# Patient Record
Sex: FEMALE | Race: BLACK OR AFRICAN AMERICAN | Employment: OTHER | URBAN - METROPOLITAN AREA
[De-identification: names, ages, dates, MRNs, and addresses within clinical notes are randomized per-mention and may not be internally consistent; named-entity substitution may affect disease eponyms.]

---

## 2017-04-20 ENCOUNTER — HOSPITAL ENCOUNTER (OUTPATIENT)
Dept: GENERAL RADIOLOGY | Age: 82
Discharge: HOME | End: 2017-04-20
Attending: INTERNAL MEDICINE | Admitting: INTERNAL MEDICINE

## 2017-04-20 LAB
ALT SERPL-CCNC: 24 U/L (ref 10–40)
AST SERPL-CCNC: 24 IU/L (ref 15–37)
BASOPHILS ABSOLUTE: 0 K/CU MM
BASOPHILS RELATIVE PERCENT: 0.3 % (ref 0–1)
BUN BLDV-MCNC: 17 MG/DL (ref 6–23)
CREAT SERPL-MCNC: 1 MG/DL (ref 0.6–1.1)
DIFFERENTIAL TYPE: ABNORMAL
EOSINOPHILS ABSOLUTE: 0.3 K/CU MM
EOSINOPHILS RELATIVE PERCENT: 3 % (ref 0–3)
ERYTHROCYTE SEDIMENTATION RATE: 66 MM/HR (ref 0–30)
GFR AFRICAN AMERICAN: >60 ML/MIN/1.73M2
GFR NON-AFRICAN AMERICAN: 53 ML/MIN/1.73M2
HCT VFR BLD CALC: 35.7 % (ref 37–47)
HEMOGLOBIN: 11.5 GM/DL (ref 12.5–16)
IMMATURE NEUTROPHIL %: 0.3 % (ref 0–0.43)
LYMPHOCYTES ABSOLUTE: 1.9 K/CU MM
LYMPHOCYTES RELATIVE PERCENT: 20.3 % (ref 24–44)
MCH RBC QN AUTO: 34.3 PG (ref 27–31)
MCHC RBC AUTO-ENTMCNC: 32.2 % (ref 32–36)
MCV RBC AUTO: 106.6 FL (ref 78–100)
MONOCYTES ABSOLUTE: 0.7 K/CU MM
MONOCYTES RELATIVE PERCENT: 7.4 % (ref 0–4)
NUCLEATED RBC %: 0 %
PDW BLD-RTO: 13.4 % (ref 11.7–14.9)
PLATELET # BLD: 268 K/CU MM (ref 140–440)
PMV BLD AUTO: 10 FL (ref 7.5–11.1)
RBC # BLD: 3.35 M/CU MM (ref 4.2–5.4)
SEGMENTED NEUTROPHILS ABSOLUTE COUNT: 6.4 K/CU MM
SEGMENTED NEUTROPHILS RELATIVE PERCENT: 68.7 % (ref 36–66)
TOTAL IMMATURE NEUTOROPHIL: 0.03 K/CU MM
TOTAL NUCLEATED RBC: 0 K/CU MM
WBC # BLD: 9.3 K/CU MM (ref 4–10.5)

## 2017-08-18 ENCOUNTER — HOSPITAL ENCOUNTER (OUTPATIENT)
Dept: GENERAL RADIOLOGY | Age: 82
Discharge: HOME | End: 2017-08-18
Attending: INTERNAL MEDICINE | Admitting: INTERNAL MEDICINE

## 2017-08-18 LAB
ALT SERPL-CCNC: 19 U/L (ref 10–40)
AST SERPL-CCNC: 24 IU/L (ref 15–37)
BASOPHILS ABSOLUTE: 0 K/CU MM
BASOPHILS RELATIVE PERCENT: 0.5 % (ref 0–1)
BUN BLDV-MCNC: 15 MG/DL (ref 6–23)
CREAT SERPL-MCNC: 1.1 MG/DL (ref 0.6–1.1)
DIFFERENTIAL TYPE: ABNORMAL
EOSINOPHILS ABSOLUTE: 0.3 K/CU MM
EOSINOPHILS RELATIVE PERCENT: 3.6 % (ref 0–3)
ERYTHROCYTE SEDIMENTATION RATE: 63 MM/HR (ref 0–30)
GFR AFRICAN AMERICAN: 58 ML/MIN/1.73M2
GFR NON-AFRICAN AMERICAN: 48 ML/MIN/1.73M2
HCT VFR BLD CALC: 35.3 % (ref 37–47)
HEMOGLOBIN: 11.2 GM/DL (ref 12.5–16)
IMMATURE NEUTROPHIL %: 0.3 % (ref 0–0.43)
LYMPHOCYTES ABSOLUTE: 2.6 K/CU MM
LYMPHOCYTES RELATIVE PERCENT: 29.7 % (ref 24–44)
MCH RBC QN AUTO: 33.9 PG (ref 27–31)
MCHC RBC AUTO-ENTMCNC: 31.7 % (ref 32–36)
MCV RBC AUTO: 107 FL (ref 78–100)
MONOCYTES ABSOLUTE: 0.9 K/CU MM
MONOCYTES RELATIVE PERCENT: 9.9 % (ref 0–4)
NUCLEATED RBC %: 0 %
PDW BLD-RTO: 14.3 % (ref 11.7–14.9)
PLATELET # BLD: 335 K/CU MM (ref 140–440)
PMV BLD AUTO: 9.5 FL (ref 7.5–11.1)
RBC # BLD: 3.3 M/CU MM (ref 4.2–5.4)
SEGMENTED NEUTROPHILS ABSOLUTE COUNT: 4.9 K/CU MM
SEGMENTED NEUTROPHILS RELATIVE PERCENT: 56 % (ref 36–66)
TOTAL IMMATURE NEUTOROPHIL: 0.03 K/CU MM
TOTAL NUCLEATED RBC: 0 K/CU MM
WBC # BLD: 8.8 K/CU MM (ref 4–10.5)

## 2018-02-19 ENCOUNTER — HOSPITAL ENCOUNTER (OUTPATIENT)
Dept: GENERAL RADIOLOGY | Age: 83
Discharge: HOME | End: 2018-02-19
Attending: INTERNAL MEDICINE | Admitting: INTERNAL MEDICINE

## 2018-02-19 LAB
ALT SERPL-CCNC: 15 U/L (ref 10–40)
AST SERPL-CCNC: 15 IU/L (ref 15–37)
BUN BLDV-MCNC: 21 MG/DL (ref 6–23)
CREAT SERPL-MCNC: 1 MG/DL (ref 0.6–1.1)
ERYTHROCYTE SEDIMENTATION RATE: 39 MM/HR (ref 0–30)
GFR AFRICAN AMERICAN: >60 ML/MIN/1.73M2
GFR NON-AFRICAN AMERICAN: 53 ML/MIN/1.73M2

## 2018-02-27 ENCOUNTER — HOSPITAL ENCOUNTER (OUTPATIENT)
Dept: GENERAL RADIOLOGY | Age: 83
Discharge: HOME | End: 2018-02-27
Attending: INTERNAL MEDICINE | Admitting: INTERNAL MEDICINE

## 2018-02-27 DIAGNOSIS — M25.552 BILATERAL HIP PAIN: ICD-10-CM

## 2018-02-27 DIAGNOSIS — M25.551 BILATERAL HIP PAIN: ICD-10-CM

## 2018-07-02 ENCOUNTER — HOSPITAL ENCOUNTER (OUTPATIENT)
Dept: GENERAL RADIOLOGY | Age: 83
Discharge: HOME | End: 2018-07-02
Attending: INTERNAL MEDICINE | Admitting: INTERNAL MEDICINE

## 2018-07-02 LAB
ALT SERPL-CCNC: 22 U/L (ref 10–40)
AST SERPL-CCNC: 21 IU/L (ref 15–37)
BASOPHILS ABSOLUTE: 0 K/CU MM
BASOPHILS RELATIVE PERCENT: 0.4 % (ref 0–1)
BUN BLDV-MCNC: 20 MG/DL (ref 6–23)
CREAT SERPL-MCNC: 1.1 MG/DL (ref 0.6–1.1)
DIFFERENTIAL TYPE: ABNORMAL
EOSINOPHILS ABSOLUTE: 0.3 K/CU MM
EOSINOPHILS RELATIVE PERCENT: 2.8 % (ref 0–3)
ERYTHROCYTE SEDIMENTATION RATE: 17 MM/HR (ref 0–30)
GFR AFRICAN AMERICAN: 57 ML/MIN/1.73M2
GFR NON-AFRICAN AMERICAN: 47 ML/MIN/1.73M2
HCT VFR BLD CALC: 37.4 % (ref 37–47)
HEMOGLOBIN: 11.6 GM/DL (ref 12.5–16)
IMMATURE NEUTROPHIL %: 0.3 % (ref 0–0.43)
LYMPHOCYTES ABSOLUTE: 3.3 K/CU MM
LYMPHOCYTES RELATIVE PERCENT: 35.8 % (ref 24–44)
MCH RBC QN AUTO: 34.3 PG (ref 27–31)
MCHC RBC AUTO-ENTMCNC: 31 % (ref 32–36)
MCV RBC AUTO: 110.7 FL (ref 78–100)
MONOCYTES ABSOLUTE: 0.9 K/CU MM
MONOCYTES RELATIVE PERCENT: 9.6 % (ref 0–4)
NUCLEATED RBC %: 0.7 %
PDW BLD-RTO: 13.2 % (ref 11.7–14.9)
PLATELET # BLD: 257 K/CU MM (ref 140–440)
PMV BLD AUTO: 10.1 FL (ref 7.5–11.1)
RBC # BLD: 3.38 M/CU MM (ref 4.2–5.4)
SEGMENTED NEUTROPHILS ABSOLUTE COUNT: 4.7 K/CU MM
SEGMENTED NEUTROPHILS RELATIVE PERCENT: 51.1 % (ref 36–66)
TOTAL IMMATURE NEUTOROPHIL: 0.03 K/CU MM
TOTAL NUCLEATED RBC: 0.1 K/CU MM
WBC # BLD: 9.1 K/CU MM (ref 4–10.5)

## 2018-11-28 ENCOUNTER — HOSPITAL ENCOUNTER (EMERGENCY)
Age: 83
Discharge: HOME | End: 2018-11-28
Attending: EMERGENCY MEDICINE
Payer: MEDICARE

## 2018-11-28 VITALS
BODY MASS INDEX: 28.93 KG/M2 | TEMPERATURE: 97.4 F | WEIGHT: 180 LBS | SYSTOLIC BLOOD PRESSURE: 137 MMHG | HEIGHT: 66 IN | DIASTOLIC BLOOD PRESSURE: 72 MMHG | OXYGEN SATURATION: 97 % | HEART RATE: 59 BPM | RESPIRATION RATE: 16 BRPM

## 2018-11-28 DIAGNOSIS — R51.9 RIGHT-SIDED HEADACHE: Primary | ICD-10-CM

## 2018-11-28 LAB
ALBUMIN SERPL-MCNC: 3.6 GM/DL (ref 3.4–5)
ALP BLD-CCNC: 66 IU/L (ref 40–129)
ALT SERPL-CCNC: 20 U/L (ref 10–40)
ANION GAP SERPL CALCULATED.3IONS-SCNC: 12 MMOL/L (ref 4–16)
AST SERPL-CCNC: 15 IU/L (ref 15–37)
BACTERIA: NEGATIVE /HPF
BASOPHILS ABSOLUTE: 0 K/CU MM
BASOPHILS RELATIVE PERCENT: 0.3 % (ref 0–1)
BILIRUB SERPL-MCNC: 0.1 MG/DL (ref 0–1)
BILIRUBIN URINE: NEGATIVE MG/DL
BLOOD, URINE: ABNORMAL
BUN BLDV-MCNC: 25 MG/DL (ref 6–23)
CALCIUM SERPL-MCNC: 9.8 MG/DL (ref 8.3–10.6)
CHLORIDE BLD-SCNC: 103 MMOL/L (ref 99–110)
CLARITY: ABNORMAL
CO2: 26 MMOL/L (ref 21–32)
COLOR: YELLOW
CREAT SERPL-MCNC: 1 MG/DL (ref 0.6–1.1)
DIFFERENTIAL TYPE: ABNORMAL
EOSINOPHILS ABSOLUTE: 0.2 K/CU MM
EOSINOPHILS RELATIVE PERCENT: 1.7 % (ref 0–3)
ERYTHROCYTE SEDIMENTATION RATE: 48 MM/HR (ref 0–30)
GFR AFRICAN AMERICAN: >60 ML/MIN/1.73M2
GFR NON-AFRICAN AMERICAN: 53 ML/MIN/1.73M2
GLUCOSE BLD-MCNC: 152 MG/DL (ref 70–99)
GLUCOSE, URINE: NEGATIVE MG/DL
HCT VFR BLD CALC: 36.1 % (ref 37–47)
HEMOGLOBIN: 11.1 GM/DL (ref 12.5–16)
IMMATURE NEUTROPHIL %: 0.3 % (ref 0–0.43)
KETONES, URINE: NEGATIVE MG/DL
LEUKOCYTE ESTERASE, URINE: ABNORMAL
LYMPHOCYTES ABSOLUTE: 3.5 K/CU MM
LYMPHOCYTES RELATIVE PERCENT: 36.5 % (ref 24–44)
MCH RBC QN AUTO: 33.3 PG (ref 27–31)
MCHC RBC AUTO-ENTMCNC: 30.7 % (ref 32–36)
MCV RBC AUTO: 108.4 FL (ref 78–100)
MONOCYTES ABSOLUTE: 0.8 K/CU MM
MONOCYTES RELATIVE PERCENT: 8.4 % (ref 0–4)
NITRITE URINE, QUANTITATIVE: NEGATIVE
NUCLEATED RBC %: 0 %
PDW BLD-RTO: 13.2 % (ref 11.7–14.9)
PH, URINE: 5 (ref 5–8)
PLATELET # BLD: 275 K/CU MM (ref 140–440)
PMV BLD AUTO: 9.1 FL (ref 7.5–11.1)
POTASSIUM SERPL-SCNC: 3.8 MMOL/L (ref 3.5–5.1)
PROTEIN UA: NEGATIVE MG/DL
RBC # BLD: 3.33 M/CU MM (ref 4.2–5.4)
RBC URINE: 6 /HPF (ref 0–6)
SEGMENTED NEUTROPHILS ABSOLUTE COUNT: 5 K/CU MM
SEGMENTED NEUTROPHILS RELATIVE PERCENT: 52.8 % (ref 36–66)
SODIUM BLD-SCNC: 141 MMOL/L (ref 135–145)
SPECIFIC GRAVITY UA: 1.02 (ref 1–1.03)
SQUAMOUS EPITHELIAL: 5 /HPF
TOTAL IMMATURE NEUTOROPHIL: 0.03 K/CU MM
TOTAL NUCLEATED RBC: 0 K/CU MM
TOTAL PROTEIN: 7 GM/DL (ref 6.4–8.2)
TRICHOMONAS: ABNORMAL /HPF
UROBILINOGEN, URINE: NORMAL MG/DL (ref 0.2–1)
WBC # BLD: 9.5 K/CU MM (ref 4–10.5)
WBC UA: 35 /HPF (ref 0–5)

## 2018-11-28 PROCEDURE — 99284 EMERGENCY DEPT VISIT MOD MDM: CPT

## 2018-11-28 PROCEDURE — 80053 COMPREHEN METABOLIC PANEL: CPT

## 2018-11-28 PROCEDURE — 6370000000 HC RX 637 (ALT 250 FOR IP): Performed by: EMERGENCY MEDICINE

## 2018-11-28 PROCEDURE — 81001 URINALYSIS AUTO W/SCOPE: CPT

## 2018-11-28 PROCEDURE — 85025 COMPLETE CBC W/AUTO DIFF WBC: CPT

## 2018-11-28 PROCEDURE — 85652 RBC SED RATE AUTOMATED: CPT

## 2018-11-28 PROCEDURE — 86141 C-REACTIVE PROTEIN HS: CPT

## 2018-11-28 PROCEDURE — 87086 URINE CULTURE/COLONY COUNT: CPT

## 2018-11-28 PROCEDURE — 36415 COLL VENOUS BLD VENIPUNCTURE: CPT

## 2018-11-28 RX ORDER — METHYLPREDNISOLONE 4 MG/1
TABLET ORAL
Qty: 1 KIT | Refills: 0 | Status: SHIPPED | OUTPATIENT
Start: 2018-11-28 | End: 2019-10-08

## 2018-11-28 RX ORDER — PREDNISONE 20 MG/1
60 TABLET ORAL ONCE
Status: COMPLETED | OUTPATIENT
Start: 2018-11-28 | End: 2018-11-28

## 2018-11-28 RX ORDER — HYDROCODONE BITARTRATE AND ACETAMINOPHEN 5; 325 MG/1; MG/1
1 TABLET ORAL ONCE
Status: COMPLETED | OUTPATIENT
Start: 2018-11-28 | End: 2018-11-28

## 2018-11-28 RX ADMIN — HYDROCODONE BITARTRATE AND ACETAMINOPHEN 1 TABLET: 5; 325 TABLET ORAL at 20:52

## 2018-11-28 RX ADMIN — PREDNISONE 60 MG: 20 TABLET ORAL at 22:06

## 2018-11-28 ASSESSMENT — ENCOUNTER SYMPTOMS
NAUSEA: 1
SORE THROAT: 0
SHORTNESS OF BREATH: 0
ABDOMINAL PAIN: 0
VOMITING: 0
CONSTIPATION: 0
COUGH: 0
EYE REDNESS: 0
DIARRHEA: 0
RHINORRHEA: 0
PHOTOPHOBIA: 0
EYE DISCHARGE: 0
BACK PAIN: 0

## 2018-11-28 ASSESSMENT — PAIN SCALES - GENERAL
PAINLEVEL_OUTOF10: 10
PAINLEVEL_OUTOF10: 10

## 2018-11-28 ASSESSMENT — PAIN DESCRIPTION - ORIENTATION: ORIENTATION: ANTERIOR;RIGHT

## 2018-11-28 ASSESSMENT — PAIN DESCRIPTION - LOCATION: LOCATION: HEAD

## 2018-11-28 ASSESSMENT — PAIN DESCRIPTION - FREQUENCY: FREQUENCY: CONTINUOUS

## 2018-11-28 ASSESSMENT — PAIN DESCRIPTION - ONSET: ONSET: GRADUAL

## 2018-11-28 ASSESSMENT — PAIN DESCRIPTION - DESCRIPTORS: DESCRIPTORS: CONSTANT

## 2018-11-28 ASSESSMENT — PAIN DESCRIPTION - PAIN TYPE: TYPE: ACUTE PAIN;CHRONIC PAIN

## 2018-11-29 LAB — HIGH SENSITIVE C-REACTIVE PROTEIN: 6.1 MG/L

## 2018-11-30 LAB
CULTURE: NORMAL
Lab: NORMAL
REPORT STATUS: NORMAL
SPECIMEN: NORMAL
TOTAL COLONY COUNT: NORMAL

## 2019-02-05 ENCOUNTER — HOSPITAL ENCOUNTER (OUTPATIENT)
Age: 84
Discharge: HOME | End: 2019-02-05
Payer: MEDICARE

## 2019-02-05 LAB
ALT SERPL-CCNC: 16 U/L (ref 10–40)
ANISOCYTOSIS: ABNORMAL
AST SERPL-CCNC: 22 IU/L (ref 15–37)
BASOPHILS ABSOLUTE: 0 K/CU MM
BASOPHILS RELATIVE PERCENT: 0.4 % (ref 0–1)
BUN BLDV-MCNC: 27 MG/DL (ref 6–23)
CREAT SERPL-MCNC: 1 MG/DL (ref 0.6–1.1)
DIFFERENTIAL TYPE: ABNORMAL
DIFFERENTIAL TYPE: ABNORMAL
EOSINOPHILS ABSOLUTE: 0.1 K/CU MM
EOSINOPHILS RELATIVE PERCENT: 1.5 % (ref 0–3)
GFR AFRICAN AMERICAN: >60 ML/MIN/1.73M2
GFR NON-AFRICAN AMERICAN: 53 ML/MIN/1.73M2
HCT VFR BLD CALC: 37.5 % (ref 37–47)
HEMOGLOBIN: 11.3 GM/DL (ref 12.5–16)
IMMATURE NEUTROPHIL %: 0.4 % (ref 0–0.43)
LYMPHOCYTES ABSOLUTE: 3.7 K/CU MM
LYMPHOCYTES RELATIVE PERCENT: 44.5 % (ref 24–44)
MACROCYTES: ABNORMAL
MCH RBC QN AUTO: 34 PG (ref 27–31)
MCHC RBC AUTO-ENTMCNC: 30.1 % (ref 32–36)
MCV RBC AUTO: 113 FL (ref 78–100)
MONOCYTES ABSOLUTE: 0.9 K/CU MM
MONOCYTES RELATIVE PERCENT: 10.3 % (ref 0–4)
NUCLEATED RBC %: 0 %
PDW BLD-RTO: 13.1 % (ref 11.7–14.9)
PLATELET # BLD: 281 K/CU MM (ref 140–440)
PMV BLD AUTO: 9.8 FL (ref 7.5–11.1)
RBC # BLD: 3.32 M/CU MM (ref 4.2–5.4)
SEGMENTED NEUTROPHILS ABSOLUTE COUNT: 3.6 K/CU MM
SEGMENTED NEUTROPHILS RELATIVE PERCENT: 42.9 % (ref 36–66)
TOTAL IMMATURE NEUTOROPHIL: 0.03 K/CU MM
TOTAL NUCLEATED RBC: 0 K/CU MM
WBC # BLD: 8.3 K/CU MM (ref 4–10.5)

## 2019-02-05 PROCEDURE — 82565 ASSAY OF CREATININE: CPT

## 2019-02-05 PROCEDURE — 84450 TRANSFERASE (AST) (SGOT): CPT

## 2019-02-05 PROCEDURE — 36415 COLL VENOUS BLD VENIPUNCTURE: CPT

## 2019-02-05 PROCEDURE — 85025 COMPLETE CBC W/AUTO DIFF WBC: CPT

## 2019-02-05 PROCEDURE — 84520 ASSAY OF UREA NITROGEN: CPT

## 2019-02-05 PROCEDURE — 84460 ALANINE AMINO (ALT) (SGPT): CPT

## 2019-10-08 ENCOUNTER — APPOINTMENT (OUTPATIENT)
Dept: CT IMAGING | Age: 84
End: 2019-10-08
Payer: COMMERCIAL

## 2019-10-08 ENCOUNTER — HOSPITAL ENCOUNTER (EMERGENCY)
Age: 84
Discharge: HOME | End: 2019-10-08
Attending: EMERGENCY MEDICINE
Payer: COMMERCIAL

## 2019-10-08 VITALS
HEART RATE: 62 BPM | RESPIRATION RATE: 19 BRPM | DIASTOLIC BLOOD PRESSURE: 78 MMHG | OXYGEN SATURATION: 99 % | HEIGHT: 66 IN | WEIGHT: 180 LBS | BODY MASS INDEX: 28.93 KG/M2 | SYSTOLIC BLOOD PRESSURE: 163 MMHG | TEMPERATURE: 98.4 F

## 2019-10-08 DIAGNOSIS — E04.1 THYROID NODULE: ICD-10-CM

## 2019-10-08 DIAGNOSIS — S09.90XA CLOSED HEAD INJURY, INITIAL ENCOUNTER: ICD-10-CM

## 2019-10-08 DIAGNOSIS — S00.03XA HEMATOMA OF SCALP, INITIAL ENCOUNTER: ICD-10-CM

## 2019-10-08 DIAGNOSIS — S01.01XA LACERATION OF SCALP, INITIAL ENCOUNTER: Primary | ICD-10-CM

## 2019-10-08 PROCEDURE — 70486 CT MAXILLOFACIAL W/O DYE: CPT

## 2019-10-08 PROCEDURE — 70450 CT HEAD/BRAIN W/O DYE: CPT

## 2019-10-08 PROCEDURE — 90715 TDAP VACCINE 7 YRS/> IM: CPT | Performed by: EMERGENCY MEDICINE

## 2019-10-08 PROCEDURE — 6360000002 HC RX W HCPCS: Performed by: EMERGENCY MEDICINE

## 2019-10-08 PROCEDURE — 72125 CT NECK SPINE W/O DYE: CPT

## 2019-10-08 PROCEDURE — 99284 EMERGENCY DEPT VISIT MOD MDM: CPT

## 2019-10-08 PROCEDURE — 4500000027

## 2019-10-08 PROCEDURE — 6370000000 HC RX 637 (ALT 250 FOR IP): Performed by: EMERGENCY MEDICINE

## 2019-10-08 PROCEDURE — 90471 IMMUNIZATION ADMIN: CPT | Performed by: EMERGENCY MEDICINE

## 2019-10-08 RX ORDER — ACETAMINOPHEN 325 MG/1
650 TABLET ORAL ONCE
Status: COMPLETED | OUTPATIENT
Start: 2019-10-08 | End: 2019-10-08

## 2019-10-08 RX ORDER — TRAMADOL HYDROCHLORIDE 50 MG/1
50 TABLET ORAL ONCE
Status: DISCONTINUED | OUTPATIENT
Start: 2019-10-08 | End: 2019-10-08

## 2019-10-08 RX ADMIN — TETANUS TOXOID, REDUCED DIPHTHERIA TOXOID AND ACELLULAR PERTUSSIS VACCINE, ADSORBED 0.5 ML: 5; 2.5; 8; 8; 2.5 SUSPENSION INTRAMUSCULAR at 17:38

## 2019-10-08 RX ADMIN — ACETAMINOPHEN 650 MG: 325 TABLET ORAL at 18:56

## 2019-10-08 ASSESSMENT — PAIN SCALES - GENERAL
PAINLEVEL_OUTOF10: 5
PAINLEVEL_OUTOF10: 6

## 2019-10-08 ASSESSMENT — PAIN DESCRIPTION - PAIN TYPE: TYPE: ACUTE PAIN

## 2019-10-22 ASSESSMENT — ENCOUNTER SYMPTOMS
VOMITING: 0
CONSTIPATION: 0
SORE THROAT: 0
ABDOMINAL PAIN: 0
SHORTNESS OF BREATH: 0
SINUS PRESSURE: 0
COUGH: 0
NAUSEA: 0
WHEEZING: 0
DIARRHEA: 0
RHINORRHEA: 0

## 2019-10-30 ENCOUNTER — HOSPITAL ENCOUNTER (OUTPATIENT)
Age: 84
Discharge: HOME | End: 2019-10-30
Payer: COMMERCIAL

## 2019-10-30 ENCOUNTER — HOSPITAL ENCOUNTER (OUTPATIENT)
Dept: ULTRASOUND IMAGING | Age: 84
Discharge: HOME | End: 2019-10-30
Payer: COMMERCIAL

## 2019-10-30 DIAGNOSIS — E04.1 THYROID NODULE: ICD-10-CM

## 2019-10-30 LAB
ALT SERPL-CCNC: 15 U/L (ref 10–40)
AST SERPL-CCNC: 16 IU/L (ref 15–37)
BASOPHILS ABSOLUTE: 0 K/CU MM
BASOPHILS RELATIVE PERCENT: 0.4 % (ref 0–1)
C-REACTIVE PROTEIN, HIGH SENSITIVITY: 3.1 MG/L
DIFFERENTIAL TYPE: ABNORMAL
EOSINOPHILS ABSOLUTE: 0.1 K/CU MM
EOSINOPHILS RELATIVE PERCENT: 1.4 % (ref 0–3)
ERYTHROCYTE SEDIMENTATION RATE: 32 MM/HR (ref 0–30)
GAMMA GLUTAMYL TRANSFERASE: 71 IU/L (ref 5–36)
HCT VFR BLD CALC: 33.3 % (ref 37–47)
HEMOGLOBIN: 10.3 GM/DL (ref 12.5–16)
IMMATURE NEUTROPHIL %: 0.1 % (ref 0–0.43)
LYMPHOCYTES ABSOLUTE: 3.2 K/CU MM
LYMPHOCYTES RELATIVE PERCENT: 40.2 % (ref 24–44)
MCH RBC QN AUTO: 33.9 PG (ref 27–31)
MCHC RBC AUTO-ENTMCNC: 30.9 % (ref 32–36)
MCV RBC AUTO: 109.5 FL (ref 78–100)
MONOCYTES ABSOLUTE: 0.4 K/CU MM
MONOCYTES RELATIVE PERCENT: 4.4 % (ref 0–4)
NUCLEATED RBC %: 0 %
PDW BLD-RTO: 13.1 % (ref 11.7–14.9)
PLATELET # BLD: 314 K/CU MM (ref 140–440)
PMV BLD AUTO: 9.2 FL (ref 7.5–11.1)
RBC # BLD: 3.04 M/CU MM (ref 4.2–5.4)
SEGMENTED NEUTROPHILS ABSOLUTE COUNT: 4.3 K/CU MM
SEGMENTED NEUTROPHILS RELATIVE PERCENT: 53.5 % (ref 36–66)
TOTAL IMMATURE NEUTOROPHIL: 0.01 K/CU MM
TOTAL NUCLEATED RBC: 0 K/CU MM
WBC # BLD: 8 K/CU MM (ref 4–10.5)

## 2019-10-30 PROCEDURE — 76536 US EXAM OF HEAD AND NECK: CPT

## 2019-10-30 PROCEDURE — 86140 C-REACTIVE PROTEIN: CPT

## 2019-10-30 PROCEDURE — 84460 ALANINE AMINO (ALT) (SGPT): CPT

## 2019-10-30 PROCEDURE — 85025 COMPLETE CBC W/AUTO DIFF WBC: CPT

## 2019-10-30 PROCEDURE — 82977 ASSAY OF GGT: CPT

## 2019-10-30 PROCEDURE — 85652 RBC SED RATE AUTOMATED: CPT

## 2019-10-30 PROCEDURE — 84450 TRANSFERASE (AST) (SGOT): CPT

## 2019-10-30 PROCEDURE — 36415 COLL VENOUS BLD VENIPUNCTURE: CPT

## 2020-02-06 ENCOUNTER — HOSPITAL ENCOUNTER (OUTPATIENT)
Age: 85
Discharge: HOME | End: 2020-02-06
Payer: COMMERCIAL

## 2020-02-06 LAB
T4 FREE: 0.77 NG/DL (ref 0.9–1.8)
TSH HIGH SENSITIVITY: 0.99 UIU/ML (ref 0.27–4.2)

## 2020-02-06 PROCEDURE — 84439 ASSAY OF FREE THYROXINE: CPT

## 2020-02-06 PROCEDURE — 84443 ASSAY THYROID STIM HORMONE: CPT

## 2020-02-06 PROCEDURE — 36415 COLL VENOUS BLD VENIPUNCTURE: CPT

## 2020-06-02 ENCOUNTER — HOSPITAL ENCOUNTER (OUTPATIENT)
Age: 85
Discharge: HOME | End: 2020-06-02
Payer: COMMERCIAL

## 2020-06-02 LAB
ALT SERPL-CCNC: 14 U/L (ref 10–40)
AST SERPL-CCNC: 15 IU/L (ref 15–37)
BASOPHILS ABSOLUTE: 0.1 K/CU MM
BASOPHILS RELATIVE PERCENT: 0.7 % (ref 0–1)
BUN BLDV-MCNC: 21 MG/DL (ref 6–23)
C-REACTIVE PROTEIN, HIGH SENSITIVITY: 4.5 MG/L
CREAT SERPL-MCNC: 1 MG/DL (ref 0.6–1.1)
DIFFERENTIAL TYPE: ABNORMAL
DIFFERENTIAL TYPE: ABNORMAL
EOSINOPHILS ABSOLUTE: 0.2 K/CU MM
EOSINOPHILS RELATIVE PERCENT: 3.1 % (ref 0–3)
ERYTHROCYTE SEDIMENTATION RATE: 41 MM/HR (ref 0–30)
GAMMA GLUTAMYL TRANSFERASE: 78 IU/L (ref 5–36)
GFR AFRICAN AMERICAN: >60 ML/MIN/1.73M2
GFR NON-AFRICAN AMERICAN: 53 ML/MIN/1.73M2
HCT VFR BLD CALC: 34.7 % (ref 37–47)
HEMOGLOBIN: 10.9 GM/DL (ref 12.5–16)
IMMATURE NEUTROPHIL %: 0.1 % (ref 0–0.43)
LYMPHOCYTES ABSOLUTE: 3 K/CU MM
LYMPHOCYTES RELATIVE PERCENT: 39.5 % (ref 24–44)
MACROCYTES: ABNORMAL
MCH RBC QN AUTO: 35 PG (ref 27–31)
MCHC RBC AUTO-ENTMCNC: 31.4 % (ref 32–36)
MCV RBC AUTO: 111.6 FL (ref 78–100)
MONOCYTES ABSOLUTE: 0.8 K/CU MM
MONOCYTES RELATIVE PERCENT: 10 % (ref 0–4)
NUCLEATED RBC %: 0 %
PDW BLD-RTO: 13.3 % (ref 11.7–14.9)
PLATELET # BLD: 302 K/CU MM (ref 140–440)
PMV BLD AUTO: 9.3 FL (ref 7.5–11.1)
RBC # BLD: 3.11 M/CU MM (ref 4.2–5.4)
RBC # BLD: ABNORMAL 10*6/UL
SEGMENTED NEUTROPHILS ABSOLUTE COUNT: 3.4 K/CU MM
SEGMENTED NEUTROPHILS RELATIVE PERCENT: 46.6 % (ref 36–66)
TOTAL IMMATURE NEUTOROPHIL: 0.01 K/CU MM
TOTAL NUCLEATED RBC: 0 K/CU MM
WBC # BLD: 7.5 K/CU MM (ref 4–10.5)

## 2020-06-02 PROCEDURE — 86140 C-REACTIVE PROTEIN: CPT

## 2020-06-02 PROCEDURE — 85025 COMPLETE CBC W/AUTO DIFF WBC: CPT

## 2020-06-02 PROCEDURE — 36415 COLL VENOUS BLD VENIPUNCTURE: CPT

## 2020-06-02 PROCEDURE — 84460 ALANINE AMINO (ALT) (SGPT): CPT

## 2020-06-02 PROCEDURE — 82565 ASSAY OF CREATININE: CPT

## 2020-06-02 PROCEDURE — 82977 ASSAY OF GGT: CPT

## 2020-06-02 PROCEDURE — 84520 ASSAY OF UREA NITROGEN: CPT

## 2020-06-02 PROCEDURE — 85652 RBC SED RATE AUTOMATED: CPT

## 2020-06-02 PROCEDURE — 84450 TRANSFERASE (AST) (SGOT): CPT

## 2020-11-04 ENCOUNTER — HOSPITAL ENCOUNTER (OUTPATIENT)
Age: 85
Discharge: HOME | End: 2020-11-04
Payer: COMMERCIAL

## 2020-11-04 LAB
ALT SERPL-CCNC: 10 U/L (ref 10–40)
AST SERPL-CCNC: 13 IU/L (ref 15–37)
BASOPHILS ABSOLUTE: 0 K/CU MM
BASOPHILS RELATIVE PERCENT: 0.5 % (ref 0–1)
BUN BLDV-MCNC: 20 MG/DL (ref 6–23)
CREAT SERPL-MCNC: 1 MG/DL (ref 0.6–1.1)
DIFFERENTIAL TYPE: ABNORMAL
EOSINOPHILS ABSOLUTE: 0.3 K/CU MM
EOSINOPHILS RELATIVE PERCENT: 4 % (ref 0–3)
ERYTHROCYTE SEDIMENTATION RATE: 30 MM/HR (ref 0–30)
GFR AFRICAN AMERICAN: >60 ML/MIN/1.73M2
GFR NON-AFRICAN AMERICAN: 53 ML/MIN/1.73M2
HCT VFR BLD CALC: 34.6 % (ref 37–47)
HEMOGLOBIN: 10.6 GM/DL (ref 12.5–16)
IMMATURE NEUTROPHIL %: 0.3 % (ref 0–0.43)
LYMPHOCYTES ABSOLUTE: 2.3 K/CU MM
LYMPHOCYTES RELATIVE PERCENT: 31 % (ref 24–44)
MCH RBC QN AUTO: 34.4 PG (ref 27–31)
MCHC RBC AUTO-ENTMCNC: 30.6 % (ref 32–36)
MCV RBC AUTO: 112.3 FL (ref 78–100)
MONOCYTES ABSOLUTE: 0.9 K/CU MM
MONOCYTES RELATIVE PERCENT: 11.4 % (ref 0–4)
NUCLEATED RBC %: 0 %
PDW BLD-RTO: 13.5 % (ref 11.7–14.9)
PLATELET # BLD: 289 K/CU MM (ref 140–440)
PMV BLD AUTO: 9.4 FL (ref 7.5–11.1)
RBC # BLD: 3.08 M/CU MM (ref 4.2–5.4)
SEGMENTED NEUTROPHILS ABSOLUTE COUNT: 4 K/CU MM
SEGMENTED NEUTROPHILS RELATIVE PERCENT: 52.8 % (ref 36–66)
TOTAL IMMATURE NEUTOROPHIL: 0.02 K/CU MM
TOTAL NUCLEATED RBC: 0 K/CU MM
WBC # BLD: 7.6 K/CU MM (ref 4–10.5)

## 2020-11-04 PROCEDURE — 36415 COLL VENOUS BLD VENIPUNCTURE: CPT

## 2020-11-04 PROCEDURE — 84460 ALANINE AMINO (ALT) (SGPT): CPT

## 2020-11-04 PROCEDURE — 84520 ASSAY OF UREA NITROGEN: CPT

## 2020-11-04 PROCEDURE — 85652 RBC SED RATE AUTOMATED: CPT

## 2020-11-04 PROCEDURE — 85025 COMPLETE CBC W/AUTO DIFF WBC: CPT

## 2020-11-04 PROCEDURE — 84450 TRANSFERASE (AST) (SGOT): CPT

## 2020-11-04 PROCEDURE — 82565 ASSAY OF CREATININE: CPT

## 2021-03-23 ENCOUNTER — HOSPITAL ENCOUNTER (OUTPATIENT)
Age: 86
Discharge: HOME | End: 2021-03-23
Payer: COMMERCIAL

## 2021-03-23 LAB
ALT SERPL-CCNC: 12 U/L (ref 10–40)
AST SERPL-CCNC: 16 IU/L (ref 15–37)
BASOPHILS ABSOLUTE: 0 K/CU MM
BASOPHILS RELATIVE PERCENT: 0.4 % (ref 0–1)
BUN BLDV-MCNC: 29 MG/DL (ref 6–23)
CREAT SERPL-MCNC: 1.1 MG/DL (ref 0.6–1.1)
DIFFERENTIAL TYPE: ABNORMAL
EOSINOPHILS ABSOLUTE: 0.2 K/CU MM
EOSINOPHILS RELATIVE PERCENT: 2.3 % (ref 0–3)
ERYTHROCYTE SEDIMENTATION RATE: 53 MM/HR (ref 0–30)
GFR AFRICAN AMERICAN: 57 ML/MIN/1.73M2
GFR NON-AFRICAN AMERICAN: 47 ML/MIN/1.73M2
HCT VFR BLD CALC: 32 % (ref 37–47)
HEMOGLOBIN: 10.1 GM/DL (ref 12.5–16)
IMMATURE NEUTROPHIL %: 0.4 % (ref 0–0.43)
LYMPHOCYTES ABSOLUTE: 3.1 K/CU MM
LYMPHOCYTES RELATIVE PERCENT: 42.3 % (ref 24–44)
MCH RBC QN AUTO: 36.5 PG (ref 27–31)
MCHC RBC AUTO-ENTMCNC: 31.6 % (ref 32–36)
MCV RBC AUTO: 115.5 FL (ref 78–100)
MONOCYTES ABSOLUTE: 0.5 K/CU MM
MONOCYTES RELATIVE PERCENT: 7.1 % (ref 0–4)
NUCLEATED RBC %: 0 %
PDW BLD-RTO: 15.8 % (ref 11.7–14.9)
PLATELET # BLD: 363 K/CU MM (ref 140–440)
PMV BLD AUTO: 9.3 FL (ref 7.5–11.1)
RBC # BLD: 2.77 M/CU MM (ref 4.2–5.4)
SEGMENTED NEUTROPHILS ABSOLUTE COUNT: 3.5 K/CU MM
SEGMENTED NEUTROPHILS RELATIVE PERCENT: 47.5 % (ref 36–66)
TOTAL IMMATURE NEUTOROPHIL: 0.03 K/CU MM
TOTAL NUCLEATED RBC: 0 K/CU MM
WBC # BLD: 7.4 K/CU MM (ref 4–10.5)

## 2021-03-23 PROCEDURE — 36415 COLL VENOUS BLD VENIPUNCTURE: CPT

## 2021-03-23 PROCEDURE — 84520 ASSAY OF UREA NITROGEN: CPT

## 2021-03-23 PROCEDURE — 84450 TRANSFERASE (AST) (SGOT): CPT

## 2021-03-23 PROCEDURE — 85652 RBC SED RATE AUTOMATED: CPT

## 2021-03-23 PROCEDURE — 82565 ASSAY OF CREATININE: CPT

## 2021-03-23 PROCEDURE — 85025 COMPLETE CBC W/AUTO DIFF WBC: CPT

## 2021-03-23 PROCEDURE — 84460 ALANINE AMINO (ALT) (SGPT): CPT

## 2021-08-09 ENCOUNTER — HOSPITAL ENCOUNTER (OUTPATIENT)
Age: 86
Discharge: HOME | End: 2021-08-09
Payer: COMMERCIAL

## 2021-08-09 LAB
ALT SERPL-CCNC: 11 U/L (ref 10–40)
AST SERPL-CCNC: 11 IU/L (ref 15–37)
BASOPHILS ABSOLUTE: 0 K/CU MM
BASOPHILS RELATIVE PERCENT: 0.4 % (ref 0–1)
BUN BLDV-MCNC: 29 MG/DL (ref 6–23)
CREAT SERPL-MCNC: 1 MG/DL (ref 0.6–1.1)
DIFFERENTIAL TYPE: ABNORMAL
EOSINOPHILS ABSOLUTE: 0.1 K/CU MM
EOSINOPHILS RELATIVE PERCENT: 1.3 % (ref 0–3)
GFR AFRICAN AMERICAN: >60 ML/MIN/1.73M2
GFR NON-AFRICAN AMERICAN: 53 ML/MIN/1.73M2
HCT VFR BLD CALC: 33.9 % (ref 37–47)
HEMOGLOBIN: 10.2 GM/DL (ref 12.5–16)
IMMATURE NEUTROPHIL %: 0.3 % (ref 0–0.43)
LYMPHOCYTES ABSOLUTE: 3 K/CU MM
LYMPHOCYTES RELATIVE PERCENT: 40.3 % (ref 24–44)
MCH RBC QN AUTO: 36.2 PG (ref 27–31)
MCHC RBC AUTO-ENTMCNC: 30.1 % (ref 32–36)
MCV RBC AUTO: 120.2 FL (ref 78–100)
MONOCYTES ABSOLUTE: 0.7 K/CU MM
MONOCYTES RELATIVE PERCENT: 9.8 % (ref 0–4)
NUCLEATED RBC %: 0 %
PDW BLD-RTO: 13.5 % (ref 11.7–14.9)
PLATELET # BLD: 329 K/CU MM (ref 140–440)
PMV BLD AUTO: 9.1 FL (ref 7.5–11.1)
RBC # BLD: 2.82 M/CU MM (ref 4.2–5.4)
SEGMENTED NEUTROPHILS ABSOLUTE COUNT: 3.6 K/CU MM
SEGMENTED NEUTROPHILS RELATIVE PERCENT: 47.9 % (ref 36–66)
TOTAL IMMATURE NEUTOROPHIL: 0.02 K/CU MM
TOTAL NUCLEATED RBC: 0 K/CU MM
WBC # BLD: 7.4 K/CU MM (ref 4–10.5)

## 2021-08-09 PROCEDURE — 82565 ASSAY OF CREATININE: CPT

## 2021-08-09 PROCEDURE — 84460 ALANINE AMINO (ALT) (SGPT): CPT

## 2021-08-09 PROCEDURE — 84520 ASSAY OF UREA NITROGEN: CPT

## 2021-08-09 PROCEDURE — 85025 COMPLETE CBC W/AUTO DIFF WBC: CPT

## 2021-08-09 PROCEDURE — 84450 TRANSFERASE (AST) (SGOT): CPT

## 2021-08-09 PROCEDURE — 85652 RBC SED RATE AUTOMATED: CPT

## 2021-08-09 PROCEDURE — 36415 COLL VENOUS BLD VENIPUNCTURE: CPT

## 2021-08-10 LAB — ERYTHROCYTE SEDIMENTATION RATE: 20 MM/HR (ref 0–30)

## 2022-01-31 ENCOUNTER — APPOINTMENT (OUTPATIENT)
Dept: CT IMAGING | Age: 87
End: 2022-01-31
Payer: COMMERCIAL

## 2022-01-31 ENCOUNTER — APPOINTMENT (OUTPATIENT)
Dept: GENERAL RADIOLOGY | Age: 87
End: 2022-01-31
Payer: COMMERCIAL

## 2022-01-31 ENCOUNTER — HOSPITAL ENCOUNTER (OUTPATIENT)
Age: 87
Setting detail: OBSERVATION
Discharge: NURSING, REHAB OR LONG-TERM CARE | End: 2022-02-12
Attending: EMERGENCY MEDICINE | Admitting: FAMILY MEDICINE
Payer: COMMERCIAL

## 2022-01-31 DIAGNOSIS — R41.0 CONFUSION: ICD-10-CM

## 2022-01-31 DIAGNOSIS — N17.9 AKI (ACUTE KIDNEY INJURY) (HCC): ICD-10-CM

## 2022-01-31 DIAGNOSIS — U07.1 COVID-19: Primary | ICD-10-CM

## 2022-01-31 DIAGNOSIS — R77.8 ELEVATED TROPONIN: ICD-10-CM

## 2022-01-31 DIAGNOSIS — M79.10 MYALGIA: ICD-10-CM

## 2022-01-31 DIAGNOSIS — R53.1 GENERAL WEAKNESS: ICD-10-CM

## 2022-01-31 PROBLEM — R79.89 TROPONIN I ABOVE REFERENCE RANGE: Status: ACTIVE | Noted: 2022-01-31

## 2022-01-31 LAB
ALBUMIN SERPL-MCNC: 2.8 GM/DL (ref 3.4–5)
ALP BLD-CCNC: 64 IU/L (ref 40–129)
ALT SERPL-CCNC: 23 U/L (ref 10–40)
ANION GAP SERPL CALCULATED.3IONS-SCNC: 12 MMOL/L (ref 4–16)
AST SERPL-CCNC: 50 IU/L (ref 15–37)
BASE EXCESS MIXED: 0.3 (ref 0–2.3)
BASOPHILS ABSOLUTE: 0 K/CU MM
BASOPHILS RELATIVE PERCENT: 0.1 % (ref 0–1)
BILIRUB SERPL-MCNC: 0.2 MG/DL (ref 0–1)
BUN BLDV-MCNC: 46 MG/DL (ref 6–23)
CALCIUM SERPL-MCNC: 9.2 MG/DL (ref 8.3–10.6)
CHLORIDE BLD-SCNC: 101 MMOL/L (ref 99–110)
CO2: 20 MMOL/L (ref 21–32)
CREAT SERPL-MCNC: 1.7 MG/DL (ref 0.6–1.1)
D DIMER: 416 NG/ML(DDU)
DIFFERENTIAL TYPE: ABNORMAL
EKG ATRIAL RATE: 56 BPM
EKG DIAGNOSIS: NORMAL
EKG P AXIS: 52 DEGREES
EKG P-R INTERVAL: 164 MS
EKG Q-T INTERVAL: 470 MS
EKG QRS DURATION: 114 MS
EKG QTC CALCULATION (BAZETT): 453 MS
EKG R AXIS: -41 DEGREES
EKG T AXIS: -69 DEGREES
EKG VENTRICULAR RATE: 56 BPM
EOSINOPHILS ABSOLUTE: 0 K/CU MM
EOSINOPHILS RELATIVE PERCENT: 0.5 % (ref 0–3)
FERRITIN: 1468 NG/ML (ref 15–150)
GFR AFRICAN AMERICAN: 34 ML/MIN/1.73M2
GFR NON-AFRICAN AMERICAN: 28 ML/MIN/1.73M2
GLUCOSE BLD-MCNC: 128 MG/DL (ref 70–99)
GLUCOSE BLD-MCNC: 81 MG/DL (ref 70–99)
HCO3 VENOUS: 23.3 MMOL/L (ref 19–25)
HCT VFR BLD CALC: 33.6 % (ref 37–47)
HEMOGLOBIN: 10.8 GM/DL (ref 12.5–16)
HIGH SENSITIVE C-REACTIVE PROTEIN: 119.9 MG/L
IMMATURE NEUTROPHIL %: 0.4 % (ref 0–0.43)
LACTATE: 1.4 MMOL/L (ref 0.4–2)
LYMPHOCYTES ABSOLUTE: 1 K/CU MM
LYMPHOCYTES RELATIVE PERCENT: 13.3 % (ref 24–44)
MCH RBC QN AUTO: 34.8 PG (ref 27–31)
MCHC RBC AUTO-ENTMCNC: 32.1 % (ref 32–36)
MCV RBC AUTO: 108.4 FL (ref 78–100)
MONOCYTES ABSOLUTE: 0.3 K/CU MM
MONOCYTES RELATIVE PERCENT: 4.4 % (ref 0–4)
NUCLEATED RBC %: 0 %
O2 SAT, VEN: 94 % (ref 50–70)
PCO2, VEN: 32 MMHG (ref 38–52)
PDW BLD-RTO: 13.7 % (ref 11.7–14.9)
PH VENOUS: 7.47 (ref 7.32–7.42)
PLATELET # BLD: 238 K/CU MM (ref 140–440)
PMV BLD AUTO: 9.7 FL (ref 7.5–11.1)
PO2, VEN: 95 MMHG (ref 28–48)
POTASSIUM SERPL-SCNC: 4.6 MMOL/L (ref 3.5–5.1)
PRO-BNP: 733.4 PG/ML
PROCALCITONIN: 0.13
PROCALCITONIN: 0.13
RBC # BLD: 3.1 M/CU MM (ref 4.2–5.4)
SARS-COV-2, NAAT: DETECTED
SEGMENTED NEUTROPHILS ABSOLUTE COUNT: 5.9 K/CU MM
SEGMENTED NEUTROPHILS RELATIVE PERCENT: 81.3 % (ref 36–66)
SODIUM BLD-SCNC: 133 MMOL/L (ref 135–145)
SOURCE: ABNORMAL
TOTAL IMMATURE NEUTOROPHIL: 0.03 K/CU MM
TOTAL NUCLEATED RBC: 0 K/CU MM
TOTAL PROTEIN: 6.4 GM/DL (ref 6.4–8.2)
TROPONIN T: 0.03 NG/ML
TROPONIN T: 0.05 NG/ML
TROPONIN T: 0.05 NG/ML
WBC # BLD: 7.3 K/CU MM (ref 4–10.5)

## 2022-01-31 PROCEDURE — 84484 ASSAY OF TROPONIN QUANT: CPT

## 2022-01-31 PROCEDURE — 2580000003 HC RX 258: Performed by: EMERGENCY MEDICINE

## 2022-01-31 PROCEDURE — 36415 COLL VENOUS BLD VENIPUNCTURE: CPT

## 2022-01-31 PROCEDURE — 85025 COMPLETE CBC W/AUTO DIFF WBC: CPT

## 2022-01-31 PROCEDURE — 82728 ASSAY OF FERRITIN: CPT

## 2022-01-31 PROCEDURE — 84145 PROCALCITONIN (PCT): CPT

## 2022-01-31 PROCEDURE — 80053 COMPREHEN METABOLIC PANEL: CPT

## 2022-01-31 PROCEDURE — 6370000000 HC RX 637 (ALT 250 FOR IP): Performed by: EMERGENCY MEDICINE

## 2022-01-31 PROCEDURE — 86141 C-REACTIVE PROTEIN HS: CPT

## 2022-01-31 PROCEDURE — 82805 BLOOD GASES W/O2 SATURATION: CPT

## 2022-01-31 PROCEDURE — 93010 ELECTROCARDIOGRAM REPORT: CPT | Performed by: INTERNAL MEDICINE

## 2022-01-31 PROCEDURE — 83880 ASSAY OF NATRIURETIC PEPTIDE: CPT

## 2022-01-31 PROCEDURE — G0378 HOSPITAL OBSERVATION PER HR: HCPCS

## 2022-01-31 PROCEDURE — 96360 HYDRATION IV INFUSION INIT: CPT

## 2022-01-31 PROCEDURE — 94761 N-INVAS EAR/PLS OXIMETRY MLT: CPT

## 2022-01-31 PROCEDURE — 2580000003 HC RX 258: Performed by: PHYSICIAN ASSISTANT

## 2022-01-31 PROCEDURE — 99285 EMERGENCY DEPT VISIT HI MDM: CPT

## 2022-01-31 PROCEDURE — 6370000000 HC RX 637 (ALT 250 FOR IP): Performed by: PHYSICIAN ASSISTANT

## 2022-01-31 PROCEDURE — 87635 SARS-COV-2 COVID-19 AMP PRB: CPT

## 2022-01-31 PROCEDURE — 85379 FIBRIN DEGRADATION QUANT: CPT

## 2022-01-31 PROCEDURE — 6360000002 HC RX W HCPCS: Performed by: PHYSICIAN ASSISTANT

## 2022-01-31 PROCEDURE — 96361 HYDRATE IV INFUSION ADD-ON: CPT

## 2022-01-31 PROCEDURE — 93005 ELECTROCARDIOGRAM TRACING: CPT | Performed by: PHYSICIAN ASSISTANT

## 2022-01-31 PROCEDURE — 83605 ASSAY OF LACTIC ACID: CPT

## 2022-01-31 PROCEDURE — 70450 CT HEAD/BRAIN W/O DYE: CPT

## 2022-01-31 PROCEDURE — 82962 GLUCOSE BLOOD TEST: CPT

## 2022-01-31 PROCEDURE — 96372 THER/PROPH/DIAG INJ SC/IM: CPT

## 2022-01-31 PROCEDURE — 99205 OFFICE O/P NEW HI 60 MIN: CPT | Performed by: INTERNAL MEDICINE

## 2022-01-31 PROCEDURE — 71045 X-RAY EXAM CHEST 1 VIEW: CPT

## 2022-01-31 RX ORDER — ONDANSETRON 4 MG/1
4 TABLET, ORALLY DISINTEGRATING ORAL EVERY 8 HOURS PRN
Status: DISCONTINUED | OUTPATIENT
Start: 2022-01-31 | End: 2022-02-12 | Stop reason: HOSPADM

## 2022-01-31 RX ORDER — SODIUM CHLORIDE 0.9 % (FLUSH) 0.9 %
5-40 SYRINGE (ML) INJECTION EVERY 12 HOURS SCHEDULED
Status: DISCONTINUED | OUTPATIENT
Start: 2022-01-31 | End: 2022-02-12 | Stop reason: HOSPADM

## 2022-01-31 RX ORDER — 0.9 % SODIUM CHLORIDE 0.9 %
500 INTRAVENOUS SOLUTION INTRAVENOUS ONCE
Status: COMPLETED | OUTPATIENT
Start: 2022-01-31 | End: 2022-01-31

## 2022-01-31 RX ORDER — ONDANSETRON 2 MG/ML
4 INJECTION INTRAMUSCULAR; INTRAVENOUS EVERY 6 HOURS PRN
Status: DISCONTINUED | OUTPATIENT
Start: 2022-01-31 | End: 2022-02-12 | Stop reason: HOSPADM

## 2022-01-31 RX ORDER — PREDNISONE 10 MG/1
5 TABLET ORAL 2 TIMES DAILY
Status: DISCONTINUED | OUTPATIENT
Start: 2022-01-31 | End: 2022-02-12 | Stop reason: HOSPADM

## 2022-01-31 RX ORDER — ACETAMINOPHEN 325 MG/1
650 TABLET ORAL EVERY 6 HOURS PRN
Status: DISCONTINUED | OUTPATIENT
Start: 2022-01-31 | End: 2022-02-01

## 2022-01-31 RX ORDER — AMLODIPINE BESYLATE 5 MG/1
10 TABLET ORAL DAILY
Status: DISCONTINUED | OUTPATIENT
Start: 2022-02-01 | End: 2022-02-12 | Stop reason: HOSPADM

## 2022-01-31 RX ORDER — ATORVASTATIN CALCIUM 20 MG/1
20 TABLET, FILM COATED ORAL DAILY
Status: DISCONTINUED | OUTPATIENT
Start: 2022-02-01 | End: 2022-02-12 | Stop reason: HOSPADM

## 2022-01-31 RX ORDER — PREDNISONE 1 MG/1
5 TABLET ORAL 2 TIMES DAILY
COMMUNITY

## 2022-01-31 RX ORDER — ACETAMINOPHEN 650 MG/1
650 SUPPOSITORY RECTAL EVERY 6 HOURS PRN
Status: DISCONTINUED | OUTPATIENT
Start: 2022-01-31 | End: 2022-02-01

## 2022-01-31 RX ORDER — CLOPIDOGREL BISULFATE 75 MG/1
75 TABLET ORAL DAILY
Status: DISCONTINUED | OUTPATIENT
Start: 2022-02-01 | End: 2022-02-12 | Stop reason: HOSPADM

## 2022-01-31 RX ORDER — CLONIDINE HYDROCHLORIDE 0.1 MG/1
0.1 TABLET ORAL DAILY
Status: DISCONTINUED | OUTPATIENT
Start: 2022-02-01 | End: 2022-02-09

## 2022-01-31 RX ORDER — PANTOPRAZOLE SODIUM 40 MG/1
40 TABLET, DELAYED RELEASE ORAL
Status: DISCONTINUED | OUTPATIENT
Start: 2022-02-01 | End: 2022-02-12 | Stop reason: HOSPADM

## 2022-01-31 RX ORDER — METOPROLOL TARTRATE 50 MG/1
25 TABLET, FILM COATED ORAL DAILY
Status: DISCONTINUED | OUTPATIENT
Start: 2022-02-01 | End: 2022-02-03

## 2022-01-31 RX ORDER — CARBAMAZEPINE 200 MG/1
200 TABLET ORAL 2 TIMES DAILY
Status: DISCONTINUED | OUTPATIENT
Start: 2022-01-31 | End: 2022-02-12 | Stop reason: HOSPADM

## 2022-01-31 RX ORDER — ASPIRIN 81 MG/1
81 TABLET, CHEWABLE ORAL DAILY
Status: DISCONTINUED | OUTPATIENT
Start: 2022-02-01 | End: 2022-02-12 | Stop reason: HOSPADM

## 2022-01-31 RX ORDER — OMEPRAZOLE 20 MG/1
20 CAPSULE, DELAYED RELEASE ORAL DAILY
COMMUNITY

## 2022-01-31 RX ORDER — DICYCLOMINE HYDROCHLORIDE 10 MG/1
10 CAPSULE ORAL 2 TIMES DAILY
Status: DISCONTINUED | OUTPATIENT
Start: 2022-01-31 | End: 2022-02-12 | Stop reason: HOSPADM

## 2022-01-31 RX ORDER — FOLIC ACID 1 MG/1
1 TABLET ORAL DAILY
COMMUNITY

## 2022-01-31 RX ORDER — ASPIRIN 325 MG
325 TABLET ORAL ONCE
Status: COMPLETED | OUTPATIENT
Start: 2022-01-31 | End: 2022-01-31

## 2022-01-31 RX ORDER — SODIUM CHLORIDE, SODIUM LACTATE, POTASSIUM CHLORIDE, CALCIUM CHLORIDE 600; 310; 30; 20 MG/100ML; MG/100ML; MG/100ML; MG/100ML
INJECTION, SOLUTION INTRAVENOUS CONTINUOUS
Status: DISPENSED | OUTPATIENT
Start: 2022-01-31 | End: 2022-02-01

## 2022-01-31 RX ORDER — SODIUM CHLORIDE 9 MG/ML
25 INJECTION, SOLUTION INTRAVENOUS PRN
Status: DISCONTINUED | OUTPATIENT
Start: 2022-01-31 | End: 2022-02-12 | Stop reason: HOSPADM

## 2022-01-31 RX ORDER — POLYETHYLENE GLYCOL 3350 17 G/17G
17 POWDER, FOR SOLUTION ORAL DAILY PRN
Status: DISCONTINUED | OUTPATIENT
Start: 2022-01-31 | End: 2022-02-12 | Stop reason: HOSPADM

## 2022-01-31 RX ORDER — DICYCLOMINE HYDROCHLORIDE 10 MG/1
10 CAPSULE ORAL 2 TIMES DAILY PRN
Status: ON HOLD | COMMUNITY
End: 2022-07-26 | Stop reason: HOSPADM

## 2022-01-31 RX ORDER — SODIUM CHLORIDE 0.9 % (FLUSH) 0.9 %
5-40 SYRINGE (ML) INJECTION PRN
Status: DISCONTINUED | OUTPATIENT
Start: 2022-01-31 | End: 2022-02-12 | Stop reason: HOSPADM

## 2022-01-31 RX ORDER — FOLIC ACID 1 MG/1
1 TABLET ORAL DAILY
Status: DISCONTINUED | OUTPATIENT
Start: 2022-01-31 | End: 2022-02-12 | Stop reason: HOSPADM

## 2022-01-31 RX ORDER — ATORVASTATIN CALCIUM 20 MG/1
20 TABLET, FILM COATED ORAL DAILY
COMMUNITY

## 2022-01-31 RX ADMIN — SODIUM CHLORIDE 500 ML: 9 INJECTION, SOLUTION INTRAVENOUS at 14:47

## 2022-01-31 RX ADMIN — CARBAMAZEPINE 200 MG: 200 TABLET ORAL at 20:31

## 2022-01-31 RX ADMIN — SODIUM CHLORIDE, PRESERVATIVE FREE 5 ML: 5 INJECTION INTRAVENOUS at 20:35

## 2022-01-31 RX ADMIN — PREDNISONE 5 MG: 10 TABLET ORAL at 20:32

## 2022-01-31 RX ADMIN — ENOXAPARIN SODIUM 30 MG: 100 INJECTION SUBCUTANEOUS at 18:13

## 2022-01-31 RX ADMIN — DICYCLOMINE HYDROCHLORIDE 10 MG: 10 CAPSULE ORAL at 20:32

## 2022-01-31 RX ADMIN — SODIUM CHLORIDE, POTASSIUM CHLORIDE, SODIUM LACTATE AND CALCIUM CHLORIDE: 600; 310; 30; 20 INJECTION, SOLUTION INTRAVENOUS at 18:16

## 2022-01-31 RX ADMIN — FOLIC ACID 1 MG: 1 TABLET ORAL at 18:13

## 2022-01-31 RX ADMIN — ASPIRIN 325 MG ORAL TABLET 325 MG: 325 PILL ORAL at 15:46

## 2022-01-31 ASSESSMENT — ENCOUNTER SYMPTOMS
COUGH: 1
SORE THROAT: 1
SHORTNESS OF BREATH: 0
RHINORRHEA: 0
ABDOMINAL PAIN: 0
DIARRHEA: 0
NAUSEA: 0
EYE REDNESS: 0
BACK PAIN: 0
VOMITING: 0

## 2022-01-31 ASSESSMENT — PAIN SCALES - GENERAL
PAINLEVEL_OUTOF10: 0
PAINLEVEL_OUTOF10: 0

## 2022-01-31 NOTE — ED PROVIDER NOTES
Triage Chief Complaint:   Generalized Body Aches (x4 days )    MEE Angeles is a 80 y.o. female that presents with feeling unwell and complains of generalized body aches for the last 4 days. She has had a cough that is intermittently productive of sputum. She denies any shortness of breath or chest pain. She is not on supplemental oxygen at home. She denies any nausea, vomiting or diarrhea. No known sick contacts. She does complain of having a cold\" repetitively. She states she does have a sore throat. Patient has been vaccinated for COVID but did not receive the booster. Patient lives with her daughter who is at bedside. Daughter explains that patient is more confused than normal and has generalized weakness and decreased oral intake. Patient's daughter explains she has unable to get up and move around the house due to the generalized weakness and daughter is unable to assist her. ROS:   Review of Systems   Constitutional: Positive for appetite change (decreased) and fatigue. Negative for chills and fever. HENT: Positive for sore throat. Negative for congestion and rhinorrhea. Eyes: Negative for redness and visual disturbance. Respiratory: Positive for cough. Negative for shortness of breath. Cardiovascular: Negative for chest pain and leg swelling. Gastrointestinal: Negative for abdominal pain, diarrhea, nausea and vomiting. Genitourinary: Negative for dysuria and frequency. Musculoskeletal: Positive for myalgias (generalized). Negative for arthralgias and back pain. Skin: Negative for rash and wound. Neurological: Positive for weakness (generalized). Negative for syncope and headaches. Psychiatric/Behavioral: Positive for confusion. Negative for hallucinations and suicidal ideas.        Past Medical History:   Diagnosis Date    Arthritis     Cataract of left eye     Cataract of right eye     Chronic kidney disease     H/O cardiovascular stress test 11/1/11 There is no scintigraphic evidence of inducible myocardial ischemia. LV function is normal. EF 59%  No ECG changes Unremarkable pharmacological stress test. Normal myocardial  perfusion study.  H/O Doppler ultrasound 11/8/11    No sonographic evidence of hemodynamically significant stenosis of the carotid arteries bilaterally.  H/O Doppler ultrasound 8/21/08    Study suggestive of lack of evidence of any hemodynamically significant peripheral vascular disease at rest. Waveform analysis is triphasic throughout the lower extremities.  H/O echocardiogram 4/26/12    Left ventricular function is normal. EF >55% The transmitral spectral doppler flow pattern is suggestive of impaired LV relaxation.  H/O tilt table evaluation 10/10/08    The patient became orthostatic on tilt table study after getting Nitro. There were no neurocardiogenic episode seen.  H/O transesophageal echocardiography (MARIO) for monitoring 11/08/10    Normal MARIO. No PFO or clots noted    Hyperlipidemia     Hypertension     Movement disorder     Neuromuscular disorder ([address]     Osteoporosis     Pneumonia     Psychiatric problem     daughter states pt has no psych problems    TIA (transient ischemic attack)     Trigeminal neuralgia of right side of face      Past Surgical History:   Procedure Laterality Date    APPENDECTOMY      CATARACT REMOVAL      CHOLECYSTECTOMY      EYE SURGERY      HYSTERECTOMY       Family History   Problem Relation Age of Onset    Cancer Maternal Aunt     Cancer Maternal Grandmother     Cancer Maternal Grandfather     High Blood Pressure Maternal Grandfather      Social History     Socioeconomic History    Marital status:       Spouse name: Not on file    Number of children: 11    Years of education: Not on file    Highest education level: Not on file   Occupational History    Not on file   Tobacco Use    Smoking status: Never Smoker    Smokeless tobacco: Never Used   Substance and Sexual Activity    Alcohol use: No     Comment: 1 - 2 per year    Drug use: No     Comment: cig    Sexual activity: Never   Other Topics Concern    Not on file   Social History Narrative    Not on file     Social Determinants of Health     Financial Resource Strain:     Difficulty of Paying Living Expenses: Not on file   Food Insecurity:     Worried About Running Out of Food in the Last Year: Not on file    Alka of Food in the Last Year: Not on file   Transportation Needs:     Lack of Transportation (Medical): Not on file    Lack of Transportation (Non-Medical):  Not on file   Physical Activity:     Days of Exercise per Week: Not on file    Minutes of Exercise per Session: Not on file   Stress:     Feeling of Stress : Not on file   Social Connections:     Frequency of Communication with Friends and Family: Not on file    Frequency of Social Gatherings with Friends and Family: Not on file    Attends Yazdanism Services: Not on file    Active Member of [address] [place] [address] or Organizations: Not on file    Attends Club or Organization Meetings: Not on file    Marital Status: Not on file   Intimate Partner Violence:     Fear of Current or Ex-Partner: Not on file    Emotionally Abused: Not on file    Physically Abused: Not on file    Sexually Abused: Not on file   Housing Stability:     Unable to Pay for Housing in the Last Year: Not on file    Number of [place] in the Last Year: Not on file    Unstable Housing in the Last Year: Not on file     Current Facility-Administered Medications   Medication Dose Route Frequency Provider Last Rate Last Admin    0.9 % sodium chloride bolus  500 mL IntraVENous Once Roque Eric  mL/hr at 01/31/22 1447 500 mL at 01/31/22 1447     Current Outpatient Medications   Medication Sig Dispense Refill    promethazine-dextromethorphan (PROMETHAZINE-DM) 6.25-15 MG/5ML syrup Take 5 mLs by mouth 4 times daily as needed for Cough      docusate sodium (COLACE) 100 MG capsule Take 1 capsule by mouth daily 30 capsule 0    lisinopril-hydrochlorothiazide (PRINZIDE;ZESTORETIC) 20-25 MG per tablet Take 1 tablet by mouth daily.  carBAMazepine (TEGRETOL) 200 MG tablet Take 200 mg by mouth 2 times daily.  amLODIPine (NORVASC) 10 MG tablet Take 10 mg by mouth daily.  polyethylene glycol (GLYCOLAX) powder Take 17 g by mouth daily.  methotrexate (RHEUMATREX) 5 MG chemo tablet Take 25 mg by mouth once a week. Pt takes this on Sunday.  clopidogrel (PLAVIX) 75 MG tablet Take 75 mg by mouth daily.  clonidine (CATAPRES) 0.1 MG tablet Take 0.1 mg by mouth 2 times daily.  aspirin 81 MG chewable tablet Take 81 mg by mouth daily. Facility-Administered Medications Ordered in Other Encounters   Medication Dose Route Frequency Provider Last Rate Last Admin    denosumab (PROLIA) SC injection 60 mg  60 mg SubCUTAneous Once Gosia Padilla MD         Allergies   Allergen Reactions    Pcn [Penicillins] Swelling       Nursing Notes Reviewed     Physical Exam:   ED Triage Vitals [01/31/22 1252]   Enc Vitals Group      /82      Pulse 57      Resp 15      Temp 97.9 °F (36.6 °C)      Temp Source Oral      SpO2 97 %      Weight 160 lb (72.6 kg)      Height 5' 6\" (1.676 m)      Head Circumference       Peak Flow       Pain Score       Pain Loc       Pain Edu? Excl. in [address]? /69   Pulse 62   Temp 97.9 °F (36.6 °C) (Oral)   Resp 21   Ht 5' 6\" (1.676 m)   Wt 160 lb (72.6 kg)   SpO2 96%   BMI 25.82 kg/m²   My pulse ox interpretation is - normal  Physical Exam  Vitals and nursing note reviewed. Constitutional:       Appearance: She is not toxic-appearing or diaphoretic. Comments: Appears to feel unwell     HENT:      Head: Normocephalic and atraumatic. Eyes:      General:         Right eye: No discharge. Left eye: No discharge.       Conjunctiva/sclera: Conjunctivae normal.   Cardiovascular:      Rate and Rhythm: Normal rate and regular rhythm. Pulses: Normal pulses. Radial pulses are 2+ on the right side and 2+ on the left side. Pulmonary:      Effort: Pulmonary effort is normal. No respiratory distress. Breath sounds: No wheezing or rales. Abdominal:      General: There is no distension. Tenderness: There is no abdominal tenderness. There is no guarding or rebound. Musculoskeletal:         General: No swelling or tenderness. Normal range of motion. Right lower leg: No edema. Left lower leg: No edema. Skin:     General: Skin is warm and dry. Neurological:      General: No focal deficit present. Mental Status: She is alert. Cranial Nerves: No cranial nerve deficit. Comments: Moving all extremities. Mild and equal weakness throughout all extremities. Normal sensation to light touch throughout.    Psychiatric:         Mood and Affect: Mood normal.         Behavior: Behavior normal.      Comments: Repetitively tells me that she has a bad \"cold\"         I have reviewed and interpreted all of the currently available lab results from this visit (if applicable):  Results for orders placed or performed during the hospital encounter of 01/31/22   COVID-19, Rapid    Specimen: Nasopharyngeal   Result Value Ref Range    Source UNKNOWN     SARS-CoV-2, NAAT DETECTED (A) NOT DETECTED   CBC Auto Differential   Result Value Ref Range    WBC 7.3 4.0 - 10.5 K/CU MM    RBC 3.10 (L) 4.2 - 5.4 M/CU MM    Hemoglobin 10.8 (L) 12.5 - 16.0 GM/DL    Hematocrit 33.6 (L) 37 - 47 %    .4 (H) 78 - 100 FL    MCH 34.8 (H) 27 - 31 PG    MCHC 32.1 32.0 - 36.0 %    RDW 13.7 11.7 - 14.9 %    Platelets 319 623 - 944 K/CU MM    MPV 9.7 7.5 - 11.1 FL    Differential Type AUTOMATED DIFFERENTIAL     Segs Relative 81.3 (H) 36 - 66 %    Lymphocytes % 13.3 (L) 24 - 44 %    Monocytes % 4.4 (H) 0 - 4 %    Eosinophils % 0.5 0 - 3 %    Basophils % 0.1 0 - 1 %    Segs Absolute 5.9 K/CU MM    Lymphocytes Absolute 1.0 K/CU MM    Monocytes Absolute 0.3 K/CU MM    Eosinophils Absolute 0.0 K/CU MM    Basophils Absolute 0.0 K/CU MM    Nucleated RBC % 0.0 %    Total Nucleated RBC 0.0 K/CU MM    Total Immature Neutrophil 0.03 K/CU MM    Immature Neutrophil % 0.4 0 - 0.43 %   Comprehensive Metabolic Panel   Result Value Ref Range    Sodium 133 (L) 135 - 145 MMOL/L    Potassium 4.6 3.5 - 5.1 MMOL/L    Chloride 101 99 - 110 mMol/L    CO2 20 (L) 21 - 32 MMOL/L    BUN 46 (H) 6 - 23 MG/DL    CREATININE 1.7 (H) 0.6 - 1.1 MG/DL    Glucose 128 (H) 70 - 99 MG/DL    Calcium 9.2 8.3 - 10.6 MG/DL    Albumin 2.8 (L) 3.4 - 5.0 GM/DL    Total Protein 6.4 6.4 - 8.2 GM/DL    Total Bilirubin 0.2 0.0 - 1.0 MG/DL    ALT 23 10 - 40 U/L    AST 50 (H) 15 - 37 IU/L    Alkaline Phosphatase 64 40 - 129 IU/L    GFR Non- 28 (L) >60 mL/min/1.73m2    GFR  34 (L) >60 mL/min/1.73m2    Anion Gap 12 4 - 16   Troponin   Result Value Ref Range    Troponin T 0.054 (H) <0.01 NG/ML   Brain Natriuretic Peptide   Result Value Ref Range    Pro-.4 (H) <300 PG/ML   Lactic Acid, Plasma   Result Value Ref Range    Lactate 1.4 0.4 - 2.0 mMOL/L   Blood Gas, Venous   Result Value Ref Range    pH, [place] 7.47 (H) 7.32 - 7.42    pCO2, [place] 32 (L) 38 - 52 mmHG    pO2, [place] 95 (H) 28 - 48 mmHG    Base Exc, Mixed . 3 0 - 2.3    HCO3, Venous 23.3 19 - 25 MMOL/L    O2 Sat, [place] 94.0 (H) 50 - 70 %   D-Dimer, Quantitative   Result Value Ref Range    D-Dimer, Quant 416 (H) <230 NG/mL(DDU)   POCT Glucose   Result Value Ref Range    POC Glucose 81 70 - 99 MG/DL   EKG 12 Lead   Result Value Ref Range    Ventricular Rate 56 BPM    Atrial Rate 56 BPM    P-R Interval 164 ms    QRS Duration 114 ms    Q-T Interval 470 ms    QTc Calculation (Bazett) 453 ms    P Axis 52 degrees    R Axis -41 degrees    T Axis -69 degrees    Diagnosis       Sinus bradycardia  Left axis deviation  Incomplete left bundle branch block  Voltage criteria for left ventricular hypertrophy  ST & Marked T wave abnormality, consider anterolateral ischemia  Abnormal ECG  When compared with ECG of 23-OCT-2016 23:09,  Incomplete left bundle branch block has replaced Incomplete right bundle branch block        Radiographs (if obtained):  [] The following radiograph was interpreted by myself in the absence of a radiologist:  [x]Radiologist's Report Reviewed:  CT HEAD WO CONTRAST   Final Result   No acute intracranial abnormality. Chronic changes as described above. XR CHEST PORTABLE   Final Result   1. Patchy density in the mid to lower lung zones bilaterally which could   represent an atypical pneumonia, including viral pneumonia. Follow-up to   resolution is recommended. 2. Cardiomegaly without overt failure. EKG (if obtained): (All EKG's are interpreted by myself in the absence of a cardiologist)  Sinus bradycardia with a rate of 56. MS interval 164, , QTc 453. No ST elevations or depressions. Significant T wave inversions in the inferior and lateral leads. Q waves in the inferior leads. Incomplete left bundle branch block. Impression: Abnormal EKG. When compared to previous EKG from 10/23/2016, the significant T wave in inversions in the bradycardia are new. MDM:  Differential diagnoses considered include but are not limited to COVID-19, pneumonia, viral URI, deconditioning, urinary tract infection, electrolyte abnormality. Basic labs are obtained which show a mild MOSES better otherwise unremarkable. EKG does have abnormal T waves. Patient troponin is detectable only slightly so. Patient will likely need cardiology evaluation for this. She denies any chest pain. Chest x-ray shows patchy density in the mid to lower lung zones bilaterally which could represent atypical pneumonia including viral pneumonia. Patient's Covid test is positive. I suspect [id number] as a cause of patient symptoms. CT scan of her head shows no acute abnormalities.   Patient is maintaining normal oxygen saturations on room air but does have an MOSES and generalized weakness for which her daughter is unable to help her get up and around the house given the significant weakness. Given these things will admit her to the hospital for further evaluation and treatment. She was given a small bolus of IV fluids and full dose of aspirin. I spoke with LUCRETIA Siegel for hospitalist service, who graciously agreed to admit the patient. Plan of care explained to patient and her daughter. All questions and concerns were addressed to the patient and daughter's satisfaction. Patient and daughter understood and agreed with plan. I did don appropriate PPE (including face mask, protective eye ware/safety glasses and gloves), as recommended by the health facility/national standard best practice, during my bedside interactions with the patient. Clinical Impression:  1. COVID-19    2. General weakness    3. MOSES (acute kidney injury) ([facility] [address]    4. Elevated troponin    5. Myalgia    6. Confusion          Woo Costa MD       Please note that portions of this note may have been complete with a voice recognition program.  Effortswere made to edit the dictations, but occasional words are mis-transcribed.           Woo Costa MD  01/31/22 6929

## 2022-01-31 NOTE — H&P
V2.0  History and Physical      Name:  Noelle Cortes /Age/Sex: 1934  (80 y.o. female)   MRN & CSN:  [id number] & [id number] Encounter Date/Time: 2022 3:55 PM EST   Location:  ED04/ED-04 PCP: Taqueria Ren MD       Hospital Day: 1    Assessment and Plan:   Noelle Cortes is a 80 y.o. female with a past medical history of hyperlipidemia, hypertension, neuromuscular disorder, TIA who presents with decreased p.o. intake, increased confusion, body aches.     COVID-19 Pneumonia   -symptom onset 22 with weakness, decreased PO intake, COVID + 22  - received 2 doses of the vaccine   - admit CXR with patchy density in the mid to lower lungs bilaterally  -Pro-Nelson 0.1  -No hypoxia  -Continue symptomatic management  - pulmonary hygiene, wean O2 as able   - monitor respiratory status    MOSES  -Creatinine 1.7, baseline ~1.0  -Likely prerenal in setting of decreased p.o. intake due to COVID-19  -Hold home lisinopril, HCTZ  -Continue gentle IV fluids, LR at 75 cc/ hour x15 hours, then reassess    Acute encephalopathy  -CT head with no acute process  -Does have intermittent confusion at baseline, daughter reports more confused today  -No focal deficits  -Suspect metabolic/infectious secondary to COVID-19 and dehydration  -Continue IV fluids, monitor mental status    Generalized weakness  -In setting of above  -Lives with family, family concerned about caring for patient  -PT/OT, case management consulted    Elevated troponin   Abnormal EKG  -Troponin T 0.054   -EKG with no acute ischemic change, inferior, anterolateral T wave inversions, new from prior  -Denies chest pain  -Given aspirin in ED  -Suspect demand ischemia in setting of MOSES and infection  -Trend troponin, repeat EKG, consult cardiology    Hypertension  -hold home lisinopril-hydrochlorothiazide in setting of MOSES  -Continue home clonidine, amlodipine with hold parameters    TIA  -Continue home Plavix, aspirin     Trigeminal neuralgia  -Continue home Tegretol    Rheumatoid arthritis  -On methotrexate weekly  -Continue home prednisone    This patient was seen and examined in conjunction with Dr. Jana Caceres. He was agreeable with the plan and management as dictated above. Hospital Problems           Last Modified POA    COVID-19 1/31/2022 Yes        Disposition:   Current Living situation: Home with daughter  Expected Disposition: To be determined, may need SNF  Estimated D/C: Be determined    Diet ADULT DIET; Regular   DVT Prophylaxis [x] Lovenox, []  Heparin, [] SCDs, [] Ambulation,  [] Eliquis, [] Xarelto   Code Status Full Code -has no documented DNR, daughter states patient probably would not want resuscitation, but has never had this conversation with the patient, would revisit this should patient decline   Surrogate Decision Maker/ POA  daughter, Lettylucy Carrilloenson     History from:     Patient, daughter    History of Present Illness:     Chief Complaint: Kerin Kehr is a 80 y.o. female who presents with mental status, decreased p.o. intake, generalized weakness. History is mainly obtained from daughter as patient is confused and drowsy. Daughter states that patient over the last couple days has had significantly decreased p.o. intake, generalized weakness. Patient states she has had a productive cough and sore throat. She began feeling sick a few days ago. No known sick contacts that she is aware of. Patient denies any chest pain, shortness of breath, nausea, vomiting, abdominal pain. She has received 2 Covid vaccinations. Patient currently lives with daughter and daughter states she has been doing okay caring for her in the home however she is worried that if she is too weak she may need some rehab and is open to SNF if needed. Per my evaluation, the patient is alert to self, place, month, does not know the year and has poor situational awareness. She is drowsy, but is awake and answering questions appropriately. Review of Systems:   Ten point ROS reviewed negative, unless as noted above    Objective:   No intake or output data in the 24 hours ending 01/31/22 1657   Vitals:   Vitals:    01/31/22 1252 01/31/22 1333 01/31/22 1530 01/31/22 1600   BP: 125/82 124/69 124/66 107/63   Pulse: 57 62 59 60   Resp: 15 21 13 13   Temp: 97.9 °F (36.6 °C)      TempSrc: Oral      SpO2: 97% 96%  97%   Weight: 160 lb (72.6 kg)      Height: 5' 6\" (1.676 m)          Medications Prior to Admission     Prior to Admission medications    Medication Sig Start Date End Date Taking? Authorizing Provider   metoprolol tartrate (LOPRESSOR) 25 MG tablet Take 25 mg by mouth daily   Yes Historical Provider, MD   omeprazole (PRILOSEC) 20 MG delayed release capsule Take 20 mg by mouth daily   Yes Historical Provider, MD   atorvastatin (LIPITOR) 20 MG tablet Take 20 mg by mouth daily   Yes Historical Provider, MD   folic acid (FOLVITE) 1 MG tablet Take 1 mg by mouth daily   Yes Historical Provider, MD   dicyclomine (BENTYL) 10 MG capsule Take 10 mg by mouth 2 times daily as needed   Yes Historical Provider, MD   predniSONE (DELTASONE) 5 MG tablet Take 5 mg by mouth 2 times daily   Yes Historical Provider, MD   docusate sodium (COLACE) 100 MG capsule Take 1 capsule by mouth daily 8/14/16   Desiree Chirinos MD   lisinopril-hydrochlorothiazide (PRINZIDE;ZESTORETIC) 20-25 MG per tablet Take 1 tablet by mouth daily. Historical Provider, MD   carBAMazepine (TEGRETOL) 200 MG tablet Take 200 mg by mouth 2 times daily. Historical Provider, MD   amLODIPine (NORVASC) 10 MG tablet Take 10 mg by mouth daily. Historical Provider, MD   polyethylene glycol (GLYCOLAX) powder Take 17 g by mouth daily. Historical Provider, MD   methotrexate (RHEUMATREX) 5 MG chemo tablet Take 25 mg by mouth once a week. Pt takes this on Sunday. Historical Provider, MD   clopidogrel (PLAVIX) 75 MG tablet Take 75 mg by mouth daily.       Historical Provider, MD   clonidine (CATAPRES) 0.1 MG tablet Take 0.1 mg by mouth daily     Historical Provider, MD   aspirin 81 MG chewable tablet Take 81 mg by mouth daily. Historical Provider, MD       Physical Exam:   GEN Awake female, laying in bed in no apparent distress. Appears given age. Appears generally weak. EYES Pupils are equally round. No scleral erythema, discharge, or conjunctivitis. HENT Mucous membranes are moist.  NECK Supple, no apparent thyromegaly or masses. RESP Clear to auscultation, no wheezes, rales or rhonchi. Respirations even and unlabored on RA. CARDIO/VASC   S1/S2 auscultated. Regular rate without appreciable murmurs, rubs, or gallops. Peripheral pulses equal bilaterally and palpable. No peripheral edema. GI Abdomen is soft without significant tenderness, masses, or guarding.  No costovertebral angle tenderness. Lazo catheter is not present. MSK No gross joint deformities. SKIN Normal coloration, warm, dry. NEURO Cranial nerves appear grossly intact, normal speech, no lateralizing weakness. Follows commands. PSYCH Awake, drowsy, oriented to self, place, month, not to year and poor situational awareness. Affect appropriate. Past Medical History:   PMHx   Past Medical History:   Diagnosis Date    Arthritis     Cataract of left eye     Cataract of right eye     Chronic kidney disease     H/O cardiovascular stress test 11/1/11    There is no scintigraphic evidence of inducible myocardial ischemia. LV function is normal. EF 59%  No ECG changes Unremarkable pharmacological stress test. Normal myocardial  perfusion study.  H/O Doppler ultrasound 11/8/11    No sonographic evidence of hemodynamically significant stenosis of the carotid arteries bilaterally.  H/O Doppler ultrasound 8/21/08    Study suggestive of lack of evidence of any hemodynamically significant peripheral vascular disease at rest. Waveform analysis is triphasic throughout the lower extremities.     H/O echocardiogram 4/26/12    Left ventricular function is normal. EF >55% The transmitral spectral doppler flow pattern is suggestive of impaired LV relaxation.  H/O tilt table evaluation 10/10/08    The patient became orthostatic on tilt table study after getting Nitro. There were no neurocardiogenic episode seen.  H/O transesophageal echocardiography (MARIO) for monitoring 11/08/10    Normal MARIO. No PFO or clots noted    Hyperlipidemia     Hypertension     Movement disorder     Neuromuscular disorder ([address]     Osteoporosis     Pneumonia     Psychiatric problem     daughter states pt has no psych problems    TIA (transient ischemic attack)     Trigeminal neuralgia of right side of face      PSHX:  has a past surgical history that includes Appendectomy; Hysterectomy; Cholecystectomy; eye surgery; and Cataract removal.  Allergies: Allergies   Allergen Reactions    Pcn [Penicillins] Swelling     Fam HX:  family history includes Cancer in her maternal aunt, maternal grandfather, and maternal grandmother; High Blood Pressure in her maternal grandfather. Soc HX:   Social History     Socioeconomic History    Marital status:      Spouse name: None    Number of children: 5    Years of education: None    Highest education level: None   Occupational History    None   Tobacco Use    Smoking status: Never Smoker    Smokeless tobacco: Never Used   Substance and Sexual Activity    Alcohol use: No     Comment: 1 - 2 per year    Drug use: No     Comment: cig    Sexual activity: Never   Other Topics Concern    None   Social History Narrative    None     Social Determinants of Health     Financial Resource Strain:     Difficulty of Paying Living Expenses: Not on file   Food Insecurity:     Worried About Running Out of Food in the Last Year: Not on file    [place] of Food in the Last Year: Not on file   Transportation Needs:     Lack of Transportation (Medical):  Not on file    Lack of Transportation (Non-Medical):  Not on file   Physical Activity:     Days of Exercise per Week: Not on file    Minutes of Exercise per Session: Not on file   Stress:     Feeling of Stress : Not on file   Social Connections:     Frequency of Communication with Friends and Family: Not on file    Frequency of Social Gatherings with Friends and Family: Not on file    Attends Spiritism Services: Not on file    Active Member of Clubs or Organizations: Not on file    Attends Club or Organization Meetings: Not on file    Marital Status: Not on file   Intimate Partner Violence:     Fear of Current or Ex-Partner: Not on file    Emotionally Abused: Not on file    Physically Abused: Not on file    Sexually Abused: Not on file   Housing Stability:     Unable to Pay for Housing in the Last Year: Not on file    Number of Places Lived in the Last Year: Not on file    Unstable Housing in the Last Year: Not on file       Medications:   Medications:    sodium chloride flush  5-40 mL IntraVENous 2 times per day    enoxaparin  30 mg SubCUTAneous Daily    [START ON 2/1/2022] amLODIPine  10 mg Oral Daily    [START ON 2/1/2022] aspirin  81 mg Oral Daily    [START ON 2/1/2022] atorvastatin  20 mg Oral Daily    carBAMazepine  200 mg Oral BID    [START ON 2/1/2022] cloNIDine  0.1 mg Oral Daily    [START ON 2/1/2022] clopidogrel  75 mg Oral Daily    dicyclomine  10 mg Oral BID    folic acid  1 mg Oral Daily    [START ON 2/1/2022] metoprolol tartrate  25 mg Oral Daily    [START ON 2/1/2022] pantoprazole  40 mg Oral QAM AC    predniSONE  5 mg Oral BID      Infusions:    sodium chloride      lactated ringers       PRN Meds: sodium chloride flush, 5-40 mL, PRN  sodium chloride, 25 mL, PRN  ondansetron, 4 mg, Q8H PRN   Or  ondansetron, 4 mg, Q6H PRN  polyethylene glycol, 17 g, Daily PRN  acetaminophen, 650 mg, Q6H PRN   Or  acetaminophen, 650 mg, Q6H PRN        Labs    CBC:   Recent Labs     01/31/22  1248   WBC 7.3   HGB 10.8*   PLT 238     BMP:    Recent Labs     01/31/22  1248   *   K 4.6      CO2 20*   BUN 46*   CREATININE 1.7*   GLUCOSE 128*     Hepatic:   Recent Labs     01/31/22  1248   AST 50*   ALT 23   BILITOT 0.2   ALKPHOS 64     Lipids:   Lab Results   Component Value Date    CHOL 256 11/10/2015    HDL 73 11/10/2015    TRIG 226 11/10/2015     Hemoglobin A1C:   Lab Results   Component Value Date    LABA1C 6.1 05/11/2011     TSH: No results found for: TSH  Troponin:   Lab Results   Component Value Date    TROPONINT 0.054 01/31/2022    TROPONINT <0.010 10/23/2016    TROPONINT <0.010 03/13/2016     Lactic Acid: No results for input(s): LACTA in the last 72 hours. BNP:   Recent Labs     01/31/22  1248   PROBNP 733.4*     UA:  Lab Results   Component Value Date    NITRU NEGATIVE 11/28/2018    COLORU YELLOW 11/28/2018    WBCUA 35 11/28/2018    RBCUA 6 11/28/2018    MUCUS RARE 10/23/2016    TRICHOMONAS NONE SEEN 11/28/2018    YEAST RARE 09/23/2013    BACTERIA NEGATIVE 11/28/2018    CLARITYU HAZY 11/28/2018    SPECGRAV 1.017 11/28/2018    LEUKOCYTESUR LARGE 11/28/2018    UROBILINOGEN NORMAL 11/28/2018    BILIRUBINUR NEGATIVE 11/28/2018    BLOODU SMALL 11/28/2018    KETUA NEGATIVE 11/28/2018     Urine Cultures: No results found for: Abad Kaufman  Blood Cultures: No results found for: BC  No results found for: BLOODCULT2  Organism:   Lab Results   Component Value Date    ORG ENC 07/25/2017       Imaging/Diagnostics Last 24 Hours   CT HEAD WO CONTRAST    Result Date: 1/31/2022  EXAMINATION: CT OF THE HEAD WITHOUT CONTRAST  1/31/2022 2:33 pm TECHNIQUE: CT of the head was performed without the administration of intravenous contrast. Dose modulation, iterative reconstruction, and/or weight based adjustment of the mA/kV was utilized to reduce the radiation dose to as low as reasonably achievable. COMPARISON: 10/08/2019 HISTORY: Altered mental status. Confusion.  FINDINGS: BRAIN/VENTRICLES: There is no acute intracranial hemorrhage, mass effect or midline shift. No abnormal extra-axial fluid collection. The gray-white differentiation is maintained without evidence of an acute infarct. There is no evidence of hydrocephalus. Moderate parenchymal volume loss, within normal limits for age. Moderate periventricular and subcortical white matter hypoattenuation and left basal ganglia lacunar infarcts, likely sequelae of chronic small-vessel ischemia or hypertension. Vascular calcifications of the bilateral supraclinoid carotid arteries. ORBITS: The visualized portion of the orbits demonstrate no acute abnormality. SINUSES: The visualized paranasal sinuses and mastoid air cells demonstrate no acute abnormality. SOFT TISSUES/SKULL:  No acute abnormality of the visualized skull or soft tissues. No acute intracranial abnormality. Chronic changes as described above. XR CHEST PORTABLE    Result Date: 1/31/2022  EXAMINATION: ONE XRAY VIEW OF THE CHEST 1/31/2022 1:23 pm COMPARISON: 10/23/2016. HISTORY: ORDERING SYSTEM PROVIDED HISTORY: cough fatigue TECHNOLOGIST PROVIDED HISTORY: Reason for exam:->cough fatigue Reason for Exam: cough   fatigue FINDINGS: There are low lung volumes. The heart size is enlarged but unchanged. The pulmonary vasculature is within normal limits. There is some patchy density involving the mid to lower lung zones bilaterally. No pneumothoraces are seen. 1. Patchy density in the mid to lower lung zones bilaterally which could represent an atypical pneumonia, including viral pneumonia. Follow-up to resolution is recommended. 2. Cardiomegaly without overt failure. I discussed this patient with the ED provider and Dr. Gina Sherwood. I did a review of patient's medical records, lab results and imaging conducted today. I personally reviewed patient's vital signs including pulse ox and EKG.      Electronically signed by Uzma López PA-C on 1/31/2022 at 4:57 PM

## 2022-01-31 NOTE — ED PROVIDER NOTES
As physician assistant-in-triage, I performed a medical screening history and physical exam on this patient. HISTORY OF PRESENT ILLNESS  Delia Cormier is a 80 y.o. female with daughter for increased fatigue and cough for the past 3 days. Patient has had increasing confusion during this time. Mild increased work of breathing. No obvious chest pain or abdominal pain. No vomiting, fever or diarrhea. PHYSICAL EXAM  /82   Pulse 57   Temp 97.9 °F (36.6 °C) (Oral)   Resp 15   Ht 5' 6\" (1.676 m)   Wt 160 lb (72.6 kg)   SpO2 97%   BMI 25.82 kg/m²     On exam, the patient is elderly female, no acute distress. Breathing is unlabored. Skin is dry. See future provider note for medical decision making and disposition. Comment: Please note this report has been produced using speech recognition software and may contain errors related to that system including errors in grammar, punctuation, and spelling, as well as words and phrases that may be inappropriate. If there are any questions or concerns please feel free to contact the dictating provider for clarification.         Erika Ramos PA-C  01/31/22 0137

## 2022-02-01 LAB
ALBUMIN SERPL-MCNC: 2.7 GM/DL (ref 3.4–5)
ALP BLD-CCNC: 63 IU/L (ref 40–129)
ALT SERPL-CCNC: 22 U/L (ref 10–40)
ANION GAP SERPL CALCULATED.3IONS-SCNC: 14 MMOL/L (ref 4–16)
AST SERPL-CCNC: 48 IU/L (ref 15–37)
ATYPICAL LYMPHOCYTE ABSOLUTE COUNT: ABNORMAL
BANDED NEUTROPHILS ABSOLUTE COUNT: 0.3 K/CU MM
BANDED NEUTROPHILS RELATIVE PERCENT: 5 % (ref 5–11)
BILIRUB SERPL-MCNC: 0.2 MG/DL (ref 0–1)
BUN BLDV-MCNC: 40 MG/DL (ref 6–23)
CALCIUM SERPL-MCNC: 9 MG/DL (ref 8.3–10.6)
CHLORIDE BLD-SCNC: 103 MMOL/L (ref 99–110)
CO2: 24 MMOL/L (ref 21–32)
CREAT SERPL-MCNC: 1.4 MG/DL (ref 0.6–1.1)
D DIMER: 465 NG/ML(DDU)
DIFFERENTIAL TYPE: ABNORMAL
GFR AFRICAN AMERICAN: 43 ML/MIN/1.73M2
GFR NON-AFRICAN AMERICAN: 36 ML/MIN/1.73M2
GLUCOSE BLD-MCNC: 117 MG/DL (ref 70–99)
GLUCOSE BLD-MCNC: 64 MG/DL (ref 70–99)
GLUCOSE BLD-MCNC: 68 MG/DL (ref 70–99)
GLUCOSE BLD-MCNC: 70 MG/DL (ref 70–99)
GLUCOSE BLD-MCNC: 73 MG/DL (ref 70–99)
GLUCOSE BLD-MCNC: 86 MG/DL (ref 70–99)
HCT VFR BLD CALC: 33.3 % (ref 37–47)
HEMOGLOBIN: 10.4 GM/DL (ref 12.5–16)
HIGH SENSITIVE C-REACTIVE PROTEIN: 152.3 MG/L
LACTATE DEHYDROGENASE: 393 IU/L (ref 120–246)
LV EF: 48 %
LVEF MODALITY: NORMAL
LYMPHOCYTES ABSOLUTE: 1.3 K/CU MM
LYMPHOCYTES RELATIVE PERCENT: 21 % (ref 24–44)
MCH RBC QN AUTO: 34.6 PG (ref 27–31)
MCHC RBC AUTO-ENTMCNC: 31.2 % (ref 32–36)
MCV RBC AUTO: 110.6 FL (ref 78–100)
MONOCYTES ABSOLUTE: 0.5 K/CU MM
MONOCYTES RELATIVE PERCENT: 8 % (ref 0–4)
PDW BLD-RTO: 13.7 % (ref 11.7–14.9)
PLATELET # BLD: 232 K/CU MM (ref 140–440)
PMV BLD AUTO: 9.5 FL (ref 7.5–11.1)
POTASSIUM SERPL-SCNC: 4.6 MMOL/L (ref 3.5–5.1)
PROCALCITONIN: 0.12
RBC # BLD: 3.01 M/CU MM (ref 4.2–5.4)
SEGMENTED NEUTROPHILS ABSOLUTE COUNT: 3.9 K/CU MM
SEGMENTED NEUTROPHILS RELATIVE PERCENT: 66 % (ref 36–66)
SODIUM BLD-SCNC: 141 MMOL/L (ref 135–145)
TOTAL PROTEIN: 5.4 GM/DL (ref 6.4–8.2)
TROPONIN T: 0.04 NG/ML
WBC # BLD: 6 K/CU MM (ref 4–10.5)

## 2022-02-01 PROCEDURE — 36415 COLL VENOUS BLD VENIPUNCTURE: CPT

## 2022-02-01 PROCEDURE — 99215 OFFICE O/P EST HI 40 MIN: CPT | Performed by: INTERNAL MEDICINE

## 2022-02-01 PROCEDURE — 80053 COMPREHEN METABOLIC PANEL: CPT

## 2022-02-01 PROCEDURE — 85007 BL SMEAR W/DIFF WBC COUNT: CPT

## 2022-02-01 PROCEDURE — 85379 FIBRIN DEGRADATION QUANT: CPT

## 2022-02-01 PROCEDURE — 6370000000 HC RX 637 (ALT 250 FOR IP): Performed by: PHYSICIAN ASSISTANT

## 2022-02-01 PROCEDURE — 2580000003 HC RX 258: Performed by: PHYSICIAN ASSISTANT

## 2022-02-01 PROCEDURE — 93306 TTE W/DOPPLER COMPLETE: CPT

## 2022-02-01 PROCEDURE — 84484 ASSAY OF TROPONIN QUANT: CPT

## 2022-02-01 PROCEDURE — 84145 PROCALCITONIN (PCT): CPT

## 2022-02-01 PROCEDURE — 96372 THER/PROPH/DIAG INJ SC/IM: CPT

## 2022-02-01 PROCEDURE — 97530 THERAPEUTIC ACTIVITIES: CPT

## 2022-02-01 PROCEDURE — 97162 PT EVAL MOD COMPLEX 30 MIN: CPT

## 2022-02-01 PROCEDURE — 83615 LACTATE (LD) (LDH) ENZYME: CPT

## 2022-02-01 PROCEDURE — G0378 HOSPITAL OBSERVATION PER HR: HCPCS

## 2022-02-01 PROCEDURE — 86141 C-REACTIVE PROTEIN HS: CPT

## 2022-02-01 PROCEDURE — 6360000002 HC RX W HCPCS: Performed by: PHYSICIAN ASSISTANT

## 2022-02-01 PROCEDURE — APPSS45 APP SPLIT SHARED TIME 31-45 MINUTES: Performed by: NURSE PRACTITIONER

## 2022-02-01 PROCEDURE — 96361 HYDRATE IV INFUSION ADD-ON: CPT

## 2022-02-01 PROCEDURE — 94761 N-INVAS EAR/PLS OXIMETRY MLT: CPT

## 2022-02-01 PROCEDURE — 85027 COMPLETE CBC AUTOMATED: CPT

## 2022-02-01 PROCEDURE — 82962 GLUCOSE BLOOD TEST: CPT

## 2022-02-01 RX ORDER — GUAIFENESIN/DEXTROMETHORPHAN 100-10MG/5
5 SYRUP ORAL EVERY 4 HOURS PRN
Status: DISCONTINUED | OUTPATIENT
Start: 2022-02-01 | End: 2022-02-12 | Stop reason: HOSPADM

## 2022-02-01 RX ORDER — ACETAMINOPHEN 325 MG/1
650 TABLET ORAL EVERY 6 HOURS PRN
Status: DISCONTINUED | OUTPATIENT
Start: 2022-02-01 | End: 2022-02-12 | Stop reason: HOSPADM

## 2022-02-01 RX ADMIN — CLOPIDOGREL 75 MG: 75 TABLET, FILM COATED ORAL at 11:10

## 2022-02-01 RX ADMIN — PREDNISONE 5 MG: 10 TABLET ORAL at 20:56

## 2022-02-01 RX ADMIN — CARBAMAZEPINE 200 MG: 200 TABLET ORAL at 11:10

## 2022-02-01 RX ADMIN — ENOXAPARIN SODIUM 30 MG: 100 INJECTION SUBCUTANEOUS at 11:11

## 2022-02-01 RX ADMIN — PREDNISONE 5 MG: 10 TABLET ORAL at 11:11

## 2022-02-01 RX ADMIN — ASPIRIN 81 MG 81 MG: 81 TABLET ORAL at 11:11

## 2022-02-01 RX ADMIN — DICYCLOMINE HYDROCHLORIDE 10 MG: 10 CAPSULE ORAL at 11:10

## 2022-02-01 RX ADMIN — ATORVASTATIN CALCIUM 20 MG: 20 TABLET, FILM COATED ORAL at 11:10

## 2022-02-01 RX ADMIN — SODIUM CHLORIDE, PRESERVATIVE FREE 10 ML: 5 INJECTION INTRAVENOUS at 11:11

## 2022-02-01 RX ADMIN — DICYCLOMINE HYDROCHLORIDE 10 MG: 10 CAPSULE ORAL at 20:56

## 2022-02-01 RX ADMIN — CLONIDINE HYDROCHLORIDE 0.1 MG: 0.1 TABLET ORAL at 11:10

## 2022-02-01 RX ADMIN — SODIUM CHLORIDE, PRESERVATIVE FREE 10 ML: 5 INJECTION INTRAVENOUS at 20:56

## 2022-02-01 RX ADMIN — METOPROLOL TARTRATE 25 MG: 50 TABLET, FILM COATED ORAL at 12:08

## 2022-02-01 RX ADMIN — AMLODIPINE BESYLATE 10 MG: 5 TABLET ORAL at 11:10

## 2022-02-01 RX ADMIN — PANTOPRAZOLE SODIUM 40 MG: 40 TABLET, DELAYED RELEASE ORAL at 08:06

## 2022-02-01 RX ADMIN — FOLIC ACID 1 MG: 1 TABLET ORAL at 11:10

## 2022-02-01 RX ADMIN — CARBAMAZEPINE 200 MG: 200 TABLET ORAL at 20:56

## 2022-02-01 ASSESSMENT — PAIN SCALES - GENERAL
PAINLEVEL_OUTOF10: 0

## 2022-02-01 NOTE — PROGRESS NOTES
Physical Therapy  [facility] ACUTE CARE PHYSICAL THERAPY EVALUATION  Beryl Alejandre, 1934, [id number]/[id number]-A, 2/1/2022    History  [place]:  The primary encounter diagnosis was COVID-19. Diagnoses of General weakness, MOSES (acute kidney injury) ([address] Elevated troponin, Myalgia, and Confusion were also pertinent to this visit. Patient  has a past medical history of Arthritis, Cataract of left eye, Cataract of right eye, Chronic kidney disease, H/O cardiovascular stress test, H/O Doppler ultrasound, H/O Doppler ultrasound, H/O echocardiogram, H/O tilt table evaluation, H/O transesophageal echocardiography (MARIO) for monitoring, Hyperlipidemia, Hypertension, Movement disorder, Neuromuscular disorder ([address] Osteoporosis, Pneumonia, Psychiatric problem, TIA (transient ischemic attack), and Trigeminal neuralgia of right side of face. Patient  has a past surgical history that includes Appendectomy; Hysterectomy;  Cholecystectomy; eye surgery; and Cataract removal.    Subjective:  Patient states:  \"I'm tired\"   Pain:  Denies   Restrictions: droplet plus     Home Setup/Prior level of function  Social/Functional History  Lives With: Daughter (Daughter works)  Type of Home: House  Home Layout: Able to Live on Main level with bedroom/bathroom  Home Access: Level entry  Bathroom Shower/Tub: Walk-in shower  Bathroom Toilet: Standard  Bathroom Equipment: Shower chair,Grab bars in [address] [place], [place] [address]d: [address] [place] [address] (Uses  for ambulation)  ADL Assistance: Independent (Daughter supervises showers)  Homemaking Assistance: Needs assistance  Homemaking Responsibilities: No (Daughter cooks & cleans)  Ambulation Assistance: Independent  Transfer Assistance: Independent (Sleeps in hospital bed)  Active : No  Patient's  Info: Daughter    Examination of body systems (includes body structures/functions, activity/participation limitations):  · Observation:  Supine in bed upon arrival. Cooperative with therapy though limited with dec activity tolerance and feeling tired  · Vision:  Dec acuity. Can see colors and shapes   · Hearing:  Slightly [place]   · Cardiopulmonary:  Stable vitals on room air   · Orientation: oriented to person and place     Musculoskeletal  · ROM R/L:  WFL BLEs. · Strength R/L:  BLEs grossly 3-/5, Significant weakness observed in function and endurance. · Neuro: poor trunk control in sitting    Mobility/treatment:   · Rolling L/R:  maxA bilat directions needing initiation and facilitation at shoulders and hips   · Supine to sit:  maxA for bilat LE advancement and uprighting trunk. HOB elevated ~60 deg   · Sit to supine: maxA for bilat LE advancement and guidance of trunk. · Transfers: NT   · Sitting balance:  X ~5 minutes EOB variable from Tamika-maxA. Heavy posterior and right lateral lean. Multiple cues for postural correction with little ability to sustain. · Standing balance:  NT   · Gait: NT  · Educated pt on POC, role of PT, discharge. Cues provided for sequencing to inc safety and indep with mobility     [facility] 6 Clicks Inpatient Mobility:  AM-PAC Inpatient Mobility Raw Score : 8    Safety: patient left in bed with alarm, call light within reach    Assessment:  Pt is an 80year old female admitted with cough, fatigue, and acute encephalopathy. Positive for covid-19. Recommend subacute rehab once medically stable. At baseline she reports being [place] with gross mobility from [facility] level and most ADLs with the exception of bathing. She performed below her baseline and is home alone at times. She would benefit from continued therapy to address her current deficits, dec potential fall risk,and restore function. Complexity: Moderate  Prognosis: Good, no significant barriers to participation at this time.    Plan Times per week: 3/week  Discharge Recommendations: Subacute/Skilled Nursing Facility  Equipment: continue to assess     Goals:  Short term goals  Time Frame for Short term goals: 1 week  Short term goal 1: Pt will perform rolling modA  Short term goal 2: Pt will sit statically x 5 minutes Tamika with BUE support  Short term goal 3: Pt will perform sit><supine modA  Short term goal 4: Pt will transfer to recliner/bed maxA       Treatment plan:  Bed mobility, transfers, balance, gait, TA, TX, WC     Recommendations for NURSING mobility: 2 person squat pivot when pt more aroused. [address] can be used if pt continues to be this tired.      Time:   Time in: 1414  Time out: 1435  Timed treatment minutes: 9  Total time: 21    Electronically signed by:    Fidel Adair [id number]  2/1/2022, 4:03 PM

## 2022-02-01 NOTE — CONSULTS
CARDIOLOGY CONSULT NOTE   Reason for consultation:  Troponin      Referring physician:  No admitting provider for patient encounter. Primary care physician: Joshua Storey MD      Dear  Dr. Miguel Taylor admitting provider for patient encounter. Thanks for the consult. Chief Complaints :  Chief Complaint   Patient presents with    Generalized Body Aches     x4 days         History of present illness:Beryl is a 80 y. o.year old who presents from home after EMT with services called due to increased fatigue tiredness lethargic over the last several days including cough and respiratory distress. Patient was exposed to Covid? She is a poor historian somewhat confused according to patient's daughter her p.o. intake has been reduced she is tired not getting out of bed much. Cardiology consult due to abnormal troponin level she is Covid positive somewhat hypoxic requiring oxygen  No previous cardiac history she also seems to have acute renal failure  EKG shows left axis deviation with diffuse T wave inversion in the inferior leads  Is also bradycardic EKG changes are new compared to previous EKG in 2016      Past medical history:    has a past medical history of Arthritis, Cataract of left eye, Cataract of right eye, Chronic kidney disease, H/O cardiovascular stress test, H/O Doppler ultrasound, H/O Doppler ultrasound, H/O echocardiogram, H/O tilt table evaluation, H/O transesophageal echocardiography (MARIO) for monitoring, Hyperlipidemia, Hypertension, Movement disorder, Neuromuscular disorder ([address] Osteoporosis, Pneumonia, Psychiatric problem, TIA (transient ischemic attack), and Trigeminal neuralgia of right side of face. Past surgical history:   has a past surgical history that includes Appendectomy; Hysterectomy; Cholecystectomy; eye surgery; and Cataract removal.  Social History:   reports that she has never smoked.  She has never used smokeless tobacco. She reports that she does not drink alcohol and does not use drugs.   Family history:   no family history of CAD, STROKE of DM at early age    Allergies   Allergen Reactions    Pcn [Penicillins] Swelling       sodium chloride flush 0.9 % injection 5-40 mL, 2 times per day  sodium chloride flush 0.9 % injection 5-40 mL, PRN  0.9 % sodium chloride infusion, PRN  enoxaparin (LOVENOX) injection 30 mg, Daily  ondansetron (ZOFRAN-ODT) disintegrating tablet 4 mg, Q8H PRN   Or  ondansetron (ZOFRAN) injection 4 mg, Q6H PRN  polyethylene glycol (GLYCOLAX) packet 17 g, Daily PRN  acetaminophen (TYLENOL) tablet 650 mg, Q6H PRN   Or  acetaminophen (TYLENOL) suppository 650 mg, Q6H PRN  lactated ringers infusion, Continuous  [START ON 2/1/2022] amLODIPine (NORVASC) tablet 10 mg, Daily  [START ON 2/1/2022] aspirin chewable tablet 81 mg, Daily  [START ON 2/1/2022] atorvastatin (LIPITOR) tablet 20 mg, Daily  carBAMazepine (TEGRETOL) tablet 200 mg, BID  [START ON 2/1/2022] cloNIDine (CATAPRES) tablet 0.1 mg, Daily  [START ON 2/1/2022] clopidogrel (PLAVIX) tablet 75 mg, Daily  dicyclomine (BENTYL) capsule 10 mg, BID  folic acid (FOLVITE) tablet 1 mg, Daily  [START ON 2/1/2022] metoprolol tartrate (LOPRESSOR) tablet 25 mg, Daily  [START ON 2/1/2022] pantoprazole (PROTONIX) tablet 40 mg, QAM AC  predniSONE (DELTASONE) tablet 5 mg, BID    denosumab (PROLIA) SC injection 60 mg, Once      Current Facility-Administered Medications   Medication Dose Route Frequency Provider Last Rate Last Admin    sodium chloride flush 0.9 % injection 5-40 mL  5-40 mL IntraVENous 2 times per day Loli Christina, PA-C        sodium chloride flush 0.9 % injection 5-40 mL  5-40 mL IntraVENous PRN Loli Christina, PA-C        0.9 % sodium chloride infusion  25 mL IntraVENous PRN Loli Christina, PA-C        enoxaparin (LOVENOX) injection 30 mg  30 mg SubCUTAneous Daily Loli Christina PA-C   30 mg at 01/31/22 1813    ondansetron (ZOFRAN-ODT) disintegrating tablet 4 mg  4 mg Oral Q8H PRN Loli Christina PA-C        Or   [facility] ondansetron (ZOFRAN) injection 4 mg  4 mg IntraVENous Q6H PRN Itz Dole, PA-C        polyethylene glycol (GLYCOLAX) packet 17 g  17 g Oral Daily PRN Itz Dole, PA-C        acetaminophen (TYLENOL) tablet 650 mg  650 mg Oral Q6H PRN Itz Dole, PA-C        Or    acetaminophen (TYLENOL) suppository 650 mg  650 mg Rectal Q6H PRN Itz Dole, PA-C        lactated ringers infusion   IntraVENous Continuous Itz Dole, PA-C 75 mL/hr at 01/31/22 1816 New Bag at 01/31/22 1816    [START ON 2/1/2022] amLODIPine (NORVASC) tablet 10 mg  10 mg Oral Daily Itz Dole, PA-C        [START ON 2/1/2022] aspirin chewable tablet 81 mg  81 mg Oral Daily Itz Dole, PA-C        [START ON 2/1/2022] atorvastatin (LIPITOR) tablet 20 mg  20 mg Oral Daily Itz Dole, PA-C        carBAMazepine (TEGRETOL) tablet 200 mg  200 mg Oral BID Itz Dole, PA-C        [START ON 2/1/2022] cloNIDine (CATAPRES) tablet 0.1 mg  0.1 mg Oral Daily Itz Dole, PA-C        [START ON 2/1/2022] clopidogrel (PLAVIX) tablet 75 mg  75 mg Oral Daily Itz Dole, PA-C        dicyclomine (BENTYL) capsule 10 mg  10 mg Oral BID Itz Dole, PA-C        folic acid (FOLVITE) tablet 1 mg  1 mg Oral Daily Itz Dole, PA-C   1 mg at 01/31/22 1813    [START ON 2/1/2022] metoprolol tartrate (LOPRESSOR) tablet 25 mg  25 mg Oral Daily Itz Dole, PA-C        [START ON 2/1/2022] pantoprazole (PROTONIX) tablet 40 mg  40 mg Oral QAM AC Itz Dole, PA-C        predniSONE (DELTASONE) tablet 5 mg  5 mg Oral BID Itz Dole, PA-C         Current Outpatient Medications   Medication Sig Dispense Refill    metoprolol tartrate (LOPRESSOR) 25 MG tablet Take 25 mg by mouth daily      omeprazole (PRILOSEC) 20 MG delayed release capsule Take 20 mg by mouth daily      atorvastatin (LIPITOR) 20 MG tablet Take 20 mg by mouth daily      folic acid (FOLVITE) 1 MG tablet Take 1 mg by mouth daily      dicyclomine (BENTYL) 10 MG capsule Take 10 mg by mouth 2 times daily as needed      predniSONE (DELTASONE) 5 MG tablet Take 5 mg by mouth 2 times daily      docusate sodium (COLACE) 100 MG capsule Take 1 capsule by mouth daily 30 capsule 0    lisinopril-hydrochlorothiazide (PRINZIDE;ZESTORETIC) 20-25 MG per tablet Take 1 tablet by mouth daily.  carBAMazepine (TEGRETOL) 200 MG tablet Take 200 mg by mouth 2 times daily.  amLODIPine (NORVASC) 10 MG tablet Take 10 mg by mouth daily.  polyethylene glycol (GLYCOLAX) powder Take 17 g by mouth daily.  methotrexate (RHEUMATREX) 5 MG chemo tablet Take 25 mg by mouth once a week. Pt takes this on Sunday.  clopidogrel (PLAVIX) 75 MG tablet Take 75 mg by mouth daily.  clonidine (CATAPRES) 0.1 MG tablet Take 0.1 mg by mouth daily       aspirin 81 MG chewable tablet Take 81 mg by mouth daily. Facility-Administered Medications Ordered in Other Encounters   Medication Dose Route Frequency Provider Last Rate Last Admin    denosumab (PROLIA) SC injection 60 mg  60 mg SubCUTAneous Once Marcos Nelson MD         Review of Systems:   · Constitutional: No Fever or Weight Loss   · Eyes: No Decreased Vision  · ENT: No Headaches, Hearing Loss or Vertigo  · Cardiovascular: As per HPI  · Respiratory: As per HPI  · Gastrointestinal: No abdominal pain, appetite loss, blood in stools, constipation, diarrhea or heartburn  · Genitourinary: No dysuria, trouble voiding, or hematuria  · Musculoskeletal: Reports generalized aches and pains no gait disturbance, weakness or joint complaints  · Integumentary: No rash or pruritis  · Neurological: No TIA or stroke symptoms  · Psychiatric: No anxiety or depression  · Endocrine: No malaise, fatigue or temperature intolerance  · Hematologic/Lymphatic: No bleeding problems, blood clots or swollen lymph nodes  · Allergic/Immunologic: No nasal congestion or hives  All systems negative except as marked.         Physical Examination:    Vitals:    01/31/22 1701 01/31/22 [id number] 01/31/22 1802 01/31/22 1831   BP: (!) 144/76 129/65 107/70 (!) 147/78   Pulse: 62 58 71 70   Resp: 14 15 13 18   Temp:       TempSrc:       SpO2:       Weight:       Height:           General Appearance:  No distress, conversant    Constitutional:  Well developed, Well nourished, No acute distress, Non-toxic appearance. HENT:  Normocephalic, Atraumatic, Bilateral external ears normal, Oropharynx moist, No oral exudates, Nose normal. Neck- Normal range of motion, No tenderness, Supple, No stridor,no apical-carotid delay  Lymphatics : no palpable lymph nodes  Eyes:  PERRL, EOMI, Conjunctiva normal, No discharge. Respiratory:  Normal breath sounds, No respiratory distress, No wheezing, No chest tenderness. ,no use of accessory muscles, crackles Absent   Cardiovascular: (PMI) apex non displaced,no lifts no thrills, ankle swelling Absent  , 1+, s1 and s2 audible,Murmur. Present, JVD not noted    Abdomen /GI:  Bowel sounds normal, Soft, No tenderness, No masses, No gross visceromegaly   :  No costovertebral angle tenderness   Musculoskeletal:  No edema, no tenderness, no deformities.  Back- no tenderness  Integument:  Well hydrated, no rash   Lymphatic:  No lymphadenopathy noted   Neurologic:  Alert & oriented x 3, CN 2-12 normal, normal motor function, normal sensory function, no focal deficits noted           Medical decision making and Data review:    Lab Review   Recent Labs     01/31/22  1248   WBC 7.3   HGB 10.8*   HCT 33.6*         Recent Labs     01/31/22  1248   *   K 4.6      CO2 20*   BUN 46*   CREATININE 1.7*     Recent Labs     01/31/22  1248   AST 50*   ALT 23   BILITOT 0.2   ALKPHOS 64     Recent Labs     01/31/22  1248 01/31/22  1656   TROPONINT 0.054* 0.049*       Recent Labs     01/31/22  1248   PROBNP 733.4*     Lab Results   Component Value Date    INR 1.04 04/01/2015    PROTIME 11.8 04/01/2015       EKG: (reviewed by myself)    ECHO:(reviewed by myself)    Chest Xray:(reviewed by myself)  CT HEAD WO CONTRAST    Result Date: 1/31/2022  EXAMINATION: CT OF THE HEAD WITHOUT CONTRAST  1/31/2022 2:33 pm TECHNIQUE: CT of the head was performed without the administration of intravenous contrast. Dose modulation, iterative reconstruction, and/or weight based adjustment of the mA/kV was utilized to reduce the radiation dose to as low as reasonably achievable. COMPARISON: 10/08/2019 HISTORY: Altered mental status. Confusion. FINDINGS: BRAIN/VENTRICLES: There is no acute intracranial hemorrhage, mass effect or midline shift. No abnormal extra-axial fluid collection. The gray-white differentiation is maintained without evidence of an acute infarct. There is no evidence of hydrocephalus. Moderate parenchymal volume loss, within normal limits for age. Moderate periventricular and subcortical white matter hypoattenuation and left basal ganglia lacunar infarcts, likely sequelae of chronic small-vessel ischemia or hypertension. Vascular calcifications of the bilateral supraclinoid carotid arteries. ORBITS: The visualized portion of the orbits demonstrate no acute abnormality. SINUSES: The visualized paranasal sinuses and mastoid air cells demonstrate no acute abnormality. SOFT TISSUES/SKULL:  No acute abnormality of the visualized skull or soft tissues. No acute intracranial abnormality. Chronic changes as described above. XR CHEST PORTABLE    Result Date: 1/31/2022  EXAMINATION: ONE XRAY VIEW OF THE CHEST 1/31/2022 1:23 pm COMPARISON: 10/23/2016. HISTORY: ORDERING SYSTEM PROVIDED HISTORY: cough fatigue TECHNOLOGIST PROVIDED HISTORY: Reason for exam:->cough fatigue Reason for Exam: cough   fatigue FINDINGS: There are low lung volumes. The heart size is enlarged but unchanged. The pulmonary vasculature is within normal limits. There is some patchy density involving the mid to lower lung zones bilaterally. No pneumothoraces are seen.      1. Patchy density in the mid to lower lung zones bilaterally which could represent an atypical pneumonia, including viral pneumonia. Follow-up to resolution is recommended. 2. Cardiomegaly without overt failure. All labs, medications and tests reviewed by myself including data  from outside source , patient and available family . Continue all other medications of all above medical condition listed as is. Impression:  Active Problems:    HTN (hypertension)    Rheumatoid arthritis ([address]    CVA, old, hemiparesis ([address]    COVID-19    Troponin I above reference range  Resolved Problems:    * No resolved hospital problems. *      Assessment: 80 y. o.year old with PMH of  has a past medical history of Arthritis, Cataract of left eye, Cataract of right eye, Chronic kidney disease, H/O cardiovascular stress test, H/O Doppler ultrasound, H/O Doppler ultrasound, H/O echocardiogram, H/O tilt table evaluation, H/O transesophageal echocardiography (MARIO) for monitoring, Hyperlipidemia, Hypertension, Movement disorder, Neuromuscular disorder ([address] Osteoporosis, Pneumonia, Psychiatric problem, TIA (transient ischemic attack), and Trigeminal neuralgia of right side of face. Plan and Recommendations:    Elevated Troponin : Check serial troponin if they are not rising we will continue medical management for now. in setting of renal failure , covid pneumonia and hypoxia, does have new EKG changes compared to previous EKG in 2016 aspirin 81 mg , can consider stress test for risk stratification due to multiple risk factors once she is clinically improved and depending on her troponin level  Get echo  Acute renal failure probably secondary to poor p.o. intake as per primary team  HTN: stable, continue present medications   Covid pneumonia treatment as per primary team  DVT prophylaxis if no contraindication  6. Dyslipidemia: continue statins           Thank you  much for consult and giving us the opportunity in contributing in the care of this patient.  Please feel free to call me for any questions.        Abel Torres MD, 1/31/2022 8:04 PM

## 2022-02-01 NOTE — PROGRESS NOTES
Hospitalist Progress Note      Name:  Kike Boss /Age/Sex: 1934  (80 y.o. female)   MRN & CSN:  [id number] & [id number] Admission Date/Time: 2022  1:03 PM   [facility]  [facility] PCP: Clay Mcguire MD         Hospital Day: 2    Assessment and Plan:   Kike Boss is a 80 y.o. female with a past medical history of hyperlipidemia, hypertension, neuromuscular disorder, TIA who presented to the emergency room with decreased p.o. intake, increased confusion, body aches. Family members had concerned of care for patient.   May require SNF/rehab placement.     COVID-19 Pneumonia   -symptom onset 22 with weakness, decreased PO intake, COVID + 22  - received 2 doses of the vaccine   - admit CXR with patchy density in the mid to lower lungs bilaterally  -Pro-Nelson 0.1  -No hypoxia  -Continue symptomatic management  - pulmonary hygiene, wean O2 as able   - monitor respiratory status  -Screening labs ordered  -Pending labs consider pharmacy consult for therapies if indicated  -Consider SNF placement and/or rehab.     MOSES  -Creatinine 1.7, baseline ~1.0 -downtrending to 1.4/ ()  -Likely prerenal in setting of decreased p.o. intake due to COVID-19  -Hold home lisinopril, HCTZ  -Continue gentle IV fluids, LR at 75 cc/ hour x15 hours, then reassess     Acute encephalopathy  -CT head with no acute process  -Does have intermittent confusion at baseline, daughter reports more confused today  -No focal deficits  -Suspect metabolic/infectious secondary to COVID-19 and dehydration  -Continue IV fluids, monitor mental status     Generalized weakness likely secondary to above.  -Lives with family, family concerned about caring for patient  -PT/OT, case management consulted     Elevated troponin   Abnormal EKG  -Troponin T 0.054   -EKG with no acute ischemic change, inferior, anterolateral T wave inversions, new from prior  -Denies chest pain  -Given aspirin in ED  -Suspect demand ischemia in setting of MOSES and infection  -Trend troponin, repeat EKG, consult cardiology     Hypertension  -hold home lisinopril-hydrochlorothiazide in setting of MOSES  -Continue home clonidine, amlodipine with hold parameters     TIA  -Continue home Plavix, aspirin      Trigeminal neuralgia  -Continue home Tegretol     Rheumatoid arthritis  -On methotrexate weekly  -Continue home prednisone    Diet: ADULT DIET; Regular  DVT prophylaxis: Lovenox  GI prophylaxis: Protonix  CODE STATUS:Full Code    Treatment and discharge plan discussed with patient/family. Patient/family voiced understanding. This patient was seen examined and treatment plan discussed with/supervising physician. History of Present Illness:     Chief Complaint: Shortness of breath, cough, chills    Subjective  Patient examined at bedside this morning. Patient endorses dry cough and generalized weakness. Patient denies any myalgias. Denies any worsening shortness of breath. Patient denies any new concerns this morning. ROS reviewed negative, unless as noted above    Objective:   No intake or output data in the 24 hours ending 02/01/22 0746   Vitals:   Vitals:    02/01/22 0315   BP: 126/85   Pulse: 62   Resp: 16   Temp: 97.4 °F (36.3 °C)   SpO2: 97%     Physical Exam:   Physical Exam  Constitutional:       Appearance: Normal appearance. HENT:      Mouth/Throat:      Mouth: Mucous membranes are moist.   Eyes:      Extraocular Movements: Extraocular movements intact. Conjunctiva/sclera: Conjunctivae normal.   Cardiovascular:      Rate and Rhythm: Normal rate and regular rhythm. Pulses: Normal pulses. Heart sounds: Normal heart sounds. Pulmonary:      Effort: Pulmonary effort is normal.      Breath sounds: Normal breath sounds. Comments: Dry cough  Abdominal:      General: Abdomen is flat. Palpations: Abdomen is soft. Musculoskeletal:         General: Normal range of motion. Skin:     General: Skin is warm and dry. Capillary Refill: Capillary refill takes less than 2 seconds. Neurological:      General: No focal deficit present. Mental Status: She is alert. Psychiatric:         Mood and Affect: Mood normal.         Medications:   Medications:    sodium chloride flush  5-40 mL IntraVENous 2 times per day    enoxaparin  30 mg SubCUTAneous Daily    amLODIPine  10 mg Oral Daily    aspirin  81 mg Oral Daily    atorvastatin  20 mg Oral Daily    carBAMazepine  200 mg Oral BID    cloNIDine  0.1 mg Oral Daily    clopidogrel  75 mg Oral Daily    dicyclomine  10 mg Oral BID    folic acid  1 mg Oral Daily    metoprolol tartrate  25 mg Oral Daily    pantoprazole  40 mg Oral QAM AC    predniSONE  5 mg Oral BID      Infusions:    sodium chloride       PRN Meds: sodium chloride flush, 5-40 mL, PRN  sodium chloride, 25 mL, PRN  ondansetron, 4 mg, Q8H PRN   Or  ondansetron, 4 mg, Q6H PRN  polyethylene glycol, 17 g, Daily PRN  acetaminophen, 650 mg, Q6H PRN   Or  acetaminophen, 650 mg, Q6H PRN        Recent Labs     01/31/22  1248 02/01/22  0651   WBC 7.3 6.0   HGB 10.8* 10.4*   HCT 33.6* 33.3*    232      Recent Labs     01/31/22  1248   *   K 4.6      CO2 20*   BUN 46*   CREATININE 1.7*     Recent Labs     01/31/22  1248   AST 50*   ALT 23   BILITOT 0.2   ALKPHOS 64     No results for input(s): INR in the last 72 hours.   Recent Labs     01/31/22  1248 01/31/22  1656 01/31/22  2217   TROPONINT 0.054* 0.049* 0.030*        Imaging reviewed      Electronically signed by NE Flynn CNP on 2/1/2022 at 7:46 AM

## 2022-02-01 NOTE — PROGRESS NOTES
[facility] [place]  [address], [address] [facility],Third Floor  Phone: [phone]    Fax [phone]                  Danette Michelle MD, Juni Boyer MD, [address], MD, MD Anisa Mcintyre MD Danae Jesus, MD Teresa Proctor, MD Aleksandr Hudson, NE Doyle, NE Huffman, NE Echols, NE Lynch, TANIA     Cardiology Progress Note     Today's Plan: sign off    Admit Date:  1/31/2022    Consult reason/ Seen today for: elevated troponin    Subjective and  Overnight Events:  Patient states that she is doing okay. She does not feel better and she has been coughing. Assessment and plan:  1. Elevated Troponin : Troponin's are flat. We will continue medical management for now. in setting of renal failure, covid pneumonia and hypoxia, does have new EKG changes compared to previous EKG in 2016 aspirin 81 mg, can consider stress test for risk stratification due to multiple risk factors once she is clinically improved as an out patient possible in 4-6 weeks. 2. Acute renal failure probably secondary to poor p.o. intake as per primary team  3. HTN: stable, continue present medications   4. Covid pneumonia treatment as per primary team  5. DVT prophylaxis if no contraindication  6. Dyslipidemia: continue statins   7. Will sign off. Please re consult if additional cardiology recommendations are needed. Telemetry Reviewed:   Sinus tach    ECHO :   Echocardiogram 2/1/2022   Summary   Left ventricular systolic function is low normal.   Ejection fraction is visually estimated at 45-50%. Sigmoid septum noted. Sclerotic, but non-stenotic aortic valve. Trace aortic regurgitation noted with color doppler. Mitral annular calcification is present. Mild-moderate mitral regurgitation is present. No evidence of any pericardial effusion.       History of Presenting Illness:    Chief complain on admission : 87 y.o.year old who is admitted for  Chief Complaint   Patient presents with    Generalized Body Aches     x4 days         Past medical history:    has a past medical history of Arthritis, Cataract of left eye, Cataract of right eye, Chronic kidney disease, H/O cardiovascular stress test, H/O Doppler ultrasound, H/O Doppler ultrasound, H/O echocardiogram, H/O tilt table evaluation, H/O transesophageal echocardiography (MARIO) for monitoring, Hyperlipidemia, Hypertension, Movement disorder, Neuromuscular disorder ([address] Osteoporosis, Pneumonia, Psychiatric problem, TIA (transient ischemic attack), and Trigeminal neuralgia of right side of face. Past surgical history:   has a past surgical history that includes Appendectomy; Hysterectomy; Cholecystectomy; eye surgery; and Cataract removal.  Social History:   reports that she has never smoked. She has never used smokeless tobacco. She reports that she does not drink alcohol and does not use drugs. Family history:  family history includes Cancer in her maternal aunt, maternal grandfather, and maternal grandmother; High Blood Pressure in her maternal grandfather. Allergies   Allergen Reactions    Pcn [Penicillins] Swelling       Review of Systems   All 14 systems were reviewed and are negative  Except for the positive findings  which as documented     /85   Pulse 62   Temp 97.4 °F (36.3 °C) (Oral)   Resp 16   Ht 5' 6\" (1.676 m)   Wt 122 lb (55.3 kg)   SpO2 97%   BMI 19.69 kg/m²   No intake or output data in the 24 hours ending 02/01/22 0819    Physical Exam  Vitals reviewed. Constitutional:       General: She is not in acute distress. Appearance: Normal appearance. She is not ill-appearing. HENT:      Head: Atraumatic. Neck:      Vascular: No carotid bruit. Cardiovascular:      Rate and Rhythm: Normal rate and regular rhythm. Pulses: Normal pulses. Heart sounds: Normal heart sounds. No murmur heard.       Pulmonary:      Effort: Pulmonary effort is normal. No respiratory distress. Breath sounds: Normal breath sounds. Musculoskeletal:         General: No swelling or deformity. Cervical back: Neck supple. No muscular tenderness. Neurological:      Mental Status: She is alert. Medications:    sodium chloride flush  5-40 mL IntraVENous 2 times per day    enoxaparin  30 mg SubCUTAneous Daily    amLODIPine  10 mg Oral Daily    aspirin  81 mg Oral Daily    atorvastatin  20 mg Oral Daily    carBAMazepine  200 mg Oral BID    cloNIDine  0.1 mg Oral Daily    clopidogrel  75 mg Oral Daily    dicyclomine  10 mg Oral BID    folic acid  1 mg Oral Daily    metoprolol tartrate  25 mg Oral Daily    pantoprazole  40 mg Oral QAM AC    predniSONE  5 mg Oral BID      sodium chloride       sodium chloride flush, sodium chloride, ondansetron **OR** ondansetron, polyethylene glycol, acetaminophen **OR** acetaminophen    Lab Data:  CBC:   Recent Labs     01/31/22  1248 02/01/22  0651   WBC 7.3 6.0   HGB 10.8* 10.4*   HCT 33.6* 33.3*   .4* 110.6*    232     BMP:   Recent Labs     01/31/22  1248 02/01/22  0651   * 141   K 4.6 4.6    103   CO2 20* 24   BUN 46* 40*   CREATININE 1.7* 1.4*     PT/INR: No results for input(s): PROTIME, INR in the last 72 hours. BNP:    Recent Labs     01/31/22  1248   PROBNP 733.4*     TROPONIN:   Recent Labs     01/31/22  1656 01/31/22  2217 02/01/22  0651   TROPONINT 0.049* 0.030* 0.042*               All labs, medications and tests reviewed by myself , continue all other medications of all above medical condition listed as is except for changes mentioned above. Thank you very much for consult , please call with questions. Electronically signed by NE Castañeda CNP on 2/1/2022 at 8:19 AM    CARDIOLOGY ATTENDING ADDENDUM      HPI:  I have reviewed the  HPI  And agree with LUCRETIA note  Gilberto Yeager is a 80 y. o.year old who and presents with had concerns including Generalized Body Aches (x4 days ). Chief Complaint   Patient presents with    Generalized Body Aches     x4 days      Please review addendum/changes made to note by LUCRETIA  Interval history: Breathing is better being treated for Covid pneumonia    Physical Exam:  General:  Awake, alert, NAD  Head:normal  Eye:normal  Neck:  No JVD   Chest:  Clear to auscultation, respiration easy  Cardiovascular:  S1 and S2 audible, No added heart sounds, No signs of ankle edema, or volume overload, No evidence of JVD, No crackles  Abdomen:   nontender  Extremities:  *NO  edema  Pulses; palpable  Neuro: grossly normal      Assessment and plan ;  1. Elevated Troponin : Nonspecific troponin elevation with no significant rise more or less flat be related to Covid and renal failure will continue medical management for now. in setting of renal failure , covid pneumonia and hypoxia, does have new EKG changes compared to previous EKG in 2016 aspirin 81 mg , can consider stress test for risk stratification as outpatient basis   2. Echo shows EF of 45 to 50% we will add small dose metoprolol  3. Acute renal failure probably secondary to poor p.o. intake as per primary team  4. HTN: stable, continue present medications   5. Covid pneumonia treatment as per primary team  6. DVT prophylaxis if no contraindication  6. Dyslipidemia: continue statins   We will sign off call with questions            I have seen ,spoken to  and examined this patient personally, independently of the advanced practitioner I have spent substantiate  portion of this encounter independently myself in examining patient and developing the medical management plan . I have reviewed the hospital care given to date and reviewed all pertinent labs and imaging. The plan was developed mutually at the time of the visit with the patient, LUCRETIA and myself. I have spoken with patient, nursing staff and provided written and verbal instructions  to LUCRETIA . The note has been reviewed and I agree with the  Examination assessment, diagnosis, and treatment plan with changes made by me as follows . My documented MDM is a substantive portion of the supervisory note.       Destinee Mora MD [address] [facility] 02/01/22

## 2022-02-01 NOTE — PLAN OF CARE
Nutrition Problem #1: Moderate malnutrition,In context of acute illness or injury  Intervention: Food and/or Nutrient Delivery: Continue Current Diet,Start Oral Nutrition Supplement  Nutritional Goals: Pt will consume greater than half of meals

## 2022-02-01 NOTE — PROGRESS NOTES
Comprehensive Nutrition Assessment    Type and Reason for Visit:  Initial (Low BMI)    Nutrition Recommendations/Plan:   · Start fortified pudding supplements, Standard High Calorie Supplements TID   · Recommend assist feed if able, or promote adequate intake of supplements   · Please document PO intakes and measured weights for accurate nutrition status   · Will monitor for glucose, lytes, GI status, cognition, and poc     Nutrition Assessment:  Admitted with COVID 19 infection. Pt currently on regular diet, on non-select menu, likely not eating well as evidence by low BS and poor intake prior to admission. No PO intake date recorded yet. Pt needs set up assist at meals. Had BM this AM. Based on stated wt, pt possibly had 24% wt loss PTA, unable to verify per chart review. Trial oral nutrition supplements. Meets criteria for acute malnutrition. Follow as high nutrition risk. Malnutrition Assessment:  Malnutrition Status: Moderate malnutrition    Context:  Acute Illness     Findings of the 6 clinical characteristics of malnutrition:  Energy Intake:  Mild decrease in energy intake (Comment)  Weight Loss:  7 - Greater than 7.5% over 3 months (Up to 24% of unintentional weight loss)     Body Fat Loss:  Unable to assess     Muscle Mass Loss:  7 - Moderate muscle mass loss Clavicles (pectoralis & deltoids),Thigh (quadraceps),Calf (gastrocnemius),Scapula (trapezius) (weakness to all extremities)  Fluid Accumulation:  No significant fluid accumulation Extremities   Strength:  Not Performed    Estimated Daily Nutrient Needs:  Energy (kcal):  0862-4199 (30-35 kcals/kg); Weight Used for Energy Requirements:  Current     Protein (g):  71-89 (1.2-1.5 g/kg);  Weight Used for Protein Requirements:  Ideal        Fluid (ml/day):  6102-2576; Method Used for Fluid Requirements:  1 ml/kcal      Nutrition Related Findings:  Noted pt on nasal cannula, +some confusion during stay, rx: prednisone, folvite, +hypoglycemia dos, lactate ringers @ 75      Wounds:  None       Current Nutrition Therapies:    ADULT DIET; Regular    Anthropometric Measures:  · Height: 5' 6\" (167.6 cm)  · Current Body Weight: 121 lb 14.6 oz (55.3 kg)   · Admission Body Weight: 160 lb (72.6 kg) (stated/estimated)    · Usual Body Weight:  (n/a)     · Ideal Body Weight: 130 lbs; % Ideal Body Weight 93.8 %   · BMI: 19.7  · BMI Categories: Underweight (BMI less than 22) age over 72       Nutrition Diagnosis:   · Moderate malnutrition,In context of acute illness or injury related to inadequate protein-energy intake,cognitive or neurological impairment,acute injury/trauma,impaired respiratory function as evidenced by BMI,poor intake prior to admission,weight loss,moderate muscle loss    Nutrition Interventions:   Food and/or Nutrient Delivery:  Continue Current Diet,Start Oral Nutrition Supplement  Nutrition Education/Counseling:  No recommendation at this time   Coordination of Nutrition Care:  Continue to monitor while inpatient    Goals:  Pt will consume greater than half of meals       Nutrition Monitoring and Evaluation:   Behavioral-Environmental Outcomes:  None Identified   Food/Nutrient Intake Outcomes:  Food and Nutrient Intake,Supplement Intake  Physical Signs/Symptoms Outcomes:  Biochemical Data,GI Status,Meal Time Behavior,Weight,Nutrition Focused Physical Findings     Discharge Planning:     Too soon to determine     Electronically signed by Denae Parada RD, LD on 2/1/22 at 9:56 AM EST    Contact: [phone]

## 2022-02-02 LAB
ALBUMIN SERPL-MCNC: 2.8 GM/DL (ref 3.4–5)
ALP BLD-CCNC: 63 IU/L (ref 40–128)
ALT SERPL-CCNC: 32 U/L (ref 10–40)
ANION GAP SERPL CALCULATED.3IONS-SCNC: 12 MMOL/L (ref 4–16)
AST SERPL-CCNC: 72 IU/L (ref 15–37)
BASOPHILS ABSOLUTE: 0 K/CU MM
BASOPHILS RELATIVE PERCENT: 0.2 % (ref 0–1)
BILIRUB SERPL-MCNC: 0.2 MG/DL (ref 0–1)
BUN BLDV-MCNC: 40 MG/DL (ref 6–23)
CALCIUM SERPL-MCNC: 9 MG/DL (ref 8.3–10.6)
CHLORIDE BLD-SCNC: 102 MMOL/L (ref 99–110)
CO2: 22 MMOL/L (ref 21–32)
CREAT SERPL-MCNC: 1.3 MG/DL (ref 0.6–1.1)
D DIMER: 389 NG/ML(DDU)
DIFFERENTIAL TYPE: ABNORMAL
EOSINOPHILS ABSOLUTE: 0 K/CU MM
EOSINOPHILS RELATIVE PERCENT: 0.3 % (ref 0–3)
GFR AFRICAN AMERICAN: 47 ML/MIN/1.73M2
GFR NON-AFRICAN AMERICAN: 39 ML/MIN/1.73M2
GLUCOSE BLD-MCNC: 159 MG/DL (ref 70–99)
HCT VFR BLD CALC: 32.9 % (ref 37–47)
HEMOGLOBIN: 10.5 GM/DL (ref 12.5–16)
HIGH SENSITIVE C-REACTIVE PROTEIN: 128.3 MG/L
IMMATURE NEUTROPHIL %: 0.7 % (ref 0–0.43)
LYMPHOCYTES ABSOLUTE: 0.8 K/CU MM
LYMPHOCYTES RELATIVE PERCENT: 13.7 % (ref 24–44)
MCH RBC QN AUTO: 34.4 PG (ref 27–31)
MCHC RBC AUTO-ENTMCNC: 31.9 % (ref 32–36)
MCV RBC AUTO: 107.9 FL (ref 78–100)
MONOCYTES ABSOLUTE: 0.6 K/CU MM
MONOCYTES RELATIVE PERCENT: 9.3 % (ref 0–4)
NUCLEATED RBC %: 0 %
PDW BLD-RTO: 13.5 % (ref 11.7–14.9)
PLATELET # BLD: 263 K/CU MM (ref 140–440)
PMV BLD AUTO: 9.6 FL (ref 7.5–11.1)
POTASSIUM SERPL-SCNC: 4.6 MMOL/L (ref 3.5–5.1)
RBC # BLD: 3.05 M/CU MM (ref 4.2–5.4)
SEGMENTED NEUTROPHILS ABSOLUTE COUNT: 4.6 K/CU MM
SEGMENTED NEUTROPHILS RELATIVE PERCENT: 75.8 % (ref 36–66)
SODIUM BLD-SCNC: 136 MMOL/L (ref 135–145)
TOTAL IMMATURE NEUTOROPHIL: 0.04 K/CU MM
TOTAL NUCLEATED RBC: 0 K/CU MM
TOTAL PROTEIN: 5.7 GM/DL (ref 6.4–8.2)
TROPONIN T: 0.05 NG/ML
WBC # BLD: 6 K/CU MM (ref 4–10.5)

## 2022-02-02 PROCEDURE — 97530 THERAPEUTIC ACTIVITIES: CPT

## 2022-02-02 PROCEDURE — 97166 OT EVAL MOD COMPLEX 45 MIN: CPT

## 2022-02-02 PROCEDURE — G0378 HOSPITAL OBSERVATION PER HR: HCPCS

## 2022-02-02 PROCEDURE — 36415 COLL VENOUS BLD VENIPUNCTURE: CPT

## 2022-02-02 PROCEDURE — 6360000002 HC RX W HCPCS: Performed by: PHYSICIAN ASSISTANT

## 2022-02-02 PROCEDURE — 80053 COMPREHEN METABOLIC PANEL: CPT

## 2022-02-02 PROCEDURE — 84484 ASSAY OF TROPONIN QUANT: CPT

## 2022-02-02 PROCEDURE — 85025 COMPLETE CBC W/AUTO DIFF WBC: CPT

## 2022-02-02 PROCEDURE — 2580000003 HC RX 258: Performed by: PHYSICIAN ASSISTANT

## 2022-02-02 PROCEDURE — 85379 FIBRIN DEGRADATION QUANT: CPT

## 2022-02-02 PROCEDURE — 6370000000 HC RX 637 (ALT 250 FOR IP): Performed by: PHYSICIAN ASSISTANT

## 2022-02-02 PROCEDURE — 96372 THER/PROPH/DIAG INJ SC/IM: CPT

## 2022-02-02 PROCEDURE — 86141 C-REACTIVE PROTEIN HS: CPT

## 2022-02-02 PROCEDURE — 97112 NEUROMUSCULAR REEDUCATION: CPT

## 2022-02-02 RX ADMIN — SODIUM CHLORIDE, PRESERVATIVE FREE 10 ML: 5 INJECTION INTRAVENOUS at 09:47

## 2022-02-02 RX ADMIN — PREDNISONE 5 MG: 10 TABLET ORAL at 20:57

## 2022-02-02 RX ADMIN — DICYCLOMINE HYDROCHLORIDE 10 MG: 10 CAPSULE ORAL at 20:57

## 2022-02-02 RX ADMIN — DICYCLOMINE HYDROCHLORIDE 10 MG: 10 CAPSULE ORAL at 09:46

## 2022-02-02 RX ADMIN — ENOXAPARIN SODIUM 30 MG: 100 INJECTION SUBCUTANEOUS at 09:47

## 2022-02-02 RX ADMIN — METOPROLOL TARTRATE 25 MG: 50 TABLET, FILM COATED ORAL at 11:34

## 2022-02-02 RX ADMIN — AMLODIPINE BESYLATE 10 MG: 5 TABLET ORAL at 09:46

## 2022-02-02 RX ADMIN — CLOPIDOGREL 75 MG: 75 TABLET, FILM COATED ORAL at 09:47

## 2022-02-02 RX ADMIN — CLONIDINE HYDROCHLORIDE 0.1 MG: 0.1 TABLET ORAL at 09:47

## 2022-02-02 RX ADMIN — ATORVASTATIN CALCIUM 20 MG: 20 TABLET, FILM COATED ORAL at 09:47

## 2022-02-02 RX ADMIN — ASPIRIN 81 MG 81 MG: 81 TABLET ORAL at 09:47

## 2022-02-02 RX ADMIN — PREDNISONE 5 MG: 10 TABLET ORAL at 09:47

## 2022-02-02 RX ADMIN — CARBAMAZEPINE 200 MG: 200 TABLET ORAL at 09:47

## 2022-02-02 RX ADMIN — FOLIC ACID 1 MG: 1 TABLET ORAL at 09:47

## 2022-02-02 RX ADMIN — PANTOPRAZOLE SODIUM 40 MG: 40 TABLET, DELAYED RELEASE ORAL at 06:11

## 2022-02-02 RX ADMIN — SODIUM CHLORIDE, PRESERVATIVE FREE 10 ML: 5 INJECTION INTRAVENOUS at 20:57

## 2022-02-02 RX ADMIN — CARBAMAZEPINE 200 MG: 200 TABLET ORAL at 20:57

## 2022-02-02 NOTE — PROGRESS NOTES
Physical Therapy    Physical Therapy Treatment Note  Name: Carmen Nichole MRN: [id number] :   1934   Date:  2022   Admission Date: 2022 Room:  [address] [place], [place] [address]A   Restrictions/Precautions:  General precautions, fall risk, droplet plus  Communication with other providers:  RN  Subjective:  Patient states:  Agreeable to therapy  Pain:   Location, Type, Intensity (0/10 to 10/10):  10/10 pain in jaw from reported fall. Objective:    Observation:  Pt supine in bed upon arrival. Room air. Pt demonstrated improved activity tolerance on this date. Vitals stable throughout. Treatment, including education/measures:  Supine-to-sit: Tamika for LE management and scooting EOB. Increased time to complete. Verbal cues for sequencing. Sit<>stand: modA x2 and RW for balance and safety. Increased effort to complete. Verbal cues for hip extension and showed fair return demo. Pt presented w knee valgus on BLEs. Stand-pivot: maxA for balance and safety. Verbal cues for sequencing and hand placement. Standing balance: modA x2 and RW for balance and safety ~1 minute. Sitting balance: CGA - SBA for static and light dynamic balance EOB for ~5 minutes. Pt demonstrated less posterior and lateral lean on this date. Joey Leung assisted in balance. Gait: Pt attempted side steps modA x2 and RW, only able to perform 1 side step to the left before requesting to sit down. Assessment / Impression:    Patient's tolerance of treatment:  Fair   Adverse Reaction: n/a  Significant change in status and impact:  n/a  Barriers to improvement:  Balance, strength, coordination, cognition, vision. Plan for Next Session:    Continue working towards goals.   Time in:  1111  Time out:  1135  Timed treatment minutes:  24  Total treatment time:  24    Previously filed items:  Social/Functional History  Lives With: Daughter (Daughter works)  Type of Home: House  Home Layout: Able to Live on Main level with bedroom/bathroom  Home Access: Level entry  Bathroom Shower/Tub: Walk-in shower  Bathroom Toilet: Standard  Bathroom Equipment: Shower chair,Grab bars in [address] [place]: [address] [facility] (Uses wc for ambulation)  ADL Assistance: Independent (Daughter supervises showers)  Homemaking Assistance: Needs assistance  Homemaking Responsibilities: No (Daughter cooks & cleans)  Ambulation Assistance: Independent  Transfer Assistance: Independent (Sleeps in hospital bed)  Active : No  Patient's  Info: Daughter  Short term goals  Time Frame for Short term goals: 1 week  Short term goal 1: Pt will perform rolling modA  Short term goal 2: Pt will sit statically x 5 minutes Tamika with BUE support  Short term goal 3: Pt will perform sit><supine modA  Short term goal 4: Pt will transfer to recliner/bed maxA       Electronically signed by:    SHIRIN Shaw  2/2/2022, 11:39 AM

## 2022-02-02 NOTE — PROGRESS NOTES
Occupational Therapy    [facility] ACUTE CARE OCCUPATIONAL THERAPY EVALUATION    Beryl Alejandre, 1934, 4010/4010-A, 2/2/2022    Discharge Recommendation: Skilled Nursing Facility      History:  [place]:  The primary encounter diagnosis was COVID-19. Diagnoses of General weakness, MOSES (acute kidney injury) ([address] Elevated troponin, Myalgia, and Confusion were also pertinent to this visit. Subjective:  Patient states: \"I can try. \"   Pain: Pt reported \"10/10 mouth pain\", unable to describe pain in further detail  Communication with other providers: PT Tiffanie Bates, RN Marsha  Restrictions: General Precautions, Fall Risk, Droplet Plus Isolation, Bed/chair alarm    Home Setup/Prior level of function:  Social/Functional History  Lives With: Daughter (who works)  Type of Home: House  Home Layout: Able to Live on Main level with bedroom/bathroom  Home Access: Level entry  Bathroom Shower/Tub: Walk-in shower  Bathroom Toilet: Standard  Bathroom Equipment: Shower chair,Grab bars in [address] [place], [place] [address]vard: [address] [place] [address] (Uses wheelchair for mobility)  ADL Assistance: Independent (Daughter supervises showers)  Homemaking Assistance: Needs assistance  Homemaking Responsibilities: No (Daughter cooks & cleans)  Ambulation Assistance: Needs assistance (does not ambulate at baseline, completes stand pivot transfers to wheelchair)  Transfer Assistance: Independent   Active : No  Patient's  Info: Daughter    Examination:  · Observation: Supine in bed upon arrival. Agreeable/cooperative with evaluation. Very soften spoken with frequent unintelligible speech.   · Vision: Impaired (able to see objects/figures in environment)  · [facility]  · Vitals: Stable vitals throughout session    Body Systems and functions:  · ROM: Rt UE active shoulder flexion grossly 0-100', Rt UE WFL distally, Lt UE active shoulder flexion grossly 0-80', Lt UE grossly WFL distally  · Strength: Rt UE 4/5 MMT all major muscle groups, Lt UE 3+ to 4-/5 MMT all major muscle groups (pt repots baseline Lt sided weakness from prior CVA)  · Sensation: WFL (denies numbness/tingling)  · Tone: Normal  · Coordination: Decreased speed and dexterity in BL UEs (Lt impairments >Rt)    Activities of Daily Living (ADLs):  · Feeding: Supervision/setup (took sips of Ensure seated at chair level; setup for opening bottle and applying straw)  · Grooming: SBA (seated facial hygiene; unable to complete in standing at this time due to LE weakness)  · UB bathing: SBA   · LB bathing: Max A (reaching distal LEs, bottom, posterior thighs)  · UB dressing: CGA (light dynamic sitting balance with donning robe seated EOB)  · LB dressing: Dependent (donning BL socks)  · Toileting: Dependent    Cognitive and Psychosocial Functioning:  · Overall cognitive status: Impaired (pt with decreased overall alertness, soft-spoken and often unintelligible speech, memory impairments, delayed task initiation, limited overall insight/safety awareness)  · Affect: Normal     Balance:   · Sitting: SBA static sitting, CGA with light dynamic sitting tasks  · Standing: Mod A with RW; retropulsive, max cues for anterior weight-shift and upright posture    Functional Mobility:  · Bed Mobility: Min A supine to sitting EOB (HOB elevated to 40', max tactile cues for task initiation, min trunk boost for uprighting)  · Transfers: Mod A sit to stand from bed, Mod A stand pivot transfer bed to chair (max cues for technique/safe hand placement with each transfer)  · Ambulation: Unsafe/unable-does not ambulate at baseline per pt      AM-PAC 6 click short form for inpatient daily activity:   How much help from another person does the patient currently need. .. Unable  Dep A Lot  Max A A Lot   Mod A A Little  Min A A Little   CGA  SBA None   Mod I  Indep  Sup   1. Putting on and taking off regular lower body clothing? [x] 1    [] 2   [] 2   [] 3   [] 3   [] 4      2.  Bathing (including washing, rinsing, drying)? [] 1   [] 2   [x] 2 [] 3 [] 3 [] 4   3. Toileting, which includes using toilet, bedpan, or urinal? [x] 1    [] 2   [] 2   [] 3   [] 3   [] 4     4. Putting on and taking off regular upper body clothing? [] 1   [] 2   [] 2   [] 3   [x] 3    [] 4      5. Taking care of personal grooming such as brushing teeth? [] 1   [] 2    [] 2 [] 3    [x] 3   [] 4      6. Eating meals? [] 1   [] 2   [] 2   [] 3   [x] 3   [] 4      Raw Score:  13     [24=0% impaired(CH), 23=1-19%(CI), 20-22=20-39%(CJ), 15-19=40-59%(CK), 10-14=60-79%(CL), 7-9=80-99%(CM), 6=100%(CN)]     Treatment:  Therapeutic Activity Training:   Therapeutic activity training was instructed today. Cues were given for safety, sequence, UE/LE placement, awareness, and balance. Activities performed today included bed mobility training, UB/LB dressing tasks, sitting balance/tolerance, transfer training, standing tolerance with RW, self-feeding, education on role of OT, POC, importance of EOB/OOB activity, d/c planning      Safety Measures: Gait belt used, Left in Chair, Alarm in place    Assessment:  Pt is an 80year old female with a past medical history of Arthritis, Cataract of left eye, Cataract of right eye, Chronic kidney disease, H/O cardiovascular stress test, H/O Doppler ultrasound, H/O Doppler ultrasound, H/O echocardiogram, H/O tilt table evaluation, H/O transesophageal echocardiography (MARIO) for monitoring, Hyperlipidemia, Hypertension, Movement disorder, Neuromuscular disorder ([address] Osteoporosis, Pneumonia, Psychiatric problem, TIA (transient ischemic attack), and Trigeminal neuralgia of right side of face. Pt admitted with decreased PO intake and altered mental status. Pt diagnosed with acute encephalopathy due to COVID-19. Pt lives at home with her daughter and reported PLOF is as above. Pt currently presents with the above impairments, and will need continued OT services in SNF at discharge.     Complexity:

## 2022-02-02 NOTE — PROGRESS NOTES
Hospitalist Progress Note      Name:  Sage Earl /Age/Sex: 1934  (80 y.o. female)   MRN & CSN:  [id number] & [id number] Admission Date/Time: 2022  1:03 PM   Location:  [facility] PCP: Sushila Palm MD         Hospital Day: 3    Assessment and Plan:       Salena Griffin a 80 y. o. female with a past medical history of hyperlipidemia, hypertension, neuromuscular disorder, TIA who presented to the emergency room with decreased p.o. intake, increased confusion, body aches.      COVID-19 Pneumonia: No hypoxia or acute respiratory failure. Patient is on room air 99%. -symptom onset 22 with weakness, decreased PO intake, COVID + 22  - received 2 doses of the vaccine   - admit CXR with patchy density in the mid to lower lungs bilaterally  -Pro-Nelson 0.1  -No hypoxia  -Continue symptomatic management  - pulmonary hygiene, wean O2 as able   - monitor respiratory status  -Screening labs ordered  -Pending labs consider pharmacy consult for therapies if indicated  -SNF placement pending.   Per social work waiting on daughter to give choice of facility.     MOSES  -Creatinine 1.7, baseline ~1.0 -downtrending to 1.4/ ()-a.m. labs pending ()  -Likely prerenal in setting of decreased p.o. intake due to COVID-19  -Hold home lisinopril, HCTZ  -Continue gentle IV fluids, LR at 75 cc/ hour x15 hours, then reassess     Acute encephalopathy  -CT head with no acute process  -Does have intermittent confusion at baseline, daughter reports more confused today  -No focal deficits  -Suspect metabolic/infectious secondary to COVID-19 and dehydration  -Continue IV fluids, monitor mental status     Generalized weakness likely secondary to above.  -Lives with family, family concerned about caring for patient  -PT/OT, case management consulted     Elevated troponin   Abnormal EKG  -Troponin T 0.054 > 0.030 > 0.042  -EKG with no acute ischemic change, inferior, anterolateral T wave inversions, new from prior  -Denies chest pain  -Given aspirin in ED  -Suspect demand ischemia in setting of MOSES and infection  -Trend troponin, repeat EKG  -Cardiology consulted-unexplained possibly secondary to MOSES and poor p.o. intake. Recommended outpatient stress test for risk stratification due to multiple risk factors in 4 to 6 weeks once patient is clinically improved. Signed off on 2/1 they were available for further questions if needed.     Hypertension  -hold home lisinopril-hydrochlorothiazide in setting of MOSES  -Continue home clonidine, amlodipine with hold parameters     TIA  -Continue home Plavix, aspirin      Trigeminal neuralgia  -Continue home Tegretol      Rheumatoid arthritis  -On methotrexate weekly  -Continue home prednisone    Diet: ADULT DIET; Regular  ADULT ORAL NUTRITION SUPPLEMENT; Breakfast, Lunch; Fortified Pudding Oral Supplement  ADULT ORAL NUTRITION SUPPLEMENT; Lunch, Dinner, Breakfast; Standard High Calorie/High Protein Oral Supplement  DVT prophylaxis: Lovenox  GI prophylaxis: Protonix  CODE STATUS:Full Code      This patient was seen examined and treatment plan discussed with/supervising physician. History of Present Illness:     Chief Complaint: Confusion, body aches, positive for COVID-19. Subjective  Patient examined at bedside this morning. She is awake and alert her name. She is pleasantly confused. Unable to state where she is or the year. She does endorse cough. Denies any chest pain, nausea vomiting. There is no family members at bedside. No acute changes we will continue with plan as above. Spoke with social work they are waiting on facility choice from daughter.     ROS reviewed negative, unless as noted above    Objective:   No intake or output data in the 24 hours ending 02/02/22 0720   Vitals:   Vitals:    02/02/22 0946   BP: 129/89   Pulse: 101   Resp: 16   Temp: 97.4 °F (36.3 °C)   SpO2: 99%     Physical Exam:   Physical Exam  HENT:      Mouth/Throat:      Mouth: Mucous INR in the last 72 hours.   Recent Labs     01/31/22  1656 01/31/22  2217 02/01/22  0651   TROPONINT 0.049* 0.030* 0.042*        Imaging reviewed      Electronically signed by NE Preston CNP on 2/2/2022 at 7:20 AM

## 2022-02-03 LAB
ALBUMIN SERPL-MCNC: 3.1 GM/DL (ref 3.4–5)
ALP BLD-CCNC: 69 IU/L (ref 40–128)
ALT SERPL-CCNC: 37 U/L (ref 10–40)
ANION GAP SERPL CALCULATED.3IONS-SCNC: 16 MMOL/L (ref 4–16)
AST SERPL-CCNC: 76 IU/L (ref 15–37)
BACTERIA: ABNORMAL /HPF
BASOPHILS ABSOLUTE: 0 K/CU MM
BASOPHILS RELATIVE PERCENT: 0.3 % (ref 0–1)
BILIRUB SERPL-MCNC: 0.3 MG/DL (ref 0–1)
BILIRUBIN URINE: NEGATIVE MG/DL
BLOOD, URINE: ABNORMAL
BUN BLDV-MCNC: 40 MG/DL (ref 6–23)
CALCIUM SERPL-MCNC: 9.6 MG/DL (ref 8.3–10.6)
CHLORIDE BLD-SCNC: 101 MMOL/L (ref 99–110)
CLARITY: ABNORMAL
CO2: 24 MMOL/L (ref 21–32)
COLOR: YELLOW
CREAT SERPL-MCNC: 1.4 MG/DL (ref 0.6–1.1)
D DIMER: 490 NG/ML(DDU)
DIFFERENTIAL TYPE: ABNORMAL
EOSINOPHILS ABSOLUTE: 0.1 K/CU MM
EOSINOPHILS RELATIVE PERCENT: 1 % (ref 0–3)
GFR AFRICAN AMERICAN: 43 ML/MIN/1.73M2
GFR NON-AFRICAN AMERICAN: 36 ML/MIN/1.73M2
GLUCOSE BLD-MCNC: 92 MG/DL (ref 70–99)
GLUCOSE, URINE: NEGATIVE MG/DL
HCT VFR BLD CALC: 36.7 % (ref 37–47)
HEMOGLOBIN: 11.4 GM/DL (ref 12.5–16)
HIGH SENSITIVE C-REACTIVE PROTEIN: 110.5 MG/L
IMMATURE NEUTROPHIL %: 0.8 % (ref 0–0.43)
KETONES, URINE: NEGATIVE MG/DL
LEUKOCYTE ESTERASE, URINE: ABNORMAL
LYMPHOCYTES ABSOLUTE: 1.6 K/CU MM
LYMPHOCYTES RELATIVE PERCENT: 25.7 % (ref 24–44)
MCH RBC QN AUTO: 34.2 PG (ref 27–31)
MCHC RBC AUTO-ENTMCNC: 31.1 % (ref 32–36)
MCV RBC AUTO: 110.2 FL (ref 78–100)
MONOCYTES ABSOLUTE: 0.7 K/CU MM
MONOCYTES RELATIVE PERCENT: 11.4 % (ref 0–4)
NITRITE URINE, QUANTITATIVE: NEGATIVE
NUCLEATED RBC %: 0.3 %
PDW BLD-RTO: 13.5 % (ref 11.7–14.9)
PH, URINE: 6.5 (ref 5–8)
PLATELET # BLD: 300 K/CU MM (ref 140–440)
PMV BLD AUTO: 9.7 FL (ref 7.5–11.1)
POTASSIUM SERPL-SCNC: 4.3 MMOL/L (ref 3.5–5.1)
PROCALCITONIN: 0.13
PROTEIN UA: >300 MG/DL
RBC # BLD: 3.33 M/CU MM (ref 4.2–5.4)
RBC URINE: 197 /HPF (ref 0–6)
SEGMENTED NEUTROPHILS ABSOLUTE COUNT: 3.8 K/CU MM
SEGMENTED NEUTROPHILS RELATIVE PERCENT: 60.8 % (ref 36–66)
SODIUM BLD-SCNC: 141 MMOL/L (ref 135–145)
SPECIFIC GRAVITY UA: 1.02 (ref 1–1.03)
TOTAL IMMATURE NEUTOROPHIL: 0.05 K/CU MM
TOTAL NUCLEATED RBC: 0 K/CU MM
TOTAL PROTEIN: 6.6 GM/DL (ref 6.4–8.2)
UROBILINOGEN, URINE: NORMAL MG/DL (ref 0.2–1)
WBC # BLD: 6.3 K/CU MM (ref 4–10.5)
WBC CLUMP: ABNORMAL /HPF
WBC UA: 996 /HPF (ref 0–5)

## 2022-02-03 PROCEDURE — 85379 FIBRIN DEGRADATION QUANT: CPT

## 2022-02-03 PROCEDURE — 36415 COLL VENOUS BLD VENIPUNCTURE: CPT

## 2022-02-03 PROCEDURE — 6360000002 HC RX W HCPCS: Performed by: PHYSICIAN ASSISTANT

## 2022-02-03 PROCEDURE — G0378 HOSPITAL OBSERVATION PER HR: HCPCS

## 2022-02-03 PROCEDURE — 85025 COMPLETE CBC W/AUTO DIFF WBC: CPT

## 2022-02-03 PROCEDURE — 84145 PROCALCITONIN (PCT): CPT

## 2022-02-03 PROCEDURE — 6370000000 HC RX 637 (ALT 250 FOR IP): Performed by: PHYSICIAN ASSISTANT

## 2022-02-03 PROCEDURE — 80053 COMPREHEN METABOLIC PANEL: CPT

## 2022-02-03 PROCEDURE — 81001 URINALYSIS AUTO W/SCOPE: CPT

## 2022-02-03 PROCEDURE — 86141 C-REACTIVE PROTEIN HS: CPT

## 2022-02-03 PROCEDURE — 2580000003 HC RX 258: Performed by: PHYSICIAN ASSISTANT

## 2022-02-03 PROCEDURE — 96372 THER/PROPH/DIAG INJ SC/IM: CPT

## 2022-02-03 RX ADMIN — ASPIRIN 81 MG 81 MG: 81 TABLET ORAL at 09:34

## 2022-02-03 RX ADMIN — FOLIC ACID 1 MG: 1 TABLET ORAL at 09:34

## 2022-02-03 RX ADMIN — DICYCLOMINE HYDROCHLORIDE 10 MG: 10 CAPSULE ORAL at 19:51

## 2022-02-03 RX ADMIN — ENOXAPARIN SODIUM 30 MG: 100 INJECTION SUBCUTANEOUS at 09:34

## 2022-02-03 RX ADMIN — METOPROLOL TARTRATE 25 MG: 25 TABLET, FILM COATED ORAL at 11:03

## 2022-02-03 RX ADMIN — ATORVASTATIN CALCIUM 20 MG: 20 TABLET, FILM COATED ORAL at 09:34

## 2022-02-03 RX ADMIN — CLOPIDOGREL 75 MG: 75 TABLET, FILM COATED ORAL at 09:34

## 2022-02-03 RX ADMIN — SODIUM CHLORIDE, PRESERVATIVE FREE 10 ML: 5 INJECTION INTRAVENOUS at 09:33

## 2022-02-03 RX ADMIN — PREDNISONE 5 MG: 10 TABLET ORAL at 19:51

## 2022-02-03 RX ADMIN — PREDNISONE 5 MG: 10 TABLET ORAL at 09:34

## 2022-02-03 RX ADMIN — CARBAMAZEPINE 200 MG: 200 TABLET ORAL at 19:51

## 2022-02-03 RX ADMIN — DICYCLOMINE HYDROCHLORIDE 10 MG: 10 CAPSULE ORAL at 09:33

## 2022-02-03 RX ADMIN — CARBAMAZEPINE 200 MG: 200 TABLET ORAL at 09:34

## 2022-02-03 RX ADMIN — PANTOPRAZOLE SODIUM 40 MG: 40 TABLET, DELAYED RELEASE ORAL at 09:34

## 2022-02-03 RX ADMIN — SODIUM CHLORIDE, PRESERVATIVE FREE 10 ML: 5 INJECTION INTRAVENOUS at 19:52

## 2022-02-03 RX ADMIN — CLONIDINE HYDROCHLORIDE 0.1 MG: 0.1 TABLET ORAL at 09:34

## 2022-02-03 RX ADMIN — AMLODIPINE BESYLATE 10 MG: 5 TABLET ORAL at 09:33

## 2022-02-03 ASSESSMENT — PAIN SCALES - GENERAL
PAINLEVEL_OUTOF10: 0
PAINLEVEL_OUTOF10: 0

## 2022-02-03 NOTE — PROGRESS NOTES
Hospitalist Progress Note      Name:  Freddy BOBO/Age/Sex: 1934  (80 y.o. female)   MRN & CSN:  [id number] & [id number] Admission Date/Time: 2022  1:03 PM   Location:  [facility] PCP: Vandana Hendrix MD         Hospital Day: 4    Assessment and Plan:       Darvin Favors a 80 y. o. female with a past medical history of hyperlipidemia, hypertension, neuromuscular disorder, TIA who presented to the emergency room with decreased p.o. intake, increased confusion, body aches.      Discharge delayed due to waiting on placement    2/3-continue with current plan waiting placement    COVID-19 Pneumonia: No hypoxia or acute respiratory failure. Patient is on room air 99%. -symptom onset 22 with weakness, decreased PO intake, COVID + 22  - received 2 doses of the vaccine   - admit CXR with patchy density in the mid to lower lungs bilaterally  -Pro-Nelson 0.1  -Has not required oxygen supplementation. No hypoxia.  -Continue symptomatic management  - pulmonary hygiene, wean O2 as able   - monitor respiratory status  -Screening labs ordered  -Pending labs consider pharmacy consult for therapies if indicated  -SNF placement pending.   [facility] -      MOSES  -Creatinine 1.7, baseline ~1.0 -downtrending to 1.4/ (2)-a.m. labs pending ()  -Likely prerenal in setting of decreased p.o. intake due to COVID-19  -Hold home lisinopril, HCTZ  -Continue gentle IV fluids, LR at 75 cc/ hour x15 hours, then reassess     Acute encephalopathy  -CT head with no acute process  -Does have intermittent confusion at baseline, daughter reports more confused today  -No focal deficits  -Suspect metabolic/infectious secondary to COVID-19 and dehydration  -Continue IV fluids, monitor mental status     Generalized weakness likely secondary to above.  -Lives with family, family concerned about caring for patient  -PT/OT, case management consulted     Elevated troponin   Abnormal EKG  -Troponin T 0.054 > 0.030 > 0.042  -EKG with no acute ischemic change, inferior, anterolateral T wave inversions, new from prior  -Denies chest pain  -Given aspirin in ED  -Suspect demand ischemia in setting of MOSES and infection  -Trend troponin, repeat EKG  -Cardiology consulted-unexplained possibly secondary to MOSES and poor p.o. intake. Recommended outpatient stress test for risk stratification due to multiple risk factors in 4 to 6 weeks once patient is clinically improved. Signed off on 2/1 they were available for further questions if needed.     Hypertension  -hold home lisinopril-hydrochlorothiazide in setting of MOSES  -Continue home clonidine, amlodipine with hold parameters     TIA  -Continue home Plavix, aspirin      Trigeminal neuralgia  -Continue home Tegretol      Rheumatoid arthritis  -On methotrexate weekly  -Continue home prednisone     Diet: ADULT DIET; Regular  ADULT ORAL NUTRITION SUPPLEMENT; Breakfast, Lunch; Fortified Pudding Oral Supplement  ADULT ORAL NUTRITION SUPPLEMENT; Lunch, Dinner, Breakfast; Standard High Calorie/High Protein Oral Supplement  DVT prophylaxis: Lovenox  GI prophylaxis: Protonix  CODE STATUS:Full Code      Treatment and discharge plan discussed with patient/family. Patient/family voiced understanding. This patient was seen examined and treatment plan discussed with/supervising physician. History of Present Illness:     Chief Complaint: COVID-19 weakness, decreased p.o. intake    Subjective  Patient examined at bedside this morning sitting up watching television. Her breakfast tray is pleasant. She is awake and alert to herself and is pleasantly confused. On exam she denies any pain. She is friendly and interactive with me. ROS reviewed negative, unless as noted above    Objective:        Intake/Output Summary (Last 24 hours) at 2/3/2022 0732  Last data filed at 2/2/2022 0956  Gross per 24 hour   Intake 120 ml   Output --   Net 120 ml      Vitals:   Vitals:    02/03/22 0230 BP: 133/81   Pulse: 72   Resp: 18   Temp: 98.7 °F (37.1 °C)   SpO2: 98%     Physical Exam:   Physical Exam  Constitutional:       Appearance: Normal appearance. HENT:      Mouth/Throat:      Mouth: Mucous membranes are moist.   Eyes:      Extraocular Movements: Extraocular movements intact. Conjunctiva/sclera: Conjunctivae normal.   Cardiovascular:      Rate and Rhythm: Normal rate and regular rhythm. Pulses: Normal pulses. Heart sounds: Normal heart sounds. Pulmonary:      Effort: Pulmonary effort is normal.      Breath sounds: Normal breath sounds. Abdominal:      General: Abdomen is flat. Palpations: Abdomen is soft. Musculoskeletal:         General: Normal range of motion. Neurological:      Mental Status: She is alert. Comments: Alert and oriented to self. Pleasantly confused.          Medications:   Medications:    sodium chloride flush  5-40 mL IntraVENous 2 times per day    enoxaparin  30 mg SubCUTAneous Daily    amLODIPine  10 mg Oral Daily    aspirin  81 mg Oral Daily    atorvastatin  20 mg Oral Daily    carBAMazepine  200 mg Oral BID    cloNIDine  0.1 mg Oral Daily    clopidogrel  75 mg Oral Daily    dicyclomine  10 mg Oral BID    folic acid  1 mg Oral Daily    metoprolol tartrate  25 mg Oral Daily    pantoprazole  40 mg Oral QAM AC    predniSONE  5 mg Oral BID      Infusions:    sodium chloride       PRN Meds: acetaminophen, 650 mg, Q6H PRN   Or  acetaminophen, 650 mg, Q6H PRN  guaiFENesin-dextromethorphan, 5 mL, Q4H PRN  sodium chloride flush, 5-40 mL, PRN  sodium chloride, 25 mL, PRN  ondansetron, 4 mg, Q8H PRN   Or  ondansetron, 4 mg, Q6H PRN  polyethylene glycol, 17 g, Daily PRN        Recent Labs     01/31/22  1248 02/01/22  0651 02/02/22  1312   WBC 7.3 6.0 6.0   HGB 10.8* 10.4* 10.5*   HCT 33.6* 33.3* 32.9*    232 263      Recent Labs     01/31/22  1248 02/01/22  0651 02/02/22  1312   * 141 136   K 4.6 4.6 4.6    103 102   CO2 20* 24 22   BUN 46* 40* 40*   CREATININE 1.7* 1.4* 1.3*     Recent Labs     01/31/22  1248 02/01/22  0651 02/02/22  1312   AST 50* 48* 72*   ALT 23 22 32   BILITOT 0.2 0.2 0.2   ALKPHOS 64 63 63     No results for input(s): INR in the last 72 hours.   Recent Labs     01/31/22  2217 02/01/22  0651 02/02/22  1312   TROPONINT 0.030* 0.042* 0.052*        Imaging reviewed      Electronically signed by NE Austin CNP on 2/3/2022 at 7:32 AM

## 2022-02-03 NOTE — CARE COORDINATION
Call to [facility]. They are reviewing the Pt and will get back with the LSW. Per their protocol Pt would not be out of the 10 day isolation until 2/11. LSW was also informed that Pt insurance has not continued the waiver and precert will have to be obtained.

## 2022-02-04 LAB
ALBUMIN SERPL-MCNC: 2.9 GM/DL (ref 3.4–5)
ALBUMIN SERPL-MCNC: 3 GM/DL (ref 3.4–5)
ALP BLD-CCNC: 69 IU/L (ref 40–128)
ALT SERPL-CCNC: 38 U/L (ref 10–40)
ANION GAP SERPL CALCULATED.3IONS-SCNC: 14 MMOL/L (ref 4–16)
ANION GAP SERPL CALCULATED.3IONS-SCNC: 16 MMOL/L (ref 4–16)
AST SERPL-CCNC: 77 IU/L (ref 15–37)
BASOPHILS ABSOLUTE: 0 K/CU MM
BASOPHILS RELATIVE PERCENT: 0.2 % (ref 0–1)
BILIRUB SERPL-MCNC: 0.2 MG/DL (ref 0–1)
BUN BLDV-MCNC: 40 MG/DL (ref 6–23)
BUN BLDV-MCNC: 42 MG/DL (ref 6–23)
CALCIUM SERPL-MCNC: 9 MG/DL (ref 8.3–10.6)
CALCIUM SERPL-MCNC: 9.4 MG/DL (ref 8.3–10.6)
CHLORIDE BLD-SCNC: 103 MMOL/L (ref 99–110)
CHLORIDE BLD-SCNC: 108 MMOL/L (ref 99–110)
CO2: 22 MMOL/L (ref 21–32)
CO2: 23 MMOL/L (ref 21–32)
CREAT SERPL-MCNC: 1.4 MG/DL (ref 0.6–1.1)
CREAT SERPL-MCNC: 1.5 MG/DL (ref 0.6–1.1)
DIFFERENTIAL TYPE: ABNORMAL
EOSINOPHILS ABSOLUTE: 0 K/CU MM
EOSINOPHILS RELATIVE PERCENT: 0.6 % (ref 0–3)
GFR AFRICAN AMERICAN: 40 ML/MIN/1.73M2
GFR AFRICAN AMERICAN: 43 ML/MIN/1.73M2
GFR NON-AFRICAN AMERICAN: 33 ML/MIN/1.73M2
GFR NON-AFRICAN AMERICAN: 36 ML/MIN/1.73M2
GLUCOSE BLD-MCNC: 104 MG/DL (ref 70–99)
GLUCOSE BLD-MCNC: 107 MG/DL (ref 70–99)
GLUCOSE BLD-MCNC: 96 MG/DL (ref 70–99)
HCT VFR BLD CALC: 34.5 % (ref 37–47)
HEMOGLOBIN: 10.9 GM/DL (ref 12.5–16)
IMMATURE NEUTROPHIL %: 0.5 % (ref 0–0.43)
LYMPHOCYTES ABSOLUTE: 1.3 K/CU MM
LYMPHOCYTES RELATIVE PERCENT: 20.3 % (ref 24–44)
MCH RBC QN AUTO: 34 PG (ref 27–31)
MCHC RBC AUTO-ENTMCNC: 31.6 % (ref 32–36)
MCV RBC AUTO: 107.5 FL (ref 78–100)
MONOCYTES ABSOLUTE: 0.8 K/CU MM
MONOCYTES RELATIVE PERCENT: 13 % (ref 0–4)
NUCLEATED RBC %: 0 %
PDW BLD-RTO: 13.5 % (ref 11.7–14.9)
PHOSPHORUS: 1.2 MG/DL (ref 2.5–4.9)
PLATELET # BLD: 311 K/CU MM (ref 140–440)
PMV BLD AUTO: 9.6 FL (ref 7.5–11.1)
POTASSIUM SERPL-SCNC: 4 MMOL/L (ref 3.5–5.1)
POTASSIUM SERPL-SCNC: 4.6 MMOL/L (ref 3.5–5.1)
RBC # BLD: 3.21 M/CU MM (ref 4.2–5.4)
SEGMENTED NEUTROPHILS ABSOLUTE COUNT: 4 K/CU MM
SEGMENTED NEUTROPHILS RELATIVE PERCENT: 65.4 % (ref 36–66)
SODIUM BLD-SCNC: 142 MMOL/L (ref 135–145)
SODIUM BLD-SCNC: 144 MMOL/L (ref 135–145)
TOTAL IMMATURE NEUTOROPHIL: 0.03 K/CU MM
TOTAL NUCLEATED RBC: 0 K/CU MM
TOTAL PROTEIN: 6.2 GM/DL (ref 6.4–8.2)
WBC # BLD: 6.2 K/CU MM (ref 4–10.5)

## 2022-02-04 PROCEDURE — 2580000003 HC RX 258: Performed by: NURSE PRACTITIONER

## 2022-02-04 PROCEDURE — 36415 COLL VENOUS BLD VENIPUNCTURE: CPT

## 2022-02-04 PROCEDURE — G0378 HOSPITAL OBSERVATION PER HR: HCPCS

## 2022-02-04 PROCEDURE — 82962 GLUCOSE BLOOD TEST: CPT

## 2022-02-04 PROCEDURE — 6370000000 HC RX 637 (ALT 250 FOR IP): Performed by: NURSE PRACTITIONER

## 2022-02-04 PROCEDURE — 80069 RENAL FUNCTION PANEL: CPT

## 2022-02-04 PROCEDURE — 6360000002 HC RX W HCPCS: Performed by: PHYSICIAN ASSISTANT

## 2022-02-04 PROCEDURE — 80053 COMPREHEN METABOLIC PANEL: CPT

## 2022-02-04 PROCEDURE — 6370000000 HC RX 637 (ALT 250 FOR IP): Performed by: PHYSICIAN ASSISTANT

## 2022-02-04 PROCEDURE — 96361 HYDRATE IV INFUSION ADD-ON: CPT

## 2022-02-04 PROCEDURE — 85025 COMPLETE CBC W/AUTO DIFF WBC: CPT

## 2022-02-04 PROCEDURE — 94761 N-INVAS EAR/PLS OXIMETRY MLT: CPT

## 2022-02-04 PROCEDURE — 2580000003 HC RX 258: Performed by: PHYSICIAN ASSISTANT

## 2022-02-04 PROCEDURE — 96372 THER/PROPH/DIAG INJ SC/IM: CPT

## 2022-02-04 RX ORDER — SODIUM CHLORIDE 9 MG/ML
INJECTION, SOLUTION INTRAVENOUS CONTINUOUS
Status: DISPENSED | OUTPATIENT
Start: 2022-02-04 | End: 2022-02-04

## 2022-02-04 RX ADMIN — ACETAMINOPHEN 650 MG: 325 TABLET ORAL at 04:40

## 2022-02-04 RX ADMIN — ASPIRIN 81 MG 81 MG: 81 TABLET ORAL at 10:37

## 2022-02-04 RX ADMIN — FOLIC ACID 1 MG: 1 TABLET ORAL at 10:38

## 2022-02-04 RX ADMIN — CLOPIDOGREL 75 MG: 75 TABLET, FILM COATED ORAL at 10:38

## 2022-02-04 RX ADMIN — SODIUM CHLORIDE, PRESERVATIVE FREE 10 ML: 5 INJECTION INTRAVENOUS at 21:15

## 2022-02-04 RX ADMIN — PREDNISONE 5 MG: 10 TABLET ORAL at 21:14

## 2022-02-04 RX ADMIN — GUAIFENESIN AND DEXTROMETHORPHAN 5 ML: 100; 10 SYRUP ORAL at 04:40

## 2022-02-04 RX ADMIN — DICYCLOMINE HYDROCHLORIDE 10 MG: 10 CAPSULE ORAL at 10:39

## 2022-02-04 RX ADMIN — CARBAMAZEPINE 200 MG: 200 TABLET ORAL at 10:38

## 2022-02-04 RX ADMIN — SODIUM CHLORIDE: 9 INJECTION, SOLUTION INTRAVENOUS at 11:35

## 2022-02-04 RX ADMIN — ACETAMINOPHEN 650 MG: 325 TABLET ORAL at 21:18

## 2022-02-04 RX ADMIN — PANTOPRAZOLE SODIUM 40 MG: 40 TABLET, DELAYED RELEASE ORAL at 06:06

## 2022-02-04 RX ADMIN — ATORVASTATIN CALCIUM 20 MG: 20 TABLET, FILM COATED ORAL at 10:36

## 2022-02-04 RX ADMIN — CARBAMAZEPINE 200 MG: 200 TABLET ORAL at 21:14

## 2022-02-04 RX ADMIN — GUAIFENESIN AND DEXTROMETHORPHAN 5 ML: 100; 10 SYRUP ORAL at 23:16

## 2022-02-04 RX ADMIN — ENOXAPARIN SODIUM 30 MG: 100 INJECTION SUBCUTANEOUS at 10:38

## 2022-02-04 RX ADMIN — DICYCLOMINE HYDROCHLORIDE 10 MG: 10 CAPSULE ORAL at 21:15

## 2022-02-04 RX ADMIN — PREDNISONE 5 MG: 10 TABLET ORAL at 10:37

## 2022-02-04 NOTE — PROGRESS NOTES
Hospitalist Progress Note      Name:  Trina Chakraborty /Age/Sex: 1934  (80 y.o. female)   MRN & CSN:  [id number] & [id number] Admission Date/Time: 2022  1:03 PM   [facility] PCP: Joaquin Castañeda MD         Hospital Day: 5    Assessment and Plan:       Luis Gallegos a 80 y. o. female with a past medical history of hyperlipidemia, hypertension, neuromuscular disorder, TIA who presented to the emergency room with decreased p.o. intake, increased confusion, body aches.      Discharge delayed due to waiting on placement     -continue with current plan awaiting placement     COVID-19 Pneumonia: No hypoxia or acute respiratory failure.  Patient is on room air 99%. -symptom onset 22 with weakness, decreased PO intake, COVID + 22. Patient remained asymptomatic.  - received 2 doses of the vaccine   - admit CXR with patchy density in the mid to lower lungs bilaterally  -Pro-Nelson 0.1  -Has not required oxygen supplementation.   No hypoxia.  -Continue symptomatic management  - pulmonary hygiene, wean O2 as able   - monitor respiratory status  -Screening labs ordered  -Pending labs consider pharmacy consult for therapies if indicated  -SNF placement pending.  [facility] or [facility] -      MOSES  -Creatinine 1.7, baseline ~1.0 -downtrending to 1.4/ ()-a.m. labs pending ()  -Likely prerenal in setting of decreased p.o. intake due to COVID-19  -Hold home lisinopril, HCTZ  -Continue gentle IV fluids, LR at 75 cc/ hour x15 hours, then reassess     Acute encephalopathy  -CT head with no acute process  -Does have intermittent confusion at baseline, daughter reports more confused today  -No focal deficits  -Suspect metabolic/infectious secondary to COVID-19 and dehydration  -Continue IV fluids, monitor mental status     Generalized weakness likely secondary to above.  -Lives with family, family concerned about caring for patient  -PT/OT, case management consulted     Elevated troponin   Abnormal EKG  -Troponin T 0.054 > 0.030 > 0.042  -EKG with no acute ischemic change, inferior, anterolateral T wave inversions, new from prior  -Denies chest pain  -Given aspirin in ED  -Suspect demand ischemia in setting of MOSES and infection  -Trend troponin, repeat EKG  -Cardiology consulted-unexplained possibly secondary to MOSES and poor p.o. intake.  Recommended outpatient stress test for risk stratification due to multiple risk factors in 4 to 6 weeks once patient is clinically improved.  Signed off on 2/1 they were available for further questions if needed.     Hypertension  -hold home lisinopril-hydrochlorothiazide in setting of MOSES  -Continue home clonidine, amlodipine with hold parameters     TIA  -Continue home Plavix, aspirin      Trigeminal neuralgia  -Continue home Tegretol      Rheumatoid arthritis  -On methotrexate weekly  -Continue home prednisone     Diet: ADULT DIET; Regular  ADULT ORAL NUTRITION SUPPLEMENT; Breakfast, Lunch; Fortified Pudding Oral Supplement  ADULT ORAL NUTRITION SUPPLEMENT; Lunch, Dinner, Breakfast; Standard High Calorie/High Protein Oral Supplement  DVT prophylaxis: Lovenox  GI prophylaxis: Protonix  CODE STATUS:Full Code    This patient was seen examined and treatment plan discussed with/supervising physician. History of Present Illness:     Chief Complaint: Generalized weakness, decreased p.o. intake. Subjective  Patient examined at bedside this morning. She is known to me and she appears pleasantly confused again. Responsive to her name and states she is in the hospital.  No acute distress noted. Nursing reports no acute events. When asked patient denies any pain. She is smiling and laughing. ROS reviewed negative, unless as noted above    Objective:        Intake/Output Summary (Last 24 hours) at 2/4/2022 0748  Last data filed at 2/3/2022 1800  Gross per 24 hour   Intake 300 ml   Output 200 ml   Net 100 ml      Vitals:   Vitals:    02/04/22 0610 Recent Labs     02/02/22  1312 02/03/22  1040 02/04/22  0226    141 142   K 4.6 4.3 4.6    101 103   CO2 22 24 23   BUN 40* 40* 42*   CREATININE 1.3* 1.4* 1.5*     Recent Labs     02/02/22  1312 02/03/22  1040 02/04/22  0226   AST 72* 76* 77*   ALT 32 37 38   BILITOT 0.2 0.3 0.2   ALKPHOS 63 69 69     No results for input(s): INR in the last 72 hours. Recent Labs     02/02/22  1312   TROPONINT 0.052*        Imaging reviewed during her hospitalization.        Electronically signed by NE Flynn CNP on 2/4/2022 at 7:48 AM

## 2022-02-04 NOTE — PLAN OF CARE
Nutrition Problem #1: Moderate malnutrition,In context of acute illness or injury  Intervention: Food and/or Nutrient Delivery: Continue Current Diet,Continue Oral Nutrition Supplement  Nutritional Goals:  If pt unable to meet greater than 50% of estimated energy needs via po intake, recommend placing NG and initiating EN

## 2022-02-04 NOTE — CARE COORDINATION
LSW called [facility] and they are full at this time and have a waiting list.  Barb Mcneill can not take pt due pt pt confusion and they do not have the staff to have a confused pt. [facility] is full.  Kassandra Dailey will be able to take pt on 2/11 once her ten day post her 1st + COVID test.

## 2022-02-04 NOTE — PROGRESS NOTES
Comprehensive Nutrition Assessment    Type and Reason for Visit:  Reassess    Nutrition Recommendations/Plan:   Assist pt with meals as needed  Please obtain updated measured weight  If pt unable to meet greater than 50% of estimated energy needs via po intake, recommend initiating EN  Will continue to closely monitor po intake, weight trends, poc    Nutrition Assessment:  Pt remains on regular diet with frozen and protein fortified oral nutrition supplements ordered, pt consuming 0-25% of meals documented in the past 3 days per flowsheet, will continue to follow at high nutrition risk    Malnutrition Assessment:  Malnutrition Status: Moderate malnutrition    Context:  Acute Illness     Estimated Daily Nutrient Needs:  Energy (kcal):  1614-7956 (30-35 kcals/kg); Weight Used for Energy Requirements:  Current     Protein (g):  71-89 (1.2-1.5 g/kg); Weight Used for Protein Requirements:  Ideal        Fluid (ml/day):  1879-4210; Method Used for Fluid Requirements:  1 ml/kcal      Nutrition Related Findings:  Labs: BUN 42, Cr 1.5      Wounds:  None       Current Nutrition Therapies:    ADULT DIET; Regular  ADULT ORAL NUTRITION SUPPLEMENT; Breakfast, Lunch;  Fortified Pudding Oral Supplement  ADULT ORAL NUTRITION SUPPLEMENT; Lunch, Dinner, Breakfast; Standard High Calorie/High Protein Oral Supplement    Anthropometric Measures:  · Height: 5' 5.98\" (167.6 cm)  · Current Body Weight: 121 lb 14.6 oz (55.3 kg) ((1/31/22))   · Admission Body Weight: 160 lb (72.6 kg) (stated/estimated)    · Usual Body Weight:  (n/a)     · Ideal Body Weight: 130 lbs; % Ideal Body Weight 93.8 %   · BMI: 19.7  · BMI Categories: Underweight (BMI less than 22) age over 72       Nutrition Diagnosis:   · Moderate malnutrition,In context of acute illness or injury related to inadequate protein-energy intake,cognitive or neurological impairment,acute injury/trauma,impaired respiratory function as evidenced by BMI,poor intake prior to admission,weight loss,moderate muscle loss    Nutrition Interventions:   Food and/or Nutrient Delivery:  Continue Current Diet,Continue Oral Nutrition Supplement  Nutrition Education/Counseling:  No recommendation at this time   Coordination of Nutrition Care:  Continue to monitor while inpatient    Goals: If pt unable to meet greater than 50% of estimated energy needs via po intake, recommend placing NG and initiating EN       Nutrition Monitoring and Evaluation:   Behavioral-Environmental Outcomes:  None Identified   Food/Nutrient Intake Outcomes:  Food and Nutrient Intake,Supplement Intake  Physical Signs/Symptoms Outcomes:  Biochemical Data,Hemodynamic Status,Fluid Status or Edema,GI Status,Weight,Skin     Discharge Planning:     Too soon to determine     Electronically signed by Chin Han MS, RD, LD on 2/4/22 at 2:15 PM EST    Contact: [phone]

## 2022-02-05 LAB
ALBUMIN SERPL-MCNC: 2.8 GM/DL (ref 3.4–5)
ALP BLD-CCNC: 61 IU/L (ref 40–128)
ALT SERPL-CCNC: 34 U/L (ref 10–40)
ANION GAP SERPL CALCULATED.3IONS-SCNC: 12 MMOL/L (ref 4–16)
AST SERPL-CCNC: 63 IU/L (ref 15–37)
BASOPHILS ABSOLUTE: 0 K/CU MM
BASOPHILS RELATIVE PERCENT: 0.2 % (ref 0–1)
BILIRUB SERPL-MCNC: 0.3 MG/DL (ref 0–1)
BUN BLDV-MCNC: 40 MG/DL (ref 6–23)
CALCIUM SERPL-MCNC: 9.3 MG/DL (ref 8.3–10.6)
CHLORIDE BLD-SCNC: 110 MMOL/L (ref 99–110)
CO2: 23 MMOL/L (ref 21–32)
CREAT SERPL-MCNC: 1.4 MG/DL (ref 0.6–1.1)
DIFFERENTIAL TYPE: ABNORMAL
EOSINOPHILS ABSOLUTE: 0.1 K/CU MM
EOSINOPHILS RELATIVE PERCENT: 1.5 % (ref 0–3)
GFR AFRICAN AMERICAN: 43 ML/MIN/1.73M2
GFR NON-AFRICAN AMERICAN: 36 ML/MIN/1.73M2
GLUCOSE BLD-MCNC: 113 MG/DL (ref 70–99)
HCT VFR BLD CALC: 32.2 % (ref 37–47)
HEMOGLOBIN: 10.2 GM/DL (ref 12.5–16)
IMMATURE NEUTROPHIL %: 0.7 % (ref 0–0.43)
LYMPHOCYTES ABSOLUTE: 1.2 K/CU MM
LYMPHOCYTES RELATIVE PERCENT: 19.7 % (ref 24–44)
MCH RBC QN AUTO: 34.6 PG (ref 27–31)
MCHC RBC AUTO-ENTMCNC: 31.7 % (ref 32–36)
MCV RBC AUTO: 109.2 FL (ref 78–100)
MONOCYTES ABSOLUTE: 0.7 K/CU MM
MONOCYTES RELATIVE PERCENT: 11.3 % (ref 0–4)
NUCLEATED RBC %: 0 %
PDW BLD-RTO: 13.6 % (ref 11.7–14.9)
PLATELET # BLD: 309 K/CU MM (ref 140–440)
PMV BLD AUTO: 9.6 FL (ref 7.5–11.1)
POTASSIUM SERPL-SCNC: 4.2 MMOL/L (ref 3.5–5.1)
RBC # BLD: 2.95 M/CU MM (ref 4.2–5.4)
SEGMENTED NEUTROPHILS ABSOLUTE COUNT: 4.1 K/CU MM
SEGMENTED NEUTROPHILS RELATIVE PERCENT: 66.6 % (ref 36–66)
SODIUM BLD-SCNC: 145 MMOL/L (ref 135–145)
TOTAL IMMATURE NEUTOROPHIL: 0.04 K/CU MM
TOTAL NUCLEATED RBC: 0 K/CU MM
TOTAL PROTEIN: 5.8 GM/DL (ref 6.4–8.2)
WBC # BLD: 6.1 K/CU MM (ref 4–10.5)

## 2022-02-05 PROCEDURE — 80053 COMPREHEN METABOLIC PANEL: CPT

## 2022-02-05 PROCEDURE — 94761 N-INVAS EAR/PLS OXIMETRY MLT: CPT

## 2022-02-05 PROCEDURE — 36415 COLL VENOUS BLD VENIPUNCTURE: CPT

## 2022-02-05 PROCEDURE — 6370000000 HC RX 637 (ALT 250 FOR IP): Performed by: PHYSICIAN ASSISTANT

## 2022-02-05 PROCEDURE — G0378 HOSPITAL OBSERVATION PER HR: HCPCS

## 2022-02-05 PROCEDURE — 96372 THER/PROPH/DIAG INJ SC/IM: CPT

## 2022-02-05 PROCEDURE — 92610 EVALUATE SWALLOWING FUNCTION: CPT

## 2022-02-05 PROCEDURE — 2580000003 HC RX 258: Performed by: PHYSICIAN ASSISTANT

## 2022-02-05 PROCEDURE — 85025 COMPLETE CBC W/AUTO DIFF WBC: CPT

## 2022-02-05 PROCEDURE — 96361 HYDRATE IV INFUSION ADD-ON: CPT

## 2022-02-05 PROCEDURE — 6370000000 HC RX 637 (ALT 250 FOR IP): Performed by: NURSE PRACTITIONER

## 2022-02-05 PROCEDURE — 6360000002 HC RX W HCPCS: Performed by: PHYSICIAN ASSISTANT

## 2022-02-05 RX ADMIN — PREDNISONE 5 MG: 10 TABLET ORAL at 21:23

## 2022-02-05 RX ADMIN — SODIUM CHLORIDE, PRESERVATIVE FREE 10 ML: 5 INJECTION INTRAVENOUS at 09:11

## 2022-02-05 RX ADMIN — PANTOPRAZOLE SODIUM 40 MG: 40 TABLET, DELAYED RELEASE ORAL at 06:41

## 2022-02-05 RX ADMIN — SODIUM CHLORIDE, PRESERVATIVE FREE 10 ML: 5 INJECTION INTRAVENOUS at 21:23

## 2022-02-05 RX ADMIN — DICYCLOMINE HYDROCHLORIDE 10 MG: 10 CAPSULE ORAL at 21:23

## 2022-02-05 RX ADMIN — CARBAMAZEPINE 200 MG: 200 TABLET ORAL at 21:23

## 2022-02-05 RX ADMIN — PREDNISONE 5 MG: 10 TABLET ORAL at 09:02

## 2022-02-05 RX ADMIN — CLOPIDOGREL 75 MG: 75 TABLET, FILM COATED ORAL at 08:58

## 2022-02-05 RX ADMIN — FOLIC ACID 1 MG: 1 TABLET ORAL at 09:03

## 2022-02-05 RX ADMIN — ATORVASTATIN CALCIUM 20 MG: 20 TABLET, FILM COATED ORAL at 08:59

## 2022-02-05 RX ADMIN — ENOXAPARIN SODIUM 30 MG: 100 INJECTION SUBCUTANEOUS at 09:08

## 2022-02-05 RX ADMIN — GUAIFENESIN AND DEXTROMETHORPHAN 5 ML: 100; 10 SYRUP ORAL at 21:23

## 2022-02-05 RX ADMIN — AMLODIPINE BESYLATE 10 MG: 5 TABLET ORAL at 08:57

## 2022-02-05 RX ADMIN — ASPIRIN 81 MG 81 MG: 81 TABLET ORAL at 08:59

## 2022-02-05 RX ADMIN — METOPROLOL TARTRATE 25 MG: 25 TABLET, FILM COATED ORAL at 09:00

## 2022-02-05 RX ADMIN — CARBAMAZEPINE 200 MG: 200 TABLET ORAL at 09:00

## 2022-02-05 RX ADMIN — DICYCLOMINE HYDROCHLORIDE 10 MG: 10 CAPSULE ORAL at 09:03

## 2022-02-05 RX ADMIN — CLONIDINE HYDROCHLORIDE 0.1 MG: 0.1 TABLET ORAL at 09:01

## 2022-02-05 ASSESSMENT — PAIN SCALES - GENERAL
PAINLEVEL_OUTOF10: 0
PAINLEVEL_OUTOF10: 0

## 2022-02-05 NOTE — PROGRESS NOTES
Hospitalist Progress Note      Name:  Trina Chakraborty /Age/Sex: 1934  (80 y.o. female)   MRN & CSN:  [id number] & [id number] Admission Date/Time: 2022  1:03 PM   [facility]CP: Joaquin Castañeda MD         Hospital Day: 6    Assessment and Plan:     Luis Gallegos a 80 y. o. female with a past medical history of hyperlipidemia, hypertension, neuromuscular disorder, TIA who presented to the emergency room with decreased p.o. intake, increased confusion, body aches.      Discharge delayed due to waiting on placement     -continue with current plan awaiting placement-patient will have to be 10 days from positive Covid test to be accepted at [facility]-which would be . Per social work note of      LSW called [facility] and they are full at this time and have a waiting list.  [facility] can not take pt due pt pt confusion and they do not have the staff to have a confused pt. [facility] is full. [facility] [facility] will be able to take pt on  once her ten day post her 1st + COVID test.                  COVID-19 Pneumonia: No hypoxia or acute respiratory failure.  Patient is on room air 99%. -symptom onset 22 with weakness, decreased PO intake, COVID + 22.   Patient remained asymptomatic.  - received 2 doses of the vaccine   - admit CXR with patchy density in the mid to lower lungs bilaterally  -Pro-Nelson 0.1  -Has not required oxygen supplementation.  No hypoxia.  -Continue symptomatic management  - pulmonary hygiene, wean O2 as able   - monitor respiratory status  -Screening labs ordered  -Pending labs consider pharmacy consult for therapies if indicated  -SNF placement pending.  [facility] or [facility] -   -Speech eval, coughing with oral intake     MOSES  -Creatinine 1.7, baseline ~1.0 -downtrending to 1.4/ ()-a.m. labs pending ()  -Likely prerenal in setting of decreased p.o. intake due to COVID-19  -Hold home lisinopril, HCTZ  -Continue gentle IV fluids, LR at 75 cc/ hour x15 hours, then reassess     Acute encephalopathy  -CT head with no acute process  -Does have intermittent confusion at baseline, daughter reports more confused today  -No focal deficits  -Suspect metabolic/infectious secondary to COVID-19 and dehydration  -Continue IV fluids, monitor mental status     Generalized weakness likely secondary to above.  -Lives with family, family concerned about caring for patient  -PT/OT, case management consulted     Elevated troponin   Abnormal EKG  -Troponin T 0.054 > 0.030 > 0.042  -EKG with no acute ischemic change, inferior, anterolateral T wave inversions, new from prior  -Denies chest pain  -Given aspirin in ED  -Suspect demand ischemia in setting of MOSES and infection  -Trend troponin, repeat EKG  -Cardiology consulted-unexplained possibly secondary to MOSES and poor p.o. intake.  Recommended outpatient stress test for risk stratification due to multiple risk factors in 4 to 6 weeks once patient is clinically improved.  Signed off on 2/1 they were available for further questions if needed.     Hypertension  -hold home lisinopril-hydrochlorothiazide in setting of MOSES  -Continue home clonidine, amlodipine with hold parameters     TIA  -Continue home Plavix, aspirin      Trigeminal neuralgia  -Continue home Tegretol      Rheumatoid arthritis  -On methotrexate weekly  -Continue home prednisone     Diet: ADULT DIET; Regular  ADULT ORAL NUTRITION SUPPLEMENT; Breakfast, Lunch; Fortified Pudding Oral Supplement  ADULT ORAL NUTRITION SUPPLEMENT; Lunch, Dinner, Breakfast; Standard High Calorie/High Protein Oral Supplement  DVT prophylaxis: Lovenox  GI prophylaxis: Protonix  CODE STATUS:Full Code     This patient was seen examined and treatment plan discussed with/supervising physician. History of Present Illness:     Chief Complaint: Generalized body aches and decreased p.o. intake. Subjective  Patient examined at bedside this morning.   She is known to me after the past few days of following patient. She is confused which she has been a week. She is responsive to her name. Nursing reported no acute events overnight. Nursing did message after feeding patient she was coughing with eating and thought she may benefit from a speech evaluation will order. As of today patient unable to transfer to [facility] waiting on Covid clearance. The facility can accept her has to wait 10 days from her positive test which will be 211. ROS reviewed negative, unless as noted above    Objective:   No intake or output data in the 24 hours ending 02/05/22 0906   Vitals:   Vitals:    02/05/22 0700   BP: 134/83   Pulse: 100   Resp: 20   Temp:    SpO2: 98%     Physical Exam:   Physical Exam  Constitutional:       Comments: To self   HENT:      Mouth/Throat:      Mouth: Mucous membranes are moist.   Eyes:      Extraocular Movements: Extraocular movements intact. Conjunctiva/sclera: Conjunctivae normal.   Cardiovascular:      Rate and Rhythm: Normal rate and regular rhythm. Pulses: Normal pulses. Heart sounds: Normal heart sounds. Pulmonary:      Effort: Pulmonary effort is normal.      Breath sounds: Normal breath sounds. Abdominal:      General: Abdomen is flat. Palpations: Abdomen is soft. Musculoskeletal:         General: Normal range of motion. Skin:     General: Skin is warm and dry. Neurological:      Mental Status: She is alert. She is disoriented.          Medications:   Medications:    metoprolol tartrate  25 mg Oral Daily    sodium chloride flush  5-40 mL IntraVENous 2 times per day    enoxaparin  30 mg SubCUTAneous Daily    amLODIPine  10 mg Oral Daily    aspirin  81 mg Oral Daily    atorvastatin  20 mg Oral Daily    carBAMazepine  200 mg Oral BID    cloNIDine  0.1 mg Oral Daily    clopidogrel  75 mg Oral Daily    dicyclomine  10 mg Oral BID    folic acid  1 mg Oral Daily    pantoprazole  40 mg Oral QAM AC    predniSONE  5 mg Oral BID      Infusions:  sodium chloride       PRN Meds: acetaminophen, 650 mg, Q6H PRN   Or  acetaminophen, 650 mg, Q6H PRN  guaiFENesin-dextromethorphan, 5 mL, Q4H PRN  sodium chloride flush, 5-40 mL, PRN  sodium chloride, 25 mL, PRN  ondansetron, 4 mg, Q8H PRN   Or  ondansetron, 4 mg, Q6H PRN  polyethylene glycol, 17 g, Daily PRN        Recent Labs     02/03/22  1040 02/04/22 0226 02/05/22  0530   WBC 6.3 6.2 6.1   HGB 11.4* 10.9* 10.2*   HCT 36.7* 34.5* 32.2*    311 309      Recent Labs     02/04/22 0226 02/04/22 2037 02/05/22  0530    144 145   K 4.6 4.0 4.2    108 110   CO2 23 22 23   PHOS  --  1.2*  --    BUN 42* 40* 40*   CREATININE 1.5* 1.4* 1.4*     Recent Labs     02/03/22  1040 02/04/22 0226 02/05/22  0530   AST 76* 77* 63*   ALT 37 38 34   BILITOT 0.3 0.2 0.3   ALKPHOS 69 69 61     No results for input(s): INR in the last 72 hours.   Recent Labs     02/02/22  1312   TROPONINT 0.052*        Imaging reviewed      Electronically signed by NE Verdugo CNP on 2/5/2022 at 9:06 AM

## 2022-02-05 NOTE — PROGRESS NOTES
Speech Language Pathology  Facility/Department: [address] [place] [address]   CLINICAL BEDSIDE SWALLOW EVALUATION    NAME: Valerie Phelan  : 1934  MRN: [id number]    ADMISSION DATE: 2022  ADMITTING DIAGNOSIS: has HTN (hypertension); Rheumatoid arthritis ([facility] [address] CVA, old, hemiparesis ([facility] [address] Tic douloureux; Debility; Ataxia; Acute pain of right knee; Sepsis ([facility] [address] Physical deconditioning; Generalized weakness; Depression; COVID-19; and Troponin I above reference range on their problem list.  ONSET DATE: this admission    Recent Chest Xray/CT of Chest: 22  Date of Eval: 2022  Evaluating Therapist: LILI Joe    IMPRESSIONS AND RECOMMENDATIONS:   Valerie Phelan was referred for a bedside swallow evaluation after being admitted to [facility] with COVID-19. RN ordered ST eval d/t observation of her coughing after thin liquids. Medical hx includes sepsis, physical deconditioning, weakness, depression, and COVID-19. Pt was seen for evaluation seated upright in bed. Pt was alert and pleasant throughout the evaluation. Baseline vocal quality is weak. Oral mechanism exam revealed that the oral mechanism appears grossly intact. Pt was presented with PO trials of thin liquids by straw, nectar thick liquids by straw, purees by spoon, and regular solids. Oral stage is moderately impaired, characterized by lack of mastication on regualar solids (pt swallowed a small piece of leighann cracker whole) and anterior loss of pureed solids. Pharyngeal phase appears impaired, characterized by decreased laryngeal elevation on thin liquid trials and wet vocal quality after thin liquid trials. No overt s/s of aspiration were noted during nectar thick liquid trials. Recommend pureed diet with nectar thick liquids. Recommend pt be a total feed. Recommend strict aspiration precautions, including alternating solids and liquids, small bites and sips, upright positioning, and slow pacing.  SLP will continue to follow 1-2x a week for diet tolerance monitoring. Results/recommendations d/w pt and RN. Current Diet level:  Current Diet : Regular  Current Liquid Diet : Thin      Primary Complaint       Pain:  Pain Assessment  Pain Assessment: 0-10  Pain Level: 0    Reason for Referral  Jonathan Galvan was referred for a bedside swallow evaluation to assess the efficiency of her swallow function, identify signs and symptoms of aspiration and make recommendations regarding safe dietary consistencies, effective compensatory strategies, and safe eating environment. Impression  Dysphagia Diagnosis: Mild to moderate oral stage dysphagia;Mild pharyngeal stage dysphagia  Dysphagia Impression : mild-moderate oropharyngeal dysphagia  Dysphagia Outcome Severity Scale: Level 3: Moderate dysphagia- Total assisstance, supervision or strategies. Two or more diet consistencies restricted     Treatment Plan  Requires SLP Intervention: Yes  Duration/Frequency of Treatment: 1-2x  D/C Recommendations: To be determined       Recommended Diet and Intervention  Diet Solids Recommendation: Dysphagia Pureed (Dysphagia I)  Liquid Consistency Recommendation: Mildly Thick (Nectar)  Recommended Form of Meds: Crushed in puree as able  Recommendations: Total feed  Therapeutic Interventions: Diet tolerance monitoring;Pharyngeal exercises; Patient/Family education    Compensatory Swallowing Strategies  Compensatory Swallowing Strategies: Alternate solids and liquids;Eat/Feed slowly;Upright as possible for all oral intake;Remain upright for 30-45 minutes after meals;Small bites/sips    Treatment/Goals  Short-term Goals  Goal 1: pt will tolerate pureed diet with nectar thick liquids w/o any s/s of aspiration 100% of the time  Goal 2: pt and caregivers will demonstrate understanding of safe swallow protocols    General  Chart Reviewed: Yes  Comments: Covid 19- observed coughing after drinks of thin liquids  Behavior/Cognition: Alert; Cooperative;Pleasant mood  Respiratory Status: Room air  O2 Device: None (Room air)  Communication Observation: Functional  Follows Directions: Simple  Dentition: Edentulous  Patient Positioning: Upright in bed  Baseline Vocal Quality: Weak  Volitional Cough: Strong  Volitional Swallow: Delayed  Prior Dysphagia History: 2011 had a evaluation completed with no suspeced s/s of aspiration  Consistencies Administered: Reg solid; Dysphagia Pureed (Dysphagia I); Nectar - straw; Thin - straw           Vision/Hearing  Vision  Vision: Within Functional Limits  Hearing  Hearing: Within functional limits    Oral Motor Deficits  Oral/Motor  Oral Motor: Within functional limits    Oral Phase Dysfunction  Oral Phase  Oral Phase: Exceptions  Oral Phase Dysfunction  Impaired Mastication: Reg Solid  Spillage Right: Puree     Indicators of Pharyngeal Phase Dysfunction   Pharyngeal Phase  Pharyngeal Phase: Exceptions  Indicators of Pharyngeal Phase Dysfunction  Wet Vocal Quality:  Thin - straw    Prognosis  Prognosis  Prognosis for safe diet advancement: guarded  Individuals consulted  Consulted and agree with results and recommendations: Patient;RN    Education  Patient Education: results and recommendations  Patient Education Response: Verbalizes understanding             Therapy Time  SLP Individual Minutes  Time In: 1400  Time Out: 1430  Minutes: Rosales [place]  2/5/2022 2:41 PM

## 2022-02-06 PROCEDURE — 6360000002 HC RX W HCPCS: Performed by: PHYSICIAN ASSISTANT

## 2022-02-06 PROCEDURE — 6370000000 HC RX 637 (ALT 250 FOR IP): Performed by: PHYSICIAN ASSISTANT

## 2022-02-06 PROCEDURE — 2580000003 HC RX 258: Performed by: PHYSICIAN ASSISTANT

## 2022-02-06 PROCEDURE — G0378 HOSPITAL OBSERVATION PER HR: HCPCS

## 2022-02-06 PROCEDURE — 94761 N-INVAS EAR/PLS OXIMETRY MLT: CPT

## 2022-02-06 PROCEDURE — 96372 THER/PROPH/DIAG INJ SC/IM: CPT

## 2022-02-06 RX ADMIN — ATORVASTATIN CALCIUM 20 MG: 20 TABLET, FILM COATED ORAL at 09:26

## 2022-02-06 RX ADMIN — FOLIC ACID 1 MG: 1 TABLET ORAL at 09:27

## 2022-02-06 RX ADMIN — ENOXAPARIN SODIUM 30 MG: 100 INJECTION SUBCUTANEOUS at 09:26

## 2022-02-06 RX ADMIN — DICYCLOMINE HYDROCHLORIDE 10 MG: 10 CAPSULE ORAL at 09:27

## 2022-02-06 RX ADMIN — CLONIDINE HYDROCHLORIDE 0.1 MG: 0.1 TABLET ORAL at 09:27

## 2022-02-06 RX ADMIN — SODIUM CHLORIDE, PRESERVATIVE FREE 10 ML: 5 INJECTION INTRAVENOUS at 21:07

## 2022-02-06 RX ADMIN — PANTOPRAZOLE SODIUM 40 MG: 40 TABLET, DELAYED RELEASE ORAL at 06:29

## 2022-02-06 RX ADMIN — CLOPIDOGREL 75 MG: 75 TABLET, FILM COATED ORAL at 09:27

## 2022-02-06 RX ADMIN — CARBAMAZEPINE 200 MG: 200 TABLET ORAL at 21:08

## 2022-02-06 RX ADMIN — CARBAMAZEPINE 200 MG: 200 TABLET ORAL at 09:26

## 2022-02-06 RX ADMIN — AMLODIPINE BESYLATE 10 MG: 5 TABLET ORAL at 09:26

## 2022-02-06 RX ADMIN — DICYCLOMINE HYDROCHLORIDE 10 MG: 10 CAPSULE ORAL at 21:08

## 2022-02-06 RX ADMIN — PREDNISONE 5 MG: 10 TABLET ORAL at 09:27

## 2022-02-06 RX ADMIN — METOPROLOL TARTRATE 25 MG: 25 TABLET, FILM COATED ORAL at 09:27

## 2022-02-06 RX ADMIN — ASPIRIN 81 MG 81 MG: 81 TABLET ORAL at 09:27

## 2022-02-06 RX ADMIN — PREDNISONE 5 MG: 10 TABLET ORAL at 21:08

## 2022-02-06 RX ADMIN — SODIUM CHLORIDE, PRESERVATIVE FREE 10 ML: 5 INJECTION INTRAVENOUS at 09:28

## 2022-02-06 ASSESSMENT — PAIN SCALES - GENERAL
PAINLEVEL_OUTOF10: 0
PAINLEVEL_OUTOF10: 0

## 2022-02-06 NOTE — PROGRESS NOTES
Hospitalist Progress Note      Name:  Carmen Nichole /Age/Sex: 1934  (80 y.o. female)   MRN & CSN:  [id number] & [id number] Admission Date/Time: 2022  1:03 PM   [facility]  [facility] PCP: Margaret Rosales MD         Hospital Day: 7    Assessment and Plan:     Reji Chopra a 80 y. o. female with a past medical history of hyperlipidemia, hypertension, neuromuscular disorder, TIA who presented to the emergency room with decreased p.o. intake, increased confusion, body aches.      Discharge delayed due to waiting on placement     -continue with current plan awaiting placement-patient will have to be 10 days from positive Covid test to be accepted at [facility]-which would be . Per social work note of      LSW called [facility] and they are full at this time and have a waiting list. [facility] can not take pt due pt pt confusion and they do not have the staff to have a confused pt. [facility] is full. [facility] will be able to take pt on  once her ten day post her 1st + COVID test.                    COVID-19 Pneumonia: No hypoxia or acute respiratory failure.  Patient is on room air 99%. -symptom onset 22 with weakness, decreased PO intake, COVID + 22.  Patient remained asymptomatic.  - no change in current plan patient is not requiring supplemental oxygen.   - received 2 doses of the vaccine   - admit CXR with patchy density in the mid to lower lungs bilaterally  -Pro-Nelson 0.1  -Has not required oxygen supplementation.  No hypoxia.  -Continue symptomatic management  - pulmonary hygiene, wean O2 as able   - monitor respiratory status  -Screening labs ordered  -Pending labs consider pharmacy consult for therapies if indicated  -SNF placement pending.  [facility] or [facility] -   -Speech eval, coughing with oral intake     MOSES  -Creatinine 1.7, baseline ~1.0 -downtrending to 1.4/ ()-a.m. labs pending ()  -Likely prerenal in setting of decreased p.o. intake due to COVID-19  -Hold home lisinopril, HCTZ  -Continue gentle IV fluids, LR at 75 cc/ hour x15 hours, then reassess     Acute encephalopathy  -CT head with no acute process  -Does have intermittent confusion at baseline, daughter reports more confused today  -No focal deficits  -Suspect metabolic/infectious secondary to COVID-19 and dehydration  -Continue IV fluids, monitor mental status     Generalized weakness likely secondary to above.  -Lives with family, family concerned about caring for patient  -PT/OT, case management consulted     Elevated troponin   Abnormal EKG  -Troponin T 0.054 > 0.030 > 0.042  -EKG with no acute ischemic change, inferior, anterolateral T wave inversions, new from prior  -Denies chest pain  -Given aspirin in ED  -Suspect demand ischemia in setting of MOSES and infection  -Trend troponin, repeat EKG  -Cardiology consulted-unexplained possibly secondary to MOSES and poor p.o. intake.  Recommended outpatient stress test for risk stratification due to multiple risk factors in 4 to 6 weeks once patient is clinically improved.  Signed off on 2/1 they were available for further questions if needed.     Hypertension  -hold home lisinopril-hydrochlorothiazide in setting of MOSES  -Continue home clonidine, amlodipine with hold parameters     TIA  -Continue home Plavix, aspirin      Trigeminal neuralgia  -Continue home Tegretol      Rheumatoid arthritis  -On methotrexate weekly  -Continue home prednisone     Diet: ADULT DIET; Regular  ADULT ORAL NUTRITION SUPPLEMENT; Breakfast, Lunch; Fortified Pudding Oral Supplement  ADULT ORAL NUTRITION SUPPLEMENT; Lunch, Dinner, Breakfast; Standard High Calorie/High Protein Oral Supplement  DVT prophylaxis: Lovenox  GI prophylaxis: Protonix  CODE STATUS:Full Code     Treatment and discharge plan discussed with patient/family. Patient/family voiced understanding. This patient was seen examined and treatment plan discussed with/supervising physician.     History of Present Illness:     Chief Complaint: Weakness and decreased p.o. intake  Subjective  Patient examined at bedside. She is known to me has not had any acute changes in her assessment. She is confused. Nursing was concerned for oral intake with her coughing requested speech evaluation. Patient reports no other events. ROS reviewed negative, unless as noted above    Objective: Intake/Output Summary (Last 24 hours) at 2/6/2022 0741  Last data filed at 2/5/2022 1823  Gross per 24 hour   Intake 1440 ml   Output --   Net 1440 ml      Vitals:   Vitals:    02/06/22 0741   BP: (!) 162/97   Pulse: 101   Resp: 18   Temp: 97.7 °F (36.5 °C)   SpO2: 100%     Physical Exam:   Physical Exam  HENT:      Mouth/Throat:      Mouth: Mucous membranes are moist.   Eyes:      Extraocular Movements: Extraocular movements intact. Conjunctiva/sclera: Conjunctivae normal.   Cardiovascular:      Rate and Rhythm: Normal rate and regular rhythm. Pulses: Normal pulses. Heart sounds: Normal heart sounds. Pulmonary:      Effort: Pulmonary effort is normal.      Breath sounds: Normal breath sounds. Abdominal:      General: Abdomen is flat. Palpations: Abdomen is soft. Skin:     General: Skin is warm and dry. Neurological:      Mental Status: She is alert. She is disoriented.    Psychiatric:         Mood and Affect: Mood normal.         Medications:   Medications:    metoprolol tartrate  25 mg Oral Daily    sodium chloride flush  5-40 mL IntraVENous 2 times per day    enoxaparin  30 mg SubCUTAneous Daily    amLODIPine  10 mg Oral Daily    aspirin  81 mg Oral Daily    atorvastatin  20 mg Oral Daily    carBAMazepine  200 mg Oral BID    cloNIDine  0.1 mg Oral Daily    clopidogrel  75 mg Oral Daily    dicyclomine  10 mg Oral BID    folic acid  1 mg Oral Daily    pantoprazole  40 mg Oral QAM AC    predniSONE  5 mg Oral BID      Infusions:    sodium chloride       PRN Meds: acetaminophen, 650 mg, Q6H PRN   Or  acetaminophen, 650 mg, Q6H PRN  guaiFENesin-dextromethorphan, 5 mL, Q4H PRN  sodium chloride flush, 5-40 mL, PRN  sodium chloride, 25 mL, PRN  ondansetron, 4 mg, Q8H PRN   Or  ondansetron, 4 mg, Q6H PRN  polyethylene glycol, 17 g, Daily PRN        Recent Labs     02/03/22  1040 02/04/22  0226 02/05/22  0530   WBC 6.3 6.2 6.1   HGB 11.4* 10.9* 10.2*   HCT 36.7* 34.5* 32.2*    311 309      Recent Labs     02/04/22 0226 02/04/22 2037 02/05/22  0530    144 145   K 4.6 4.0 4.2    108 110   CO2 23 22 23   PHOS  --  1.2*  --    BUN 42* 40* 40*   CREATININE 1.5* 1.4* 1.4*     Recent Labs     02/03/22  1040 02/04/22  0226 02/05/22  0530   AST 76* 77* 63*   ALT 37 38 34   BILITOT 0.3 0.2 0.3   ALKPHOS 69 69 61     No results for input(s): INR in the last 72 hours. No results for input(s): CKTOTAL, CKMB, CKMBINDEX, TROPONINT in the last 72 hours.      Imaging reviewed      Electronically signed by NE Diaz CNP on 2/6/2022 at 7:41 AM

## 2022-02-07 LAB
ANION GAP SERPL CALCULATED.3IONS-SCNC: 9 MMOL/L (ref 4–16)
BUN BLDV-MCNC: 35 MG/DL (ref 6–23)
CALCIUM SERPL-MCNC: 10.2 MG/DL (ref 8.3–10.6)
CHLORIDE BLD-SCNC: 106 MMOL/L (ref 99–110)
CO2: 28 MMOL/L (ref 21–32)
CREAT SERPL-MCNC: 1.1 MG/DL (ref 0.6–1.1)
GFR AFRICAN AMERICAN: 57 ML/MIN/1.73M2
GFR NON-AFRICAN AMERICAN: 47 ML/MIN/1.73M2
GLUCOSE BLD-MCNC: 133 MG/DL (ref 70–99)
POTASSIUM SERPL-SCNC: 4.8 MMOL/L (ref 3.5–5.1)
SODIUM BLD-SCNC: 143 MMOL/L (ref 135–145)

## 2022-02-07 PROCEDURE — G0378 HOSPITAL OBSERVATION PER HR: HCPCS

## 2022-02-07 PROCEDURE — 6370000000 HC RX 637 (ALT 250 FOR IP): Performed by: NURSE PRACTITIONER

## 2022-02-07 PROCEDURE — 2580000003 HC RX 258: Performed by: PHYSICIAN ASSISTANT

## 2022-02-07 PROCEDURE — 97530 THERAPEUTIC ACTIVITIES: CPT

## 2022-02-07 PROCEDURE — 94761 N-INVAS EAR/PLS OXIMETRY MLT: CPT

## 2022-02-07 PROCEDURE — 36415 COLL VENOUS BLD VENIPUNCTURE: CPT

## 2022-02-07 PROCEDURE — 6370000000 HC RX 637 (ALT 250 FOR IP): Performed by: PHYSICIAN ASSISTANT

## 2022-02-07 PROCEDURE — 80048 BASIC METABOLIC PNL TOTAL CA: CPT

## 2022-02-07 RX ADMIN — SODIUM CHLORIDE, PRESERVATIVE FREE 10 ML: 5 INJECTION INTRAVENOUS at 22:23

## 2022-02-07 RX ADMIN — METOPROLOL TARTRATE 25 MG: 25 TABLET, FILM COATED ORAL at 08:43

## 2022-02-07 RX ADMIN — ATORVASTATIN CALCIUM 20 MG: 20 TABLET, FILM COATED ORAL at 08:43

## 2022-02-07 RX ADMIN — ASPIRIN 81 MG 81 MG: 81 TABLET ORAL at 08:43

## 2022-02-07 RX ADMIN — PREDNISONE 5 MG: 10 TABLET ORAL at 08:43

## 2022-02-07 RX ADMIN — PANTOPRAZOLE SODIUM 40 MG: 40 TABLET, DELAYED RELEASE ORAL at 04:24

## 2022-02-07 RX ADMIN — CARBAMAZEPINE 200 MG: 200 TABLET ORAL at 08:43

## 2022-02-07 RX ADMIN — SODIUM CHLORIDE, PRESERVATIVE FREE 10 ML: 5 INJECTION INTRAVENOUS at 08:54

## 2022-02-07 RX ADMIN — CLONIDINE HYDROCHLORIDE 0.1 MG: 0.1 TABLET ORAL at 08:43

## 2022-02-07 RX ADMIN — CARBAMAZEPINE 200 MG: 200 TABLET ORAL at 22:23

## 2022-02-07 RX ADMIN — DICYCLOMINE HYDROCHLORIDE 10 MG: 10 CAPSULE ORAL at 22:23

## 2022-02-07 RX ADMIN — FOLIC ACID 1 MG: 1 TABLET ORAL at 08:43

## 2022-02-07 RX ADMIN — PREDNISONE 5 MG: 10 TABLET ORAL at 22:23

## 2022-02-07 RX ADMIN — CLOPIDOGREL 75 MG: 75 TABLET, FILM COATED ORAL at 08:43

## 2022-02-07 RX ADMIN — DICYCLOMINE HYDROCHLORIDE 10 MG: 10 CAPSULE ORAL at 08:43

## 2022-02-07 RX ADMIN — AMLODIPINE BESYLATE 10 MG: 5 TABLET ORAL at 08:43

## 2022-02-07 RX ADMIN — GUAIFENESIN AND DEXTROMETHORPHAN 5 ML: 100; 10 SYRUP ORAL at 00:55

## 2022-02-07 ASSESSMENT — PAIN SCALES - WONG BAKER: WONGBAKER_NUMERICALRESPONSE: 0

## 2022-02-07 ASSESSMENT — PAIN SCALES - GENERAL
PAINLEVEL_OUTOF10: 0

## 2022-02-07 NOTE — CARE COORDINATION
[facility] can take pt on 2/11. [facility] cannot take due to pt's confusion. [facility] has a waiting list and [facility]. LSW has requested updated PT/OT notes WB note placed.

## 2022-02-07 NOTE — PROGRESS NOTES
Comprehensive Nutrition Assessment    Type and Reason for Visit:  Reassess    Nutrition Recommendations/Plan:   · Continue current diet  · Continue current supplements, between meals  · Assist with feeding as needed  · Consider appetite stimulant    Nutrition Assessment:  Pt continues with poor po intake/appetite, consuming less than half of meals with assistance, usually bites only. Diet recently advanced per SLP noted. Does take oral supplement well, generally. Awaiting d/c to SNF noted. Will continue to follow as high nutrition risk    Malnutrition Assessment:  Malnutrition Status: Moderate malnutrition    Context:  Acute Illness     Findings of the 6 clinical characteristics of malnutrition:  Energy Intake:  Mild decrease in energy intake  Weight Loss:   Greater than 7.5% over 3 months (Up to 24% of unintentional weight loss)     Body Fat Loss:  Unable to assess     Muscle Mass Loss: Moderate muscle mass loss Clavicles (pectoralis & deltoids),Thigh (quadraceps),Calf (gastrocnemius),Scapula (trapezius) (weakness to all extremities)  Fluid Accumulation:  No significant fluid accumulation Extremities   Strength:  Not Performed    Estimated Daily Nutrient Needs:  Energy (kcal):  5987-0381 (30-35 kcals/kg); Weight Used for Energy Requirements:  Current     Protein (g):  71-89 (1.2-1.5 g/kg); Weight Used for Protein Requirements:  Ideal        Fluid (ml/day):  4847-0741; Method Used for Fluid Requirements:  1 ml/kcal      Nutrition Related Findings:  BUN 40, Cr 1.4      Wounds:  None       Current Nutrition Therapies:    ADULT ORAL NUTRITION SUPPLEMENT; Breakfast, Lunch; Fortified Pudding Oral Supplement  ADULT ORAL NUTRITION SUPPLEMENT; Lunch, Dinner, Breakfast; Standard High Calorie/High Protein Oral Supplement  ADULT DIET;  Dysphagia - Minced and Moist    Anthropometric Measures:  · Height: 5' 5.98\" (167.6 cm)  · Current Body Weight: 122 lb (55.3 kg)   · Admission Body Weight: 122 lb (55.3 kg)    · Usual

## 2022-02-07 NOTE — PROGRESS NOTES
Physical Therapy  Name: Kerin Kehr MRN: [id number] :   1934   Date:  2022   Admission Date: 2022 Room:  [address] [place] [address]   Restrictions/Precautions:        Covid+, fall risk  Communication with other providers:  Patient is appropriate today via chart review, RN Esperanza Patel orients the patient to who her RN is and assists in bringing items to change the patients undergarments  Subjective:  Patient states: \"Yes, Katelyn Charles, yes\" She states this throughout the session but still appropriately answers therapist questions. Pain:   Location, Type, Intensity (0/10 to 10/10): Does not c/o pain  Objective:    Observation:  Patient is supine in semi-del rio bed today. She has soiled undergarments with BM   Treatment, including education/measures:  Therapeutic Exercise:  Therapeutic exercises were instructed today. Cues were given for technique, safety, recruitment, and rationale. Cues were verbal and/or tactile. Transfers with line management of BP Cuff, Pulse Ox, Tele monitor  Rolling: Min A for initiation, quickly progresses to SBA. She attempts to initiate rolling upon hearing v/c but needs Min A to begin  Supine to sit :Min A, management with BLE and for sequential rise of trunk to upright  Sit to supine :Min A for BLe management  Scooting :Min-Max A. Patient follows cues most of the time but required assistance to scoot hips forward seated. Min A with Supine scooting required moderate cues for pulling with BUE and pushing with BLE. Sit to stand :Mod-Max A, phi knee blocking and Mod A to assist patient to stand upright. Noticeable effort from patient with her BLE. She does not have sufficient strength with her BUE to push off EOB   Stand to sit :Min A for safety to EOB  Seated balance: ~10' at [facility] A with patient able to use intermittent BUE support. Hand held guidance for reaching, pushing against therapist, pulling against therapist resistance and overhead reaching.  LOB posterior and Rt with reaching right past midline and with pushing against therapist, 1 LOB ea respective activity. Min A to correct balance  Sitting Exercises: Ankle pumps x 10  LAQ's x 10  Marching x 10   Therapeutic Exercise:  Therapeutic exercises were instructed today. Cues were given for technique, safety, recruitment, and rationale. Cues were verbal and/or tactile. Safety  Patient left safely in the bed, with call light/phone in reach with alarm applied. Gait belt and mask were used for transfers and gait. Assessment / Impression:   Patient follows almost all cuing today and stays on task with little to no redirection. She is able to complete activities with little to no increase in fatigue but SOB does present itself.    Patient's tolerance of treatment:  Fair   Adverse Reaction: increase in SOB  Significant change in status and impact:  none  Barriers to improvement:  weakness  Plan for Next Session:    Will cont to work towards pt's goals per patient tolerance  Time in:  1519  Time out:  1548  Timed treatment minutes:  29  Total treatment time:  29    Previously filed items:  Social/Functional History  Lives With: Daughter (Daughter works)  Type of Home: [place]  Home Layout: Able to Live on Main level with bedroom/bathroom  Home Access: Level entry  Bathroom Shower/Tub: Walk-in shower  Bathroom Toilet: Standard  Bathroom Equipment: Shower chair,Grab bars in [address] [place], [place] [address]vard: [address] [place] [address] (Uses  for ambulation)  ADL Assistance: Independent (Daughter supervises showers)  Homemaking Assistance: Needs assistance  Homemaking Responsibilities: No (Daughter cooks & cleans)  Ambulation Assistance: Independent  Transfer Assistance: Independent (Sleeps in hospital bed)  Active : No  Patient's  Info: Daughter  Short term goals  Time Frame for Short term goals: 1 week  Short term goal 1: Pt will perform rolling modA  Short term goal 2: Pt will sit statically x 5 minutes Tamika with BUE support  Short term goal 3: Pt will perform sit><supine modA  Short term goal 4: Pt will transfer to recliner/bed maxA       Electronically signed by:     Luca Morley PTA  2/7/2022, 3:50 PM

## 2022-02-07 NOTE — PROGRESS NOTES
Hospitalist Progress Note      Name:  Bijal Downs /Age/Sex: 1934  (80 y.o. female)   MRN & CSN:  [id number] & [id number] Admission Date/Time: 2022  1:03 PM   Location:  [address] [place] [address]-A PCP: Iraida Astudillo MD         Hospital Day: 8    Assessment and Plan:   Metta Dance a 80 y. o. female with a past medical history of hyperlipidemia, hypertension, neuromuscular disorder, TIA who presented to the emergency room with decreased p.o. intake, increased confusion, body aches.      Discharge delayed due to waiting on placement     -continue with current plan awaiting placement-patient will have to be 10 days from positive Covid test to be accepted at [facility]-which would be . Per social work note of      LSW called [facility] and they are full at this time and have a waiting list. [facility] can not take pt due pt pt confusion and they do not have the staff to have a confused pt. [facility] full. [facility] will be able to take pt on  once her ten day post her 1st + COVID test.                    COVID-19 Pneumonia: No hypoxia or acute respiratory failure.  Patient is on room air 99%. -symptom onset 22 with weakness, decreased PO intake, COVID + 22.  Patient remained asymptomatic.  - no change in current plan patient is not requiring supplemental oxygen.   - received 2 doses of the vaccine   - admit CXR with patchy density in the mid to lower lungs bilaterally  -Pro-Nelson 0.1  -Has not required oxygen supplementation.  No hypoxia.  -Continue symptomatic management  - pulmonary hygiene, wean O2 as able   - monitor respiratory status  -SNF placement pending.  [facility] or [facility] -   -Speech eval, coughing with oral intake     MOSES-improving a.m. labs pending ()  -Creatinine 1.7, baseline ~1.0 -downtrending to 1.4/ ()-a.m. labs pending   -Likely prerenal in setting of decreased p.o. intake due to COVID-19  -Hold home lisinopril, HCTZ  -Continue gentle IV fluids, LR at 76 cc/ hour x15 hours, then reassess     Acute encephalopathy  -CT head with no acute process  -Does have intermittent confusion at baseline, daughter reports more confused today  -No focal deficits  -Suspect metabolic/infectious secondary to COVID-19 and dehydration  -Continue IV fluids, monitor mental status     Generalized weakness likely secondary to above.  -Lives with family, family concerned about caring for patient  -PT/OT, case management consulted     Elevated troponin   Abnormal EKG  -Troponin T 0.054 > 0.030 > 0.042  -EKG with no acute ischemic change, inferior, anterolateral T wave inversions, new from prior  -Denies chest pain  -Given aspirin in ED  -Suspect demand ischemia in setting of MOSES and infection  -Trend troponin, repeat EKG  -Cardiology consulted-unexplained possibly secondary to MOSES and poor p.o. intake.  Recommended outpatient stress test for risk stratification due to multiple risk factors in 4 to 6 weeks once patient is clinically improved.  Signed off on 2/1 they were available for further questions if needed.     Hypertension  -hold home lisinopril-hydrochlorothiazide in setting of MOSES  -Continue home clonidine, amlodipine with hold parameters     TIA  -Continue home Plavix, aspirin      Trigeminal neuralgia  -Continue home Tegretol      Rheumatoid arthritis  -On methotrexate weekly  -Continue home prednisone      Diet: ADULT ORAL NUTRITION SUPPLEMENT; Breakfast, Lunch; Fortified Pudding Oral Supplement  ADULT ORAL NUTRITION SUPPLEMENT; Lunch, Dinner, Breakfast; Standard High Calorie/High Protein Oral Supplement  ADULT DIET; Dysphagia - Minced and Moist  DVT prophylaxis:   GI prophylaxis  CODE STATUS:Full Code    Treatment and discharge plan discussed with patient/family. Patient/family voiced understanding. This patient was seen examined and treatment plan discussed with/supervising physician.       History of Present Illness:     Chief Complaint: COVID-19, weakness, poor oral intake    Subjective  Patient resting comfortably. No acute distress noted. Patient is well-known to me during the past week. Patient pleasantly confused. Noted adverse events per nursing. Patient diet was changed to dysphagia she was coughing when eating. Patient does not want to wear her dentures. ROS reviewed negative, unless as noted above    Objective:   No intake or output data in the 24 hours ending 02/07/22 1037   Vitals:   Vitals:    02/07/22 0845   BP: (!) 140/81   Pulse:    Resp: 18   Temp: 97.9 °F (36.6 °C)   SpO2: 98%     Physical Exam:   Physical Exam  HENT:      Mouth/Throat:      Mouth: Mucous membranes are moist.   Eyes:      Extraocular Movements: Extraocular movements intact. Conjunctiva/sclera: Conjunctivae normal.   Cardiovascular:      Rate and Rhythm: Normal rate and regular rhythm. Pulses: Normal pulses. Heart sounds: Normal heart sounds. Pulmonary:      Effort: Pulmonary effort is normal.      Breath sounds: Normal breath sounds. Abdominal:      Palpations: Abdomen is soft. Musculoskeletal:         General: Normal range of motion. Skin:     General: Skin is warm and dry. Neurological:      Mental Status: She is alert. She is disoriented.          Medications:   Medications:    metoprolol tartrate  25 mg Oral Daily    sodium chloride flush  5-40 mL IntraVENous 2 times per day    enoxaparin  30 mg SubCUTAneous Daily    amLODIPine  10 mg Oral Daily    aspirin  81 mg Oral Daily    atorvastatin  20 mg Oral Daily    carBAMazepine  200 mg Oral BID    cloNIDine  0.1 mg Oral Daily    clopidogrel  75 mg Oral Daily    dicyclomine  10 mg Oral BID    folic acid  1 mg Oral Daily    pantoprazole  40 mg Oral QAM AC    predniSONE  5 mg Oral BID      Infusions:    sodium chloride       PRN Meds: acetaminophen, 650 mg, Q6H PRN   Or  acetaminophen, 650 mg, Q6H PRN  guaiFENesin-dextromethorphan, 5 mL, Q4H PRN  sodium chloride flush, 5-40 mL, PRN  sodium chloride, 25 mL, PRN  ondansetron, 4 mg, Q8H PRN   Or  ondansetron, 4 mg, Q6H PRN  polyethylene glycol, 17 g, Daily PRN        Recent Labs     02/05/22  0530   WBC 6.1   HGB 10.2*   HCT 32.2*         Recent Labs     02/04/22 2037 02/05/22  0530    145   K 4.0 4.2    110   CO2 22 23   PHOS 1.2*  --    BUN 40* 40*   CREATININE 1.4* 1.4*     Recent Labs     02/05/22  0530   AST 63*   ALT 34   BILITOT 0.3   ALKPHOS 61     No results for input(s): INR in the last 72 hours. No results for input(s): CKTOTAL, CKMB, CKMBINDEX, TROPONINT in the last 72 hours.      Imaging reviewed      Electronically signed by NE Powers CNP on 2/7/2022 at 10:37 AM

## 2022-02-08 LAB
ANION GAP SERPL CALCULATED.3IONS-SCNC: 9 MMOL/L (ref 4–16)
BUN BLDV-MCNC: 38 MG/DL (ref 6–23)
CALCIUM SERPL-MCNC: 10 MG/DL (ref 8.3–10.6)
CHLORIDE BLD-SCNC: 104 MMOL/L (ref 99–110)
CO2: 28 MMOL/L (ref 21–32)
CREAT SERPL-MCNC: 1.2 MG/DL (ref 0.6–1.1)
GFR AFRICAN AMERICAN: 51 ML/MIN/1.73M2
GFR NON-AFRICAN AMERICAN: 42 ML/MIN/1.73M2
GLUCOSE BLD-MCNC: 195 MG/DL (ref 70–99)
POTASSIUM SERPL-SCNC: 4.2 MMOL/L (ref 3.5–5.1)
REASON FOR REJECTION: NORMAL
REJECTED TEST: NORMAL
SODIUM BLD-SCNC: 141 MMOL/L (ref 135–145)

## 2022-02-08 PROCEDURE — 94761 N-INVAS EAR/PLS OXIMETRY MLT: CPT

## 2022-02-08 PROCEDURE — 97110 THERAPEUTIC EXERCISES: CPT

## 2022-02-08 PROCEDURE — 2580000003 HC RX 258: Performed by: PHYSICIAN ASSISTANT

## 2022-02-08 PROCEDURE — 6360000002 HC RX W HCPCS: Performed by: PHYSICIAN ASSISTANT

## 2022-02-08 PROCEDURE — 97535 SELF CARE MNGMENT TRAINING: CPT

## 2022-02-08 PROCEDURE — 6370000000 HC RX 637 (ALT 250 FOR IP): Performed by: NURSE PRACTITIONER

## 2022-02-08 PROCEDURE — G0378 HOSPITAL OBSERVATION PER HR: HCPCS

## 2022-02-08 PROCEDURE — 36415 COLL VENOUS BLD VENIPUNCTURE: CPT

## 2022-02-08 PROCEDURE — 6370000000 HC RX 637 (ALT 250 FOR IP): Performed by: PHYSICIAN ASSISTANT

## 2022-02-08 PROCEDURE — 96372 THER/PROPH/DIAG INJ SC/IM: CPT

## 2022-02-08 PROCEDURE — 80048 BASIC METABOLIC PNL TOTAL CA: CPT

## 2022-02-08 RX ADMIN — ACETAMINOPHEN 650 MG: 325 TABLET ORAL at 03:37

## 2022-02-08 RX ADMIN — ENOXAPARIN SODIUM 30 MG: 100 INJECTION SUBCUTANEOUS at 09:16

## 2022-02-08 RX ADMIN — FOLIC ACID 1 MG: 1 TABLET ORAL at 09:16

## 2022-02-08 RX ADMIN — CLOPIDOGREL 75 MG: 75 TABLET, FILM COATED ORAL at 09:16

## 2022-02-08 RX ADMIN — PREDNISONE 5 MG: 10 TABLET ORAL at 20:31

## 2022-02-08 RX ADMIN — SODIUM CHLORIDE, PRESERVATIVE FREE 10 ML: 5 INJECTION INTRAVENOUS at 09:17

## 2022-02-08 RX ADMIN — DICYCLOMINE HYDROCHLORIDE 10 MG: 10 CAPSULE ORAL at 20:32

## 2022-02-08 RX ADMIN — PREDNISONE 5 MG: 10 TABLET ORAL at 09:16

## 2022-02-08 RX ADMIN — SODIUM CHLORIDE, PRESERVATIVE FREE 10 ML: 5 INJECTION INTRAVENOUS at 20:31

## 2022-02-08 RX ADMIN — CARBAMAZEPINE 200 MG: 200 TABLET ORAL at 09:15

## 2022-02-08 RX ADMIN — AMLODIPINE BESYLATE 10 MG: 5 TABLET ORAL at 09:16

## 2022-02-08 RX ADMIN — ACETAMINOPHEN 650 MG: 325 TABLET ORAL at 20:32

## 2022-02-08 RX ADMIN — PANTOPRAZOLE SODIUM 40 MG: 40 TABLET, DELAYED RELEASE ORAL at 05:32

## 2022-02-08 RX ADMIN — ASPIRIN 81 MG 81 MG: 81 TABLET ORAL at 09:15

## 2022-02-08 RX ADMIN — DICYCLOMINE HYDROCHLORIDE 10 MG: 10 CAPSULE ORAL at 09:15

## 2022-02-08 RX ADMIN — ATORVASTATIN CALCIUM 20 MG: 20 TABLET, FILM COATED ORAL at 09:15

## 2022-02-08 RX ADMIN — CARBAMAZEPINE 200 MG: 200 TABLET ORAL at 20:32

## 2022-02-08 RX ADMIN — METOPROLOL TARTRATE 25 MG: 25 TABLET, FILM COATED ORAL at 09:15

## 2022-02-08 RX ADMIN — CLONIDINE HYDROCHLORIDE 0.1 MG: 0.1 TABLET ORAL at 09:16

## 2022-02-08 ASSESSMENT — PAIN SCALES - WONG BAKER

## 2022-02-08 ASSESSMENT — PAIN SCALES - GENERAL
PAINLEVEL_OUTOF10: 4
PAINLEVEL_OUTOF10: 4

## 2022-02-08 NOTE — PROGRESS NOTES
Hospitalist Progress Note      Name:  Lindsey Montgomery /Age/Sex: 1934  (80 y.o. female)   MRN & CSN:  [id number] & [id number] Admission Date/Time: 2022  1:03 PM   Location:  [facility] PCP: Shelly Figueroa MD         Hospital Day: 9                                               Attending Physician Dr Rosa Alarcon and Plan:   Lindsey Montgomery is a 80 y.o.  female  who presents with COVID-19    Present on Admission:   COVID-19   Rheumatoid arthritis ([facility]   CVA, old, hemiparesis ([facility]   HTN (hypertension)   Troponin I above reference range     COVID pneumonia    Initial diagnosis    Currently on RA   CXR () Patchy density in the mid to lower lung zones bilaterally    PRN symptom control     Physical deconditioning/ Generalized weakness   [facility]   Will accept on  after COVID quarantine period   PT/OT     Acute kidney injury- sCr 1.2- improving. Baseline 1.0-1.1   Holding nephrotoxic agents    Acute encephalopathy- resolved    Elevated troponin- w/o chest pain. Possible 2/2 MOSES   EKG with no acute ischemic change   Cardiology evaluation- signed off. Recommend OP stress   TTE(22) EF 45/50%. Hypertension   hold home lisinopril-hydrochlorothiazide 2/2 MOSES   Continue home clonidine, amlodipine with hold parameters     Hx of CVA   Continue DAPT/ statin      Trigeminal neuralgia   Continue home Tegretol     Rheumatoid arthritis   methotrexate weekly   Continue prednisone    Diet ADULT ORAL NUTRITION SUPPLEMENT; Breakfast, Lunch; Fortified Pudding Oral Supplement  ADULT ORAL NUTRITION SUPPLEMENT; Lunch, Dinner, Breakfast; Standard High Calorie/High Protein Oral Supplement  ADULT DIET;  Dysphagia - Minced and Moist   DVT Prophylaxis [x] Lovenox, []  Heparin, [] SCDs, [] Ambulation   GI Prophylaxis [x] PPI,  [] H2 Blocker,  [] Carafate,  [] Diet/Tube Feeds   Code Status Full Code     -Patient assessment and plan discussed with supervising physician-  Subjective 2/8/2022     Kike Boss is a 80 y.o.  female, who presented with  Generalized Body Aches (x4 days )  . Patient seen and examined at bedside  No significant events overnight reported    Denies new concerns    Ten point ROS reviewed negative, unless as noted above    Objective: Intake/Output Summary (Last 24 hours) at 2/8/2022 1347  Last data filed at 2/8/2022 0919  Gross per 24 hour   Intake 120 ml   Output --   Net 120 ml      Vitals:   Vitals:    02/08/22 0045 02/08/22 0331 02/08/22 0912 02/08/22 0915   BP:  (!) 176/94 (!) 142/75    Pulse: 109 104 123    Resp: 15 27 19    Temp:  99.5 °F (37.5 °C) 98 °F (36.7 °C)    TempSrc:  Oral Oral    SpO2:  98%  98%   Weight:       Height:         Physical Exam: 02/08/22     GEN -Awake nontoxic/chronically ill appearing female, sitting upright in bed , NAD. normal body habitus. Appears given age. EYES -Pupils are equally round. No scleral erythema, discharge, or conjunctivitis. HENT -MM are moist. Oral pharynx without exudates, no evidence of thrush. NECK -Supple, no apparent thyromegaly or masses. RESP -CTA, no wheezes, rales or rhonchi. Symmetric chest movement while on RA.  C/V -S1/S2 auscultated. RRR without appreciable M/R/G. No JVD or carotid bruits. Peripheral pulses equal bilaterally and palpable. No peripheral edema. GI -Abdomen is soft non distended and without significant tenderness. No masses or guarding. + BS Rectal exam deferred.  -No CVA tenderness. Lazo catheter is not present. LYMPH-No palpable cervical lymphadenopathy and no hepatosplenomegaly. No petechiae or ecchymoses. MS -No gross joint deformities. SKIN -Normal coloration, warm, dry. NEURO-Cranial nerves appear grossly intact, normal speech, no lateralizing weakness. PSYC-Awake, alert, oriented x 3. Appropriate affect.     Medications:   Medications:    metoprolol tartrate  25 mg Oral Daily    sodium chloride flush  5-40 mL IntraVENous 2 times per day    enoxaparin  30 mg SubCUTAneous Daily    amLODIPine  10 mg Oral Daily    aspirin  81 mg Oral Daily    atorvastatin  20 mg Oral Daily    carBAMazepine  200 mg Oral BID    cloNIDine  0.1 mg Oral Daily    clopidogrel  75 mg Oral Daily    dicyclomine  10 mg Oral BID    folic acid  1 mg Oral Daily    pantoprazole  40 mg Oral QAM AC    predniSONE  5 mg Oral BID      Infusions:    sodium chloride       PRN Meds: acetaminophen, 650 mg, Q6H PRN   Or  acetaminophen, 650 mg, Q6H PRN  guaiFENesin-dextromethorphan, 5 mL, Q4H PRN  sodium chloride flush, 5-40 mL, PRN  sodium chloride, 25 mL, PRN  ondansetron, 4 mg, Q8H PRN   Or  ondansetron, 4 mg, Q6H PRN  polyethylene glycol, 17 g, Daily PRN      LABS  CBC   No results for input(s): WBC, HGB, HCT, PLT in the last 72 hours. RENAL  Recent Labs     02/07/22  1559 02/08/22  1150    141   K 4.8 4.2    104   CO2 28 28   BUN 35* 38*   CREATININE 1.1 1.2*       Radiology this visit:  Reviewed. ECHO Complete 2D W Doppler W Color    Result Date: 2/1/2022  Transthoracic Echocardiography Report (TTE)  Demographics   Patient Name       Pollo Leavitt Date of Study       02/01/2022   Date of Birth      1934         Gender              Female   Age                80 year(s)         Race                Black   Patient Number     [id number]         Room Number         2148   Visit Number       [id number]   Corporate ID       [id number]   Accession Number   [id number]         [address] [place] [address] Dr   Ordering Physician Jesus Sotelo MD                 Physician           MD  Procedure Type of Study   TTE procedure:ECHOCARDIOGRAM COMPLETE 2D W DOPPLER W COLOR. Procedure Date Date: 02/01/2022 Start: 07:35 AM Study Location: Portable Technical Quality: Fair visualization Indications:COVID-19.  Patient Status: Routine Height: 66 inches Weight: 160 pounds BSA: 1.82 m2 BMI: 25.82 kg/m2 HR: 88 bpm BP: 126/85 mmHg  Conclusions   Summary  Left ventricular systolic function is low normal.  Ejection fraction is visually estimated at 45-50%. Sigmoid septum noted. Sclerotic, but non-stenotic aortic valve. Trace aortic regurgitation noted with color doppler. Mitral annular calcification is present. Mild-moderate mitral regurgitation is present. No evidence of any pericardial effusion. Signature   ------------------------------------------------------------------  Electronically signed by Ulisses Miller MD (Interpreting  physician) on 02/01/2022 at 04:40 PM  ------------------------------------------------------------------   Findings   Left Ventricle  Left ventricular systolic function is low normal.  Ejection fraction is visually estimated at 45-50%. No regional wall motion abnormalites. Sigmoid septum noted. Left ventricle size is normal.  Unable to determine diastolic function due to arrhythmia. Left Atrium  Essentially normal left atrium. Right Atrium  Essentially normal right atrium. Right Ventricle  Essentially normal right ventricle. Aortic Valve  Sclerotic, but non-stenotic aortic valve. Trace aortic regurgitation noted with color doppler. Mitral Valve  Mitral annular calcification is present. Mild-moderate mitral regurgitation is present. Tricuspid Valve  Trace tricuspid regurgitation is present. Pulmonic Valve  The pulmonic valve was not well visualized. Pericardial Effusion  No evidence of any pericardial effusion. Pleural Effusion  No evidence of pleural effusion. Miscellaneous  Aorta was not clearly visualized.   M-Mode/2D Measurements & Calculations   LV Diastolic Dimension:   LV Systolic Dimension:   LA Dimension: 3.8 cmAO  4.19 cm                   3.1 cm                   Root Dimension: 3.8 cm  LV FS:26 %                LV Volume Diastolic: 78  LV PW Diastolic: 3.62 cm  ml  LV PW Systolic: 6.46 cm   LV Volume Systolic: 39  Septum Diastolic: 0.9 cm  ml                       RV Diastolic Dimension:  Septum Systolic: 6.19 cm  LV EDV/LV EDV Index: 78  3.18 cm  CO: 4.51 l/min            ml/43 m2LV ESV/LV ESV  CI: 2.48 l/m*m2           Index: 39 ml/21 m2       LA/Aorta: 1                            EF Calculated (A4C): 50  LV Area Diastolic: 15.6   %  cm2                       EF Calculated (2D): 22.2  LV Area Systolic: 97.7    %  cm2                            LV Length: 7.39 cm                             LVOT: 1.9 cm  Doppler Measurements & Calculations    AV Peak Velocity: 168 cm/s    LVOT Peak Velocity: 129 cm/s   AV Peak Gradient: 11.29 mmHg  LVOT Mean Velocity: 82.1 cm/s   AV Mean Velocity: 112 cm/s    LVOT Peak Gradient: 7 mmHgLVOT Mean Gradient:   AV Mean Gradient: 6 mmHg      3 mmHg   AV VTI: 23.1 cm               Estimated RVSP: 11 mmHg   AV Area (Continuity):2.22 cm2 Estimated RAP:3 mmHg    LVOT VTI: 18.1 cm                                 TR Velocity:138 cm/s   Estimated PASP: 10.62 mmHg    TR Gradient:7.62 mmHg      CT HEAD WO CONTRAST    Result Date: 1/31/2022  EXAMINATION: CT OF THE HEAD WITHOUT CONTRAST  1/31/2022 2:33 pm TECHNIQUE: CT of the head was performed without the administration of intravenous contrast. Dose modulation, iterative reconstruction, and/or weight based adjustment of the mA/kV was utilized to reduce the radiation dose to as low as reasonably achievable. COMPARISON: 10/08/2019 HISTORY: Altered mental status. Confusion. FINDINGS: BRAIN/VENTRICLES: There is no acute intracranial hemorrhage, mass effect or midline shift. No abnormal extra-axial fluid collection. The gray-white differentiation is maintained without evidence of an acute infarct. There is no evidence of hydrocephalus. Moderate parenchymal volume loss, within normal limits for age. Moderate periventricular and subcortical white matter hypoattenuation and left basal ganglia lacunar infarcts, likely sequelae of chronic small-vessel ischemia or hypertension. Vascular calcifications of the bilateral supraclinoid carotid arteries. ORBITS: The visualized portion of the orbits demonstrate no acute abnormality. SINUSES: The visualized paranasal sinuses and mastoid air cells demonstrate no acute abnormality. SOFT TISSUES/SKULL:  No acute abnormality of the visualized skull or soft tissues. No acute intracranial abnormality. Chronic changes as described above. XR CHEST PORTABLE    Result Date: 1/31/2022  EXAMINATION: ONE XRAY VIEW OF THE CHEST 1/31/2022 1:23 pm COMPARISON: 10/23/2016. HISTORY: ORDERING SYSTEM PROVIDED HISTORY: cough fatigue TECHNOLOGIST PROVIDED HISTORY: Reason for exam:->cough fatigue Reason for Exam: cough   fatigue FINDINGS: There are low lung volumes. The heart size is enlarged but unchanged. The pulmonary vasculature is within normal limits. There is some patchy density involving the mid to lower lung zones bilaterally. No pneumothoraces are seen. 1. Patchy density in the mid to lower lung zones bilaterally which could represent an atypical pneumonia, including viral pneumonia. Follow-up to resolution is recommended. 2. Cardiomegaly without overt failure.          Electronically signed by NE Nunez CNP on 2/8/2022 at 1:47 PM

## 2022-02-08 NOTE — PROGRESS NOTES
Occupational Therapy  . Occupational Therapy Treatment Note      Name: Kerin Kehr MRN: [id number] :   1934   Date:  2022   Admission Date: 2022 Room:  3005/3005-A     Primary Problem:      Restrictions/Precautions:    General precautions, fall risk    Per speech note patient is to be on nectar thick liquids and pureed diet    Droplet plus  Aspiration precautions    Communication with other providers:  Updated with OT and Speech in regards to patients diet.  (patient choking on thin liquids, but speech note in stating patient should be on nectar) updated Nurse on patients diet needs. Subjective:  Patient states:  im ok. . im ok  Pain: none stated (location, type, intensity)    Objective:    Observation:  patient in semi fowlers. pleasant confused. Lunch tray untouched on tray table. patient is a total feed with max encouragement to participate. Objective Measures:  RA,     Treatment, including education:    ADL activity training was instructed today. Cues were given for safety, sequence, UE/LE placement, visual cues, and balance. Activities performed today included dressing, toileting, hand hygiene, and grooming. Facial hygiene- DEP. Placed wash cloth in patients hand, but laura not follow cues to wash face. Feeding-DEP  Grooming- DEP to place sleep cap on. Pt Sup + vc's/visual cues for technique to perform BUE/BLE AROM exercises to include: shoulder flex/extension, bicep curls, ankle pumps    patient able to assist with scooting up to [facility]. Patient educated on role of OT , benefits of OT and rationale for therapeutic intervention. All therapeutic intervention performed c emphasis on BUE strengthening and endurance to  increase strength for functional tasks / transfers. Patient left safely in bed at end of session, with call light in reach, alarm on and nursing aware.      Assessment / Impression:    Patient's tolerance of treatment: fair  Adverse Reaction: none  Significant change in status and impact:  none  Barriers to improvement: weakness, confusion      Plan for Next Session:    Continue with OT POC      Time in:  1321  Time out:  1405  Timed treatment minutes:  44  Total treatment time:  44      Electronically signed by:    TRACY Reynolds COTA/L [id number]    2/8/2022, 1:59 PM

## 2022-02-08 NOTE — PLAN OF CARE
Problem: Falls - Risk of:  Goal: Will remain free from falls  Description: Will remain free from falls  Outcome: Met This Shift  Goal: Absence of physical injury  Description: Absence of physical injury  Outcome: Met This Shift     Problem: Airway Clearance - Ineffective  Goal: Achieve or maintain patent airway  Outcome: Met This Shift     Problem: Gas Exchange - Impaired  Goal: Absence of hypoxia  Outcome: Met This Shift  Goal: Promote optimal lung function  Outcome: Met This Shift     Problem: Breathing Pattern - Ineffective  Goal: Ability to achieve and maintain a regular respiratory rate  Outcome: Met This Shift     Problem:  Body Temperature -  Risk of, Imbalanced  Goal: Ability to maintain a body temperature within defined limits  Outcome: Met This Shift  Goal: Will regain or maintain usual level of consciousness  Outcome: Met This Shift  Goal: Complications related to the disease process, condition or treatment will be avoided or minimized  Outcome: Met This Shift     Problem: Isolation Precautions - Risk of Spread of Infection  Goal: Prevent transmission of infection  Outcome: Met This Shift     Problem: Nutrition Deficits  Goal: Optimize nutritional status  Outcome: Met This Shift     Problem: Risk for Fluid Volume Deficit  Goal: Maintain normal heart rhythm  Outcome: Met This Shift  Goal: Maintain absence of muscle cramping  Outcome: Met This Shift  Goal: Maintain normal serum potassium, sodium, calcium, phosphorus, and pH  Outcome: Met This Shift     Problem: Loneliness or Risk for Loneliness  Goal: Demonstrate positive use of time alone when socialization is not possible  Outcome: Met This Shift     Problem: Fatigue  Goal: Verbalize increase energy and improved vitality  Outcome: Met This Shift     Problem: Patient Education: Go to Patient Education Activity  Goal: Patient/Family Education  Outcome: Met This Shift     Problem: Skin Integrity:  Goal: Will show no infection signs and symptoms  Description: Will show no infection signs and symptoms  Outcome: Met This Shift  Goal: Absence of new skin breakdown  Description: Absence of new skin breakdown  Outcome: Met This Shift     Problem: Nutrition  Goal: Optimal nutrition therapy  Outcome: Met This Shift

## 2022-02-09 ENCOUNTER — APPOINTMENT (OUTPATIENT)
Dept: GENERAL RADIOLOGY | Age: 87
End: 2022-02-09
Payer: COMMERCIAL

## 2022-02-09 LAB
ANION GAP SERPL CALCULATED.3IONS-SCNC: 12 MMOL/L (ref 4–16)
BUN BLDV-MCNC: 35 MG/DL (ref 6–23)
CALCIUM SERPL-MCNC: 9.9 MG/DL (ref 8.3–10.6)
CHLORIDE BLD-SCNC: 112 MMOL/L (ref 99–110)
CO2: 24 MMOL/L (ref 21–32)
CREAT SERPL-MCNC: 1.2 MG/DL (ref 0.6–1.1)
GFR AFRICAN AMERICAN: 51 ML/MIN/1.73M2
GFR NON-AFRICAN AMERICAN: 42 ML/MIN/1.73M2
GLUCOSE BLD-MCNC: 170 MG/DL (ref 70–99)
HCT VFR BLD CALC: 31.9 % (ref 37–47)
HEMOGLOBIN: 10.1 GM/DL (ref 12.5–16)
MCH RBC QN AUTO: 34.6 PG (ref 27–31)
MCHC RBC AUTO-ENTMCNC: 31.7 % (ref 32–36)
MCV RBC AUTO: 109.2 FL (ref 78–100)
PDW BLD-RTO: 13.8 % (ref 11.7–14.9)
PLATELET # BLD: 483 K/CU MM (ref 140–440)
PMV BLD AUTO: 9.9 FL (ref 7.5–11.1)
POTASSIUM SERPL-SCNC: 4.5 MMOL/L (ref 3.5–5.1)
RBC # BLD: 2.92 M/CU MM (ref 4.2–5.4)
SODIUM BLD-SCNC: 148 MMOL/L (ref 135–145)
WBC # BLD: 12 K/CU MM (ref 4–10.5)

## 2022-02-09 PROCEDURE — 80048 BASIC METABOLIC PNL TOTAL CA: CPT

## 2022-02-09 PROCEDURE — G0378 HOSPITAL OBSERVATION PER HR: HCPCS

## 2022-02-09 PROCEDURE — 6370000000 HC RX 637 (ALT 250 FOR IP): Performed by: NURSE PRACTITIONER

## 2022-02-09 PROCEDURE — 36415 COLL VENOUS BLD VENIPUNCTURE: CPT

## 2022-02-09 PROCEDURE — 94640 AIRWAY INHALATION TREATMENT: CPT

## 2022-02-09 PROCEDURE — 6360000002 HC RX W HCPCS: Performed by: PHYSICIAN ASSISTANT

## 2022-02-09 PROCEDURE — 96372 THER/PROPH/DIAG INJ SC/IM: CPT

## 2022-02-09 PROCEDURE — 6370000000 HC RX 637 (ALT 250 FOR IP): Performed by: PHYSICIAN ASSISTANT

## 2022-02-09 PROCEDURE — 94761 N-INVAS EAR/PLS OXIMETRY MLT: CPT

## 2022-02-09 PROCEDURE — 85027 COMPLETE CBC AUTOMATED: CPT

## 2022-02-09 PROCEDURE — 2580000003 HC RX 258: Performed by: PHYSICIAN ASSISTANT

## 2022-02-09 PROCEDURE — 71045 X-RAY EXAM CHEST 1 VIEW: CPT

## 2022-02-09 RX ORDER — LANOLIN ALCOHOL/MO/W.PET/CERES
9 CREAM (GRAM) TOPICAL NIGHTLY PRN
Status: DISCONTINUED | OUTPATIENT
Start: 2022-02-09 | End: 2022-02-12 | Stop reason: HOSPADM

## 2022-02-09 RX ORDER — CLONIDINE HYDROCHLORIDE 0.1 MG/1
0.1 TABLET ORAL 3 TIMES DAILY
Status: DISCONTINUED | OUTPATIENT
Start: 2022-02-09 | End: 2022-02-12 | Stop reason: HOSPADM

## 2022-02-09 RX ADMIN — METOPROLOL TARTRATE 25 MG: 25 TABLET, FILM COATED ORAL at 09:26

## 2022-02-09 RX ADMIN — CLONIDINE HYDROCHLORIDE 0.1 MG: 0.1 TABLET ORAL at 09:26

## 2022-02-09 RX ADMIN — ATORVASTATIN CALCIUM 20 MG: 20 TABLET, FILM COATED ORAL at 09:26

## 2022-02-09 RX ADMIN — ENOXAPARIN SODIUM 30 MG: 100 INJECTION SUBCUTANEOUS at 09:27

## 2022-02-09 RX ADMIN — PREDNISONE 5 MG: 10 TABLET ORAL at 09:27

## 2022-02-09 RX ADMIN — CARBAMAZEPINE 200 MG: 200 TABLET ORAL at 09:27

## 2022-02-09 RX ADMIN — FOLIC ACID 1 MG: 1 TABLET ORAL at 09:26

## 2022-02-09 RX ADMIN — ASPIRIN 81 MG 81 MG: 81 TABLET ORAL at 09:27

## 2022-02-09 RX ADMIN — PANTOPRAZOLE SODIUM 40 MG: 40 TABLET, DELAYED RELEASE ORAL at 05:59

## 2022-02-09 RX ADMIN — AMLODIPINE BESYLATE 10 MG: 5 TABLET ORAL at 09:26

## 2022-02-09 RX ADMIN — DICYCLOMINE HYDROCHLORIDE 10 MG: 10 CAPSULE ORAL at 20:54

## 2022-02-09 RX ADMIN — CARBAMAZEPINE 200 MG: 200 TABLET ORAL at 20:55

## 2022-02-09 RX ADMIN — CLONIDINE HYDROCHLORIDE 0.1 MG: 0.1 TABLET ORAL at 20:54

## 2022-02-09 RX ADMIN — DICYCLOMINE HYDROCHLORIDE 10 MG: 10 CAPSULE ORAL at 09:26

## 2022-02-09 RX ADMIN — CLOPIDOGREL 75 MG: 75 TABLET, FILM COATED ORAL at 09:26

## 2022-02-09 RX ADMIN — CLONIDINE HYDROCHLORIDE 0.1 MG: 0.1 TABLET ORAL at 15:07

## 2022-02-09 RX ADMIN — SODIUM CHLORIDE, PRESERVATIVE FREE 10 ML: 5 INJECTION INTRAVENOUS at 09:27

## 2022-02-09 RX ADMIN — Medication 2 PUFF: at 20:26

## 2022-02-09 RX ADMIN — SODIUM CHLORIDE, PRESERVATIVE FREE 10 ML: 5 INJECTION INTRAVENOUS at 20:55

## 2022-02-09 RX ADMIN — PREDNISONE 5 MG: 10 TABLET ORAL at 20:55

## 2022-02-09 RX ADMIN — Medication 9 MG: at 20:54

## 2022-02-09 ASSESSMENT — PAIN SCALES - GENERAL: PAINLEVEL_OUTOF10: 0

## 2022-02-09 ASSESSMENT — PAIN SCALES - WONG BAKER: WONGBAKER_NUMERICALRESPONSE: 0

## 2022-02-09 NOTE — PROGRESS NOTES
Hospitalist Progress Note      Name:  Delia Cormier /Age/Sex: 1934  (80 y.o. female)   MRN & CSN:  [id number] & [id number] Admission Date/Time: 2022  1:03 PM   Location:  [facility] PCP: Elena Salguero MD         Hospital Day: 10                                               Attending Physician Dr Rebecca Poole and Plan:   Delia Cormier is a 80 y.o.  female  who presents with COVID-19    Present on Admission:   COVID-19   Rheumatoid arthritis ([facility] [address]   CVA, old, hemiparesis ([facility] [address]   HTN (hypertension)   Troponin I above reference range     COVID pneumonia    Initial diagnosis    Currently on RA   CXR () Patchy density in the mid to lower lung zones bilaterally    PRN symptom control     Physical deconditioning/ Generalized weakness   Pending placement to [facility]   Will accept on  after COVID quarantine period   Stable for discharge    PT/OT     Acute kidney injury- Baseline 1.0-1.1   Holding nephrotoxic agents   Pending repeat AM labs not completed at present     Acute encephalopathy- resolved    Elevated troponin- w/o chest pain. Possible 2/2 MOSES   EKG with no acute ischemic change   Cardiology evaluation- signed off. Recommended OP stress   TTE(22) EF 45/50%. Hypertension   hold home lisinopril-hydrochlorothiazide 2/2 MOSES   Continue home clonidine, amlodipine with hold parameters    Uncontrolled trends    Titrated clonidine to TID     Hx of CVA   Continue DAPT/ statin      Trigeminal neuralgia   Continue home Tegretol     Rheumatoid arthritis   methotrexate weekly   Continue prednisone    Diet ADULT ORAL NUTRITION SUPPLEMENT; Breakfast, Lunch; Fortified Pudding Oral Supplement  ADULT ORAL NUTRITION SUPPLEMENT; Lunch, Dinner, Breakfast; Standard High Calorie/High Protein Oral Supplement  ADULT DIET;  Dysphagia - Pureed; Mildly Thick (Nectar)   DVT Prophylaxis [x] Lovenox, []  Heparin, [] SCDs, [] Ambulation   GI Prophylaxis [x] PPI,  [] H2 Blocker, [] Carafate,  [] Diet/Tube Feeds   Code Status Full Code     -Patient assessment and plan discussed with supervising physician-  Subjective 2/9/2022     Ermelinda Pineda is a 80 y.o.  female, who presented with  Generalized Body Aches (x4 days )    Patient seen and examined at bedside    Per nursing notes restless overnight     Denies new concerns, denies pain     Ten point ROS reviewed negative, unless as noted above    Objective: Intake/Output Summary (Last 24 hours) at 2/9/2022 1008  Last data filed at 2/8/2022 1629  Gross per 24 hour   Intake 120 ml   Output --   Net 120 ml      Vitals:   Vitals:    02/08/22 1418 02/08/22 2030 02/09/22 0320 02/09/22 0924   BP: 120/67 (!) 143/76 (!) 157/109 (!) 156/108   Pulse: 74 95 99    Resp: 15 21 17    Temp: 97.7 °F (36.5 °C) 97.9 °F (36.6 °C) 99.1 °F (37.3 °C) 97.6 °F (36.4 °C)   TempSrc: Oral Oral Oral Oral   SpO2: 95% 100% 100% 99%   Weight:       Height:         Physical Exam: 02/09/22     GEN -Awake nontoxic/chronically ill appearing female, sitting upright in bed , NAD. normal body habitus. Appears given age. EYES -Pupils are equally round. No scleral erythema, discharge, or conjunctivitis. HENT -MM are moist. Oral pharynx without exudates, no evidence of thrush. NECK -Supple, no apparent thyromegaly or masses. RESP -CTA, no wheezes, rales or rhonchi. Symmetric chest movement while on RA.  C/V -S1/S2 auscultated. RRR without appreciable M/R/G. No JVD or carotid bruits. Peripheral pulses equal bilaterally and palpable. No peripheral edema. GI -Abdomen is soft non distended and without significant tenderness. No masses or guarding. + BS Rectal exam deferred.  -No CVA tenderness. Lazo catheter is not present. LYMPH-No palpable cervical lymphadenopathy and no hepatosplenomegaly. No petechiae or ecchymoses. MS -No gross joint deformities. SKIN -Normal coloration, warm, dry.   NEURO-Cranial nerves appear grossly intact, normal speech, no lateralizing weakness. PSYC-Awake, alert, oriented x 3. Appropriate affect. Medications:   Medications:    metoprolol tartrate  25 mg Oral Daily    sodium chloride flush  5-40 mL IntraVENous 2 times per day    enoxaparin  30 mg SubCUTAneous Daily    amLODIPine  10 mg Oral Daily    aspirin  81 mg Oral Daily    atorvastatin  20 mg Oral Daily    carBAMazepine  200 mg Oral BID    cloNIDine  0.1 mg Oral Daily    clopidogrel  75 mg Oral Daily    dicyclomine  10 mg Oral BID    folic acid  1 mg Oral Daily    pantoprazole  40 mg Oral QAM AC    predniSONE  5 mg Oral BID      Infusions:    sodium chloride       PRN Meds: acetaminophen, 650 mg, Q6H PRN   Or  acetaminophen, 650 mg, Q6H PRN  guaiFENesin-dextromethorphan, 5 mL, Q4H PRN  sodium chloride flush, 5-40 mL, PRN  sodium chloride, 25 mL, PRN  ondansetron, 4 mg, Q8H PRN   Or  ondansetron, 4 mg, Q6H PRN  polyethylene glycol, 17 g, Daily PRN      LABS  CBC   No results for input(s): WBC, HGB, HCT, PLT in the last 72 hours. RENAL  Recent Labs     02/07/22  1559 02/08/22  1150    141   K 4.8 4.2    104   CO2 28 28   BUN 35* 38*   CREATININE 1.1 1.2*       Radiology this visit:  Reviewed. ECHO Complete 2D W Doppler W Color    Result Date: 2/1/2022  Transthoracic Echocardiography Report (TTE)  Demographics   Patient Name       Jose Gallego Date of Study       02/01/2022   Date of Birth      1934         Gender              Female   Age                80 year(s)         Race                Black   Patient Number     [id number]         Room Number         7088   Visit Number       [id number]   Corporate ID       [id number]   Accession Number   [id number]         [address] [place] [address] Dr   Ordering Physician Jhoana Hatfield MD                 Physician           MD  Procedure Type of Study   TTE procedure:ECHOCARDIOGRAM COMPLETE 2D W DOPPLER W COLOR.   Procedure Date Date: 02/01/2022 Start: 07:35 AM Study Location: Portable Technical Quality: Fair visualization Indications:COVID-19. Patient Status: Routine Height: 66 inches Weight: 160 pounds BSA: 1.82 m2 BMI: 25.82 kg/m2 HR: 88 bpm BP: 126/85 mmHg  Conclusions   Summary  Left ventricular systolic function is low normal.  Ejection fraction is visually estimated at 45-50%. Sigmoid septum noted. Sclerotic, but non-stenotic aortic valve. Trace aortic regurgitation noted with color doppler. Mitral annular calcification is present. Mild-moderate mitral regurgitation is present. No evidence of any pericardial effusion. Signature   ------------------------------------------------------------------  Electronically signed by Donna Fonseca MD (Interpreting  physician) on 02/01/2022 at 04:40 PM  ------------------------------------------------------------------   Findings   Left Ventricle  Left ventricular systolic function is low normal.  Ejection fraction is visually estimated at 45-50%. No regional wall motion abnormalites. Sigmoid septum noted. Left ventricle size is normal.  Unable to determine diastolic function due to arrhythmia. Left Atrium  Essentially normal left atrium. Right Atrium  Essentially normal right atrium. Right Ventricle  Essentially normal right ventricle. Aortic Valve  Sclerotic, but non-stenotic aortic valve. Trace aortic regurgitation noted with color doppler. Mitral Valve  Mitral annular calcification is present. Mild-moderate mitral regurgitation is present. Tricuspid Valve  Trace tricuspid regurgitation is present. Pulmonic Valve  The pulmonic valve was not well visualized. Pericardial Effusion  No evidence of any pericardial effusion. Pleural Effusion  No evidence of pleural effusion. Miscellaneous  Aorta was not clearly visualized.   M-Mode/2D Measurements & Calculations   LV Diastolic Dimension:   LV Systolic Dimension:   LA Dimension: 3.8 cmAO  4.19 cm                   3.1 cm Root Dimension: 3.8 cm  LV FS:26 %                LV Volume Diastolic: 78  LV PW Diastolic: 1.15 cm  ml  LV PW Systolic: 3.25 cm   LV Volume Systolic: 39  Septum Diastolic: 0.9 cm  ml                       RV Diastolic Dimension:  Septum Systolic: 0.73 cm  LV EDV/LV EDV Index: 78  3.18 cm  CO: 4.51 l/min            ml/43 m2LV ESV/LV ESV  CI: 2.48 l/m*m2           Index: 39 ml/21 m2       LA/Aorta: 1                            EF Calculated (A4C): 50  LV Area Diastolic: 01.9   %  cm2                       EF Calculated (2D): 82.4  LV Area Systolic: 58.0    %  cm2                            LV Length: 7.39 cm                             LVOT: 1.9 cm  Doppler Measurements & Calculations    AV Peak Velocity: 168 cm/s    LVOT Peak Velocity: 129 cm/s   AV Peak Gradient: 11.29 mmHg  LVOT Mean Velocity: 82.1 cm/s   AV Mean Velocity: 112 cm/s    LVOT Peak Gradient: 7 mmHgLVOT Mean Gradient:   AV Mean Gradient: 6 mmHg      3 mmHg   AV VTI: 23.1 cm               Estimated RVSP: 11 mmHg   AV Area (Continuity):2.22 cm2 Estimated RAP:3 mmHg    LVOT VTI: 18.1 cm                                 TR Velocity:138 cm/s   Estimated PASP: 10.62 mmHg    TR Gradient:7.62 mmHg      CT HEAD WO CONTRAST    Result Date: 1/31/2022  EXAMINATION: CT OF THE HEAD WITHOUT CONTRAST  1/31/2022 2:33 pm TECHNIQUE: CT of the head was performed without the administration of intravenous contrast. Dose modulation, iterative reconstruction, and/or weight based adjustment of the mA/kV was utilized to reduce the radiation dose to as low as reasonably achievable. COMPARISON: 10/08/2019 HISTORY: Altered mental status. Confusion. FINDINGS: BRAIN/VENTRICLES: There is no acute intracranial hemorrhage, mass effect or midline shift. No abnormal extra-axial fluid collection. The gray-white differentiation is maintained without evidence of an acute infarct. There is no evidence of hydrocephalus.  Moderate parenchymal volume loss, within normal limits for age. Moderate periventricular and subcortical white matter hypoattenuation and left basal ganglia lacunar infarcts, likely sequelae of chronic small-vessel ischemia or hypertension. Vascular calcifications of the bilateral supraclinoid carotid arteries. ORBITS: The visualized portion of the orbits demonstrate no acute abnormality. SINUSES: The visualized paranasal sinuses and mastoid air cells demonstrate no acute abnormality. SOFT TISSUES/SKULL:  No acute abnormality of the visualized skull or soft tissues. No acute intracranial abnormality. Chronic changes as described above. XR CHEST PORTABLE    Result Date: 1/31/2022  EXAMINATION: ONE XRAY VIEW OF THE CHEST 1/31/2022 1:23 pm COMPARISON: 10/23/2016. HISTORY: ORDERING SYSTEM PROVIDED HISTORY: cough fatigue TECHNOLOGIST PROVIDED HISTORY: Reason for exam:->cough fatigue Reason for Exam: cough   fatigue FINDINGS: There are low lung volumes. The heart size is enlarged but unchanged. The pulmonary vasculature is within normal limits. There is some patchy density involving the mid to lower lung zones bilaterally. No pneumothoraces are seen. 1. Patchy density in the mid to lower lung zones bilaterally which could represent an atypical pneumonia, including viral pneumonia. Follow-up to resolution is recommended. 2. Cardiomegaly without overt failure.          Electronically signed by NE Johnson CNP on 2/9/2022 at 10:08 AM

## 2022-02-09 NOTE — DISCHARGE INSTR - COC
Continuity of Care Form    Patient Name: Moose Desir   :  1934  MRN:  [id number]    Admit date:  2022  Discharge date:  2022    Code Status Order: Full Code   Advance Directives:      Admitting Physician:  Evelia Cortez DO  PCP: Kim Valentine MD    Discharging Nurse: [facility] Unit/Room#: 0622/6909-X  Discharging Unit Phone Number: [phone]    Emergency Contact:   Extended Emergency Contact Information  Primary Emergency Contact: Ozzy Jameson  Address: [address], [address] [place] [address] Phone: [phone]  Mobile Phone: [phone]  Relation: Child    Past Surgical History:  Past Surgical History:   Procedure Laterality Date    APPENDECTOMY      CATARACT REMOVAL      CHOLECYSTECTOMY      EYE SURGERY      HYSTERECTOMY         Immunization History:   Immunization History   Administered Date(s) Administered    Tdap (Boostrix, Adacel) 10/08/2019       Active Problems:  Patient Active Problem List   Diagnosis Code    HTN (hypertension) I10    Rheumatoid arthritis ([address] M06.9    CVA, old, hemiparesis ([address] I69.359    Tic douloureux G50.0    Debility R53.81    Ataxia R27.0    Acute pain of right knee M25.561    Sepsis ([address] A41.9    Physical deconditioning R53.81    Generalized weakness R53.1    Depression F32. A    COVID-19 U07.1    Troponin I above reference range R77.8       Isolation/Infection:   Isolation            Droplet Plus          Patient Infection Status       Infection Onset Added Last Indicated Last Indicated By Review Planned Expiration Resolved Resolved By    COVID-19 22 COVID-19, Rapid 22      Resolved    COVID-19 (Rule Out) 22 COVID-19, Rapid (Ordered)   22 Rule-Out Test Resulted            Nurse Assessment:  Last Vital Signs: BP (!) 157/109   Pulse 99   Temp 99.1 °F (37.3 °C) (Oral)   Resp 17   Ht 5' 5.98\" (1.676 m)   Wt 122 lb (55.3 kg)   SpO2 100%   BMI 19.70 kg/m²     Last documented pain score (0-10 scale): Pain Level: 4  Last Weight:   Wt Readings from Last 1 Encounters:   01/31/22 122 lb (55.3 kg)     Mental Status:  disoriented and alert    IV Access:  - None    Nursing Mobility/ADLs:  Walking   Dependent  Transfer  Dependent  Bathing  Dependent  Dressing  Dependent  Toileting  Dependent  Feeding  Dependent  Med Admin  Dependent  Med Delivery   crushed and prefers mixed with applesauce or pudding    Wound Care Documentation and Therapy:        Elimination:  Continence: Bowel: No  Bladder: No  Urinary Catheter: None   Colostomy/Ileostomy/Ileal Conduit: No       Date of Last BM: 2/9/2022    Intake/Output Summary (Last 24 hours) at 2/9/2022 0751  Last data filed at 2/8/2022 1629  Gross per 24 hour   Intake 240 ml   Output --   Net 240 ml     I/O last 3 completed shifts: In: 240 [P.O.:240]  Out: -     Safety Concerns: At Risk for Falls    Impairments/Disabilities:      None      Patient's personal belongings (please select all that are sent with patient):  None    RN SIGNATURE:  Electronically signed by Breezy Cox RN on 2/11/22 at 11:11 AM EST      PHYSICIAN SECTION      Nutrition Therapy:  Current Nutrition Therapy:   - Oral Diet:  Dysphagia 1 pureed    Routes of Feeding: Oral  Liquids: Nectar Thick Liquids  Daily Fluid Restriction: no  Last Modified Barium Swallow with Video (Video Swallowing Test): not done    Treatments at the Time of Hospital Discharge:   Respiratory Treatments: PRN  Oxygen Therapy:  is not on home oxygen therapy.   Ventilator:    - No ventilator support    Rehab Therapies: Physical Therapy, Occupational Therapy, and Speech/Language Therapy  Weight Bearing Status/Restrictions: No weight bearing restirctions  Other Medical Equipment (for information only, NOT a DME order):  walker  Other Treatments: None      Prognosis: Good    Condition at Discharge: Stable    Rehab Potential (if

## 2022-02-09 NOTE — CARE COORDINATION
Pt can go to [facility] on Friday 2/11/2022. CM will need a JÚNIOR completed by RN and doctor at discharge. If pt is discharged after hours please complete the following. ... Call report to [phone]  Fax completed AVS with both JÚNIOR on the AVS and any written Rx [phone]. Set up transportation (pt's choice) -[phone]  [facility] [employer] [phone] or [phone] and call family.

## 2022-02-10 LAB
ANION GAP SERPL CALCULATED.3IONS-SCNC: 12 MMOL/L (ref 4–16)
BACTERIA: NEGATIVE /HPF
BILIRUBIN URINE: NEGATIVE MG/DL
BLOOD, URINE: ABNORMAL
BUN BLDV-MCNC: 35 MG/DL (ref 6–23)
CALCIUM SERPL-MCNC: 10 MG/DL (ref 8.3–10.6)
CHLORIDE BLD-SCNC: 111 MMOL/L (ref 99–110)
CLARITY: ABNORMAL
CO2: 23 MMOL/L (ref 21–32)
COLOR: YELLOW
CREAT SERPL-MCNC: 1.1 MG/DL (ref 0.6–1.1)
GFR AFRICAN AMERICAN: 57 ML/MIN/1.73M2
GFR NON-AFRICAN AMERICAN: 47 ML/MIN/1.73M2
GLUCOSE BLD-MCNC: 144 MG/DL (ref 70–99)
GLUCOSE, URINE: NEGATIVE MG/DL
HCT VFR BLD CALC: 36.8 % (ref 37–47)
HEMOGLOBIN: 10.8 GM/DL (ref 12.5–16)
KETONES, URINE: NEGATIVE MG/DL
LEUKOCYTE ESTERASE, URINE: ABNORMAL
MCH RBC QN AUTO: 35.1 PG (ref 27–31)
MCHC RBC AUTO-ENTMCNC: 29.3 % (ref 32–36)
MCV RBC AUTO: 119.5 FL (ref 78–100)
MUCUS: ABNORMAL HPF
NITRITE URINE, QUANTITATIVE: POSITIVE
PDW BLD-RTO: 13.9 % (ref 11.7–14.9)
PH, URINE: 7.5 (ref 5–8)
PLATELET # BLD: 477 K/CU MM (ref 140–440)
PMV BLD AUTO: 10 FL (ref 7.5–11.1)
POTASSIUM SERPL-SCNC: 4.4 MMOL/L (ref 3.5–5.1)
PROTEIN UA: 30 MG/DL
RBC # BLD: 3.08 M/CU MM (ref 4.2–5.4)
RBC URINE: ABNORMAL /HPF (ref 0–6)
SODIUM BLD-SCNC: 146 MMOL/L (ref 135–145)
SPECIFIC GRAVITY UA: 1.01 (ref 1–1.03)
SQUAMOUS EPITHELIAL: 4 /HPF
TRICHOMONAS: ABNORMAL /HPF
UROBILINOGEN, URINE: 1 MG/DL (ref 0.2–1)
WBC # BLD: 14.1 K/CU MM (ref 4–10.5)
WBC CLUMP: ABNORMAL /HPF
WBC UA: 353 /HPF (ref 0–5)

## 2022-02-10 PROCEDURE — 97530 THERAPEUTIC ACTIVITIES: CPT

## 2022-02-10 PROCEDURE — 94761 N-INVAS EAR/PLS OXIMETRY MLT: CPT

## 2022-02-10 PROCEDURE — 87077 CULTURE AEROBIC IDENTIFY: CPT

## 2022-02-10 PROCEDURE — 96372 THER/PROPH/DIAG INJ SC/IM: CPT

## 2022-02-10 PROCEDURE — 6370000000 HC RX 637 (ALT 250 FOR IP): Performed by: NURSE PRACTITIONER

## 2022-02-10 PROCEDURE — 87186 SC STD MICRODIL/AGAR DIL: CPT

## 2022-02-10 PROCEDURE — 6370000000 HC RX 637 (ALT 250 FOR IP): Performed by: PHYSICIAN ASSISTANT

## 2022-02-10 PROCEDURE — 92526 ORAL FUNCTION THERAPY: CPT

## 2022-02-10 PROCEDURE — 36415 COLL VENOUS BLD VENIPUNCTURE: CPT

## 2022-02-10 PROCEDURE — 80048 BASIC METABOLIC PNL TOTAL CA: CPT

## 2022-02-10 PROCEDURE — G0378 HOSPITAL OBSERVATION PER HR: HCPCS

## 2022-02-10 PROCEDURE — 2580000003 HC RX 258: Performed by: PHYSICIAN ASSISTANT

## 2022-02-10 PROCEDURE — 81001 URINALYSIS AUTO W/SCOPE: CPT

## 2022-02-10 PROCEDURE — 96361 HYDRATE IV INFUSION ADD-ON: CPT

## 2022-02-10 PROCEDURE — 94640 AIRWAY INHALATION TREATMENT: CPT

## 2022-02-10 PROCEDURE — 51701 INSERT BLADDER CATHETER: CPT

## 2022-02-10 PROCEDURE — 6360000002 HC RX W HCPCS: Performed by: PHYSICIAN ASSISTANT

## 2022-02-10 PROCEDURE — 2580000003 HC RX 258: Performed by: NURSE PRACTITIONER

## 2022-02-10 PROCEDURE — 87086 URINE CULTURE/COLONY COUNT: CPT

## 2022-02-10 PROCEDURE — 85027 COMPLETE CBC AUTOMATED: CPT

## 2022-02-10 RX ORDER — 0.9 % SODIUM CHLORIDE 0.9 %
250 INTRAVENOUS SOLUTION INTRAVENOUS ONCE
Status: COMPLETED | OUTPATIENT
Start: 2022-02-10 | End: 2022-02-10

## 2022-02-10 RX ORDER — SULFAMETHOXAZOLE AND TRIMETHOPRIM 800; 160 MG/1; MG/1
1 TABLET ORAL EVERY 12 HOURS SCHEDULED
Status: DISCONTINUED | OUTPATIENT
Start: 2022-02-10 | End: 2022-02-12 | Stop reason: HOSPADM

## 2022-02-10 RX ADMIN — CLONIDINE HYDROCHLORIDE 0.1 MG: 0.1 TABLET ORAL at 09:58

## 2022-02-10 RX ADMIN — CLONIDINE HYDROCHLORIDE 0.1 MG: 0.1 TABLET ORAL at 20:03

## 2022-02-10 RX ADMIN — AMLODIPINE BESYLATE 10 MG: 5 TABLET ORAL at 09:58

## 2022-02-10 RX ADMIN — METOPROLOL TARTRATE 25 MG: 25 TABLET, FILM COATED ORAL at 09:58

## 2022-02-10 RX ADMIN — Medication 2 PUFF: at 11:12

## 2022-02-10 RX ADMIN — FOLIC ACID 1 MG: 1 TABLET ORAL at 09:59

## 2022-02-10 RX ADMIN — SODIUM CHLORIDE, PRESERVATIVE FREE 10 ML: 5 INJECTION INTRAVENOUS at 20:03

## 2022-02-10 RX ADMIN — ACETAMINOPHEN 650 MG: 325 TABLET ORAL at 20:02

## 2022-02-10 RX ADMIN — DICYCLOMINE HYDROCHLORIDE 10 MG: 10 CAPSULE ORAL at 09:58

## 2022-02-10 RX ADMIN — SODIUM CHLORIDE 250 ML: 9 INJECTION, SOLUTION INTRAVENOUS at 16:10

## 2022-02-10 RX ADMIN — DICYCLOMINE HYDROCHLORIDE 10 MG: 10 CAPSULE ORAL at 20:03

## 2022-02-10 RX ADMIN — SULFAMETHOXAZOLE AND TRIMETHOPRIM 1 TABLET: 800; 160 TABLET ORAL at 20:02

## 2022-02-10 RX ADMIN — Medication 9 MG: at 20:02

## 2022-02-10 RX ADMIN — PANTOPRAZOLE SODIUM 40 MG: 40 TABLET, DELAYED RELEASE ORAL at 05:47

## 2022-02-10 RX ADMIN — CARBAMAZEPINE 200 MG: 200 TABLET ORAL at 20:03

## 2022-02-10 RX ADMIN — ENOXAPARIN SODIUM 30 MG: 100 INJECTION SUBCUTANEOUS at 09:59

## 2022-02-10 RX ADMIN — SODIUM CHLORIDE, PRESERVATIVE FREE 10 ML: 5 INJECTION INTRAVENOUS at 10:01

## 2022-02-10 RX ADMIN — CARBAMAZEPINE 200 MG: 200 TABLET ORAL at 09:59

## 2022-02-10 RX ADMIN — CLOPIDOGREL 75 MG: 75 TABLET, FILM COATED ORAL at 09:59

## 2022-02-10 RX ADMIN — PREDNISONE 5 MG: 10 TABLET ORAL at 20:02

## 2022-02-10 RX ADMIN — PREDNISONE 5 MG: 10 TABLET ORAL at 09:58

## 2022-02-10 RX ADMIN — CLONIDINE HYDROCHLORIDE 0.1 MG: 0.1 TABLET ORAL at 16:05

## 2022-02-10 RX ADMIN — ATORVASTATIN CALCIUM 20 MG: 20 TABLET, FILM COATED ORAL at 09:59

## 2022-02-10 RX ADMIN — ASPIRIN 81 MG 81 MG: 81 TABLET ORAL at 09:58

## 2022-02-10 RX ADMIN — Medication 2 PUFF: at 15:23

## 2022-02-10 ASSESSMENT — PAIN SCALES - WONG BAKER
WONGBAKER_NUMERICALRESPONSE: 0

## 2022-02-10 ASSESSMENT — PAIN SCALES - GENERAL
PAINLEVEL_OUTOF10: 2
PAINLEVEL_OUTOF10: 0
PAINLEVEL_OUTOF10: 3

## 2022-02-10 NOTE — PROGRESS NOTES
[id number] OF SPEECH/LANGUAGE PATHOLOGY  DAILY PROGRESS NOTE  Beryl Faye  2/10/2022  [id number]  Myalgia [M79.10]  Confusion [R41.0]  General weakness [R53.1]  Elevated troponin [R77.8]  MOSES (acute kidney injury) ([facility] [address] [N17.9]  COVID-19 [U07.1]  Allergies   Allergen Reactions    Pcn [Penicillins] Swelling         Pt was seen this date for dysphagia treatment. IMPRESSION AND RECOMMENDATIONS: Venita Costello was seen for dysphagia follow-up. She was seated upright in bed, alert, confused, cooperative. She was presented with PO trials of ice chips, thin liquids via tsp/cup/straw, nectar thick liquids via cup/straw, puree, soft and regular solids. Oral stage moderately impaired, characterized by adequate labial seal, prolonged/disorganized mastication, expectoration of solids, adequate AP transit. Reduced clearance/mod residue noted with soft solids. Suspect ongoing pharyngeal dysphagia with adequate swallow initiation/reduced laryngeal elevation. Delayed cough followed >50% of trials of thin liquids via cup/straw only. Recommend continued pureed diet/nectar thick liquids. Pt is a total feed. Follow aspiration precautions. SLP will follow.       GOALS (current status in bold):  Short-term Goals  Goal 1: pt will tolerate pureed diet with nectar thick liquids w/o s/s of aspiration 100% Meeting, continue  Goal 2: pt and caregivers will demonstrate understanding of safe swallow protocols Ongoing, continue          EDUCATION: recommendations/plan    PAIN RATING (0-10 Scale): 0/denies  Time in/Time out: SLP Individual Minutes  Time In: 1525  Time Out: Aura 16  Minutes: 15    Visit number: [id number] [address] [facility]  2/10/2022  4:34 PM

## 2022-02-10 NOTE — RT PROTOCOL NOTE
revise RT bronchodilator order(s) to equivalent RT Bronchodilator order with Frequency of every 4 hours PRN for wheezing or increased work of breathing using Per Protocol order mode. 4-6 - enter or revise RT Bronchodilator order(s) to two equivalent RT bronchodilator orders with one order with BID Frequency and one order with Frequency of every 4 hours PRN wheezing or increased work of breathing using Per Protocol order mode. 7-10 - enter or revise RT Bronchodilator order(s) to two equivalent RT bronchodilator orders with one order with TID Frequency and one order with Frequency of every 4 hours PRN wheezing or increased work of breathing using Per Protocol order mode. 11-13 - enter or revise RT Bronchodilator order(s) to one equivalent RT bronchodilator order with QID Frequency and an Albuterol order with Frequency of every 4 hours PRN wheezing or increased work of breathing using Per Protocol order mode. Greater than 13 - enter or revise RT Bronchodilator order(s) to one equivalent RT bronchodilator order with every 4 hours Frequency and an Albuterol order with Frequency of every 2 hours PRN wheezing or increased work of breathing using Per Protocol order mode. RT to enter RT Home Evaluation for COPD & MDI Assessment order using Per Protocol order mode.     Electronically signed by Sung Tiwari RCP on 2/10/2022 at 5:23 PM

## 2022-02-10 NOTE — PROGRESS NOTES
Hospitalist Progress Note      Name:  Delia Cormier /Age/Sex: 1934  (80 y.o. female)   MRN & CSN:  [id number] & [id number] Admission Date/Time: 2022  1:03 PM   Location:  [facility] PCP: Elena Salguero MD         Hospital Day: 11                                               Attending Physician Dr Rebecca Poole and Plan:   Delia Cormier is a 80 y.o.  female  who presents with COVID-19    Present on Admission:   COVID-19   [facility]umatoid arthritis ([facility]   CVA, old, hemiparesis ([facility]   HTN (hypertension)   Troponin I above reference range     COVID pneumonia    Initial diagnosis    Currently on RA   CXR () Patchy density in the mid to lower lung zones bilaterally    CXR () Bilateral airspace opacities, left greater than right.  Findings are not   significantly changed   PRN symptom control    Bronchodilators   Pulmonary hygiene    Physical deconditioning/ Generalized weakness   Case management assisting with discharge preauthorization started    Pending placement to [facility]   Will accept on  after COVID quarantine period   Stable for discharge    PT/OT   Encourage ambulation/up to chair    Acute kidney injury- Baseline 1.0-1.1   Holding nephrotoxic agents     Acute encephalopathy- resolved    Elevated troponin- w/o chest pain. Possible 2/2 MOSES   EKG with no acute ischemic change   Cardiology evaluation- signed off. Recommended OP stress   TTE(22) EF 45/50%. Hypertension   hold home lisinopril-hydrochlorothiazide 2/2 MOSES   Continue home clonidine, amlodipine with hold parameters    Uncontrolled trends    Titrated clonidine to TID     Hx of CVA   Continue DAPT/ statin      Trigeminal neuralgia   Continue home Tegretol     Rheumatoid arthritis   methotrexate weekly   Continue prednisone    Diet ADULT ORAL NUTRITION SUPPLEMENT; Breakfast, Lunch;  Fortified Pudding Oral Supplement  ADULT ORAL NUTRITION SUPPLEMENT; Lunch, Dinner, Breakfast; Standard High Calorie/High Protein Oral Supplement  ADULT DIET; Dysphagia - Pureed; Mildly Thick (Nectar)   DVT Prophylaxis [x] Lovenox, []  Heparin, [] SCDs, [] Ambulation   GI Prophylaxis [x] PPI,  [] H2 Blocker,  [] Carafate,  [] Diet/Tube Feeds   Code Status Full Code     -Patient assessment and plan discussed with supervising physician-  Subjective 2/10/2022     Venita Costello is a 80 y.o.  female, who presented with  Generalized Body Aches (x4 days )    Patient seen and examined at bedside. No significant changes overnight  Per case management will decline except the patient but prior authorization is in place due to out of network insurance. Patient denies pain  Patient denies new symptoms  Encouraged to get up to chair    Ten point ROS reviewed negative, unless as noted above    Objective:     No intake or output data in the 24 hours ending 02/10/22 1159   Vitals:   Vitals:    02/10/22 0945 02/10/22 0956 02/10/22 1036 02/10/22 1114   BP:  (!) 152/99     Pulse:  108 115    Resp:  16     Temp: 97.9 °F (36.6 °C) 97.9 °F (36.6 °C)     TempSrc: Oral Oral     SpO2:  98%  100%   Weight:       Height:         Physical Exam: 02/10/22     GEN -Awake nontoxic/chronically ill appearing female, sitting upright in bed , NAD. normal body habitus. Appears given age. EYES -Pupils are equally round. No scleral erythema, discharge, or conjunctivitis. HENT -MM are moist. Oral pharynx without exudates, no evidence of thrush. NECK -Supple, no apparent thyromegaly or masses. RESP -CTA, no wheezes, rales or rhonchi. Symmetric chest movement while on RA.  C/V -S1/S2 auscultated. RRR without appreciable M/R/G. No JVD or carotid bruits. Peripheral pulses equal bilaterally and palpable. No peripheral edema. GI -Abdomen is soft non distended and without significant tenderness. No masses or guarding. + BS Rectal exam deferred.  -No CVA tenderness. Lazo catheter is not present.   LYMPH-No palpable cervical lymphadenopathy and no hepatosplenomegaly. No petechiae or ecchymoses. MS -No gross joint deformities. SKIN -Normal coloration, warm, dry. NEURO-Cranial nerves appear grossly intact, normal speech, no lateralizing weakness. PSYC-Awake, alert, oriented x 3. Appropriate affect. Medications:   Medications:    cloNIDine  0.1 mg Oral TID    ipratropium  2 puff Inhalation 4x daily    metoprolol tartrate  25 mg Oral Daily    sodium chloride flush  5-40 mL IntraVENous 2 times per day    enoxaparin  30 mg SubCUTAneous Daily    amLODIPine  10 mg Oral Daily    aspirin  81 mg Oral Daily    atorvastatin  20 mg Oral Daily    carBAMazepine  200 mg Oral BID    clopidogrel  75 mg Oral Daily    dicyclomine  10 mg Oral BID    folic acid  1 mg Oral Daily    pantoprazole  40 mg Oral QAM AC    predniSONE  5 mg Oral BID      Infusions:    sodium chloride       PRN Meds: melatonin, 9 mg, Nightly PRN  acetaminophen, 650 mg, Q6H PRN   Or  acetaminophen, 650 mg, Q6H PRN  guaiFENesin-dextromethorphan, 5 mL, Q4H PRN  sodium chloride flush, 5-40 mL, PRN  sodium chloride, 25 mL, PRN  ondansetron, 4 mg, Q8H PRN   Or  ondansetron, 4 mg, Q6H PRN  polyethylene glycol, 17 g, Daily PRN      LABS  CBC   Recent Labs     02/09/22  1422   WBC 12.0*   HGB 10.1*   HCT 31.9*   *      RENAL  Recent Labs     02/07/22  1559 02/08/22  1150 02/09/22  1422    141 148*   K 4.8 4.2 4.5    104 112*   CO2 28 28 24   BUN 35* 38* 35*   CREATININE 1.1 1.2* 1.2*       Radiology this visit:  Reviewed.     ECHO Complete 2D W Doppler W Color    Result Date: 2/1/2022  Transthoracic Echocardiography Report (TTE)  Demographics   Patient Name       Pollo Leavitt Date of Study       02/01/2022   Date of Birth      1934         Gender              Female   Age                80 year(s)         Race                Black   Patient Number     [id number]         Room Number         6711   Visit Number       [id number]   Corporate ID       [id number] Accession Number   [id number]         [address] [place] [address]   Ordering Physician Anne Spencer MD                 Physician           MD  Procedure Type of Study   TTE procedure:ECHOCARDIOGRAM COMPLETE 2D W DOPPLER W COLOR. Procedure Date Date: 02/01/2022 Start: 07:35 AM Study Location: Portable Technical Quality: Fair visualization Indications:COVID-19. Patient Status: Routine Height: 66 inches Weight: 160 pounds BSA: 1.82 m2 BMI: 25.82 kg/m2 HR: 88 bpm BP: 126/85 mmHg  Conclusions   Summary  Left ventricular systolic function is low normal.  Ejection fraction is visually estimated at 45-50%. Sigmoid septum noted. Sclerotic, but non-stenotic aortic valve. Trace aortic regurgitation noted with color doppler. Mitral annular calcification is present. Mild-moderate mitral regurgitation is present. No evidence of any pericardial effusion. Signature   ------------------------------------------------------------------  Electronically signed by Abby Gonsales MD (Interpreting  physician) on 02/01/2022 at 04:40 PM  ------------------------------------------------------------------   Findings   Left Ventricle  Left ventricular systolic function is low normal.  Ejection fraction is visually estimated at 45-50%. No regional wall motion abnormalites. Sigmoid septum noted. Left ventricle size is normal.  Unable to determine diastolic function due to arrhythmia. Left Atrium  Essentially normal left atrium. Right Atrium  Essentially normal right atrium. Right Ventricle  Essentially normal right ventricle. Aortic Valve  Sclerotic, but non-stenotic aortic valve. Trace aortic regurgitation noted with color doppler. Mitral Valve  Mitral annular calcification is present. Mild-moderate mitral regurgitation is present. Tricuspid Valve  Trace tricuspid regurgitation is present.    Pulmonic Valve  The pulmonic valve was not well visualized. Pericardial Effusion  No evidence of any pericardial effusion. Pleural Effusion  No evidence of pleural effusion. Miscellaneous  Aorta was not clearly visualized. M-Mode/2D Measurements & Calculations   LV Diastolic Dimension:   LV Systolic Dimension:   LA Dimension: 3.8 cmAO  4.19 cm                   3.1 cm                   Root Dimension: 3.8 cm  LV FS:26 %                LV Volume Diastolic: 78  LV PW Diastolic: 9.88 cm  ml  LV PW Systolic: 1.35 cm   LV Volume Systolic: 39  Septum Diastolic: 0.9 cm  ml                       RV Diastolic Dimension:  Septum Systolic: 7.68 cm  LV EDV/LV EDV Index: 78  3.18 cm  CO: 4.51 l/min            ml/43 m2LV ESV/LV ESV  CI: 2.48 l/m*m2           Index: 39 ml/21 m2       LA/Aorta: 1                            EF Calculated (A4C): 50  LV Area Diastolic: 06.5   %  cm2                       EF Calculated (2D): 92.7  LV Area Systolic: 67.5    %  cm2                            LV Length: 7.39 cm                             LVOT: 1.9 cm  Doppler Measurements & Calculations    AV Peak Velocity: 168 cm/s    LVOT Peak Velocity: 129 cm/s   AV Peak Gradient: 11.29 mmHg  LVOT Mean Velocity: 82.1 cm/s   AV Mean Velocity: 112 cm/s    LVOT Peak Gradient: 7 mmHgLVOT Mean Gradient:   AV Mean Gradient: 6 mmHg      3 mmHg   AV VTI: 23.1 cm               Estimated RVSP: 11 mmHg   AV Area (Continuity):2.22 cm2 Estimated RAP:3 mmHg    LVOT VTI: 18.1 cm                                 TR Velocity:138 cm/s   Estimated PASP: 10.62 mmHg    TR Gradient:7.62 mmHg      CT HEAD WO CONTRAST    Result Date: 1/31/2022  EXAMINATION: CT OF THE HEAD WITHOUT CONTRAST  1/31/2022 2:33 pm TECHNIQUE: CT of the head was performed without the administration of intravenous contrast. Dose modulation, iterative reconstruction, and/or weight based adjustment of the mA/kV was utilized to reduce the radiation dose to as low as reasonably achievable. COMPARISON: 10/08/2019 HISTORY: Altered mental status. Confusion. FINDINGS: BRAIN/VENTRICLES: There is no acute intracranial hemorrhage, mass effect or midline shift. No abnormal extra-axial fluid collection. The gray-white differentiation is maintained without evidence of an acute infarct. There is no evidence of hydrocephalus. Moderate parenchymal volume loss, within normal limits for age. Moderate periventricular and subcortical white matter hypoattenuation and left basal ganglia lacunar infarcts, likely sequelae of chronic small-vessel ischemia or hypertension. Vascular calcifications of the bilateral supraclinoid carotid arteries. ORBITS: The visualized portion of the orbits demonstrate no acute abnormality. SINUSES: The visualized paranasal sinuses and mastoid air cells demonstrate no acute abnormality. SOFT TISSUES/SKULL:  No acute abnormality of the visualized skull or soft tissues. No acute intracranial abnormality. Chronic changes as described above. XR CHEST PORTABLE    Result Date: 1/31/2022  EXAMINATION: ONE XRAY VIEW OF THE CHEST 1/31/2022 1:23 pm COMPARISON: 10/23/2016. HISTORY: ORDERING SYSTEM PROVIDED HISTORY: cough fatigue TECHNOLOGIST PROVIDED HISTORY: Reason for exam:->cough fatigue Reason for Exam: cough   fatigue FINDINGS: There are low lung volumes. The heart size is enlarged but unchanged. The pulmonary vasculature is within normal limits. There is some patchy density involving the mid to lower lung zones bilaterally. No pneumothoraces are seen. 1. Patchy density in the mid to lower lung zones bilaterally which could represent an atypical pneumonia, including viral pneumonia. Follow-up to resolution is recommended. 2. Cardiomegaly without overt failure.          Electronically signed by NE Anderson CNP on 2/10/2022 at 11:59 AM

## 2022-02-10 NOTE — PROGRESS NOTES
Physical Therapy  Name: Carmen Red MRN: [id number] :   1934   Date:  2/10/2022   Admission Date: 2022 Room:  [address] [place] [address]   Restrictions/Precautions:        Covid+, fall risk  Communication with other providers:  Patient is appropriate today via chart review  Subjective:  Patient states: \"Yes, [employer], yes\" She states this throughout the session and still appropriately answers therapist questions. Pain:   Location, Type, Intensity (0/10 to 10/10): Does not c/o pain  Objective:    Observation:  Patient is supine in semi-del rio bed today. She has normal water on her tray, she is only appropriate for nectar thick. Gave her nectar thicken water at EOs with patient drinking ~3oz of the 4oz provided  Treatment, including education/measures:  Therapeutic Exercise:  Therapeutic exercises were instructed today. Cues were given for technique, safety, recruitment, and rationale. Cues were verbal and/or tactile. Transfers with line management of BP Cuff, Pulse Ox, Tele monitor  Rolling: Min A for initiation, quickly progresses to SBA. She attempts to initiate rolling upon hearing v/c but needs Min A to begin  Supine to sit :Min A, management with BLE and for sequential rise of trunk to upright  Sit to supine :Min A for BLe management  Scooting :Min-Max A. Patient follows cues most of the time but required assistance to scoot hips forward seated. Min A with Supine scooting required moderate cues for pulling with BUE and pushing with BLE. Sit to stand :Mod-Max A, phi knee blocking and Mod A to assist patient to stand upright. Noticeable effort from patient with her BLE. She does not have sufficient strength with her BUE to push off EOB   Standing tolerance ~30sec with Mod-Max support at gait belt for balance at [facility]-   Stand to sit :Min A for safety to EOB  Seated balance: ~10' at [facility] A with patient able to use intermittent BUE support.  LOB posterior and Rt with scooting forward  Sitting Exercises: Ankle pumps x 10  LAQ's x 10   Therapeutic Exercise:  Therapeutic exercises were instructed today. Cues were given for technique, safety, recruitment, and rationale. Cues were verbal and/or tactile. Safety  Patient left safely in the bed, with call light/phone in reach with alarm applied. Gait belt and mask were used for transfers and gait. Assessment / Impression:   Patient follows almost all cuing today and stays on task with little to no redirection. Unable to to do marching today seated with given visual and verbal cues.  She is able to complete activities with a moderate increase in fatigue today   Patient's tolerance of treatment:  Fair   Adverse Reaction: none  Significant change in status and impact:  none  Barriers to improvement:  weakness  Plan for Next Session:    Will cont to work towards pt's goals per patient tolerance  Time in:  1020  Time out:  1047  Timed treatment minutes:  27  Total treatment time:  27    Previously filed items:  Social/Functional History  Lives With: Daughter (Daughter works)  Type of Home: [employer]  Home Layout: Able to Live on Main level with bedroom/bathroom  Home Access: Level entry  Bathroom Shower/Tub: Walk-in shower  Bathroom Toilet: Standard  Bathroom Equipment: Shower chair,Grab bars in [facility]: [address] [place] [address] (Uses  for ambulation)  ADL Assistance: Independent (Daughter supervises showers)  Homemaking Assistance: Needs assistance  Homemaking Responsibilities: No (Daughter cooks & cleans)  Ambulation Assistance: Independent  Transfer Assistance: Independent (Sleeps in hospital bed)  Active : No  Patient's  Info: Daughter  Short term goals  Time Frame for Short term goals: 1 week  Short term goal 1: Pt will perform rolling modA  Short term goal 2: Pt will sit statically x 5 minutes Tamika with BUE support  Short term goal 3: Pt will perform sit><supine modA  Short term goal 4: Pt will transfer to recliner/bed Julienne       Electronically signed by:     Ciarra Caldwell PTA  2/10/2022, 10:49 AM

## 2022-02-11 PROBLEM — N30.00 ACUTE CYSTITIS WITHOUT HEMATURIA: Status: ACTIVE | Noted: 2022-02-11

## 2022-02-11 LAB
ANION GAP SERPL CALCULATED.3IONS-SCNC: 14 MMOL/L (ref 4–16)
BUN BLDV-MCNC: 26 MG/DL (ref 6–23)
CALCIUM SERPL-MCNC: 9.5 MG/DL (ref 8.3–10.6)
CHLORIDE BLD-SCNC: 102 MMOL/L (ref 99–110)
CO2: 22 MMOL/L (ref 21–32)
CREAT SERPL-MCNC: 1.1 MG/DL (ref 0.6–1.1)
GFR AFRICAN AMERICAN: 57 ML/MIN/1.73M2
GFR NON-AFRICAN AMERICAN: 47 ML/MIN/1.73M2
GLUCOSE BLD-MCNC: 95 MG/DL (ref 70–99)
HCT VFR BLD CALC: 31.9 % (ref 37–47)
HEMOGLOBIN: 9.8 GM/DL (ref 12.5–16)
MCH RBC QN AUTO: 34.5 PG (ref 27–31)
MCHC RBC AUTO-ENTMCNC: 30.7 % (ref 32–36)
MCV RBC AUTO: 112.3 FL (ref 78–100)
PDW BLD-RTO: 13.5 % (ref 11.7–14.9)
PLATELET # BLD: 501 K/CU MM (ref 140–440)
PMV BLD AUTO: 9.6 FL (ref 7.5–11.1)
POTASSIUM SERPL-SCNC: 4.1 MMOL/L (ref 3.5–5.1)
RBC # BLD: 2.84 M/CU MM (ref 4.2–5.4)
SODIUM BLD-SCNC: 138 MMOL/L (ref 135–145)
WBC # BLD: 13 K/CU MM (ref 4–10.5)

## 2022-02-11 PROCEDURE — 97110 THERAPEUTIC EXERCISES: CPT

## 2022-02-11 PROCEDURE — 2580000003 HC RX 258: Performed by: PHYSICIAN ASSISTANT

## 2022-02-11 PROCEDURE — 6370000000 HC RX 637 (ALT 250 FOR IP): Performed by: NURSE PRACTITIONER

## 2022-02-11 PROCEDURE — 85027 COMPLETE CBC AUTOMATED: CPT

## 2022-02-11 PROCEDURE — 6360000002 HC RX W HCPCS: Performed by: PHYSICIAN ASSISTANT

## 2022-02-11 PROCEDURE — G0378 HOSPITAL OBSERVATION PER HR: HCPCS

## 2022-02-11 PROCEDURE — 6370000000 HC RX 637 (ALT 250 FOR IP): Performed by: PHYSICIAN ASSISTANT

## 2022-02-11 PROCEDURE — 36415 COLL VENOUS BLD VENIPUNCTURE: CPT

## 2022-02-11 PROCEDURE — 97530 THERAPEUTIC ACTIVITIES: CPT

## 2022-02-11 PROCEDURE — 80048 BASIC METABOLIC PNL TOTAL CA: CPT

## 2022-02-11 PROCEDURE — 94761 N-INVAS EAR/PLS OXIMETRY MLT: CPT

## 2022-02-11 PROCEDURE — 96372 THER/PROPH/DIAG INJ SC/IM: CPT

## 2022-02-11 RX ORDER — SULFAMETHOXAZOLE AND TRIMETHOPRIM 800; 160 MG/1; MG/1
1 TABLET ORAL EVERY 12 HOURS SCHEDULED
Qty: 20 TABLET | Refills: 0
Start: 2022-02-11 | End: 2022-02-21

## 2022-02-11 RX ADMIN — SODIUM CHLORIDE, PRESERVATIVE FREE 10 ML: 5 INJECTION INTRAVENOUS at 20:36

## 2022-02-11 RX ADMIN — DICYCLOMINE HYDROCHLORIDE 10 MG: 10 CAPSULE ORAL at 09:48

## 2022-02-11 RX ADMIN — PANTOPRAZOLE SODIUM 40 MG: 40 TABLET, DELAYED RELEASE ORAL at 05:00

## 2022-02-11 RX ADMIN — CLONIDINE HYDROCHLORIDE 0.1 MG: 0.1 TABLET ORAL at 09:46

## 2022-02-11 RX ADMIN — CLONIDINE HYDROCHLORIDE 0.1 MG: 0.1 TABLET ORAL at 14:12

## 2022-02-11 RX ADMIN — ASPIRIN 81 MG 81 MG: 81 TABLET ORAL at 09:47

## 2022-02-11 RX ADMIN — METOPROLOL TARTRATE 25 MG: 25 TABLET, FILM COATED ORAL at 09:46

## 2022-02-11 RX ADMIN — CLONIDINE HYDROCHLORIDE 0.1 MG: 0.1 TABLET ORAL at 20:35

## 2022-02-11 RX ADMIN — CARBAMAZEPINE 200 MG: 200 TABLET ORAL at 09:46

## 2022-02-11 RX ADMIN — CARBAMAZEPINE 200 MG: 200 TABLET ORAL at 20:35

## 2022-02-11 RX ADMIN — ENOXAPARIN SODIUM 30 MG: 100 INJECTION SUBCUTANEOUS at 09:46

## 2022-02-11 RX ADMIN — FOLIC ACID 1 MG: 1 TABLET ORAL at 09:46

## 2022-02-11 RX ADMIN — PREDNISONE 5 MG: 10 TABLET ORAL at 20:34

## 2022-02-11 RX ADMIN — PREDNISONE 5 MG: 10 TABLET ORAL at 09:48

## 2022-02-11 RX ADMIN — SODIUM CHLORIDE, PRESERVATIVE FREE 10 ML: 5 INJECTION INTRAVENOUS at 08:06

## 2022-02-11 RX ADMIN — DICYCLOMINE HYDROCHLORIDE 10 MG: 10 CAPSULE ORAL at 20:35

## 2022-02-11 RX ADMIN — CLOPIDOGREL 75 MG: 75 TABLET, FILM COATED ORAL at 09:48

## 2022-02-11 RX ADMIN — Medication 9 MG: at 20:34

## 2022-02-11 RX ADMIN — ACETAMINOPHEN 650 MG: 325 TABLET ORAL at 14:12

## 2022-02-11 RX ADMIN — AMLODIPINE BESYLATE 10 MG: 5 TABLET ORAL at 09:47

## 2022-02-11 RX ADMIN — ACETAMINOPHEN 650 MG: 325 TABLET ORAL at 20:35

## 2022-02-11 RX ADMIN — SULFAMETHOXAZOLE AND TRIMETHOPRIM 1 TABLET: 800; 160 TABLET ORAL at 20:34

## 2022-02-11 RX ADMIN — ATORVASTATIN CALCIUM 20 MG: 20 TABLET, FILM COATED ORAL at 09:47

## 2022-02-11 RX ADMIN — SULFAMETHOXAZOLE AND TRIMETHOPRIM 1 TABLET: 800; 160 TABLET ORAL at 09:48

## 2022-02-11 RX ADMIN — GUAIFENESIN AND DEXTROMETHORPHAN 5 ML: 100; 10 SYRUP ORAL at 03:43

## 2022-02-11 ASSESSMENT — PAIN SCALES - WONG BAKER
WONGBAKER_NUMERICALRESPONSE: 4
WONGBAKER_NUMERICALRESPONSE: 0

## 2022-02-11 ASSESSMENT — PAIN SCALES - GENERAL
PAINLEVEL_OUTOF10: 4
PAINLEVEL_OUTOF10: 0
PAINLEVEL_OUTOF10: 0
PAINLEVEL_OUTOF10: 3

## 2022-02-11 ASSESSMENT — PAIN DESCRIPTION - DESCRIPTORS: DESCRIPTORS: ACHING

## 2022-02-11 ASSESSMENT — PAIN DESCRIPTION - ORIENTATION: ORIENTATION: RIGHT;LEFT

## 2022-02-11 ASSESSMENT — PAIN DESCRIPTION - LOCATION: LOCATION: HAND

## 2022-02-11 ASSESSMENT — PAIN DESCRIPTION - PAIN TYPE: TYPE: CHRONIC PAIN

## 2022-02-11 NOTE — DISCHARGE SUMMARY
V2.0  Discharge Summary    Name:  Ermelinda Pineda /Age/Sex: 1934 (80 y.o. female)   Admit Date: 2022  Discharge Date: 22    MRN & CSN:  [id number] & [id number] Encounter Date and Time 22 11:06 AM EST    Attending:  Vicki Negron MD Discharging Provider: Missy VELAZQUEZ [facility] Course:     Brief HPI/Discharge diagnosis: Ermelinda Pineda is a 80 y.o. female with PMHx of HLD, HTN, CKD, neuromuscular/movement disorder who presented with poor oral intake, increased confusion and body aches. Workup revealed COVID pneumonia. Brief Problem Based Course:    COVID pneumonia. CXR 22 revealed patchy density in the mid lower lungs bilaterally. +COVID test. Pt received prn breathing treatment, pulmonary hygiene, prn symptom management. Repeat CXR 22 revealed stable findings. On room air for duration of the hospitalization and remained asypmtomatic. Acute cystitis. Started on 10-day course of bactrim. Urine culture pending. Leukocytosis improving. Denied dysuria. MOSES. Treated with gentle IVF hydration. Kidney function improved with treatment. Nephrotoxin agents avoided. Elevated Troponin level. Felt to be related to MOSES. EKG was nonacute. TTE revealed EF 45/50%. Cardiology evaluated and recommended OP stress. Physical deconditioning/generalized weakness. PT/OT evaluated and recommended subacute/skilled nursing facility. OOB as tolerated. SNF placement arranged. HTN. Home antihypertensives continued and adjusted as needed for BP control. Trigeminal neuralgia. Home tegretol was continued. Rheumatoid arthritis. Home dose prednisone and weekly methrotrexate continued      The patient expressed appropriate understanding of, and agreement with the discharge recommendations, medications, and plan.      Consults this admission:  IP CONSULT TO HOSPITALIST  IP CONSULT TO CARDIOLOGY  IP CONSULT TO CASE MANAGEMENT  IP CONSULT TO SOCIAL WORK  IP CONSULT TO SOCIAL WORK  IP CONSULT TO IV TEAM      Discharge Instruction:   Follow up appointments:   Primary care physician: Clarita Hopkins MD within 2 weeks  Diet: cardiac diet and encourage fluids   Activity: activity as tolerated with assistance  Disposition: Discharged to:   []Home, []HHC, [x]SNF, []Acute Rehab, []Hospice   Condition on discharge: Stable  Labs and Tests to be Followed up as an outpatient by PCP or Specialist: CBC, BMP    Discharge Medications:        Medication List      START taking these medications    sulfamethoxazole-trimethoprim 800-160 MG per tablet  Commonly known as: BACTRIM DS;SEPTRA DS  Take 1 tablet by mouth every 12 hours for 10 days        CONTINUE taking these medications    amLODIPine 10 MG tablet  Commonly known as: NORVASC     aspirin 81 MG chewable tablet     atorvastatin 20 MG tablet  Commonly known as: LIPITOR     carBAMazepine 200 MG tablet  Commonly known as: TEGRETOL     cloNIDine 0.1 MG tablet  Commonly known as: CATAPRES     clopidogrel 75 MG tablet  Commonly known as: PLAVIX     dicyclomine 10 MG capsule  Commonly known as: BENTYL     docusate sodium 100 MG capsule  Commonly known as: Colace  Take 1 capsule by mouth daily     folic acid 1 MG tablet  Commonly known as: FOLVITE     lisinopril-hydroCHLOROthiazide 20-25 MG per tablet  Commonly known as: PRINZIDE;ZESTORETIC     methotrexate 5 MG chemo tablet  Commonly known as: RHEUMATREX     metoprolol tartrate 25 MG tablet  Commonly known as: LOPRESSOR     omeprazole 20 MG delayed release capsule  Commonly known as: PRILOSEC     polyethylene glycol 17 GM/SCOOP powder  Commonly known as: GLYCOLAX     predniSONE 5 MG tablet  Commonly known as: Mina Burris        STOP taking these medications    promethazine-dextromethorphan 6.25-15 MG/5ML syrup  Commonly known as: PROMETHAZINE-DM           Where to Get Your Medications      Information about where to get these medications is not yet available    Ask your nurse or doctor about these medications  sulfamethoxazole-trimethoprim 800-160 MG per tablet        Objective Findings at Discharge:   BP (!) 150/93   Pulse 78   Temp 99.8 °F (37.7 °C) (Axillary)   Resp 13   Ht 5' 5.98\" (1.676 m)   Wt 122 lb (55.3 kg)   SpO2 94%   BMI 19.70 kg/m²       Physical Exam:   General: cooperative,NAD  Eyes: PERRL 3 mm bilaterally, EOMI  ENT: neck supple, oral mucosa pink and moist  Cardiovascular: RRR. Respiratory: Clear to auscultation, +cough  Gastrointestinal: Soft, non tender, +BS  Genitourinary: no suprapubic tenderness  Musculoskeletal: No edema  Skin: warm, dry  Neuro: alert, awake, pleasantly confused, MACHADO  Psych: Mood appropriate. Labs and Imaging   XR CHEST PORTABLE    Result Date: 2/11/2022  EXAMINATION: ONE X-RAY VIEW OF THE CHEST 2/9/2022 7:11 pm COMPARISON: January 31, 2022 HISTORY: ORDERING SYSTEM PROVIDED HISTORY: Follow up TECHNOLOGIST PROVIDED HISTORY: Reason for exam:->Follow up Reason for Exam: follow up Additional signs and symptoms: COVID 19 Relevant Medical/Surgical History: na FINDINGS: No lines or tubes. Stable cardiomediastinal silhouette. Bilateral airspace opacities persist, left greater than right. Findings are not significantly changed from the prior study. No pneumothorax. No pulmonary edema. No acute osseous abnormality. 1. Bilateral airspace opacities, left greater than right. Findings are not significantly changed since January 31, 2022.        CBC:   Recent Labs     02/09/22  1422 02/10/22  1212   WBC 12.0* 14.1*   HGB 10.1* 10.8*   * 477*     BMP:    Recent Labs     02/08/22  1150 02/09/22  1422 02/10/22  1212    148* 146*   K 4.2 4.5 4.4    112* 111*   CO2 28 24 23   BUN 38* 35* 35*   CREATININE 1.2* 1.2* 1.1   GLUCOSE 195* 170* 144*     Lipids:   Lab Results   Component Value Date    CHOL 256 11/10/2015    HDL 73 11/10/2015    TRIG 226 11/10/2015     Hemoglobin A1C:   Lab Results   Component Value Date    LABA1C 6.1 05/11/2011     TSH: No results found for: TSH  Troponin:   Lab Results   Component Value Date    TROPONINT 0.052 02/02/2022    TROPONINT 0.042 02/01/2022    TROPONINT 0.030 01/31/2022     Lactic Acid: No results for input(s): LACTA in the last 72 hours. BNP: No results for input(s): PROBNP in the last 72 hours.   UA:  Lab Results   Component Value Date    NITRU POSITIVE 02/10/2022    COLORU YELLOW 02/10/2022    WBCUA 353 02/10/2022    RBCUA NONE SEEN 02/10/2022    MUCUS MODERATE 02/10/2022    TRICHOMONAS NONE SEEN 02/10/2022    YEAST RARE 09/23/2013    BACTERIA NEGATIVE 02/10/2022    CLARITYU SLIGHTLY CLOUDY 02/10/2022    SPECGRAV 1.015 02/10/2022    LEUKOCYTESUR LARGE 02/10/2022    UROBILINOGEN 1.0 02/10/2022    BILIRUBINUR NEGATIVE 02/10/2022    BLOODU SMALL 02/10/2022    KETUA NEGATIVE 02/10/2022     Urine Cultures: No results found for: LABURIN  Blood Cultures: No results found for: BC  No results found for: BLOODCULT2  Organism:   Lab Results   Component Value Date    [facility] 07/25/2017       Time Spent Discharging patient 30 minutes    Electronically signed by NE Jean-Baptiste CNP on 2/11/2022 at 11:06 AM

## 2022-02-11 NOTE — CARE COORDINATION
Transportation has been set up for 9:30 on 2/12 with [place]. MAUDE has faxed AVS to [facility]. MAUDE has also informed RN, Family, and [place] of transport time.

## 2022-02-11 NOTE — PLAN OF CARE
Problem: Falls - Risk of:  Goal: Will remain free from falls  Description: Will remain free from falls  2/10/2022 2242 by Lizette Keyes RN  Outcome: Met This Shift  2/10/2022 1032 by Vianney Mendoza LPN  Outcome: Ongoing  Goal: Absence of physical injury  Description: Absence of physical injury  2/10/2022 2242 by Lizette Keyes RN  Outcome: Met This Shift  2/10/2022 1032 by Vianney Mendoza LPN  Outcome: Ongoing     Problem: Airway Clearance - Ineffective  Goal: Achieve or maintain patent airway  2/10/2022 2242 by Lizette Keyes RN  Outcome: Met This Shift  2/10/2022 1032 by Vianney Mendoza LPN  Outcome: Ongoing     Problem: Gas Exchange - Impaired  Goal: Absence of hypoxia  2/10/2022 2242 by Lizette Keyes RN  Outcome: Met This Shift  2/10/2022 1032 by Vianney Mendoza LPN  Outcome: Ongoing  Goal: Promote optimal lung function  2/10/2022 2242 by Lizette Keyes RN  Outcome: Met This Shift  2/10/2022 1032 by Vianney Mendoza LPN  Outcome: Ongoing     Problem: Breathing Pattern - Ineffective  Goal: Ability to achieve and maintain a regular respiratory rate  2/10/2022 2242 by Lizette Keyes RN  Outcome: Met This Shift  2/10/2022 1032 by Vianney Mendoza LPN  Outcome: Ongoing     Problem:  Body Temperature -  Risk of, Imbalanced  Goal: Ability to maintain a body temperature within defined limits  2/10/2022 2242 by Lizette Keyes RN  Outcome: Met This Shift  2/10/2022 1032 by Vianney Mendoza LPN  Outcome: Ongoing  Goal: Will regain or maintain usual level of consciousness  2/10/2022 2242 by Lizette Keyes RN  Outcome: Met This Shift  2/10/2022 1032 by Vianney Mendoza LPN  Outcome: Ongoing  Goal: Complications related to the disease process, condition or treatment will be avoided or minimized  2/10/2022 2242 by Lizette Keyes RN  Outcome: Met This Shift  2/10/2022 1032 by Vianney Mendoza LPN  Outcome: Ongoing     Problem: Isolation Precautions - Risk of Spread of Infection  Goal: Prevent transmission of infection  2/10/2022 2242 by Ronn Gomez RN  Outcome: Met This Shift  2/10/2022 1032 by Rossy Jackson LPN  Outcome: Ongoing     Problem: Nutrition Deficits  Goal: Optimize nutritional status  2/10/2022 2242 by Ronn Gomez RN  Outcome: Met This Shift  2/10/2022 1032 by Rossy Jackson LPN  Outcome: Ongoing     Problem: Risk for Fluid Volume Deficit  Goal: Maintain normal heart rhythm  2/10/2022 2242 by Ronn Gomez RN  Outcome: Met This Shift  2/10/2022 1032 by Rossy Jackson LPN  Outcome: Ongoing  Goal: Maintain absence of muscle cramping  2/10/2022 2242 by Ronn Gomez RN  Outcome: Met This Shift  2/10/2022 1032 by Rossy Jackson LPN  Outcome: Ongoing  Goal: Maintain normal serum potassium, sodium, calcium, phosphorus, and pH  2/10/2022 2242 by Ronn Gomez RN  Outcome: Met This Shift  2/10/2022 1032 by Rossy Jackson LPN  Outcome: Ongoing     Problem: Loneliness or Risk for Loneliness  Goal: Demonstrate positive use of time alone when socialization is not possible  2/10/2022 2242 by Ronn Gomez RN  Outcome: Met This Shift  2/10/2022 1032 by Rossy Jackson LPN  Outcome: Ongoing     Problem: Fatigue  Goal: Verbalize increase energy and improved vitality  2/10/2022 2242 by Ronn Gomez RN  Outcome: Met This Shift  2/10/2022 1032 by Rossy Jackson LPN  Outcome: Ongoing     Problem: Patient Education: Go to Patient Education Activity  Goal: Patient/Family Education  2/10/2022 2242 by Ronn Gomez RN  Outcome: Met This Shift  2/10/2022 1032 by Rossy Jackson LPN  Outcome: Ongoing     Problem: Skin Integrity:  Goal: Will show no infection signs and symptoms  Description: Will show no infection signs and symptoms  2/10/2022 2242 by Ronn Gomez RN  Outcome: Met This Shift  2/10/2022 1032 by Rossy Jackson LPN  Outcome: Ongoing  Goal: Absence of new skin breakdown  Description: Absence of new skin breakdown  2/10/2022 2242 by Ronn Gomez RN  Outcome: Met This Shift  2/10/2022 1032 by Rhiannon Tang LPN  Outcome: Ongoing     Problem: Nutrition  Goal: Optimal nutrition therapy  2/10/2022 2242 by Ivania Pryor RN  Outcome: Met This Shift  2/10/2022 1032 by Rhiannon Tang LPN  Outcome: Ongoing     Problem: Pain:  Goal: Pain level will decrease  Description: Pain level will decrease  2/10/2022 2242 by Ivania Pryor RN  Outcome: Met This Shift  2/10/2022 1032 by Rhiannon Tang LPN  Outcome: Ongoing  Goal: Control of acute pain  Description: Control of acute pain  2/10/2022 2242 by Ivania Pryor RN  Outcome: Met This Shift  2/10/2022 1032 by Rhiannon Tang LPN  Outcome: Ongoing  Goal: Control of chronic pain  Description: Control of chronic pain  2/10/2022 2242 by Ivania Pryor RN  Outcome: Met This Shift  2/10/2022 1032 by Rhiannon Tang LPN  Outcome: Ongoing

## 2022-02-11 NOTE — CARE COORDINATION
CM spoke with Otto Esteves at [facility], [place] and pt has been approved. CM informed doctor. PASSR completed. CM will need a JÚNIOR completed by RN and doctor at discharge. If pt is discharged after hours please complete the following. ... Call report to [phone]  Fax completed AVS with both JÚNIOR on the AVS and any written Rx [phone]. Set up transportation (pt's choice) -[phone]  [facility] [phone] or [phone] and call family.

## 2022-02-11 NOTE — PROGRESS NOTES
Physical Therapy  Name: Carmen Red MRN: [id number] :   1934   Date:  2022   Admission Date: 2022 Room:  [address] [place] [address]   Restrictions/Precautions:        Covid+, fall risk  Communication with other providers:  Patient is appropriate today via chart review  Subjective:  Patient states: \"Yes, [employer], yes\" She states this throughout the session and still appropriately answers therapist questions. Pain:   Location, Type, Intensity (0/10 to 10/10): Does not c/o pain  Objective:    Observation:  Patient is supine in semi-del rio bed today. She has normal water on her tray, she is only appropriate for nectar thick. Gave her nectar thicken water at EOs with patient drinking ~3oz of the 4oz provided  Treatment, including education/measures:  Therapeutic Exercise:  Therapeutic exercises were instructed today. Cues were given for technique, safety, recruitment, and rationale. Cues were verbal and/or tactile. Transfers with line management of BP Cuff, Pulse Ox, Tele monitor  Supine exercises: CGA-Min A to stay on task  SLR x10 with   Heel sides x10   Ap x10  Hip Abduction x10  Transfers  Rolling: Min A for initiation, quickly progresses to SBA. She attempts to initiate rolling upon hearing v/c but needs Min A to begin  Supine to sit :Min A, management with BLE and for sequential rise of trunk to upright  Sit to supine :Min A for BLE management  Scooting :Min-Max A. Seated scooting Mod A with hip advancement. Dep x1 to HOB supine with bed in trendelenberg  Sit to stand :Mod-Max A, phi knee blocking and Mod A to assist patient to stand upright. Noticeable effort from patient with her BLE. She does not have sufficient strength with her BUE to push off EOB   Standing tolerance ~30sec with Mod-Max support at gait belt for balance at [facility]-   Stand to sit :Min A for safety to EOB  Seated balance: ~10' at [facility] A with patient able to use intermittent BUE support.  Balance activities: reacing across midline, trunk twist and phi reaching up and lateral.   Sitting Exercises:  LAQ's x 10   Therapeutic Exercise:  Therapeutic exercises were instructed today. Cues were given for technique, safety, recruitment, and rationale. Cues were verbal and/or tactile. Safety  Patient left safely in the bed, with call light/phone in reach with alarm applied. Gait belt and mask were used for transfers and gait. Assessment / Impression:   Patient follows almost all cuing today and stays on task with little to no redirection. She is able to complete activities with a moderate increase in fatigue today.  Assisted her to drink her nectar thick water  Patient's tolerance of treatment:  Fair   Adverse Reaction: none  Significant change in status and impact:  none  Barriers to improvement:  weakness  Plan for Next Session:    Will cont to work towards pt's goals per patient tolerance  Time in:  1006  Time out:  1029  Timed treatment minutes:  23  Total treatment time:  23    Previously filed items:  Social/Functional History  Lives With: Daughter (Daughter works)  Type of Home: [employer]  Home Layout: Able to Live on Main level with bedroom/bathroom  Home Access: Level entry  Bathroom Shower/Tub: Walk-in shower  Bathroom Toilet: Standard  Bathroom Equipment: Shower chair,[facility] bars [facility] [address] [place] [address] (Uses  for ambulation)  ADL Assistance: Independent (Daughter supervises showers)  Homemaking Assistance: Needs assistance  Homemaking Responsibilities: No (Daughter cooks & cleans)  Ambulation Assistance: Independent  Transfer Assistance: Independent (Sleeps in hospital bed)  Active : No  Patient's  Info: Daughter  Short term goals  Time Frame for Short term goals: 1 week  Short term goal 1: Pt will perform rolling modA  Short term goal 2: Pt will sit statically x 5 minutes Tamika with BUE support  Short term goal 3: Pt will perform sit><supine modA  Short term goal 4: Pt will transfer to recliner/bed maxA       Electronically signed by:     Bala Selby PTA  2/11/2022, 10:30 AM

## 2022-02-12 VITALS
BODY MASS INDEX: 19.61 KG/M2 | WEIGHT: 122 LBS | OXYGEN SATURATION: 98 % | HEART RATE: 106 BPM | TEMPERATURE: 98.1 F | DIASTOLIC BLOOD PRESSURE: 84 MMHG | SYSTOLIC BLOOD PRESSURE: 158 MMHG | HEIGHT: 66 IN | RESPIRATION RATE: 18 BRPM

## 2022-02-12 PROCEDURE — 6370000000 HC RX 637 (ALT 250 FOR IP): Performed by: PHYSICIAN ASSISTANT

## 2022-02-12 PROCEDURE — 96372 THER/PROPH/DIAG INJ SC/IM: CPT

## 2022-02-12 PROCEDURE — 6360000002 HC RX W HCPCS: Performed by: PHYSICIAN ASSISTANT

## 2022-02-12 PROCEDURE — 6370000000 HC RX 637 (ALT 250 FOR IP): Performed by: NURSE PRACTITIONER

## 2022-02-12 PROCEDURE — G0378 HOSPITAL OBSERVATION PER HR: HCPCS

## 2022-02-12 RX ADMIN — PANTOPRAZOLE SODIUM 40 MG: 40 TABLET, DELAYED RELEASE ORAL at 05:39

## 2022-02-12 RX ADMIN — ASPIRIN 81 MG 81 MG: 81 TABLET ORAL at 09:23

## 2022-02-12 RX ADMIN — CLOPIDOGREL 75 MG: 75 TABLET, FILM COATED ORAL at 09:25

## 2022-02-12 RX ADMIN — CARBAMAZEPINE 200 MG: 200 TABLET ORAL at 09:23

## 2022-02-12 RX ADMIN — METOPROLOL TARTRATE 25 MG: 25 TABLET, FILM COATED ORAL at 09:23

## 2022-02-12 RX ADMIN — AMLODIPINE BESYLATE 10 MG: 5 TABLET ORAL at 09:24

## 2022-02-12 RX ADMIN — SULFAMETHOXAZOLE AND TRIMETHOPRIM 1 TABLET: 800; 160 TABLET ORAL at 09:24

## 2022-02-12 RX ADMIN — ENOXAPARIN SODIUM 30 MG: 100 INJECTION SUBCUTANEOUS at 09:22

## 2022-02-12 RX ADMIN — PREDNISONE 5 MG: 10 TABLET ORAL at 09:23

## 2022-02-12 RX ADMIN — ACETAMINOPHEN 650 MG: 325 TABLET ORAL at 03:43

## 2022-02-12 RX ADMIN — DICYCLOMINE HYDROCHLORIDE 10 MG: 10 CAPSULE ORAL at 09:24

## 2022-02-12 RX ADMIN — CLONIDINE HYDROCHLORIDE 0.1 MG: 0.1 TABLET ORAL at 09:24

## 2022-02-12 RX ADMIN — FOLIC ACID 1 MG: 1 TABLET ORAL at 09:23

## 2022-02-12 ASSESSMENT — PAIN SCALES - GENERAL: PAINLEVEL_OUTOF10: 4

## 2022-02-13 LAB
CULTURE: ABNORMAL
CULTURE: ABNORMAL
Lab: ABNORMAL
SPECIMEN: ABNORMAL

## 2022-03-04 ENCOUNTER — APPOINTMENT (OUTPATIENT)
Dept: GENERAL RADIOLOGY | Age: 87
DRG: 683 | End: 2022-03-04
Payer: COMMERCIAL

## 2022-03-04 ENCOUNTER — HOSPITAL ENCOUNTER (INPATIENT)
Age: 87
LOS: 6 days | Discharge: NURSING, REHAB OR LONG-TERM CARE | DRG: 683 | End: 2022-03-10
Attending: EMERGENCY MEDICINE | Admitting: INTERNAL MEDICINE
Payer: COMMERCIAL

## 2022-03-04 DIAGNOSIS — R77.8 TROPONIN LEVEL ELEVATED: ICD-10-CM

## 2022-03-04 DIAGNOSIS — R79.89 SERUM CREATININE RAISED: ICD-10-CM

## 2022-03-04 DIAGNOSIS — I95.9 HYPOTENSION, UNSPECIFIED HYPOTENSION TYPE: ICD-10-CM

## 2022-03-04 DIAGNOSIS — R53.83 FATIGUE, UNSPECIFIED TYPE: Primary | ICD-10-CM

## 2022-03-04 DIAGNOSIS — R79.9 ELEVATED BUN: ICD-10-CM

## 2022-03-04 PROBLEM — Z78.9 SELF-CARE DEFICIT: Status: ACTIVE | Noted: 2022-03-04

## 2022-03-04 LAB
ALBUMIN SERPL-MCNC: 3.3 GM/DL (ref 3.4–5)
ALP BLD-CCNC: 70 IU/L (ref 40–129)
ALT SERPL-CCNC: 18 U/L (ref 10–40)
ANION GAP SERPL CALCULATED.3IONS-SCNC: 12 MMOL/L (ref 4–16)
AST SERPL-CCNC: 19 IU/L (ref 15–37)
BACTERIA: ABNORMAL /HPF
BASOPHILS ABSOLUTE: 0 K/CU MM
BASOPHILS RELATIVE PERCENT: 0.4 % (ref 0–1)
BILIRUB SERPL-MCNC: 0.2 MG/DL (ref 0–1)
BILIRUBIN URINE: NEGATIVE MG/DL
BLOOD, URINE: NEGATIVE
BUN BLDV-MCNC: 60 MG/DL (ref 6–23)
CALCIUM SERPL-MCNC: 10.7 MG/DL (ref 8.3–10.6)
CHLORIDE BLD-SCNC: 101 MMOL/L (ref 99–110)
CLARITY: ABNORMAL
CO2: 25 MMOL/L (ref 21–32)
COLOR: YELLOW
CREAT SERPL-MCNC: 1.7 MG/DL (ref 0.6–1.1)
DIFFERENTIAL TYPE: ABNORMAL
EKG ATRIAL RATE: 53 BPM
EKG DIAGNOSIS: NORMAL
EKG P AXIS: 16 DEGREES
EKG P-R INTERVAL: 176 MS
EKG Q-T INTERVAL: 452 MS
EKG QRS DURATION: 118 MS
EKG QTC CALCULATION (BAZETT): 424 MS
EKG R AXIS: -36 DEGREES
EKG T AXIS: 8 DEGREES
EKG VENTRICULAR RATE: 53 BPM
EOSINOPHILS ABSOLUTE: 0.1 K/CU MM
EOSINOPHILS RELATIVE PERCENT: 1 % (ref 0–3)
ERYTHROCYTE SEDIMENTATION RATE: 105 MM/HR (ref 0–30)
GFR AFRICAN AMERICAN: 34 ML/MIN/1.73M2
GFR NON-AFRICAN AMERICAN: 28 ML/MIN/1.73M2
GLUCOSE BLD-MCNC: 140 MG/DL (ref 70–99)
GLUCOSE, URINE: NEGATIVE MG/DL
HCT VFR BLD CALC: 35.7 % (ref 37–47)
HEMOGLOBIN: 10.8 GM/DL (ref 12.5–16)
HIGH SENSITIVE C-REACTIVE PROTEIN: 8.4 MG/L
HYALINE CASTS: 0 /LPF
IMMATURE NEUTROPHIL %: 0.5 % (ref 0–0.43)
KETONES, URINE: NEGATIVE MG/DL
LACTATE: 1.9 MMOL/L (ref 0.4–2)
LEUKOCYTE ESTERASE, URINE: ABNORMAL
LIPASE: 35 IU/L (ref 13–60)
LYMPHOCYTES ABSOLUTE: 1.4 K/CU MM
LYMPHOCYTES RELATIVE PERCENT: 14.5 % (ref 24–44)
MAGNESIUM: 2.1 MG/DL (ref 1.8–2.4)
MCH RBC QN AUTO: 33.8 PG (ref 27–31)
MCHC RBC AUTO-ENTMCNC: 30.3 % (ref 32–36)
MCV RBC AUTO: 111.6 FL (ref 78–100)
MONOCYTES ABSOLUTE: 0.5 K/CU MM
MONOCYTES RELATIVE PERCENT: 4.8 % (ref 0–4)
NITRITE URINE, QUANTITATIVE: NEGATIVE
NUCLEATED RBC %: 0 %
PDW BLD-RTO: 13.2 % (ref 11.7–14.9)
PH, URINE: 5.5 (ref 5–8)
PLATELET # BLD: 451 K/CU MM (ref 140–440)
PMV BLD AUTO: 8.6 FL (ref 7.5–11.1)
POTASSIUM SERPL-SCNC: 4.3 MMOL/L (ref 3.5–5.1)
PRO-BNP: 750.6 PG/ML
PROTEIN UA: NEGATIVE MG/DL
RBC # BLD: 3.2 M/CU MM (ref 4.2–5.4)
RBC URINE: ABNORMAL /HPF (ref 0–6)
SEGMENTED NEUTROPHILS ABSOLUTE COUNT: 7.6 K/CU MM
SEGMENTED NEUTROPHILS RELATIVE PERCENT: 78.8 % (ref 36–66)
SODIUM BLD-SCNC: 138 MMOL/L (ref 135–145)
SPECIFIC GRAVITY UA: 1.02 (ref 1–1.03)
SQUAMOUS EPITHELIAL: 1 /HPF
TOTAL CK: 42 IU/L (ref 26–140)
TOTAL IMMATURE NEUTOROPHIL: 0.05 K/CU MM
TOTAL NUCLEATED RBC: 0 K/CU MM
TOTAL PROTEIN: 7.7 GM/DL (ref 6.4–8.2)
TROPONIN T: 0.03 NG/ML
TROPONIN T: 0.03 NG/ML
TROPONIN T: 0.06 NG/ML
TSH HIGH SENSITIVITY: 1.05 UIU/ML (ref 0.27–4.2)
URIC ACID: 9.2 MG/DL (ref 2.6–6)
UROBILINOGEN, URINE: NORMAL MG/DL (ref 0.2–1)
WBC # BLD: 9.6 K/CU MM (ref 4–10.5)
WBC UA: 51 /HPF (ref 0–5)
YEAST: ABNORMAL /HPF

## 2022-03-04 PROCEDURE — 36415 COLL VENOUS BLD VENIPUNCTURE: CPT

## 2022-03-04 PROCEDURE — 99284 EMERGENCY DEPT VISIT MOD MDM: CPT

## 2022-03-04 PROCEDURE — 93010 ELECTROCARDIOGRAM REPORT: CPT | Performed by: INTERNAL MEDICINE

## 2022-03-04 PROCEDURE — 6370000000 HC RX 637 (ALT 250 FOR IP): Performed by: NURSE PRACTITIONER

## 2022-03-04 PROCEDURE — 1200000000 HC SEMI PRIVATE

## 2022-03-04 PROCEDURE — 87186 SC STD MICRODIL/AGAR DIL: CPT

## 2022-03-04 PROCEDURE — 85652 RBC SED RATE AUTOMATED: CPT

## 2022-03-04 PROCEDURE — 73630 X-RAY EXAM OF FOOT: CPT

## 2022-03-04 PROCEDURE — 84550 ASSAY OF BLOOD/URIC ACID: CPT

## 2022-03-04 PROCEDURE — 82550 ASSAY OF CK (CPK): CPT

## 2022-03-04 PROCEDURE — 96360 HYDRATION IV INFUSION INIT: CPT

## 2022-03-04 PROCEDURE — 83690 ASSAY OF LIPASE: CPT

## 2022-03-04 PROCEDURE — 83880 ASSAY OF NATRIURETIC PEPTIDE: CPT

## 2022-03-04 PROCEDURE — 71045 X-RAY EXAM CHEST 1 VIEW: CPT

## 2022-03-04 PROCEDURE — 81001 URINALYSIS AUTO W/SCOPE: CPT

## 2022-03-04 PROCEDURE — 84484 ASSAY OF TROPONIN QUANT: CPT

## 2022-03-04 PROCEDURE — 87086 URINE CULTURE/COLONY COUNT: CPT

## 2022-03-04 PROCEDURE — 2580000003 HC RX 258: Performed by: EMERGENCY MEDICINE

## 2022-03-04 PROCEDURE — 2580000003 HC RX 258: Performed by: NURSE PRACTITIONER

## 2022-03-04 PROCEDURE — 93005 ELECTROCARDIOGRAM TRACING: CPT | Performed by: EMERGENCY MEDICINE

## 2022-03-04 PROCEDURE — 6360000002 HC RX W HCPCS: Performed by: NURSE PRACTITIONER

## 2022-03-04 PROCEDURE — 80053 COMPREHEN METABOLIC PANEL: CPT

## 2022-03-04 PROCEDURE — 87077 CULTURE AEROBIC IDENTIFY: CPT

## 2022-03-04 PROCEDURE — 86141 C-REACTIVE PROTEIN HS: CPT

## 2022-03-04 PROCEDURE — 83605 ASSAY OF LACTIC ACID: CPT

## 2022-03-04 PROCEDURE — 85025 COMPLETE CBC W/AUTO DIFF WBC: CPT

## 2022-03-04 PROCEDURE — 51701 INSERT BLADDER CATHETER: CPT

## 2022-03-04 PROCEDURE — 84443 ASSAY THYROID STIM HORMONE: CPT

## 2022-03-04 PROCEDURE — 83735 ASSAY OF MAGNESIUM: CPT

## 2022-03-04 RX ORDER — 0.9 % SODIUM CHLORIDE 0.9 %
1000 INTRAVENOUS SOLUTION INTRAVENOUS ONCE
Status: COMPLETED | OUTPATIENT
Start: 2022-03-04 | End: 2022-03-04

## 2022-03-04 RX ORDER — SODIUM CHLORIDE 9 MG/ML
INJECTION, SOLUTION INTRAVENOUS CONTINUOUS
Status: DISCONTINUED | OUTPATIENT
Start: 2022-03-04 | End: 2022-03-05

## 2022-03-04 RX ORDER — PANTOPRAZOLE SODIUM 40 MG/1
40 TABLET, DELAYED RELEASE ORAL
Status: DISCONTINUED | OUTPATIENT
Start: 2022-03-05 | End: 2022-03-10 | Stop reason: HOSPADM

## 2022-03-04 RX ORDER — ATORVASTATIN CALCIUM 20 MG/1
20 TABLET, FILM COATED ORAL DAILY
Status: DISCONTINUED | OUTPATIENT
Start: 2022-03-04 | End: 2022-03-10 | Stop reason: HOSPADM

## 2022-03-04 RX ORDER — AMLODIPINE BESYLATE 5 MG/1
10 TABLET ORAL DAILY
Status: DISCONTINUED | OUTPATIENT
Start: 2022-03-04 | End: 2022-03-10 | Stop reason: HOSPADM

## 2022-03-04 RX ORDER — POLYETHYLENE GLYCOL 3350 17 G/17G
17 POWDER, FOR SOLUTION ORAL DAILY
Status: DISCONTINUED | OUTPATIENT
Start: 2022-03-04 | End: 2022-03-10 | Stop reason: HOSPADM

## 2022-03-04 RX ORDER — SODIUM CHLORIDE 9 MG/ML
25 INJECTION, SOLUTION INTRAVENOUS PRN
Status: DISCONTINUED | OUTPATIENT
Start: 2022-03-04 | End: 2022-03-10 | Stop reason: HOSPADM

## 2022-03-04 RX ORDER — ONDANSETRON 4 MG/1
4 TABLET, ORALLY DISINTEGRATING ORAL EVERY 8 HOURS PRN
Status: DISCONTINUED | OUTPATIENT
Start: 2022-03-04 | End: 2022-03-10 | Stop reason: HOSPADM

## 2022-03-04 RX ORDER — CLONIDINE HYDROCHLORIDE 0.1 MG/1
0.1 TABLET ORAL DAILY
Status: DISCONTINUED | OUTPATIENT
Start: 2022-03-04 | End: 2022-03-10 | Stop reason: HOSPADM

## 2022-03-04 RX ORDER — DICYCLOMINE HYDROCHLORIDE 10 MG/1
10 CAPSULE ORAL 2 TIMES DAILY PRN
Status: DISCONTINUED | OUTPATIENT
Start: 2022-03-04 | End: 2022-03-10 | Stop reason: HOSPADM

## 2022-03-04 RX ORDER — POLYETHYLENE GLYCOL 3350 17 G/17G
17 POWDER, FOR SOLUTION ORAL DAILY PRN
Status: DISCONTINUED | OUTPATIENT
Start: 2022-03-04 | End: 2022-03-10 | Stop reason: HOSPADM

## 2022-03-04 RX ORDER — CLOPIDOGREL BISULFATE 75 MG/1
75 TABLET ORAL DAILY
Status: DISCONTINUED | OUTPATIENT
Start: 2022-03-04 | End: 2022-03-10 | Stop reason: HOSPADM

## 2022-03-04 RX ORDER — DOCUSATE SODIUM 100 MG/1
100 CAPSULE, LIQUID FILLED ORAL DAILY
Status: DISCONTINUED | OUTPATIENT
Start: 2022-03-04 | End: 2022-03-10 | Stop reason: HOSPADM

## 2022-03-04 RX ORDER — CARBAMAZEPINE 200 MG/1
200 TABLET ORAL 2 TIMES DAILY
Status: DISCONTINUED | OUTPATIENT
Start: 2022-03-04 | End: 2022-03-10 | Stop reason: HOSPADM

## 2022-03-04 RX ORDER — ONDANSETRON 2 MG/ML
4 INJECTION INTRAMUSCULAR; INTRAVENOUS EVERY 6 HOURS PRN
Status: DISCONTINUED | OUTPATIENT
Start: 2022-03-04 | End: 2022-03-10 | Stop reason: HOSPADM

## 2022-03-04 RX ORDER — ASPIRIN 81 MG/1
81 TABLET, CHEWABLE ORAL DAILY
Status: DISCONTINUED | OUTPATIENT
Start: 2022-03-04 | End: 2022-03-10 | Stop reason: HOSPADM

## 2022-03-04 RX ORDER — SODIUM CHLORIDE 0.9 % (FLUSH) 0.9 %
5-40 SYRINGE (ML) INJECTION PRN
Status: DISCONTINUED | OUTPATIENT
Start: 2022-03-04 | End: 2022-03-10 | Stop reason: HOSPADM

## 2022-03-04 RX ORDER — FOLIC ACID 1 MG/1
1 TABLET ORAL DAILY
Status: DISCONTINUED | OUTPATIENT
Start: 2022-03-04 | End: 2022-03-10 | Stop reason: HOSPADM

## 2022-03-04 RX ORDER — ACETAMINOPHEN 325 MG/1
650 TABLET ORAL EVERY 6 HOURS PRN
Status: DISCONTINUED | OUTPATIENT
Start: 2022-03-04 | End: 2022-03-10 | Stop reason: HOSPADM

## 2022-03-04 RX ORDER — ACETAMINOPHEN 650 MG/1
650 SUPPOSITORY RECTAL EVERY 6 HOURS PRN
Status: DISCONTINUED | OUTPATIENT
Start: 2022-03-04 | End: 2022-03-10 | Stop reason: HOSPADM

## 2022-03-04 RX ORDER — PREDNISONE 10 MG/1
5 TABLET ORAL 2 TIMES DAILY
Status: DISCONTINUED | OUTPATIENT
Start: 2022-03-04 | End: 2022-03-10 | Stop reason: HOSPADM

## 2022-03-04 RX ORDER — LISINOPRIL AND HYDROCHLOROTHIAZIDE 12.5; 1 MG/1; MG/1
2 TABLET ORAL DAILY
Status: DISCONTINUED | OUTPATIENT
Start: 2022-03-04 | End: 2022-03-10 | Stop reason: HOSPADM

## 2022-03-04 RX ORDER — SODIUM CHLORIDE 0.9 % (FLUSH) 0.9 %
5-40 SYRINGE (ML) INJECTION EVERY 12 HOURS SCHEDULED
Status: DISCONTINUED | OUTPATIENT
Start: 2022-03-04 | End: 2022-03-10 | Stop reason: HOSPADM

## 2022-03-04 RX ADMIN — SODIUM CHLORIDE 1000 ML: 0.9 INJECTION, SOLUTION INTRAVENOUS at 14:18

## 2022-03-04 RX ADMIN — ENOXAPARIN SODIUM 30 MG: 100 INJECTION SUBCUTANEOUS at 18:50

## 2022-03-04 RX ADMIN — SODIUM CHLORIDE: 9 INJECTION, SOLUTION INTRAVENOUS at 18:50

## 2022-03-04 RX ADMIN — ASPIRIN 81 MG 81 MG: 81 TABLET ORAL at 18:50

## 2022-03-04 RX ADMIN — CLOPIDOGREL BISULFATE 75 MG: 75 TABLET ORAL at 18:50

## 2022-03-04 RX ADMIN — ATORVASTATIN CALCIUM 20 MG: 20 TABLET, FILM COATED ORAL at 18:50

## 2022-03-04 RX ADMIN — PREDNISONE 5 MG: 10 TABLET ORAL at 20:00

## 2022-03-04 RX ADMIN — CARBAMAZEPINE 200 MG: 200 TABLET ORAL at 21:28

## 2022-03-04 ASSESSMENT — PAIN SCALES - GENERAL
PAINLEVEL_OUTOF10: 0
PAINLEVEL_OUTOF10: 0

## 2022-03-04 ASSESSMENT — ENCOUNTER SYMPTOMS
NAUSEA: 0
EYE REDNESS: 0
COUGH: 0
RESPIRATORY NEGATIVE: 1
DIARRHEA: 0
EYES NEGATIVE: 1
GASTROINTESTINAL NEGATIVE: 1
VOMITING: 0
SHORTNESS OF BREATH: 0
ALLERGIC/IMMUNOLOGIC NEGATIVE: 1

## 2022-03-04 NOTE — ED PROVIDER NOTES
[facility]      TRIAGE CHIEF COMPLAINT:   Fatigue and Hypotension      [place]:  Joaquim Person is a 80 y.o. female that presents from home with complaint of fatigue, malaise, hypertension. Patient apparently was in the hospital recently for COVID-19 was discharged to nursing home,  Kat Jj recently got out has been home with home health care today after doing physical therapy she was generally weak has not been eating drinking well maybe little bit confused but generally weak and, had low blood pressure. No change in medications daughter is here no reports of fevers nausea vomiting no loss of consciousness no chest pain shortness of breath or abdominal pain no bowel or bladder symptoms. No unilateral weakness just general weakness. No other questions or concerns. Patient is resting comfortably she is alert oriented has no complaints. REVIEW OF SYSTEMS:  At least 10 systems reviewed and otherwise acutely negative except as in the [address] Review of Systems   Constitutional: Positive for activity change, appetite change and fatigue. HENT: Negative. Eyes: Negative. Respiratory: Negative. Cardiovascular: Negative. Gastrointestinal: Negative. Endocrine: Negative. Genitourinary: Negative. Musculoskeletal: Negative. Skin: Negative. Allergic/Immunologic: Negative. Neurological: Positive for weakness and light-headedness. Hematological: Negative. Psychiatric/Behavioral: Negative. All other systems reviewed and are negative. Past Medical History:   Diagnosis Date    Arthritis     Cataract of left eye     Cataract of right eye     Chronic kidney disease     H/O cardiovascular stress test 11/1/11    There is no scintigraphic evidence of inducible myocardial ischemia. LV function is normal. EF 59%  No ECG changes Unremarkable pharmacological stress test. Normal myocardial  perfusion study.     H/O Doppler ultrasound 11/8/11    No sonographic evidence of hemodynamically significant stenosis of the carotid arteries bilaterally.  H/O Doppler ultrasound 8/21/08    Study suggestive of lack of evidence of any hemodynamically significant peripheral vascular disease at rest. Waveform analysis is triphasic throughout the lower extremities.  H/O echocardiogram 4/26/12    Left ventricular function is normal. EF >55% The transmitral spectral doppler flow pattern is suggestive of impaired LV relaxation.  H/O tilt table evaluation 10/10/08    The patient became orthostatic on tilt table study after getting Nitro. There were no neurocardiogenic episode seen.  H/O transesophageal echocardiography (MARIO) for monitoring 11/08/10    Normal MARIO. No PFO or clots noted    Hyperlipidemia     Hypertension     Movement disorder     Neuromuscular disorder ([address]     Osteoporosis     Pneumonia     Psychiatric problem     daughter states pt has no psych problems    TIA (transient ischemic attack)     Trigeminal neuralgia of right side of face      Past Surgical History:   Procedure Laterality Date    APPENDECTOMY      CATARACT REMOVAL      CHOLECYSTECTOMY      EYE SURGERY      HYSTERECTOMY       Family History   Problem Relation Age of Onset    Cancer Maternal Aunt     Cancer Maternal Grandmother     Cancer Maternal Grandfather     High Blood Pressure Maternal Grandfather      Social History     Socioeconomic History    Marital status:       Spouse name: Not on file    Number of children: 11    Years of education: Not on file    Highest education level: Not on file   Occupational History    Not on file   Tobacco Use    Smoking status: Never Smoker    Smokeless tobacco: Never Used   Substance and Sexual Activity    Alcohol use: No     Comment: 1 - 2 per year    Drug use: No     Comment: cig    Sexual activity: Never   Other Topics Concern    Not on file   Social History Narrative    Not on file     Social Determinants of Health     Financial Resource Strain:     Difficulty of Paying Living Expenses: Not on file   Food Insecurity:     Worried About Running Out of Food in the Last Year: Not on file    Alka of Food in the Last Year: Not on file   Transportation Needs:     Lack of Transportation (Medical): Not on file    Lack of Transportation (Non-Medical):  Not on file   Physical Activity:     Days of Exercise per Week: Not on file    Minutes of Exercise per Session: Not on file   Stress:     Feeling of Stress : Not on file   Social Connections:     Frequency of Communication with Friends and Family: Not on file    Frequency of Social Gatherings with Friends and Family: Not on file    Attends [employer] Services: Not on file    Active Member of [address] [place] [address] [employer] or Organizations: Not on file    Attends Club or Organization Meetings: Not on file    Marital Status: Not on file   Intimate Partner Violence:     Fear of Current or Ex-Partner: Not on file    Emotionally Abused: Not on file    Physically Abused: Not on file    Sexually Abused: Not on file   Housing Stability:     Unable to Pay for Housing in the Last Year: Not on file    Number of [place] in the Last Year: Not on file    Unstable Housing in the Last Year: Not on file     Current Facility-Administered Medications   Medication Dose Route Frequency Provider Last Rate Last Admin    0.9 % sodium chloride bolus  1,000 mL IntraVENous Once Celestino Audrey, DO 1,000 mL/hr at 03/04/22 1418 1,000 mL at 03/04/22 1418    0.9 % sodium chloride bolus  1,000 mL IntraVENous Once Celestino Audrey, DO 1,000 mL/hr at 03/04/22 1418 1,000 mL at 03/04/22 1418     Current Outpatient Medications   Medication Sig Dispense Refill    metoprolol tartrate (LOPRESSOR) 25 MG tablet Take 25 mg by mouth daily      omeprazole (PRILOSEC) 20 MG delayed release capsule Take 20 mg by mouth daily      atorvastatin (LIPITOR) 20 MG tablet Take 20 mg by mouth daily      folic acid (FOLVITE) 1 MG tablet Take 1 mg by mouth daily      dicyclomine (BENTYL) 10 MG capsule Take 10 mg by mouth 2 times daily as needed      predniSONE (DELTASONE) 5 MG tablet Take 5 mg by mouth 2 times daily      docusate sodium (COLACE) 100 MG capsule Take 1 capsule by mouth daily 30 capsule 0    lisinopril-hydrochlorothiazide (PRINZIDE;ZESTORETIC) 20-25 MG per tablet Take 1 tablet by mouth daily.  carBAMazepine (TEGRETOL) 200 MG tablet Take 200 mg by mouth 2 times daily.  amLODIPine (NORVASC) 10 MG tablet Take 10 mg by mouth daily.  polyethylene glycol (GLYCOLAX) powder Take 17 g by mouth daily.  methotrexate (RHEUMATREX) 5 MG chemo tablet Take 25 mg by mouth once a week. Pt takes this on Sunday.  clopidogrel (PLAVIX) 75 MG tablet Take 75 mg by mouth daily.  clonidine (CATAPRES) 0.1 MG tablet Take 0.1 mg by mouth daily       aspirin 81 MG chewable tablet Take 81 mg by mouth daily.          Facility-Administered Medications Ordered in Other Encounters   Medication Dose Route Frequency Provider Last Rate Last Admin    denosumab (PROLIA) SC injection 60 mg  60 mg SubCUTAneous Once M Jj Garcia MD          Allergies   Allergen Reactions    Pcn [Penicillins] Swelling     Current Facility-Administered Medications   Medication Dose Route Frequency Provider Last Rate Last Admin    0.9 % sodium chloride bolus  1,000 mL IntraVENous Once [employer], DO 1,000 mL/hr at 03/04/22 1418 1,000 mL at 03/04/22 1418    0.9 % sodium chloride bolus  1,000 mL IntraVENous Once [employer], DO 1,000 mL/hr at 03/04/22 1418 1,000 mL at 03/04/22 1418     Current Outpatient Medications   Medication Sig Dispense Refill    metoprolol tartrate (LOPRESSOR) 25 MG tablet Take 25 mg by mouth daily      omeprazole (PRILOSEC) 20 MG delayed release capsule Take 20 mg by mouth daily      atorvastatin (LIPITOR) 20 MG tablet Take 20 mg by mouth daily      folic acid (FOLVITE) 1 MG tablet Take 1 mg by mouth daily      dicyclomine (BENTYL) 10 MG capsule Take 10 mg by mouth 2 times daily as needed      predniSONE (DELTASONE) 5 MG tablet Take 5 mg by mouth 2 times daily      docusate sodium (COLACE) 100 MG capsule Take 1 capsule by mouth daily 30 capsule 0    lisinopril-hydrochlorothiazide (PRINZIDE;ZESTORETIC) 20-25 MG per tablet Take 1 tablet by mouth daily.  carBAMazepine (TEGRETOL) 200 MG tablet Take 200 mg by mouth 2 times daily.  amLODIPine (NORVASC) 10 MG tablet Take 10 mg by mouth daily.  polyethylene glycol (GLYCOLAX) powder Take 17 g by mouth daily.  methotrexate (RHEUMATREX) 5 MG chemo tablet Take 25 mg by mouth once a week. Pt takes this on Sunday.  clopidogrel (PLAVIX) 75 MG tablet Take 75 mg by mouth daily.  clonidine (CATAPRES) 0.1 MG tablet Take 0.1 mg by mouth daily       aspirin 81 MG chewable tablet Take 81 mg by mouth daily. Facility-Administered Medications Ordered in Other Encounters   Medication Dose Route Frequency Provider Last Rate Last Admin    denosumab (PROLIA) SC injection 60 mg  60 mg SubCUTAneous Once Lesli Garcia MD           Nursing Notes Reviewed    VITAL SIGNS:  ED Triage Vitals [03/04/22 1217]   Enc Vitals Group      BP (!) 81/57      Pulse 105      Resp 16      Temp 98 °F (36.7 °C)      Temp Source Oral      SpO2 98 %      Weight       Height       Head Circumference       Peak Flow       Pain Score       Pain Loc       Pain Edu? Excl. in [address]? PHYSICAL EXAM:  Physical Exam  Vitals and nursing note reviewed. Constitutional:       General: She is not in acute distress. Appearance: Normal appearance. She is well-developed, well-groomed and underweight. She is not ill-appearing, toxic-appearing or diaphoretic. Neck:      Vascular: No JVD. Trachea: Phonation normal.   Abdominal:      Tenderness: There is no abdominal tenderness. There is no guarding or rebound.  Negative signs include Heath's sign, Rovsing's sign and McBurney's sign.   Musculoskeletal:      Cervical back: Full passive range of motion without pain and normal range of motion. No edema, erythema, signs of trauma, rigidity, torticollis or crepitus. No pain with movement. Normal range of motion. Left foot: Tenderness present. Neurological:      Mental Status: She is alert and oriented to person, place, and time. GCS: GCS eye subscore is 4. GCS verbal subscore is 5. GCS motor subscore is 6. Cranial Nerves: Cranial nerves are intact. No cranial nerve deficit, dysarthria or facial asymmetry. Sensory: Sensation is intact. No sensory deficit. Motor: Weakness present. No tremor, atrophy, abnormal muscle tone or seizure activity. Comments: General weakness nothing focal   Psychiatric:         Behavior: Behavior is cooperative.            I have reviewed andinterpreted all of the currently available lab results from this visit (if applicable):    Results for orders placed or performed during the hospital encounter of 03/04/22   CBC with Auto Differential   Result Value Ref Range    WBC 9.6 4.0 - 10.5 K/CU MM    RBC 3.20 (L) 4.2 - 5.4 M/CU MM    Hemoglobin 10.8 (L) 12.5 - 16.0 GM/DL    Hematocrit 35.7 (L) 37 - 47 %    .6 (H) 78 - 100 FL    MCH 33.8 (H) 27 - 31 PG    MCHC 30.3 (L) 32.0 - 36.0 %    RDW 13.2 11.7 - 14.9 %    Platelets 439 (H) 423 - 440 K/CU MM    MPV 8.6 7.5 - 11.1 FL    Differential Type AUTOMATED DIFFERENTIAL     Segs Relative 78.8 (H) 36 - 66 %    Lymphocytes % 14.5 (L) 24 - 44 %    Monocytes % 4.8 (H) 0 - 4 %    Eosinophils % 1.0 0 - 3 %    Basophils % 0.4 0 - 1 %    Segs Absolute 7.6 K/CU MM    Lymphocytes Absolute 1.4 K/CU MM    Monocytes Absolute 0.5 K/CU MM    Eosinophils Absolute 0.1 K/CU MM    Basophils Absolute 0.0 K/CU MM    Nucleated RBC % 0.0 %    Total Nucleated RBC 0.0 K/CU MM    Total Immature Neutrophil 0.05 K/CU MM    Immature Neutrophil % 0.5 (H) 0 - 0.43 %   Comprehensive Metabolic Panel   Result Value Ref Range Sodium 138 135 - 145 MMOL/L    Potassium 4.3 3.5 - 5.1 MMOL/L    Chloride 101 99 - 110 mMol/L    CO2 25 21 - 32 MMOL/L    BUN 60 (H) 6 - 23 MG/DL    CREATININE 1.7 (H) 0.6 - 1.1 MG/DL    Glucose 140 (H) 70 - 99 MG/DL    Calcium 10.7 (H) 8.3 - 10.6 MG/DL    Albumin 3.3 (L) 3.4 - 5.0 GM/DL    Total Protein 7.7 6.4 - 8.2 GM/DL    Total Bilirubin 0.2 0.0 - 1.0 MG/DL    ALT 18 10 - 40 U/L    AST 19 15 - 37 IU/L    Alkaline Phosphatase 70 40 - 129 IU/L    GFR Non- 28 (L) >60 mL/min/1.73m2    GFR  34 (L) >60 mL/min/1.73m2    Anion Gap 12 4 - 16   TSH   Result Value Ref Range    TSH, High Sensitivity 1.050 0.270 - 4.20 uIu/ml   Magnesium   Result Value Ref Range    Magnesium 2.1 1.8 - 2.4 mg/dl   Troponin   Result Value Ref Range    Troponin T 0.059 (H) <0.01 NG/ML   Brain Natriuretic Peptide   Result Value Ref Range    Pro-.6 (H) <300 PG/ML   CK   Result Value Ref Range    Total CK 42 26 - 140 IU/L   Lipase   Result Value Ref Range    Lipase 35 13 - 60 IU/L   Lactic Acid   Result Value Ref Range    Lactate 1.9 0.4 - 2.0 mMOL/L        Radiographs (if obtained):  [] The following radiograph was interpreted by myself in the absence of a radiologist:  [x] Radiologist's Report Reviewed:    CXR    XR CHEST PORTABLE    Result Date: 2/11/2022  EXAMINATION: ONE X-RAY VIEW OF THE CHEST 2/9/2022 7:11 pm COMPARISON: January 31, 2022 HISTORY: ORDERING SYSTEM PROVIDED HISTORY: Follow up TECHNOLOGIST PROVIDED HISTORY: Reason for exam:->Follow up Reason for Exam: follow up Additional signs and symptoms: COVID 19 Relevant Medical/Surgical History: na FINDINGS: No lines or tubes. Stable cardiomediastinal silhouette. Bilateral airspace opacities persist, left greater than right. Findings are not significantly changed from the prior study. No pneumothorax. No pulmonary edema. No acute osseous abnormality. 1. Bilateral airspace opacities, left greater than right.   Findings are not significantly changed since January 31, 2022. EKG (if obtained): (All EKG's are interpreted by myself in the absence of a cardiologist)      12 lead EKG per my interpretation:  Sinus Bradycardia 53  Axis is   Left axis deviation  QTc is  424  There is no specific T wave changes appreciated. There is no specific ST wave changes appreciated. Prior EKG to compare with was not available       MDM:    Patient here with complaint of fatigue, malaise, hypotension. Again she was here recently in January, February for COVID-19 she was admitted discharged to [address] [place] [address] recently got discharged from their back home with home health care, physical therapy which she has been doing. Per daughter has not been eating drinking well, generally fatigue and malaise today had some generalized weakness. Did not pass out or hit her head no fever chest pain shortness of breath no nausea vomiting diarrhea no bowel changes no medication changes. Patient appears alert oriented on my exam daughter states she has been slightly confused. Has history of UTIs. Labs imaging performed she does have elevated BUN, creatinine troponin levels mildly elevated chronic so. Chest x-ray performed and shows improving infiltrates. Again pending urinalysis. Again patient has general symptoms of a focal high case management see her patient will need admission for PT OT evaluation, readmission for nursing home but needs clearance. Patient daughter okay with plan. Work-up otherwise negative save for chronic elevation of troponin stable and serum creatinine. BUN is elevated today otherwise stable admitted. Pending urinalysis. Given IV fluids.   She is not confused on my exam.    CLINICAL IMPRESSION:  Final diagnoses:   Fatigue, unspecified type   Hypotension, unspecified hypotension type   Elevated BUN   Serum creatinine raised   Troponin level elevated       (Please note that portions of this note may have been completed with a voice recognition program. Efforts were made to edit the dictations but occasionally words aremis-transcribed.)    DISPOSITION REFERRAL (if applicable):  No follow-up provider specified.     DISPOSITION MEDICATIONS (if applicable):  New Prescriptions    No medications on file          Yon Fernandez, [facility],   03/04/22 5849

## 2022-03-04 NOTE — H&P
V2.0  History and Physical      Name:  Jonathan Galvan /Age/Sex: 1934  (80 y.o. female)   MRN & CSN:  [id number] & [id number] Encounter Date/Time: 3/4/2022 2:58 PM EST   Location:  [facility] PCP: Judith Sandoval MD       Hospital Day: 1    Assessment and Plan:   Jonathan Galvan is a 80 y.o. female with a pmh of hypertension, rheumatoid arthritis who presents with self-care deficit, MOSES    Generalized Weakness  Physical deconditioning   Admit to inpatient, MedSurg   Consult to case management for assistance with discharge planning   Consult PT OT   Chest x-ray with improved bilateral airspace disease, no acute cardiopulmonary process    Rheumatoid arthritis  Bilateral foot pain   Continue home prednisone   Check ESR, CRP, check uric acid level   Redness and warmth noted to bony prominences, concern for gout, checking uric acid level as noted above    Elevated troponin level  Acute kidney injury  Malnutrition with weight loss, unintentional   Troponin level slightly elevated above baseline but within range, patient denies chest pain, will trend every 6 hours x2. Likely secondary to MOSES   EKG reviewed, normal sinus rhythm without acute ST elevation or depression. Repeat EKG in a.m. Received 2 L fluid bolus in ED for dehydration and poor p.o. intake. Continue maintenance IV overnight with repeat BMP in a.m. Consult to dietitian for malnutrition  Suspect 2/2 poor p.o. intake, ACE inhibitor and hydrochlorothiazide use   Monitor lab trends with IVF   Hold nephrotoxic agents including lisinopril and hctz    Consult nephrology    Hypertension   Continue home medications with holding parameters due to reported hypotension as noted above, no documented episodes here    Trigeminal neuralgia   Continue home Tegretol    Chronic Conditions: continue home medication as ordered    All testing  and results reviewed with patient . All questions answered.  Patient and family voiced understanding    This patient was seen and examined in conjunction with Dr. Walt Gordon. He/She was agreeable with the plan and management as dictated above. Disposition:   Current Living situation: Home with daughter  Expected Disposition: Unclear, possible return to staff  Estimated D/C: 2 to 3 days    Diet ADULT DIET; Regular   GI Prophylaxis  [x] PPI,  [] H2 Blocker,  [] Carafate,  [] Diet/Tube Feeds   DVT Prophylaxis [x] Lovenox, []  Heparin, [] SCDs, [] Ambulation,  [] NOAC   Code Status Full Code   Surrogate Decision Maker/ POA          History from:     patient    History of Present Illness:     Chief Complaint: Self-care deficit  Kenny Crowe is a 80 y.o. female with pmh of recent hospitalization in February 2022 for Covid pneumonia, was discharged to an ECF who presents to the ED with complaints of generalized weakness, difficulty ambulating, and overall malaise. Patient's daughter reported that she was discharged 2   ECF after recent hospitalization and has since been sent home with home health care with PT OT and she has been generally weak, with poor appetite and reports of low blood pressure. Denies changes in her medications since recent hospitalization. Denies fever, chills, nausea, vomiting,. Denies falls. Denies loss of consciousness. Patient denies chest pain or shortness of breath. Patient denies abdominal pain. Denies problem with bowel or bladder habits. Denies unilateral weakness just endorses generalized weakness. Only complaint is pain in bilateral feet. Additional review of symptoms as noted below     Review of Systems: Need 10 Elements   Review of Systems   Constitutional: Negative for activity change, appetite change, diaphoresis and fatigue. HENT: Negative for congestion and postnasal drip. Eyes: Negative for redness and visual disturbance. Respiratory: Negative for cough and shortness of breath. Cardiovascular: Negative for chest pain and palpitations.    Gastrointestinal: Negative for diarrhea, nausea and vomiting. Genitourinary: Negative for difficulty urinating and dysuria. Musculoskeletal: Positive for gait problem. Negative for joint swelling and neck pain. Neurological: Negative for dizziness and speech difficulty. Psychiatric/Behavioral: Negative for agitation and confusion. Objective:   No intake or output data in the 24 hours ending 03/04/22 1545   Vitals:   Vitals:    03/04/22 1217 03/04/22 1455 03/04/22 1533   BP: 101/75 132/80 (!) 125/114   Pulse: 105 66 64   Resp: 16 17 17   Temp: 98 °F (36.7 °C)     TempSrc: Oral     SpO2: 98%         Medications Prior to Admission     Prior to Admission medications    Medication Sig Start Date End Date Taking? Authorizing Provider   metoprolol tartrate (LOPRESSOR) 25 MG tablet Take 25 mg by mouth daily    Historical Provider, MD   omeprazole (PRILOSEC) 20 MG delayed release capsule Take 20 mg by mouth daily    Historical Provider, MD   atorvastatin (LIPITOR) 20 MG tablet Take 20 mg by mouth daily    Historical Provider, MD   folic acid (FOLVITE) 1 MG tablet Take 1 mg by mouth daily    Historical Provider, MD   dicyclomine (BENTYL) 10 MG capsule Take 10 mg by mouth 2 times daily as needed    Historical Provider, MD   predniSONE (DELTASONE) 5 MG tablet Take 5 mg by mouth 2 times daily    Historical Provider, MD   docusate sodium (COLACE) 100 MG capsule Take 1 capsule by mouth daily 8/14/16   Jennifer Chandler MD   lisinopril-hydrochlorothiazide (PRINZIDE;ZESTORETIC) 20-25 MG per tablet Take 1 tablet by mouth daily. Historical Provider, MD   carBAMazepine (TEGRETOL) 200 MG tablet Take 200 mg by mouth 2 times daily. Historical Provider, MD   amLODIPine (NORVASC) 10 MG tablet Take 10 mg by mouth daily. Historical Provider, MD   polyethylene glycol (GLYCOLAX) powder Take 17 g by mouth daily. Historical Provider, MD   methotrexate (RHEUMATREX) 5 MG chemo tablet Take 25 mg by mouth once a week. Pt takes this on Sunday. Historical Provider, MD   clopidogrel (PLAVIX) 75 MG tablet Take 75 mg by mouth daily. Historical Provider, MD   clonidine (CATAPRES) 0.1 MG tablet Take 0.1 mg by mouth daily     Historical Provider, MD   aspirin 81 MG chewable tablet Take 81 mg by mouth daily. Historical Provider, MD       Physical Exam: Need 8 Elements   Physical Exam  Vitals and nursing note reviewed. Constitutional:       General: She is not in acute distress. Appearance: Normal appearance. She is cachectic. HENT:      Head: Normocephalic. Mouth/Throat:      Pharynx: Oropharynx is clear. Eyes:      Pupils: Pupils are equal, round, and reactive to light. Cardiovascular:      Rate and Rhythm: Normal rate. Pulses: Normal pulses. Pulmonary:      Effort: Pulmonary effort is normal. No respiratory distress. Breath sounds: No wheezing or rhonchi. Abdominal:      General: Abdomen is flat. Bowel sounds are normal. There is no distension. Musculoskeletal:         General: Normal range of motion. Cervical back: Normal range of motion. Feet:      Right foot:      Skin integrity: Warmth present. Left foot:      Skin integrity: Warmth present. Skin:     General: Skin is warm and dry. Capillary Refill: Capillary refill takes 2 to 3 seconds. Neurological:      General: No focal deficit present. Mental Status: She is alert and oriented to person, place, and time. Past Medical History:   PMHx   Past Medical History:   Diagnosis Date    Arthritis     Cataract of left eye     Cataract of right eye     Chronic kidney disease     H/O cardiovascular stress test 11/1/11    There is no scintigraphic evidence of inducible myocardial ischemia. LV function is normal. EF 59%  No ECG changes Unremarkable pharmacological stress test. Normal myocardial  perfusion study.     H/O Doppler ultrasound 11/8/11    No sonographic evidence of hemodynamically significant stenosis of the carotid arteries bilaterally.  H/O Doppler ultrasound 8/21/08    Study suggestive of lack of evidence of any hemodynamically significant peripheral vascular disease at rest. Waveform analysis is triphasic throughout the lower extremities.  H/O echocardiogram 4/26/12    Left ventricular function is normal. EF >55% The transmitral spectral doppler flow pattern is suggestive of impaired LV relaxation.  H/O tilt table evaluation 10/10/08    The patient became orthostatic on tilt table study after getting Nitro. There were no neurocardiogenic episode seen.  H/O transesophageal echocardiography (MARIO) for monitoring 11/08/10    Normal MARIO. No PFO or clots noted    Hyperlipidemia     Hypertension     Movement disorder     Neuromuscular disorder ([address]     Osteoporosis     Pneumonia     Psychiatric problem     daughter states pt has no psych problems    TIA (transient ischemic attack)     Trigeminal neuralgia of right side of face      PSHX:  has a past surgical history that includes Appendectomy; Hysterectomy; Cholecystectomy; eye surgery; and Cataract removal.  Allergies: Allergies   Allergen Reactions    Pcn [Penicillins] Swelling     Fam HX: family history includes Cancer in her maternal aunt, maternal grandfather, and maternal grandmother; High Blood Pressure in her maternal grandfather. Soc HX:   Social History     Socioeconomic History    Marital status:       Spouse name: None    Number of children: 5    Years of education: None    Highest education level: None   Occupational History    None   Tobacco Use    Smoking status: Never Smoker    Smokeless tobacco: Never Used   Substance and Sexual Activity    Alcohol use: No     Comment: 1 - 2 per year    Drug use: No     Comment: cig    Sexual activity: Never   Other Topics Concern    None   Social History Narrative    None     Social Determinants of Health     Financial Resource Strain:     Difficulty of Paying Living Expenses: Not on file Food Insecurity:     Worried About Running Out of Food in the Last Year: Not on file    [place] of Food in the Last Year: Not on file   Transportation Needs:     Lack of Transportation (Medical): Not on file    Lack of Transportation (Non-Medical):  Not on file   Physical Activity:     Days of Exercise per Week: Not on file    Minutes of Exercise per Session: Not on file   Stress:     Feeling of Stress : Not on file   Social Connections:     Frequency of Communication with Friends and Family: Not on file    Frequency of Social Gatherings with Friends and Family: Not on file    Attends Christianity Services: Not on file    Active Member of Clubs or Organizations: Not on file    Attends Club or Organization Meetings: Not on file    Marital Status: Not on file   Intimate Partner Violence:     Fear of Current or Ex-Partner: Not on file    Emotionally Abused: Not on file    Physically Abused: Not on file    Sexually Abused: Not on file   Housing Stability:     Unable to Pay for Housing in the Last Year: Not on file    Number of Places Lived in the Last Year: Not on file    Unstable Housing in the Last Year: Not on file       Medications:   Medications:    sodium chloride flush  5-40 mL IntraVENous 2 times per day    enoxaparin  40 mg SubCUTAneous Daily    cloNIDine  0.1 mg Oral Daily    aspirin  81 mg Oral Daily    clopidogrel  75 mg Oral Daily    methotrexate  25 mg Oral Weekly    amLODIPine  10 mg Oral Daily    polyethylene glycol  17 g Oral Daily    lisinopril-hydroCHLOROthiazide  1 tablet Oral Daily    carBAMazepine  200 mg Oral BID    docusate sodium  100 mg Oral Daily    metoprolol tartrate  25 mg Oral Daily    [START ON 3/5/2022] pantoprazole  40 mg Oral QAM AC    atorvastatin  20 mg Oral Daily    folic acid  1 mg Oral Daily    predniSONE  5 mg Oral BID      Infusions:    sodium chloride      sodium chloride       PRN Meds: sodium chloride flush, 5-40 mL, PRN  sodium chloride, 25 mL, PRN  ondansetron, 4 mg, Q8H PRN   Or  ondansetron, 4 mg, Q6H PRN  polyethylene glycol, 17 g, Daily PRN  acetaminophen, 650 mg, Q6H PRN   Or  acetaminophen, 650 mg, Q6H PRN  dicyclomine, 10 mg, BID PRN        Labs      CBC:   Recent Labs     03/04/22  1314   WBC 9.6   HGB 10.8*   *     BMP:    Recent Labs     03/04/22  1314      K 4.3      CO2 25   BUN 60*   CREATININE 1.7*   GLUCOSE 140*     Hepatic:   Recent Labs     03/04/22  1314   AST 19   ALT 18   BILITOT 0.2   ALKPHOS 70     Lipids:   Lab Results   Component Value Date    CHOL 256 11/10/2015    HDL 73 11/10/2015    TRIG 226 11/10/2015     Hemoglobin A1C:   Lab Results   Component Value Date    LABA1C 6.1 05/11/2011     TSH: No results found for: TSH  Troponin:   Lab Results   Component Value Date    TROPONINT 0.059 03/04/2022    TROPONINT 0.052 02/02/2022    TROPONINT 0.042 02/01/2022     Lactic Acid: No results for input(s): LACTA in the last 72 hours. BNP:   Recent Labs     03/04/22  1314   PROBNP 750.6*     UA:  Lab Results   Component Value Date    NITRU NEGATIVE 03/04/2022    COLORU YELLOW 03/04/2022    WBCUA 51 03/04/2022    RBCUA NONE SEEN 03/04/2022    MUCUS MODERATE 02/10/2022    TRICHOMONAS NONE SEEN 02/10/2022    YEAST MODERATE 03/04/2022    BACTERIA MODERATE 03/04/2022    CLARITYU SLIGHTLY CLOUDY 03/04/2022    SPECGRAV 1.020 03/04/2022    LEUKOCYTESUR LARGE 03/04/2022    UROBILINOGEN NORMAL 03/04/2022    BILIRUBINUR NEGATIVE 03/04/2022    BLOODU NEGATIVE 03/04/2022    KETUA NEGATIVE 03/04/2022     Urine Cultures: No results found for: Saige Dodd  Blood Cultures: No results found for: BC  No results found for: BLOODCULT2  Organism:   Lab Results   Component Value Date    ORG ENC 07/25/2017       Imaging/Diagnostics Last 24 Hours   XR FOOT LEFT (MIN 3 VIEWS)    Result Date: 3/4/2022  EXAMINATION: THREE XRAY VIEWS OF THE LEFT FOOT 3/4/2022 1:27 pm COMPARISON: None.  HISTORY: ORDERING SYSTEM PROVIDED HISTORY: pain TECHNOLOGIST PROVIDED HISTORY: Reason for exam:->pain Reason for Exam: 2nd toe is red and painful FINDINGS: Diffuse osseous demineralization. Normal midfoot alignment is maintained. No fracture or dislocation. There are chronic appearing erosions along the medial and lateral aspects of the 3rd metatarsal head. No osseous erosion in the 2nd toe. Atherosclerotic vascular calcifications are seen. No radiopaque foreign body or soft tissue gas. 1. No abnormality in the 2nd toe identified. 2. Chronic appearing erosions along the medial and lateral aspects of the 3rd metatarsal head nonspecific but raising the possibility of gout     XR CHEST PORTABLE    Result Date: 3/4/2022  EXAMINATION: ONE XRAY VIEW OF THE CHEST 3/4/2022 1:27 pm COMPARISON: Chest radiograph of 02/09/2022 HISTORY: ORDERING SYSTEM PROVIDED HISTORY: fatigue/malaise TECHNOLOGIST PROVIDED HISTORY: Reason for exam:->fatigue/malaise Reason for Exam: fatigue/malaise FINDINGS: Cardiomediastinal silhouette is unchanged. No pleural effusion or pneumothorax. Mild central pulmonary vascular congestion is similar to prior. Improved bilateral airspace opacities, especially in the left mid lung. No acute osseous abnormality. Improved bilateral airspace opacities. Central pulmonary vascular congestion, similar to the prior study. Electronically signed by NE Disla CNP on 3/4/2022 at 3:45 PM          This dictation was created with voice recognition software. While attempts have been made to review the dictation as it is transcribed, on occasion the spoken word can be misinterpreted by the technology leading to omissions or inappropriate words, phrases or sentences.      Electronically signed by NE Disla CNP on 3/4/2022 at 3:45 PM

## 2022-03-04 NOTE — PROGRESS NOTES
RENAL DOSE ADJUSTMENT MADE PER P/T PROTOCOL    PREVIOUS ORDER:  Lovenox 40 mg once daily    CrCl cannot be calculated (Unknown ideal weight. ).   Recent Labs     03/04/22  1314   BUN 60*   CREATININE 1.7*   *     NEW RENALLY ADJUSTED ORDER:  Lovenox 30 mg once daily    MACK Herring [facility]  3/4/2022 3:44 PM

## 2022-03-04 NOTE — CARE COORDINATION
CM consult per Dr Ayan Arndt to assist with d/c planning for this pt who has come to ER today with c/o fatigue, hypotension. Review of pt chart, pt has Yesi Gomez, PCP Dr Gogo Burr. Last admission 1/31-2/12/22 for COVID PNE, Encephalopathy. Pt discharged to [facility]. Pt discharged from [facility] over a week ago, continues to feel weak and debilitated. CM visited pt who is alert and oriented discussed possible return to [facility] for additional rehab to continue working toward baseline. Pt agreeable to going back to [facility] for additional rehab. Explained process of pre-cert requirements per insurance and coming back into hospital for PT/OT evaluation and COVID. PT verbalized understanding. Cbr/Melody not at bedside during visit, pt states Chaparro Carlos is here somewhere. Call to [facility]/ admissions,[phone]. She confirms pre-cert will be necessary to get pt approved for SNF rehab for 2nd time. Update to Dr Ayan Arndt that pre-cert needed for pt to possibly qualify for more rehab days, PT/OT COVID test. Not all labs resulted yet, POC will be to get pt back to SNF rehab.  TERRELLRN/MAUDE

## 2022-03-05 LAB
ALBUMIN SERPL-MCNC: 3.1 GM/DL (ref 3.4–5)
ALP BLD-CCNC: 65 IU/L (ref 40–128)
ALT SERPL-CCNC: 16 U/L (ref 10–40)
ANION GAP SERPL CALCULATED.3IONS-SCNC: 13 MMOL/L (ref 4–16)
AST SERPL-CCNC: 18 IU/L (ref 15–37)
BASOPHILS ABSOLUTE: 0 K/CU MM
BASOPHILS RELATIVE PERCENT: 0.3 % (ref 0–1)
BILIRUB SERPL-MCNC: 0.2 MG/DL (ref 0–1)
BUN BLDV-MCNC: 48 MG/DL (ref 6–23)
CALCIUM SERPL-MCNC: 10 MG/DL (ref 8.3–10.6)
CHLORIDE BLD-SCNC: 107 MMOL/L (ref 99–110)
CO2: 21 MMOL/L (ref 21–32)
CREAT SERPL-MCNC: 1.1 MG/DL (ref 0.6–1.1)
DIFFERENTIAL TYPE: ABNORMAL
EKG ATRIAL RATE: 73 BPM
EKG DIAGNOSIS: NORMAL
EKG P AXIS: 21 DEGREES
EKG P-R INTERVAL: 168 MS
EKG Q-T INTERVAL: 384 MS
EKG QRS DURATION: 118 MS
EKG QTC CALCULATION (BAZETT): 423 MS
EKG R AXIS: -37 DEGREES
EKG T AXIS: 69 DEGREES
EKG VENTRICULAR RATE: 73 BPM
EOSINOPHILS ABSOLUTE: 0 K/CU MM
EOSINOPHILS RELATIVE PERCENT: 0.3 % (ref 0–3)
GFR AFRICAN AMERICAN: 57 ML/MIN/1.73M2
GFR NON-AFRICAN AMERICAN: 47 ML/MIN/1.73M2
GLUCOSE BLD-MCNC: 98 MG/DL (ref 70–99)
HCT VFR BLD CALC: 30.5 % (ref 37–47)
HEMOGLOBIN: 9.3 GM/DL (ref 12.5–16)
IMMATURE NEUTROPHIL %: 0.5 % (ref 0–0.43)
LYMPHOCYTES ABSOLUTE: 1.2 K/CU MM
LYMPHOCYTES RELATIVE PERCENT: 13.6 % (ref 24–44)
MCH RBC QN AUTO: 34.1 PG (ref 27–31)
MCHC RBC AUTO-ENTMCNC: 30.5 % (ref 32–36)
MCV RBC AUTO: 111.7 FL (ref 78–100)
MONOCYTES ABSOLUTE: 0.4 K/CU MM
MONOCYTES RELATIVE PERCENT: 4.3 % (ref 0–4)
NUCLEATED RBC %: 0 %
PDW BLD-RTO: 13.2 % (ref 11.7–14.9)
PLATELET # BLD: 421 K/CU MM (ref 140–440)
PMV BLD AUTO: 8.8 FL (ref 7.5–11.1)
POTASSIUM SERPL-SCNC: 4.6 MMOL/L (ref 3.5–5.1)
RBC # BLD: 2.73 M/CU MM (ref 4.2–5.4)
SEGMENTED NEUTROPHILS ABSOLUTE COUNT: 7.1 K/CU MM
SEGMENTED NEUTROPHILS RELATIVE PERCENT: 81 % (ref 36–66)
SODIUM BLD-SCNC: 141 MMOL/L (ref 135–145)
TOTAL IMMATURE NEUTOROPHIL: 0.04 K/CU MM
TOTAL NUCLEATED RBC: 0 K/CU MM
TOTAL PROTEIN: 6.6 GM/DL (ref 6.4–8.2)
WBC # BLD: 8.8 K/CU MM (ref 4–10.5)

## 2022-03-05 PROCEDURE — 97110 THERAPEUTIC EXERCISES: CPT

## 2022-03-05 PROCEDURE — 94761 N-INVAS EAR/PLS OXIMETRY MLT: CPT

## 2022-03-05 PROCEDURE — 6360000002 HC RX W HCPCS: Performed by: NURSE PRACTITIONER

## 2022-03-05 PROCEDURE — 93005 ELECTROCARDIOGRAM TRACING: CPT | Performed by: NURSE PRACTITIONER

## 2022-03-05 PROCEDURE — 85025 COMPLETE CBC W/AUTO DIFF WBC: CPT

## 2022-03-05 PROCEDURE — 36415 COLL VENOUS BLD VENIPUNCTURE: CPT

## 2022-03-05 PROCEDURE — 6370000000 HC RX 637 (ALT 250 FOR IP): Performed by: NURSE PRACTITIONER

## 2022-03-05 PROCEDURE — 1200000000 HC SEMI PRIVATE

## 2022-03-05 PROCEDURE — 80053 COMPREHEN METABOLIC PANEL: CPT

## 2022-03-05 PROCEDURE — 93010 ELECTROCARDIOGRAM REPORT: CPT | Performed by: INTERNAL MEDICINE

## 2022-03-05 PROCEDURE — 97162 PT EVAL MOD COMPLEX 30 MIN: CPT

## 2022-03-05 PROCEDURE — 2580000003 HC RX 258: Performed by: NURSE PRACTITIONER

## 2022-03-05 RX ADMIN — AMLODIPINE BESYLATE 10 MG: 5 TABLET ORAL at 09:25

## 2022-03-05 RX ADMIN — FOLIC ACID 1 MG: 1 TABLET ORAL at 09:25

## 2022-03-05 RX ADMIN — CLOPIDOGREL BISULFATE 75 MG: 75 TABLET ORAL at 09:25

## 2022-03-05 RX ADMIN — POLYETHYLENE GLYCOL (3350) 17 G: 17 POWDER, FOR SOLUTION ORAL at 09:25

## 2022-03-05 RX ADMIN — CARBAMAZEPINE 200 MG: 200 TABLET ORAL at 20:31

## 2022-03-05 RX ADMIN — ENOXAPARIN SODIUM 30 MG: 100 INJECTION SUBCUTANEOUS at 09:26

## 2022-03-05 RX ADMIN — ASPIRIN 81 MG 81 MG: 81 TABLET ORAL at 09:25

## 2022-03-05 RX ADMIN — ACETAMINOPHEN 650 MG: 325 TABLET ORAL at 15:37

## 2022-03-05 RX ADMIN — PREDNISONE 5 MG: 10 TABLET ORAL at 09:25

## 2022-03-05 RX ADMIN — DOCUSATE SODIUM 100 MG: 100 CAPSULE, LIQUID FILLED ORAL at 09:25

## 2022-03-05 RX ADMIN — CARBAMAZEPINE 200 MG: 200 TABLET ORAL at 09:24

## 2022-03-05 RX ADMIN — CLONIDINE HYDROCHLORIDE 0.1 MG: 0.1 TABLET ORAL at 09:25

## 2022-03-05 RX ADMIN — ACETAMINOPHEN 650 MG: 325 TABLET ORAL at 09:24

## 2022-03-05 RX ADMIN — SODIUM CHLORIDE, PRESERVATIVE FREE 10 ML: 5 INJECTION INTRAVENOUS at 09:27

## 2022-03-05 RX ADMIN — PREDNISONE 5 MG: 10 TABLET ORAL at 20:31

## 2022-03-05 RX ADMIN — ATORVASTATIN CALCIUM 20 MG: 20 TABLET, FILM COATED ORAL at 09:25

## 2022-03-05 RX ADMIN — SODIUM CHLORIDE, PRESERVATIVE FREE 10 ML: 5 INJECTION INTRAVENOUS at 20:31

## 2022-03-05 RX ADMIN — PANTOPRAZOLE SODIUM 40 MG: 40 TABLET, DELAYED RELEASE ORAL at 05:09

## 2022-03-05 ASSESSMENT — PAIN SCALES - GENERAL
PAINLEVEL_OUTOF10: 8
PAINLEVEL_OUTOF10: 8

## 2022-03-05 NOTE — PLAN OF CARE
Problem: Falls - Risk of:  Goal: Will remain free from falls  Description: Will remain free from falls  3/5/2022 1124 by Irish Nina LPN  Outcome: Ongoing  3/4/2022 2259 by Lon Rose LPN  Outcome: Ongoing  Goal: Absence of physical injury  Description: Absence of physical injury  3/5/2022 1124 by Irish Nina LPN  Outcome: Ongoing  3/4/2022 2259 by Lon Rose LPN  Outcome: Ongoing     Problem: Skin Integrity:  Goal: Will show no infection signs and symptoms  Description: Will show no infection signs and symptoms  3/5/2022 1124 by Irish Nina LPN  Outcome: Ongoing  3/4/2022 2259 by Lon Rose LPN  Outcome: Ongoing  Goal: Absence of new skin breakdown  Description: Absence of new skin breakdown  3/5/2022 1124 by Irish Nina LPN  Outcome: Ongoing  3/4/2022 2259 by Lon Rose LPN  Outcome: Ongoing     Problem: Confusion - Acute:  Goal: Absence of continued neurological deterioration signs and symptoms  Description: Absence of continued neurological deterioration signs and symptoms  3/5/2022 1124 by Irish Nina LPN  Outcome: Ongoing  3/4/2022 2259 by Lon Rose LPN  Outcome: Ongoing  Goal: Mental status will be restored to baseline  Description: Mental status will be restored to baseline  3/5/2022 1124 by Irish Nina LPN  Outcome: Ongoing  3/4/2022 2259 by Lon Rose LPN  Outcome: Ongoing     Problem: Discharge Planning:  Goal: Ability to perform activities of daily living will improve  Description: Ability to perform activities of daily living will improve  3/5/2022 1124 by Irish Nina LPN  Outcome: Ongoing  3/4/2022 2259 by Lon Rose LPN  Outcome: Ongoing  Goal: Participates in care planning  Description: Participates in care planning  3/5/2022 1124 by Irish Nina LPN  Outcome: Ongoing  3/4/2022 2259 by Lon Rose LPN  Outcome: Ongoing     Problem: Injury - Risk of, Physical Injury:  Goal: Will remain free from falls  Description: Will remain free from falls  3/5/2022 1124 by Mohini Marsh LPN  Outcome: Ongoing  3/4/2022 2259 by Yin Shankar LPN  Outcome: Ongoing  Goal: Absence of physical injury  Description: Absence of physical injury  3/5/2022 1124 by Mohini Marsh LPN  Outcome: Ongoing  3/4/2022 2259 by Yin Shankar LPN  Outcome: Ongoing     Problem: Mood - Altered:  Goal: Mood stable  Description: Mood stable  3/5/2022 1124 by Mohini Marsh LPN  Outcome: Ongoing  3/4/2022 2259 by Yin Shankar LPN  Outcome: Ongoing  Goal: Absence of abusive behavior  Description: Absence of abusive behavior  3/5/2022 1124 by Mohini Marsh LPN  Outcome: Ongoing  3/4/2022 2259 by Yin Shankar LPN  Outcome: Ongoing  Goal: Verbalizations of feeling emotionally comfortable while being cared for will increase  Description: Verbalizations of feeling emotionally comfortable while being cared for will increase  3/5/2022 1124 by Mohini Marsh LPN  Outcome: Ongoing  3/4/2022 2259 by Yin Shankar LPN  Outcome: Ongoing     Problem: Psychomotor Activity - Altered:  Goal: Absence of psychomotor disturbance signs and symptoms  Description: Absence of psychomotor disturbance signs and symptoms  3/5/2022 1124 by Mohini Marsh LPN  Outcome: Ongoing  3/4/2022 2259 by Yin Shankar LPN  Outcome: Ongoing     Problem: Sensory Perception - Impaired:  Goal: Demonstrations of improved sensory functioning will increase  Description: Demonstrations of improved sensory functioning will increase  3/5/2022 1124 by Mohini Marsh LPN  Outcome: Ongoing  3/4/2022 2259 by Yin Shankar LPN  Outcome: Ongoing  Goal: Decrease in sensory misperception frequency  Description: Decrease in sensory misperception frequency  3/5/2022 1124 by Mohini Marsh LPN  Outcome: Ongoing  3/4/2022 2259 by Yin Shankar LPN  Outcome: Ongoing  Goal: Able to refrain from responding to false sensory perceptions  Description: Able to refrain from responding to false sensory perceptions  3/5/2022 1124 by Chapincito Flowers LPN  Outcome: Ongoing  3/4/2022 2259 by Elsa Thomas LPN  Outcome: Ongoing  Goal: Demonstrates accurate environmental perceptions  Description: Demonstrates accurate environmental perceptions  3/5/2022 1124 by Chapincito Flowers LPN  Outcome: Ongoing  3/4/2022 2259 by Elsa Thomas LPN  Outcome: Ongoing  Goal: Able to distinguish between reality-based and nonreality-based thinking  Description: Able to distinguish between reality-based and nonreality-based thinking  3/5/2022 1124 by Chapincito Flowers LPN  Outcome: Ongoing  3/4/2022 2259 by Elsa Thomas LPN  Outcome: Ongoing  Goal: Able to interrupt nonreality-based thinking  Description: Able to interrupt nonreality-based thinking  3/5/2022 1124 by Chapincito Flowers LPN  Outcome: Ongoing  3/4/2022 2259 by Elsa Thomas LPN  Outcome: Ongoing     Problem: Sleep Pattern Disturbance:  Goal: Appears well-rested  Description: Appears well-rested  3/5/2022 1124 by Chapincito Flowers LPN  Outcome: Ongoing  3/4/2022 2259 by Elsa Thomas LPN  Outcome: Ongoing

## 2022-03-05 NOTE — PROGRESS NOTES
Physician Progress Note      PATIENT:               Deepthi Darden  CSN #:                  [id number]  :                       1934  ADMIT DATE:       3/4/2022 12:16 PM  DISCH DATE:  RESPONDING  PROVIDER #:        Kumar Schrader        QUERY TEXT:    Stage of Chronic Kidney Disease: Please provide further specificity, if known. Clinical indicators include: ckd, creatinine  Options provided:  -- Chronic kidney disease stage 1  -- Chronic kidney disease stage 2  -- Chronic kidney disease stage 3  -- Chronic kidney disease stage 3a  -- Chronic kidney disease stage 3b  -- Chronic kidney disease stage 4  -- Chronic kidney disease stage 5  -- Chronic kidney disease stage 5, requiring dialysis  -- End stage renal disease  -- Other - I will add my own diagnosis  -- Disagree - Not applicable / Not valid  -- Disagree - Clinically Unable to determine / Unknown        PROVIDER RESPONSE TEXT:    The patient has chronic kidney disease stage 2.       Electronically signed by:  Kumar Schrader 3/5/2022 2:36 PM

## 2022-03-05 NOTE — PLAN OF CARE
Problem: Falls - Risk of:  Goal: Will remain free from falls  Description: Will remain free from falls  3/4/2022 2259 by Grecia Hummel LPN  Outcome: Ongoing  3/4/2022 1837 by Wolfgang Galeas RN  Outcome: Ongoing  Goal: Absence of physical injury  Description: Absence of physical injury  3/4/2022 2259 by Grecia Hummel LPN  Outcome: Ongoing  3/4/2022 1837 by Wolfgang Galeas RN  Outcome: Ongoing     Problem: Skin Integrity:  Goal: Will show no infection signs and symptoms  Description: Will show no infection signs and symptoms  3/4/2022 2259 by Grecia Hummel LPN  Outcome: Ongoing  3/4/2022 1837 by Wolfgang Galeas RN  Outcome: Ongoing  Goal: Absence of new skin breakdown  Description: Absence of new skin breakdown  3/4/2022 2259 by Grecia Hummel LPN  Outcome: Ongoing  3/4/2022 1837 by Wolfgang Galeas RN  Outcome: Ongoing     Problem: Confusion - Acute:  Goal: Absence of continued neurological deterioration signs and symptoms  Description: Absence of continued neurological deterioration signs and symptoms  3/4/2022 2259 by Grecia Hummel LPN  Outcome: Ongoing  3/4/2022 1837 by Wolfgang Galeas RN  Outcome: Ongoing  Goal: Mental status will be restored to baseline  Description: Mental status will be restored to baseline  3/4/2022 2259 by Grecia Hummel LPN  Outcome: Ongoing  3/4/2022 1837 by Wolfgang Galeas RN  Outcome: Ongoing     Problem: Discharge Planning:  Goal: Ability to perform activities of daily living will improve  Description: Ability to perform activities of daily living will improve  3/4/2022 2259 by Grecia Hummel LPN  Outcome: Ongoing  3/4/2022 1837 by Wolfgang Galeas RN  Outcome: Ongoing  Goal: Participates in care planning  Description: Participates in care planning  3/4/2022 2259 by Grecia Hummel LPN  Outcome: Ongoing  3/4/2022 1837 by Wolfgang Galeas RN  Outcome: Ongoing     Problem: Injury - Risk of, Physical Injury:  Goal: Will remain free from falls  Description: Will perceptions  3/4/2022 2259 by Yin Shankar LPN  Outcome: Ongoing  3/4/2022 1837 by Kayleen Moya RN  Outcome: Ongoing  Goal: Demonstrates accurate environmental perceptions  Description: Demonstrates accurate environmental perceptions  3/4/2022 2259 by Yin Shankar LPN  Outcome: Ongoing  3/4/2022 1837 by Kayleen Moya RN  Outcome: Ongoing  Goal: Able to distinguish between reality-based and nonreality-based thinking  Description: Able to distinguish between reality-based and nonreality-based thinking  3/4/2022 2259 by Yin Shankar LPN  Outcome: Ongoing  3/4/2022 1837 by Kayleen Moya RN  Outcome: Ongoing  Goal: Able to interrupt nonreality-based thinking  Description: Able to interrupt nonreality-based thinking  3/4/2022 2259 by Yin Shanakr LPN  Outcome: Ongoing  3/4/2022 1837 by Kayleen Moya RN  Outcome: Ongoing     Problem: Sleep Pattern Disturbance:  Goal: Appears well-rested  Description: Appears well-rested  3/4/2022 2259 by Yin Shankar LPN  Outcome: Ongoing  3/4/2022 1837 by Kayleen Moya RN  Outcome: Ongoing

## 2022-03-05 NOTE — PROGRESS NOTES
Hospitalist Progress Note      Name:  Emi Mack /Age/Sex: 1934  (80 y.o. female)   MRN & CSN:  [id number] & [id number] Admission Date/Time: 3/4/2022 12:16 PM   Location:  [address] [place] [address]- PCP: Margie Doyle MD         Hospital Day: 2    Assessment and Plan:   Emi Mack is a 80 y.o.  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  -elevated creatine at 1.7 with GFR 34 on admission  -received IV fluids  -improved creatine at 1.1  -keep monitor    Failure to thrive  malnutrition  -pt is not eating and drinking well at home  -PT/OT and case management consulted  -likely need SNF placement  -Dietitian consult    Elevated troponin  -chronic, baseline was between 0.03-0.54  -troponin 0.059-0.029-0.029  -keep monitoring    Hypertension  -continue home meds, hold Lisinopril/HCtZ    History of TIA  -continue Plavix and ASA    Trigeminal neuralgia  -continue home meds Tegretol    Rheumatoid arthritis  -continue prednisone    DVT prophylasix   -Lovenox    Diet ADULT DIET; Regular   DVT Prophylaxis [] Lovenox, []  Heparin, [] SCDs, [] Ambulation   GI Prophylaxis [] PPI,  [] H2 Blocker,  [] Carafate,  [] Diet/Tube Feeds   Code Status Full Code   Disposition Patient requires continued admission due to medical condition     History of Present Illness:     Chief Complaint: Self-care deficit      Emi Mack is a 80 y.o.  female with history of hypertension, hyperlipidemia, TIA, rheumatoid arthritis, trigeminal neuralgia recently was admitted to hospital for Covid pneumonia presents with generalized weakness and MOSES. Patient was discharged home from nursing home recently. Family reported that patient was not eating and drinking well. Patient was found malnutrition and elevated creatinine level at 1.7. Patient has been treated with IV fluids and supportive care. Pending PT OT evaluation and recommendation. Ten point ROS reviewed negative, unless as noted above    Objective:   No intake or output data in the 24 hours ending 03/05/22 1348   Vitals:   Vitals:    03/05/22 0715   BP: (!) 149/76   Pulse: 84   Resp: 16   Temp: 98 °F (36.7 °C)   SpO2: 100%     Physical Exam:   GEN Awake female, sitting upright in bed in no apparent distress. Chronic sick looking  EYES Pupils are equally round. No scleral erythema, discharge, or conjunctivitis. HENT Mucous membranes are moist. Oral pharynx without exudates, no evidence of thrush. NECK Supple, no apparent thyromegaly or masses. RESP Clear to auscultation, no wheezes, rales or rhonchi. Symmetric chest movement while on room air. CARDIO/VASC S1/S2 auscultated. Regular rate without appreciable murmurs, rubs, or gallops. No JVD or carotid bruits. Peripheral pulses equal bilaterally and palpable. No peripheral edema. GI Abdomen is soft without significant tenderness, masses, or guarding. Bowel sounds are normoactive. Rectal exam deferred.  No costovertebral angle tenderness. Normal appearing external genitalia. Lazo catheter is not present. HEME/LYMPH No palpable cervical lymphadenopathy and no hepatosplenomegaly. No petechiae or ecchymoses. MSK No gross joint deformities. SKIN Normal coloration, warm, dry. NEURO Cranial nerves appear grossly intact, normal speech, no lateralizing weakness. PSYCH Awake, alert, oriented to place and people. Affect appropriate.     Medications:   Medications:    sodium chloride flush  5-40 mL IntraVENous 2 times per day    cloNIDine  0.1 mg Oral Daily    aspirin  81 mg Oral Daily    clopidogrel  75 mg Oral Daily    amLODIPine  10 mg Oral Daily    polyethylene glycol  17 g Oral Daily    [Held by provider] lisinopril-hydroCHLOROthiazide  2 tablet Oral Daily    carBAMazepine  200 mg Oral BID    docusate sodium  100 mg Oral Daily    metoprolol tartrate  25 mg Oral Daily    pantoprazole  40 mg Oral QAM AC    atorvastatin  20 mg Oral Daily    folic acid  1 mg Oral Daily    predniSONE  5 mg Oral BID    enoxaparin  30 mg SubCUTAneous Daily    [START ON 3/13/2022] methotrexate  12.5 mg Oral Weekly      Infusions:    sodium chloride       PRN Meds: sodium chloride flush, 5-40 mL, PRN  sodium chloride, 25 mL, PRN  ondansetron, 4 mg, Q8H PRN   Or  ondansetron, 4 mg, Q6H PRN  polyethylene glycol, 17 g, Daily PRN  acetaminophen, 650 mg, Q6H PRN   Or  acetaminophen, 650 mg, Q6H PRN  dicyclomine, 10 mg, BID PRN          Patient is still admitted because PT OT evaluation and recommendation.  The anticipated discharge is in 24 to 48 hours    Electronically signed by Jake Parkinson CNP on 3/5/2022 at 1:48 PM

## 2022-03-05 NOTE — PROGRESS NOTES
Physical Therapy  [facility] ACUTE CARE PHYSICAL THERAPY EVALUATION  Beryl Alejandre, 1934, [id number]-A, 3/5/2022    History  [place]:  The primary encounter diagnosis was Fatigue, unspecified type. Diagnoses of Hypotension, unspecified hypotension type, Elevated BUN, Serum creatinine raised, and Troponin level elevated were also pertinent to this visit. Patient  has a past medical history of Arthritis, Cataract of left eye, Cataract of right eye, Chronic kidney disease, H/O cardiovascular stress test, H/O Doppler ultrasound, H/O Doppler ultrasound, H/O echocardiogram, H/O tilt table evaluation, H/O transesophageal echocardiography (MARIO) for monitoring, Hyperlipidemia, Hypertension, Movement disorder, Neuromuscular disorder ([address] Osteoporosis, Pneumonia, Psychiatric problem, TIA (transient ischemic attack), and Trigeminal neuralgia of right side of face. Patient  has a past surgical history that includes Appendectomy; Hysterectomy; Cholecystectomy; eye surgery; and Cataract removal.    Subjective:  Patient states: \"My feet are painful\". Pain:  5/10 plantar surface B feet. Daughter, Michel Pineda, says this is secondary to her neuropathy. Also notes pain at L great toe due to open sore. Communication with other providers:  Handoff to RN.   Restrictions: fall risk    Home Setup/Prior level of function  Social/Functional History  Lives With: Family,Daughter (daughter - nathaniel)  Type of Home: House  Home Layout: One level  Home Access: Ramped entrance  Bathroom Shower/Tub: Tub/Shower unit,Shower chair without back (currently bedsides baths)  Bathroom Toilet: Standard  Bathroom Equipment: Grab bars in shower  Bathroom Accessibility: Accessible  Home Equipment: [address] [facility] bed,Lift chair  Receives Help From: Family  ADL Assistance: Needs assistance  Homemaking Assistance: Needs assistance  Homemaking Responsibilities: No  Ambulation Assistance: Needs assistance  Transfer Assistance: Needs assistance  Active : No    Examination of body systems (includes body structures/functions, activity/participation limitations):  · Observation:  Supine in bed upon arrival  · Vision:  [facility] [place]  · Hearing:  [facility] [place]  · Cardiopulmonary:  WFL  · Cognition: oriented x 4, see OT/SLP note for further evaluation. Musculoskeletal  · ROM R/L:  WFL. · Strength R/L:  4/5, in function and endurance. · Neuro:  Decreased sensation B feet   · Gait pattern: pt unable to ambulate due to pain B feet. Mobility:  · Rolling L/R:  SBA maximal cues  · Supine to sit:  SBA maximal cues  · Transfers: SBA maximal cues  · Sitting balance:  fair. · Standing balance:  unable. · Gait: pt unable to ambulate due to pain B feet. [facility] Mob[facility]  AM-PAC Inpatient Mobility Raw Score : 14    Treatment:  Pt performed EIB activities including LAQ, marching, ankle pumps, ankle circles. Safety: patient left in bed, call light within reach, RN notified, gait belt used. Assessment:  Pt is a 80year old female with complaints of muscle weakness and difficulty with ADL's. Pt unable to ambulate or stand due to pain at plantar surface of B feet. Pt requires SBA with maximal cues for rolling, transfers and supine to/from sitting. Pt is performing ADL's below premorbid levels and will benefit from skilled therapy during hospitalization as well as upon d/c to SNF in order to address concerns with muscle weakness, balance, and ADL performance. Complexity: Moderate  Prognosis: Good, no significant barriers to participation at this time. Plan Times per week: 3/week, 1 week. Discharge Recommendations: Subacute/Skilled Nursing Facility  Equipment: none    Goals:  Short term goals  Short term goal 1: Pt will perform bed mobility with independence. Short term goal 2: Pt will perform transfers with independence. Short term goal 3: Pt will perform seated upright posturing EOB for 2 minutes with independence.   Short term goal 4: Pt will perform scooting up in bed with independence. Treatment plan:  Bed mobility, transfers, balance, gait, TA, TX.     Recommendations for NURSING mobility: pt does not ambulate    Time:   Time in: 1217  Time out: 1240  Timed treatment minutes: 10  Total time: 23    Electronically signed by:    Brodie Chu PT  3/5/2022, 12:48 PM

## 2022-03-06 LAB
ANION GAP SERPL CALCULATED.3IONS-SCNC: 10 MMOL/L (ref 4–16)
BUN BLDV-MCNC: 31 MG/DL (ref 6–23)
CALCIUM SERPL-MCNC: 9.8 MG/DL (ref 8.3–10.6)
CHLORIDE BLD-SCNC: 104 MMOL/L (ref 99–110)
CO2: 23 MMOL/L (ref 21–32)
CREAT SERPL-MCNC: 1 MG/DL (ref 0.6–1.1)
CULTURE: ABNORMAL
GFR AFRICAN AMERICAN: >60 ML/MIN/1.73M2
GFR NON-AFRICAN AMERICAN: 52 ML/MIN/1.73M2
GLUCOSE BLD-MCNC: 106 MG/DL (ref 70–99)
Lab: ABNORMAL
POTASSIUM SERPL-SCNC: 4.7 MMOL/L (ref 3.5–5.1)
SODIUM BLD-SCNC: 137 MMOL/L (ref 135–145)
SPECIMEN: ABNORMAL

## 2022-03-06 PROCEDURE — 94761 N-INVAS EAR/PLS OXIMETRY MLT: CPT

## 2022-03-06 PROCEDURE — 36415 COLL VENOUS BLD VENIPUNCTURE: CPT

## 2022-03-06 PROCEDURE — 2500000003 HC RX 250 WO HCPCS: Performed by: NURSE PRACTITIONER

## 2022-03-06 PROCEDURE — 1200000000 HC SEMI PRIVATE

## 2022-03-06 PROCEDURE — 6370000000 HC RX 637 (ALT 250 FOR IP): Performed by: NURSE PRACTITIONER

## 2022-03-06 PROCEDURE — 2580000003 HC RX 258: Performed by: NURSE PRACTITIONER

## 2022-03-06 PROCEDURE — 6360000002 HC RX W HCPCS: Performed by: NURSE PRACTITIONER

## 2022-03-06 PROCEDURE — 80048 BASIC METABOLIC PNL TOTAL CA: CPT

## 2022-03-06 RX ORDER — DEXTROSE, SODIUM CHLORIDE, AND POTASSIUM CHLORIDE 5; .45; .15 G/100ML; G/100ML; G/100ML
INJECTION INTRAVENOUS CONTINUOUS
Status: DISCONTINUED | OUTPATIENT
Start: 2022-03-06 | End: 2022-03-07

## 2022-03-06 RX ADMIN — FOLIC ACID 1 MG: 1 TABLET ORAL at 07:57

## 2022-03-06 RX ADMIN — SODIUM CHLORIDE, PRESERVATIVE FREE 10 ML: 5 INJECTION INTRAVENOUS at 07:57

## 2022-03-06 RX ADMIN — CLOPIDOGREL BISULFATE 75 MG: 75 TABLET ORAL at 07:56

## 2022-03-06 RX ADMIN — AMLODIPINE BESYLATE 10 MG: 5 TABLET ORAL at 07:57

## 2022-03-06 RX ADMIN — POTASSIUM CHLORIDE, DEXTROSE MONOHYDRATE AND SODIUM CHLORIDE: 150; 5; 450 INJECTION, SOLUTION INTRAVENOUS at 13:06

## 2022-03-06 RX ADMIN — CARBAMAZEPINE 200 MG: 200 TABLET ORAL at 07:57

## 2022-03-06 RX ADMIN — ENOXAPARIN SODIUM 30 MG: 100 INJECTION SUBCUTANEOUS at 07:56

## 2022-03-06 RX ADMIN — ACETAMINOPHEN 650 MG: 325 TABLET ORAL at 12:45

## 2022-03-06 RX ADMIN — CARBAMAZEPINE 200 MG: 200 TABLET ORAL at 21:46

## 2022-03-06 RX ADMIN — PREDNISONE 5 MG: 10 TABLET ORAL at 21:46

## 2022-03-06 RX ADMIN — METOPROLOL TARTRATE 25 MG: 25 TABLET, FILM COATED ORAL at 07:57

## 2022-03-06 RX ADMIN — PREDNISONE 5 MG: 10 TABLET ORAL at 07:56

## 2022-03-06 RX ADMIN — POLYETHYLENE GLYCOL (3350) 17 G: 17 POWDER, FOR SOLUTION ORAL at 07:57

## 2022-03-06 RX ADMIN — DOCUSATE SODIUM 100 MG: 100 CAPSULE, LIQUID FILLED ORAL at 07:57

## 2022-03-06 RX ADMIN — ATORVASTATIN CALCIUM 20 MG: 20 TABLET, FILM COATED ORAL at 07:56

## 2022-03-06 RX ADMIN — CLONIDINE HYDROCHLORIDE 0.1 MG: 0.1 TABLET ORAL at 07:57

## 2022-03-06 RX ADMIN — ASPIRIN 81 MG 81 MG: 81 TABLET ORAL at 07:57

## 2022-03-06 ASSESSMENT — PAIN DESCRIPTION - DESCRIPTORS: DESCRIPTORS: ACHING

## 2022-03-06 ASSESSMENT — PAIN SCALES - WONG BAKER: WONGBAKER_NUMERICALRESPONSE: 10

## 2022-03-06 ASSESSMENT — PAIN DESCRIPTION - PROGRESSION: CLINICAL_PROGRESSION: GRADUALLY WORSENING

## 2022-03-06 ASSESSMENT — PAIN SCALES - GENERAL
PAINLEVEL_OUTOF10: 3
PAINLEVEL_OUTOF10: 0

## 2022-03-06 ASSESSMENT — PAIN DESCRIPTION - FREQUENCY: FREQUENCY: INTERMITTENT

## 2022-03-06 ASSESSMENT — PAIN DESCRIPTION - ORIENTATION: ORIENTATION: LOWER;MID

## 2022-03-06 ASSESSMENT — PAIN DESCRIPTION - LOCATION: LOCATION: BACK

## 2022-03-06 ASSESSMENT — PAIN DESCRIPTION - ONSET: ONSET: GRADUAL

## 2022-03-06 ASSESSMENT — PAIN - FUNCTIONAL ASSESSMENT: PAIN_FUNCTIONAL_ASSESSMENT: ACTIVITIES ARE NOT PREVENTED

## 2022-03-06 ASSESSMENT — PAIN DESCRIPTION - PAIN TYPE: TYPE: CHRONIC PAIN

## 2022-03-06 NOTE — PLAN OF CARE
Nutrition Problem #1: Predicted inadequate energy intake  Intervention: Food and/or Nutrient Delivery: Modify Current Diet,Start Oral Nutrition Supplement  Nutritional Goals: Pt will consume at least 50% or more of PO intakes

## 2022-03-06 NOTE — PROGRESS NOTES
Comprehensive Nutrition Assessment    Type and Reason for Visit:  Initial,Positive Nutrition Screen (Missing teeth, weight loss, and poor appetite) Consult for Gil Scale     Nutrition Recommendations/Plan:   · Obtain measured weight to accurately assess nutritional status  · Start Easy to Chew Diet, may adjust back to regular diet if pt requests   · Start Frozen oral nutrition supplement, Fortified pudding supplements and standard oral nutrition supplement   · Document PO intakes in I/O   · Continue total assist feeding, recommend to offer oral nutrition supplements with meds if able     Nutrition Assessment:  Admitted with COVID 19 PNA, MOSES, and self-care deficit. PMH: HLD, HTN, CKD. Pt is currently on regular diet with inconsistent PO intakes between %. Poor intake, difficulty chewing without dentures PTA. KATHLEEN verify weight loss as no current weight taken during admission. Follow as moderate nutrition risk. Malnutrition Assessment:  Malnutrition Status:  Insufficient data    Context:  Acute Illness       Estimated Daily Nutrient Needs:  Energy (kcal):  3711-1020 (30-35 kcals/kg); Weight Used for Energy Requirements:  Ideal     Protein (g):  63-74 (1.1-1.3 g/kg); Weight Used for Protein Requirements:  Ideal        Fluid (ml/day):  6298-2646; Method Used for Fluid Requirements:  1 ml/kcal      Nutrition Related Findings:  Rx: folvite, deltasone, colace, glycolax      Wounds:  None       Current Nutrition Therapies:    ADULT DIET;  Regular    Anthropometric Measures:  · Height: 5' 5\" (165.1 cm)  · Current Body Weight: 122 lb (55.3 kg)   · Admission Body Weight:  (from Jan 2022)    · Usual Body Weight: 122 lb (55.3 kg) (1/31/22)     · Ideal Body Weight: 125 lbs; % Ideal Body Weight 97.6 %   · BMI: 20.3  · Adjusted Body Weight:  ; No Adjustment   · Adjusted BMI:      · BMI Categories: Underweight (BMI less than 22) age over 72       Nutrition Diagnosis:   · Predicted inadequate energy intake related to biting/chewing (masticatory) difficulty as evidenced by poor dentition (inconsistent PO intakes)    Nutrition Interventions:   Food and/or Nutrient Delivery:  Modify Current Diet,Start Oral Nutrition Supplement  Nutrition Education/Counseling:  No recommendation at this time   Coordination of Nutrition Care:  Continue to monitor while inpatient    Goals:  Pt will consume at least 50% or more of PO intakes       Nutrition Monitoring and Evaluation:   Behavioral-Environmental Outcomes:  None Identified   Food/Nutrient Intake Outcomes:  Food and Nutrient Intake,Supplement Intake  Physical Signs/Symptoms Outcomes:  Biochemical Data,Chewing or Swallowing,GI Status,Meal Time Behavior,Weight     Discharge Planning:     Too soon to determine     Electronically signed by Keiko Clarke RD, LD on 3/6/22 at 3:28 PM EST    Contact: [phone]

## 2022-03-06 NOTE — PROGRESS NOTES
Hospitalist Progress Note      Name:  Zayda Jefferson /Age/Sex: 1934  (80 y.o. female)   MRN & CSN:  [id number] & [id number] Admission Date/Time: 3/4/2022 12:16 PM   Location:  [address] [place] [address] PCP: Arabella Haines MD         Hospital Day: 3    Assessment and Plan:   Zayda Jefferson is a 80 y.o.  female with history of hypertension, hyperlipidemia, CKD, recently was admitted to hospital for Covid pneumonia who presents with Self-care deficit and MOSES. PT evaluation completed and recommended SNF. Pre-CERT started.     MOSES on CKD stage II  -elevated creatine at 1.7 with GFR 34 on admission  -received IV fluids  -improved creatine at 1.1  -BMP daily, today's lab is pending     Failure to thrive  malnutrition  -pt is not eating and drinking well at home  -PT/OT and case management consulted  -likely need SNF placement  -Dietitian consult     Elevated troponin  -chronic, baseline was between 0.03-0.54  -troponin 0.059-0.029-0.029  -keep monitoring     Hypertension  -continue home meds, hold Lisinopril/HCtZ     History of TIA  -continue Plavix and ASA     Trigeminal neuralgia  -continue home meds Tegretol     Rheumatoid arthritis  -continue prednisone     DVT prophylasix   -Lovenox    Diet ADULT DIET; Regular   DVT Prophylaxis [x] Lovenox, []  Heparin, [] SCDs, [] Ambulation   GI Prophylaxis [] PPI,  [] H2 Blocker,  [] Carafate,  [] Diet/Tube Feeds   Code Status Full Code   Disposition Patient requires continued admission due to SNF     History of Present Illness:     Chief Complaint: Self-care deficit     Zayda Jefferson is a 80 y.o.  female with history of hypertension, hyperlipidemia, TIA, rheumatoid arthritis, trigeminal neuralgia recently was admitted to hospital for Covid pneumonia presents with generalized weakness and MOSES. Patient was discharged home from nursing home recently. Family reported that patient was not eating and drinking well.   Patient was found malnutrition and elevated creatinine level at 1.7. Patient has been treated with IV fluids and supportive care. Pending PT OT evaluation and recommendation. Ten point ROS reviewed negative, unless as noted above    Objective:   No intake or output data in the 24 hours ending 03/06/22 1237   Vitals:   Vitals:    03/06/22 0745   BP: (!) 154/100   Pulse: 65   Resp: 16   Temp: 97.6 °F (36.4 °C)   SpO2: 100%     Physical Exam:   GEN Awake female, laying on the bed, generalized weakness, chronically sick looking,. Appears given age. EYES Pupils are equally round. No scleral erythema, discharge, or conjunctivitis. HENT Mucous membranes are moist. Oral pharynx without exudates, no evidence of thrush. NECK Supple, no apparent thyromegaly or masses. RESP Clear to auscultation, no wheezes, rales or rhonchi. Symmetric chest movement while on room air. CARDIO/VASC S1/S2 auscultated. Regular rate without appreciable murmurs, rubs, or gallops. No JVD or carotid bruits. Peripheral pulses equal bilaterally and palpable. No peripheral edema. GI Abdomen is soft without significant tenderness, masses, or guarding. Bowel sounds are normoactive. Rectal exam deferred.  No costovertebral angle tenderness. Normal appearing external genitalia. Lazo catheter is not present. HEME/LYMPH No palpable cervical lymphadenopathy and no hepatosplenomegaly. No petechiae or ecchymoses. MSK No gross joint deformities. SKIN Normal coloration, warm, dry. NEURO Cranial nerves appear grossly intact, normal speech, no lateralizing weakness. PSYCH Awake, generalized weakness, affect appropriate.     Medications:   Medications:    sodium chloride flush  5-40 mL IntraVENous 2 times per day    cloNIDine  0.1 mg Oral Daily    aspirin  81 mg Oral Daily    clopidogrel  75 mg Oral Daily    amLODIPine  10 mg Oral Daily    polyethylene glycol  17 g Oral Daily    [Held by provider] lisinopril-hydroCHLOROthiazide  2 tablet Oral Daily    carBAMazepine  200 mg Oral BID    docusate sodium  100 mg Oral Daily    metoprolol tartrate  25 mg Oral Daily    pantoprazole  40 mg Oral QAM AC    atorvastatin  20 mg Oral Daily    folic acid  1 mg Oral Daily    predniSONE  5 mg Oral BID    enoxaparin  30 mg SubCUTAneous Daily    [START ON 3/13/2022] methotrexate  12.5 mg Oral Weekly      Infusions:    sodium chloride       PRN Meds: sodium chloride flush, 5-40 mL, PRN  sodium chloride, 25 mL, PRN  ondansetron, 4 mg, Q8H PRN   Or  ondansetron, 4 mg, Q6H PRN  polyethylene glycol, 17 g, Daily PRN  acetaminophen, 650 mg, Q6H PRN   Or  acetaminophen, 650 mg, Q6H PRN  dicyclomine, 10 mg, BID PRN          Patient is still admitted because SNF. The anticipated discharge is in 24 to 48 hours.      Electronically signed by Pattie Parkinson CNP on 3/6/2022 at 12:37 PM

## 2022-03-07 LAB
ANION GAP SERPL CALCULATED.3IONS-SCNC: 11 MMOL/L (ref 4–16)
BUN BLDV-MCNC: 27 MG/DL (ref 6–23)
CALCIUM SERPL-MCNC: 10.3 MG/DL (ref 8.3–10.6)
CHLORIDE BLD-SCNC: 107 MMOL/L (ref 99–110)
CO2: 23 MMOL/L (ref 21–32)
CREAT SERPL-MCNC: 0.9 MG/DL (ref 0.6–1.1)
GFR AFRICAN AMERICAN: >60 ML/MIN/1.73M2
GFR NON-AFRICAN AMERICAN: 59 ML/MIN/1.73M2
GLUCOSE BLD-MCNC: 88 MG/DL (ref 70–99)
POTASSIUM SERPL-SCNC: 4.4 MMOL/L (ref 3.5–5.1)
SODIUM BLD-SCNC: 141 MMOL/L (ref 135–145)

## 2022-03-07 PROCEDURE — 6360000002 HC RX W HCPCS: Performed by: NURSE PRACTITIONER

## 2022-03-07 PROCEDURE — 97110 THERAPEUTIC EXERCISES: CPT

## 2022-03-07 PROCEDURE — 2500000003 HC RX 250 WO HCPCS: Performed by: NURSE PRACTITIONER

## 2022-03-07 PROCEDURE — 1200000000 HC SEMI PRIVATE

## 2022-03-07 PROCEDURE — 94761 N-INVAS EAR/PLS OXIMETRY MLT: CPT

## 2022-03-07 PROCEDURE — 80048 BASIC METABOLIC PNL TOTAL CA: CPT

## 2022-03-07 PROCEDURE — 97530 THERAPEUTIC ACTIVITIES: CPT

## 2022-03-07 PROCEDURE — 36415 COLL VENOUS BLD VENIPUNCTURE: CPT

## 2022-03-07 PROCEDURE — 6370000000 HC RX 637 (ALT 250 FOR IP): Performed by: NURSE PRACTITIONER

## 2022-03-07 PROCEDURE — 97166 OT EVAL MOD COMPLEX 45 MIN: CPT

## 2022-03-07 PROCEDURE — 76937 US GUIDE VASCULAR ACCESS: CPT

## 2022-03-07 PROCEDURE — 97535 SELF CARE MNGMENT TRAINING: CPT

## 2022-03-07 PROCEDURE — 2580000003 HC RX 258: Performed by: NURSE PRACTITIONER

## 2022-03-07 RX ADMIN — SODIUM CHLORIDE, PRESERVATIVE FREE 10 ML: 5 INJECTION INTRAVENOUS at 20:59

## 2022-03-07 RX ADMIN — ENOXAPARIN SODIUM 30 MG: 100 INJECTION SUBCUTANEOUS at 09:42

## 2022-03-07 RX ADMIN — AMLODIPINE BESYLATE 10 MG: 5 TABLET ORAL at 09:40

## 2022-03-07 RX ADMIN — CLOPIDOGREL BISULFATE 75 MG: 75 TABLET ORAL at 09:42

## 2022-03-07 RX ADMIN — PREDNISONE 5 MG: 10 TABLET ORAL at 09:41

## 2022-03-07 RX ADMIN — DOCUSATE SODIUM 100 MG: 100 CAPSULE, LIQUID FILLED ORAL at 09:40

## 2022-03-07 RX ADMIN — ASPIRIN 81 MG 81 MG: 81 TABLET ORAL at 09:41

## 2022-03-07 RX ADMIN — FOLIC ACID 1 MG: 1 TABLET ORAL at 09:41

## 2022-03-07 RX ADMIN — CARBAMAZEPINE 200 MG: 200 TABLET ORAL at 09:41

## 2022-03-07 RX ADMIN — CARBAMAZEPINE 200 MG: 200 TABLET ORAL at 20:59

## 2022-03-07 RX ADMIN — POTASSIUM CHLORIDE, DEXTROSE MONOHYDRATE AND SODIUM CHLORIDE: 150; 5; 450 INJECTION, SOLUTION INTRAVENOUS at 05:51

## 2022-03-07 RX ADMIN — POLYETHYLENE GLYCOL (3350) 17 G: 17 POWDER, FOR SOLUTION ORAL at 09:42

## 2022-03-07 RX ADMIN — PANTOPRAZOLE SODIUM 40 MG: 40 TABLET, DELAYED RELEASE ORAL at 05:34

## 2022-03-07 RX ADMIN — CLONIDINE HYDROCHLORIDE 0.1 MG: 0.1 TABLET ORAL at 09:41

## 2022-03-07 RX ADMIN — METOPROLOL TARTRATE 25 MG: 25 TABLET, FILM COATED ORAL at 09:40

## 2022-03-07 RX ADMIN — ATORVASTATIN CALCIUM 20 MG: 20 TABLET, FILM COATED ORAL at 09:41

## 2022-03-07 RX ADMIN — PREDNISONE 5 MG: 10 TABLET ORAL at 20:59

## 2022-03-07 RX ADMIN — ACETAMINOPHEN 650 MG: 325 TABLET ORAL at 09:48

## 2022-03-07 ASSESSMENT — PAIN SCALES - WONG BAKER
WONGBAKER_NUMERICALRESPONSE: 0

## 2022-03-07 ASSESSMENT — PAIN SCALES - GENERAL
PAINLEVEL_OUTOF10: 1
PAINLEVEL_OUTOF10: 0

## 2022-03-07 NOTE — CARE COORDINATION
Spoke with Jessica Santana from [facility], Will start prior auth once therapy evals in [facility].

## 2022-03-07 NOTE — PROGRESS NOTES
[facility] HOSPITALIST PROGRESS NOTE      PCP: Ward Coker MD    Date of Admission: 3/4/2022    Subjective:   Pleasantly confused    Brief Hospital summary patient is an 69-year-old female with history of hypertension, rheumatoid arthritis who was admitted with weakness. Patient recently had COVID-19, on admission had elevated creatinine  IV fluids started    Creatinine back to baseline with hydration  PT OT consulted, recommended SNF, pre-CERT pending    Vitals signs:  Afebrile, heart rate 80s range, blood pressure 130/74, on room air    Medications: Amlodipine, aspirin, Lipitor, carbamazepine, clonidine, Plavix, Lovenox, folic acid, methotrexate, metoprolol, pantoprazole, prednisone    Antibiotics: None    Fluid status: None documented    Labs:   Sodium 141, potassium 4.4, chloride 1 7, bicarb 23, creatinine 0.9    Imaging:   X-ray left foot did not show any acute abnormalities, chronic appearing erosions along the medial and lateral aspect of third metatarsal head nonspecific    Chest x-ray showed improved bilateral opacities    Assessment/Plan:     Acute kidney injury  Failure to thrive  Elevated troponin  Essential hypertension  History of TIA  Trigeminal neuralgia  History of rheumatoid arthritis      Admitted with generalized weakness, creatinine elevated on admission, resolved with hydration  DC IV fluid, PT OT consulted, recommend SNF placement, placement pending      Continue aspirin Lipitor Plavix  Continue methotrexate and prednisone, folic acid  Continue metoprolol amlodipine, as needed hydralazine  Continue pantoprazole for GERD  Continue Lipitor for hyperlipidemia  Discharge once accepted    DVT prophlaxis:   Lovenox      Physical Exam Performed:       /74   Pulse 81   Temp 98.7 °F (37.1 °C) (Oral)   Resp 16   Ht 5' 5\" (1.651 m)   SpO2 100%   BMI 20.30 kg/m²     Physical Exam  Constitutional:       General: She is not in acute distress.      Appearance: Normal appearance. HENT:      Head: Normocephalic and atraumatic. Right Ear: External ear normal.      Left Ear: External ear normal.   Eyes:      Extraocular Movements: Extraocular movements intact. Pupils: Pupils are equal, round, and reactive to light. Cardiovascular:      Rate and Rhythm: Normal rate and regular rhythm. Heart sounds: No murmur heard. Pulmonary:      Effort: Pulmonary effort is normal. No respiratory distress. Breath sounds: Normal breath sounds. No wheezing. Abdominal:      General: Bowel sounds are normal. There is no distension. Palpations: Abdomen is soft. Tenderness: There is no abdominal tenderness. Musculoskeletal:         General: No swelling. Cervical back: Normal range of motion. Skin:     General: Skin is warm. Neurological:      General: No focal deficit present. Mental Status: She is alert and oriented to person, place, and time. Cranial Nerves: No cranial nerve deficit. Psychiatric:         Mood and Affect: Mood normal.         Labs:   Recent Labs     03/04/22  1314 03/05/22  0208   WBC 9.6 8.8   HGB 10.8* 9.3*   HCT 35.7* 30.5*   * 421     Recent Labs     03/05/22  0208 03/06/22  1525 03/07/22  0529    137 141   K 4.6 4.7 4.4    104 107   CO2 21 23 23   BUN 48* 31* 27*   CREATININE 1.1 1.0 0.9   CALCIUM 10.0 9.8 10.3     Recent Labs     03/04/22  1314 03/05/22  0208   AST 19 18   ALT 18 16   BILITOT 0.2 0.2   ALKPHOS 70 65     No results for input(s): INR in the last 72 hours. Recent Labs     03/04/22  1314   CKTOTAL 42       Urinalysis:      Lab Results   Component Value Date    NITRU NEGATIVE 03/04/2022    WBCUA 51 03/04/2022    BACTERIA MODERATE 03/04/2022    RBCUA NONE SEEN 03/04/2022    BLOODU NEGATIVE 03/04/2022    SPECGRAV 1.020 03/04/2022       Radiology:  XR FOOT LEFT (MIN 3 VIEWS)   Final Result   1. No abnormality in the 2nd toe identified.    2. Chronic appearing erosions along the medial and lateral aspects of the 3rd   metatarsal head nonspecific but raising the possibility of gout         XR CHEST PORTABLE   Final Result   Improved bilateral airspace opacities. Central pulmonary vascular congestion, similar to the prior study.                  Noe Reyes MD  3/7/2022 9:23 AM

## 2022-03-07 NOTE — PROGRESS NOTES
Occupational Therapy    [facility] ACUTE CARE OCCUPATIONAL THERAPY EVALUATION  Beryl Alejandre, 1934, [id number]-A, 3/7/2022    History  [place]:  The primary encounter diagnosis was Fatigue, unspecified type. Diagnoses of Hypotension, unspecified hypotension type, Elevated BUN, Serum creatinine raised, and Troponin level elevated were also pertinent to this visit. Patient  has a past medical history of Arthritis, Cataract of left eye, Cataract of right eye, Chronic kidney disease, H/O cardiovascular stress test, H/O Doppler ultrasound, H/O Doppler ultrasound, H/O echocardiogram, H/O tilt table evaluation, H/O transesophageal echocardiography (MARIO) for monitoring, Hyperlipidemia, Hypertension, Movement disorder, Neuromuscular disorder ([address] Osteoporosis, Pneumonia, Psychiatric problem, TIA (transient ischemic attack), and Trigeminal neuralgia of right side of face. Patient  has a past surgical history that includes Appendectomy; Hysterectomy; Cholecystectomy; eye surgery; and Cataract removal.    Subjective:  Patient states:  Pt confused, frequent re-direction to task.     Pain:  No.    Communication with other providers:  Handoff to RN  Restrictions: General Precautions, Fall Risk    Home Setup/Prior level of function  Social/Functional History  Lives With: Family,Daughter (daughter - nathaniel)  Type of Home: House  Home Layout: One level  Home Access: Ramped entrance  Bathroom Shower/Tub: Tub/Shower unit,Shower chair without back (currently bedsides baths)  Bathroom Toilet: Standard  Bathroom Equipment: Grab bars in shower  Bathroom Accessibility: Accessible  Home Equipment: [address] [facility] bed,Lift chair  Receives Help From: Family  ADL Assistance: Needs assistance  Homemaking Assistance: Needs assistance  Homemaking Responsibilities: No  Ambulation Assistance: Needs assistance  Transfer Assistance: Needs assistance  Active : No    Examination of body systems (includes body structures/functions, activity/participation limitations):  · Observation:  Supine in bed upon arrival, agreeable to therapy, soiled self  · Vision:  [facility]  · Hearing:  [facility]  · Cardiopulmonary:  No 02 needs      Body Systems and functions:  · ROM R/L:  WFL. · Strength R/L:  4-/5,   · Sensation: Denies numbness  · Tone: Normal  · Coordination: WFL  · Perception: min decreased initiation/sequencing    Activities of Daily Living (ADLs):  · Feeding: Setup (feeding at end of session, pt not eating much despite ability to bring food to mouth)  · Grooming: Tamika/Setup (washing face/hands w/ warm washcloth)  · UB bathing: SBA (washing abdomen, trunk, BUE arms)  · LB bathing: maxA (washing buzz area/buttocks rolling L/R, washing upper/lower legs)  · UB dressing: Tamika (doffing/donning gown)  · LB dressing: maxA (doffing soiled depends rolling L/R0  · Toileting: maxA (See LB bathing/dressing)    Cognitive and Psychosocial Functioning:  · Overall cognitive status: AxO to self only, psychiatric problem at baseline, decreased problem solving/short term memory/long term memory, increased processing time, frequent re-direction tot ask   · Affect: Pleasantly confused       Mobility:  · Supine to sit:  maxA  · Transfers: maxA STS from EOB, maxA stand pivot from EOB to reclining chair  · Sitting balance:  SBA. · Standing balance:  maxA.   · Functional Mobility DNT  · Toilet/Shower Transfers: DNT             AM-PAC Daily Activity Inpatient   How much help for putting on and taking off regular lower body clothing?: Total  How much help for Bathing?: A Lot  How much help for Toileting?: Total  How much help for putting on and taking off regular upper body clothing?: A Little  How much help for taking care of personal grooming?: A Little  How much help for eating meals?: A Little  AM-[facility] Inpatient Daily Activity Raw Score: 13  AM-PAC Inpatient ADL T-Scale Score : 32.03  ADL Inpatient CMS 0-100% Score: 63.03  ADL Inpatient CMS G-Code Modifier : CL    Treatment:  Self Care Training:   Cues were given for safety, sequence, UE/LE placement, visual cues, and balance. Activities performed today included grooming, UB/LB bathing/dressing, toileting    Therapeutic Activity Training:   Therapeutic activity training was instructed today. Cues were given for safety, sequence, UE/LE placement, awareness, and balance. Activities performed today included bed mobility training, sup-sit, sit-stand, stand pivot, stand to sit      Safety: patient left in chair with chair alarm, call light within reach, RN notified, gait belt used. Assessment:  Pt is a 81 yo female admitted from home for self care deficit. Pt at baseline needs assistance for ADLs needs assistance for high level IADLs and needs assistance for functional transfers/mobility. Pt currently presents w/ deficits in ADL and high level IADL independence, functional activity tolerance, dynamic sitting and standing balance and tolerance and functional transfers, BUE strength, cognition and OOB activity. Pt would benefit from continued acute care OT services w/ discharge to SNF  Complexity: Moderate  Prognosis: Good, no significant barriers to participation at this time.    Plan  Times per week: 3x+  Times per day: Daily  Current Treatment Recommendations: Strengthening,Endurance Training,Patient/Caregiver Education & Training,Neuromuscular Re-education,Equipment Evaluation, Education, & procurement,Cognitive Reorientation,ROM,Self-Care / ADL,Balance Training,Pain Management,Home Management Training,Cognitive/Perceptual Training,Functional Mobility Training,Safety Education & Training,Positioning     Equipment: defer    Goals:  Pt goal: go home  Time Frame for STGs: discharge  Goal 1: Pt will perform UE ADLs Supervision  Goal 2: Pt will perform LE ADLs Tamika w/ AD  Goal 3: Pt will perform toileting Tamika w/ AD  Goal 4: Pt will perform functional transfer w/ AD Tamika  Goal 5: Pt will perform functional mobility w/ AD Tamika  Goal 6: Pt will perform therex/theract in order to increase functional activity tolerance and dynamic standing balance  Goal 7: Pt will perform all aspects of bed mobility SBA    Treatment plan:  Pt will perform functional task in stand reaching in all 3 planes in order to increase dynamic standing balance and functional activity tolerance    Recommendations for NURSING activity: Up to chair for all 3 meals and up to [facility] for all toileting needs    Time:   Time in: 1001  Time out: 1034  Timed treatment minutes: 23 minutes  Total time: 33 minutes    Electronically signed by:    Negro HOWE/L [id number]  11:31 AM,3/7/2022

## 2022-03-07 NOTE — CARE COORDINATION
Reviewed chart and discussed in IDR. Plan is to [facility], therapy notes needed for prior auth. VM left for [facility] at [facility].

## 2022-03-07 NOTE — CONSULTS
Consult completed. Nexiva 20g 1 inch catheter inserted via ultrasound in patient's RFA. Brisk blood return noted and catheter flushes with ease. Patient tolerated well. Yanick Schwartz, primary RN notified. Consult IV/PICC team if patient's needs change.

## 2022-03-07 NOTE — PROGRESS NOTES
Physical Therapy  Name: Otilio Angeles MRN: [id number] :   1934   Date:  3/7/2022   Admission Date: 3/4/2022 Room:  [address] [place] [address]   Restrictions/Precautions:        Fall Risk, General precautions  Communication with other providers:  Handoff received from JOO Durand  Subjective:  Patient states:  She expresses pain when feet are touched   Pain:   Location, Type, Intensity (0/10 to 10/10): Does not quantify feet pain  Objective:    Observation:  Patient is sitting in recliner with feed assist from [facility]. Patient has forward flexed posture seated and in standing  Treatment, including education/measures:  Transfers with line management of [employer]  Scooting :seated scooting in recliner, Max A x1  Sit to stand : Max A x1 without AD, and Max x1 to RW. Poor knee and hip extension and required RW for BUE effort to full stand. Stand to sit :Min A for safety eccentric control  Standing Tolerance: Fair-, Mod A with BUE support at [address] [place] [address] ~30sec tolerance before overcome by fatigue  Sitting Exercises: Ankle pumps x 10  LAQ's x 10  Marching x 10   Seated balance: No BUE support, ant and posterior leaning x10, CGA and Min A to avoid falling backwards into chair  Therapeutic Exercise:  Therapeutic exercises were instructed today. Cues were given for technique, safety, recruitment, and rationale. Cues were verbal and/or tactile. Safety  Patient left safely in the recliner, with call light/phone in reach with alarm applied. Gait belt and mask were used for transfers and gait. Assessment / Impression:   Patient has fair tolerance with today's session. RN notified that patient is not feeding herself and is having a hard time chewing what is fed to her. She needs constant curing throughout session to stay on task.    Patient's tolerance of treatment:  fair   Adverse Reaction: none  Significant change in status and impact:  none  Barriers to improvement:  strength  Plan for Next Session:    Will cont to work towards pt's goals per patient tolerance  Time in:  1030  Time out:  1053  Timed treatment minutes:  23  Total treatment time:  23    Previously filed items:  Social/Functional History  Lives With: Family,Daughter (daughter - nathaniel)  Type of Home: House  Home Layout: One level  Home Access: Ramped entrance  Bathroom Shower/Tub: Tub/Shower unit,Shower chair without back (currently bedsides baths)  Bathroom Toilet: Standard  Bathroom Equipment: Grab bars in shower  Bathroom Accessibility: Accessible  Home Equipment: [facility] bed,Lift chair  Receives Help From: Family  ADL Assistance: Needs assistance  Homemaking Assistance: Needs assistance  Homemaking Responsibilities: No  Ambulation Assistance: Needs assistance  Transfer Assistance: Needs assistance  Active : No  Short term goals  Short term goal 1: Pt will perform bed mobility with independence. Short term goal 2: Pt will perform transfers with independence. Short term goal 3: Pt will perform seated upright posturing EOB for 2 minutes with independence. Short term goal 4: Pt will perform scooting up in bed with independence. Electronically signed by:     Niraj Muro PTA  3/7/2022, 11:05 AM

## 2022-03-08 LAB
ANION GAP SERPL CALCULATED.3IONS-SCNC: 12 MMOL/L (ref 4–16)
BUN BLDV-MCNC: 27 MG/DL (ref 6–23)
CALCIUM SERPL-MCNC: 9.7 MG/DL (ref 8.3–10.6)
CHLORIDE BLD-SCNC: 105 MMOL/L (ref 99–110)
CO2: 23 MMOL/L (ref 21–32)
CREAT SERPL-MCNC: 1 MG/DL (ref 0.6–1.1)
GFR AFRICAN AMERICAN: >60 ML/MIN/1.73M2
GFR NON-AFRICAN AMERICAN: 52 ML/MIN/1.73M2
GLUCOSE BLD-MCNC: 103 MG/DL (ref 70–99)
POTASSIUM SERPL-SCNC: 4.5 MMOL/L (ref 3.5–5.1)
SODIUM BLD-SCNC: 140 MMOL/L (ref 135–145)

## 2022-03-08 PROCEDURE — 6360000002 HC RX W HCPCS: Performed by: NURSE PRACTITIONER

## 2022-03-08 PROCEDURE — 94761 N-INVAS EAR/PLS OXIMETRY MLT: CPT

## 2022-03-08 PROCEDURE — 6370000000 HC RX 637 (ALT 250 FOR IP): Performed by: NURSE PRACTITIONER

## 2022-03-08 PROCEDURE — 2580000003 HC RX 258: Performed by: NURSE PRACTITIONER

## 2022-03-08 PROCEDURE — 1200000000 HC SEMI PRIVATE

## 2022-03-08 PROCEDURE — 99211 OFF/OP EST MAY X REQ PHY/QHP: CPT

## 2022-03-08 PROCEDURE — 36415 COLL VENOUS BLD VENIPUNCTURE: CPT

## 2022-03-08 PROCEDURE — 80048 BASIC METABOLIC PNL TOTAL CA: CPT

## 2022-03-08 RX ADMIN — AMLODIPINE BESYLATE 10 MG: 5 TABLET ORAL at 10:05

## 2022-03-08 RX ADMIN — ACETAMINOPHEN 650 MG: 325 TABLET ORAL at 21:12

## 2022-03-08 RX ADMIN — ASPIRIN 81 MG 81 MG: 81 TABLET ORAL at 10:05

## 2022-03-08 RX ADMIN — ACETAMINOPHEN 650 MG: 325 TABLET ORAL at 06:03

## 2022-03-08 RX ADMIN — SODIUM CHLORIDE, PRESERVATIVE FREE 8 ML: 5 INJECTION INTRAVENOUS at 11:27

## 2022-03-08 RX ADMIN — CARBAMAZEPINE 200 MG: 200 TABLET ORAL at 21:12

## 2022-03-08 RX ADMIN — DOCUSATE SODIUM 100 MG: 100 CAPSULE, LIQUID FILLED ORAL at 10:04

## 2022-03-08 RX ADMIN — PANTOPRAZOLE SODIUM 40 MG: 40 TABLET, DELAYED RELEASE ORAL at 06:03

## 2022-03-08 RX ADMIN — ACETAMINOPHEN 650 MG: 325 TABLET ORAL at 12:01

## 2022-03-08 RX ADMIN — METOPROLOL TARTRATE 25 MG: 25 TABLET, FILM COATED ORAL at 08:30

## 2022-03-08 RX ADMIN — CARBAMAZEPINE 200 MG: 200 TABLET ORAL at 10:04

## 2022-03-08 RX ADMIN — DICYCLOMINE HYDROCHLORIDE 10 MG: 10 CAPSULE ORAL at 21:12

## 2022-03-08 RX ADMIN — ATORVASTATIN CALCIUM 20 MG: 20 TABLET, FILM COATED ORAL at 10:06

## 2022-03-08 RX ADMIN — SODIUM CHLORIDE, PRESERVATIVE FREE 10 ML: 5 INJECTION INTRAVENOUS at 21:13

## 2022-03-08 RX ADMIN — PREDNISONE 5 MG: 10 TABLET ORAL at 10:06

## 2022-03-08 RX ADMIN — FOLIC ACID 1 MG: 1 TABLET ORAL at 10:06

## 2022-03-08 RX ADMIN — CLONIDINE HYDROCHLORIDE 0.1 MG: 0.1 TABLET ORAL at 10:06

## 2022-03-08 RX ADMIN — PREDNISONE 5 MG: 10 TABLET ORAL at 21:12

## 2022-03-08 RX ADMIN — CLOPIDOGREL BISULFATE 75 MG: 75 TABLET ORAL at 10:06

## 2022-03-08 RX ADMIN — ENOXAPARIN SODIUM 30 MG: 100 INJECTION SUBCUTANEOUS at 10:06

## 2022-03-08 ASSESSMENT — PAIN SCALES - GENERAL
PAINLEVEL_OUTOF10: 4
PAINLEVEL_OUTOF10: 8
PAINLEVEL_OUTOF10: 9
PAINLEVEL_OUTOF10: 3
PAINLEVEL_OUTOF10: 3

## 2022-03-08 ASSESSMENT — PAIN SCALES - WONG BAKER
WONGBAKER_NUMERICALRESPONSE: 0
WONGBAKER_NUMERICALRESPONSE: 0

## 2022-03-08 NOTE — PROGRESS NOTES
[facility] HOSPITALIST PROGRESS NOTE      PCP: Taqueria Ren MD    Date of Admission: 3/4/2022    Subjective: No complaints     Brief Hospital summary patient is an 80-year-old female with history of hypertension, rheumatoid arthritis who was admitted with weakness.   Patient recently had COVID-19, on admission had elevated creatinine  IV fluids started    Creatinine back to baseline with hydration  PT OT consulted, recommended SNF, pre-CERT pending    Vitals signs: Afebrile, heart rate 60s to 80s range, blood pressure 139/76, on room air    Medications: Amlodipine, aspirin, Lipitor, carbamazepine, clonidine, Plavix, Lovenox, folic acid, methotrexate, metoprolol, pantoprazole, prednisone    Lisinopril/HCTZ held    Antibiotics: None    Fluid status: 2 cc documented    Labs: Sodium 140, potassium 4.5, chloride 105, bicarb 20, creatinine 1      Imaging:   X-ray left foot did not show any acute abnormalities, chronic appearing erosions along the medial and lateral aspect of third metatarsal head nonspecific    Chest x-ray showed improved bilateral opacities    Assessment/Plan:     Acute kidney injury  Failure to thrive  Elevated troponin  Essential hypertension  History of TIA  Trigeminal neuralgia  History of rheumatoid arthritis      Admitted with generalized weakness, creatinine elevated on admission, resolved with hydration    PT OT consulted, recommend SNF placement, placement pending    Continue aspirin Lipitor Plavix  Continue methotrexate and prednisone, folic acid  Continue metoprolol amlodipine, as needed hydralazine  Continue pantoprazole for GERD  Continue Lipitor for hyperlipidemia    Resume lisinopril/HCTZ, if blood pressure elevated    Awaiting placement    DVT prophlaxis:   Lovenox      Physical Exam Performed:       /76   Pulse 84   Temp 97.8 °F (36.6 °C) (Oral)   Resp 18   Ht 5' 5\" (1.651 m)   Wt 112 lb 8 oz (51 kg)   SpO2 100%   BMI 18.72 kg/m²     Physical Exam  Constitutional:       General: She is not in acute distress. Appearance: Normal appearance. HENT:      Head: Normocephalic and atraumatic. Right Ear: External ear normal.      Left Ear: External ear normal.   Eyes:      Extraocular Movements: Extraocular movements intact. Pupils: Pupils are equal, round, and reactive to light. Cardiovascular:      Rate and Rhythm: Normal rate and regular rhythm. Heart sounds: No murmur heard. Pulmonary:      Effort: Pulmonary effort is normal. No respiratory distress. Breath sounds: Normal breath sounds. No wheezing. Abdominal:      General: Bowel sounds are normal. There is no distension. Palpations: Abdomen is soft. Tenderness: There is no abdominal tenderness. Musculoskeletal:         General: No swelling. Cervical back: Normal range of motion. Skin:     General: Skin is warm. Neurological:      General: No focal deficit present. Mental Status: She is alert and oriented to person, place, and time. Cranial Nerves: No cranial nerve deficit. Psychiatric:         Mood and Affect: Mood normal.         Labs:   No results for input(s): WBC, HGB, HCT, PLT in the last 72 hours. Recent Labs     03/06/22  1525 03/07/22  0529 03/08/22  0015    141 140   K 4.7 4.4 4.5    107 105   CO2 23 23 23   BUN 31* 27* 27*   CREATININE 1.0 0.9 1.0   CALCIUM 9.8 10.3 9.7     No results for input(s): AST, ALT, BILIDIR, BILITOT, ALKPHOS in the last 72 hours. No results for input(s): INR in the last 72 hours. No results for input(s): Briana Divine in the last 72 hours. Urinalysis:      Lab Results   Component Value Date    NITRU NEGATIVE 03/04/2022    WBCUA 51 03/04/2022    BACTERIA MODERATE 03/04/2022    RBCUA NONE SEEN 03/04/2022    BLOODU NEGATIVE 03/04/2022    SPECGRAV 1.020 03/04/2022       Radiology:  XR FOOT LEFT (MIN 3 VIEWS)   Final Result   1. No abnormality in the 2nd toe identified.    2. Chronic appearing erosions along the medial and lateral aspects of the 3rd   metatarsal head nonspecific but raising the possibility of gout         XR CHEST PORTABLE   Final Result   Improved bilateral airspace opacities. Central pulmonary vascular congestion, similar to the prior study.                  Sandra Boyce MD  3/8/2022 8:00 AM

## 2022-03-08 NOTE — CONSULTS
[facility] Continence Nurse  Consult Note       Beryl HARRIS Pili  AGE: 80 y.o. GENDER: female  : 1934  TODAY'S DATE:  3/8/2022    Subjective:     Reason for  Evaluation and Assessment: wound care davin Willingham is a 80 y.o. female referred by:   [x] Physician  [] Nursing  [] Other:     Wound Identification:  Wound Type: blister  Contributing Factors: chronic pressure, decreased mobility, incontinence of stool and incontinence of urine        PAST MEDICAL HISTORY        Diagnosis Date    Arthritis     Cataract of left eye     Cataract of right eye     Chronic kidney disease     H/O cardiovascular stress test 11    There is no scintigraphic evidence of inducible myocardial ischemia. LV function is normal. EF 59%  No ECG changes Unremarkable pharmacological stress test. Normal myocardial  perfusion study.  H/O Doppler ultrasound 11    No sonographic evidence of hemodynamically significant stenosis of the carotid arteries bilaterally.  H/O Doppler ultrasound 08    Study suggestive of lack of evidence of any hemodynamically significant peripheral vascular disease at rest. Waveform analysis is triphasic throughout the lower extremities.  H/O echocardiogram 12    Left ventricular function is normal. EF >55% The transmitral spectral doppler flow pattern is suggestive of impaired LV relaxation.  H/O tilt table evaluation 10/10/08    The patient became orthostatic on tilt table study after getting Nitro. There were no neurocardiogenic episode seen.  H/O transesophageal echocardiography (MARIO) for monitoring 11/08/10    Normal MARIO.   No PFO or clots noted    Hyperlipidemia     Hypertension     Movement disorder     Neuromuscular disorder ([address]     Osteoporosis     Pneumonia     Psychiatric problem     daughter states pt has no psych problems    TIA (transient ischemic attack)     Trigeminal neuralgia of right side of face        PAST SURGICAL HISTORY    Past Surgical History:   Procedure Laterality Date    APPENDECTOMY      CATARACT REMOVAL      CHOLECYSTECTOMY      EYE SURGERY      HYSTERECTOMY         FAMILY HISTORY    Family History   Problem Relation Age of Onset    Cancer Maternal Aunt     Cancer Maternal Grandmother     Cancer Maternal Grandfather     High Blood Pressure Maternal Grandfather        SOCIAL HISTORY    Social History     Tobacco Use    Smoking status: Never Smoker    Smokeless tobacco: Never Used   Substance Use Topics    Alcohol use: No     Comment: 1 - 2 per year    Drug use: No     Comment: cig       ALLERGIES    Allergies   Allergen Reactions    Pcn [Penicillins] Swelling       MEDICATIONS    Current Facility-Administered Medications on File Prior to Encounter   Medication Dose Route Frequency Provider Last Rate Last Admin    denosumab (PROLIA) SC injection 60 mg  60 mg SubCUTAneous Once Edilberto Ko MD         Current Outpatient Medications on File Prior to Encounter   Medication Sig Dispense Refill    metoprolol tartrate (LOPRESSOR) 25 MG tablet Take 25 mg by mouth daily      omeprazole (PRILOSEC) 20 MG delayed release capsule Take 20 mg by mouth daily      atorvastatin (LIPITOR) 20 MG tablet Take 20 mg by mouth daily      folic acid (FOLVITE) 1 MG tablet Take 1 mg by mouth daily      dicyclomine (BENTYL) 10 MG capsule Take 10 mg by mouth 2 times daily as needed      predniSONE (DELTASONE) 5 MG tablet Take 5 mg by mouth 2 times daily      docusate sodium (COLACE) 100 MG capsule Take 1 capsule by mouth daily 30 capsule 0    lisinopril-hydrochlorothiazide (PRINZIDE;ZESTORETIC) 20-25 MG per tablet Take 1 tablet by mouth daily.  carBAMazepine (TEGRETOL) 200 MG tablet Take 200 mg by mouth 2 times daily.  amLODIPine (NORVASC) 10 MG tablet Take 10 mg by mouth daily.  polyethylene glycol (GLYCOLAX) powder Take 17 g by mouth daily.       methotrexate (RHEUMATREX) 5 MG chemo tablet Take 25 mg by mouth once a week. Pt takes this on Sunday.  clopidogrel (PLAVIX) 75 MG tablet Take 75 mg by mouth daily.  clonidine (CATAPRES) 0.1 MG tablet Take 0.1 mg by mouth daily       aspirin 81 MG chewable tablet Take 81 mg by mouth daily.              Objective:      /72   Pulse 99   Temp 98.3 °F (36.8 °C) (Oral)   Resp 16   Ht 5' 5\" (1.651 m)   Wt 112 lb 8 oz (51 kg)   SpO2 98%   BMI 18.72 kg/m²   Gil Risk Score: Gil Scale Score: 13    LABS    CBC:   Lab Results   Component Value Date    WBC 8.8 03/05/2022    RBC 2.73 03/05/2022    HGB 9.3 03/05/2022    HCT 30.5 03/05/2022    .7 03/05/2022    MCH 34.1 03/05/2022    MCHC 30.5 03/05/2022    RDW 13.2 03/05/2022     03/05/2022    MPV 8.8 03/05/2022     CMP:    Lab Results   Component Value Date     03/08/2022    K 4.5 03/08/2022     03/08/2022    CO2 23 03/08/2022    BUN 27 03/08/2022    CREATININE 1.0 03/08/2022    GFRAA >60 03/08/2022    LABGLOM 52 03/08/2022    GLUCOSE 103 03/08/2022    PROT 6.6 03/05/2022    PROT 7.3 03/21/2012    LABALBU 3.1 03/05/2022    CALCIUM 9.7 03/08/2022    BILITOT 0.2 03/05/2022    ALKPHOS 65 03/05/2022    AST 18 03/05/2022    ALT 16 03/05/2022     Albumin:    Lab Results   Component Value Date    LABALBU 3.1 03/05/2022     PT/INR:    Lab Results   Component Value Date    PROTIME 11.8 04/01/2015    PROTIME 12.4 11/20/2011    INR 1.04 04/01/2015     HgBA1c:    Lab Results   Component Value Date    LABA1C 6.1 05/11/2011         Assessment:     Patient Active Problem List   Diagnosis    HTN (hypertension)    Rheumatoid arthritis [facility]    CVA, old, hemiparesis ([address]    Tic douloureux    Debility    Ataxia    Acute pain of right knee    Sepsis ([address]    Physical deconditioning    Generalized weakness    Depression    COVID-19    Troponin I above reference range    Acute cystitis without hematuria    Self-care deficit       Measurements:  Wound 03/08/22 Toe (Comment  which one) Left 2nd toe (Active)   Wound Image   03/08/22 1324   Wound Cleansed Cleansed with saline 03/08/22 1324   Dressing/Treatment Open to air 03/08/22 1324   Wound Length (cm) 1.7 cm 03/08/22 1324   Wound Width (cm) 2 cm 03/08/22 1324   Wound Depth (cm) 0 cm 03/08/22 1324   Wound Surface Area (cm^2) 3.4 cm^2 03/08/22 1324   Wound Volume (cm^3) 0 cm^3 03/08/22 1324   Distance Tunneling (cm) 0 cm 03/08/22 1324   Tunneling Position ___ O'Clock 0 03/08/22 1324   Undermining Starts ___ O'Clock 0 03/08/22 1324   Undermining Ends___ O'Clock 0 03/08/22 1324   Undermining Maxium Distance (cm) 0 03/08/22 1324   Drainage Amount None 03/08/22 1324   Odor None 03/08/22 1324   Jennifer-wound Assessment Intact 03/08/22 1324   Margins Attached edges 03/08/22 1324   Number of days: 0       Response to treatment:  Well tolerated by patient. Pain Assessment:  Severity:  none  Quality of pain:   Wound Pain Timing/Severity:   Premedicated: no    Plan:     Plan of Care: Wound 03/08/22 Toe (Comment  which one) Left 2nd toe-Dressing/Treatment: Open to air     Patient in bed agreeable to wound care eval. Pt has a blister to left 2nd toe purple/red. Measured and pictured and left open to air. Heels intact and floated. Buttocks intact. Pt is incontinent of urine/stool. Jennifer care done and moisture barrier cream applied. Pt turned to rt side with pillow support. Atmos air pump placed to the bed. Pt is at moderate risk for skin breakdown AEB pati. Follow pati orders. Specialty Bed Required :  yes  [] Low Air Loss   [x] Pressure Redistribution  [] Fluid Immersion  [] Bariatric  [] Total Pressure Relief  [] Other:     Discharge Plan:  Placement for patient upon discharge: tbd  Hospice Care: no  Patient appropriate for Outpatient [address]: no    Patient/Caregiver Teaching:  Level of patient/caregiver understanding able to:   Cares explained as given. No evidence of learning. Electronically signed by Sandra Roe RN,  on 3/8/2022 at 2:44 PM

## 2022-03-08 NOTE — CARE COORDINATION
Reviewed chart and discussed pt in IDR, [address] working on prior [address] [facility] but concerned pt may be as baseline. I spoke with pt's daughter Prateek Mauro at bedside if WG denied then plan will be to return home with [facility] [place], [facility].. Called and faxed [phone] info to Shaheen Garzon at [facility]. They will be following pt.

## 2022-03-09 LAB
HCT VFR BLD CALC: 29.7 % (ref 37–47)
HEMOGLOBIN: 8.9 GM/DL (ref 12.5–16)
MCH RBC QN AUTO: 33.6 PG (ref 27–31)
MCHC RBC AUTO-ENTMCNC: 30 % (ref 32–36)
MCV RBC AUTO: 112.1 FL (ref 78–100)
PDW BLD-RTO: 13.6 % (ref 11.7–14.9)
PLATELET # BLD: 355 K/CU MM (ref 140–440)
PMV BLD AUTO: 8.8 FL (ref 7.5–11.1)
RBC # BLD: 2.65 M/CU MM (ref 4.2–5.4)
WBC # BLD: 9.3 K/CU MM (ref 4–10.5)

## 2022-03-09 PROCEDURE — 1200000000 HC SEMI PRIVATE

## 2022-03-09 PROCEDURE — 6370000000 HC RX 637 (ALT 250 FOR IP): Performed by: NURSE PRACTITIONER

## 2022-03-09 PROCEDURE — 36415 COLL VENOUS BLD VENIPUNCTURE: CPT

## 2022-03-09 PROCEDURE — 6370000000 HC RX 637 (ALT 250 FOR IP): Performed by: INTERNAL MEDICINE

## 2022-03-09 PROCEDURE — 2580000003 HC RX 258: Performed by: NURSE PRACTITIONER

## 2022-03-09 PROCEDURE — 94761 N-INVAS EAR/PLS OXIMETRY MLT: CPT

## 2022-03-09 PROCEDURE — 85027 COMPLETE CBC AUTOMATED: CPT

## 2022-03-09 PROCEDURE — 6360000002 HC RX W HCPCS: Performed by: NURSE PRACTITIONER

## 2022-03-09 RX ORDER — OXYCODONE HYDROCHLORIDE 5 MG/1
5 TABLET ORAL EVERY 6 HOURS PRN
Status: DISCONTINUED | OUTPATIENT
Start: 2022-03-09 | End: 2022-03-10 | Stop reason: HOSPADM

## 2022-03-09 RX ORDER — METHOCARBAMOL 500 MG/1
500 TABLET, FILM COATED ORAL 3 TIMES DAILY
Status: DISCONTINUED | OUTPATIENT
Start: 2022-03-09 | End: 2022-03-10 | Stop reason: HOSPADM

## 2022-03-09 RX ADMIN — CARBAMAZEPINE 200 MG: 200 TABLET ORAL at 08:32

## 2022-03-09 RX ADMIN — ATORVASTATIN CALCIUM 20 MG: 20 TABLET, FILM COATED ORAL at 08:32

## 2022-03-09 RX ADMIN — METHOCARBAMOL 500 MG: 500 TABLET ORAL at 21:21

## 2022-03-09 RX ADMIN — METHOCARBAMOL 500 MG: 500 TABLET ORAL at 08:32

## 2022-03-09 RX ADMIN — ACETAMINOPHEN 650 MG: 325 TABLET ORAL at 18:09

## 2022-03-09 RX ADMIN — OXYCODONE HYDROCHLORIDE 5 MG: 5 TABLET ORAL at 01:21

## 2022-03-09 RX ADMIN — CLOPIDOGREL BISULFATE 75 MG: 75 TABLET ORAL at 08:31

## 2022-03-09 RX ADMIN — PREDNISONE 5 MG: 10 TABLET ORAL at 21:18

## 2022-03-09 RX ADMIN — METHOCARBAMOL 500 MG: 500 TABLET ORAL at 17:08

## 2022-03-09 RX ADMIN — SODIUM CHLORIDE, PRESERVATIVE FREE 10 ML: 5 INJECTION INTRAVENOUS at 21:20

## 2022-03-09 RX ADMIN — METHOCARBAMOL 500 MG: 500 TABLET ORAL at 01:21

## 2022-03-09 RX ADMIN — METOPROLOL TARTRATE 25 MG: 25 TABLET, FILM COATED ORAL at 08:31

## 2022-03-09 RX ADMIN — AMLODIPINE BESYLATE 10 MG: 5 TABLET ORAL at 08:32

## 2022-03-09 RX ADMIN — DOCUSATE SODIUM 100 MG: 100 CAPSULE, LIQUID FILLED ORAL at 08:32

## 2022-03-09 RX ADMIN — PREDNISONE 5 MG: 10 TABLET ORAL at 08:32

## 2022-03-09 RX ADMIN — PANTOPRAZOLE SODIUM 40 MG: 40 TABLET, DELAYED RELEASE ORAL at 06:15

## 2022-03-09 RX ADMIN — CLONIDINE HYDROCHLORIDE 0.1 MG: 0.1 TABLET ORAL at 08:31

## 2022-03-09 RX ADMIN — SODIUM CHLORIDE, PRESERVATIVE FREE 10 ML: 5 INJECTION INTRAVENOUS at 10:32

## 2022-03-09 RX ADMIN — ENOXAPARIN SODIUM 30 MG: 100 INJECTION SUBCUTANEOUS at 08:32

## 2022-03-09 RX ADMIN — POLYETHYLENE GLYCOL (3350) 17 G: 17 POWDER, FOR SOLUTION ORAL at 08:31

## 2022-03-09 RX ADMIN — FOLIC ACID 1 MG: 1 TABLET ORAL at 08:33

## 2022-03-09 RX ADMIN — ASPIRIN 81 MG 81 MG: 81 TABLET ORAL at 08:31

## 2022-03-09 RX ADMIN — CARBAMAZEPINE 200 MG: 200 TABLET ORAL at 21:18

## 2022-03-09 ASSESSMENT — PAIN SCALES - WONG BAKER
WONGBAKER_NUMERICALRESPONSE: 0
WONGBAKER_NUMERICALRESPONSE: 6

## 2022-03-09 ASSESSMENT — PAIN SCALES - GENERAL
PAINLEVEL_OUTOF10: 0
PAINLEVEL_OUTOF10: 2
PAINLEVEL_OUTOF10: 10

## 2022-03-09 ASSESSMENT — PAIN DESCRIPTION - PAIN TYPE: TYPE: CHRONIC PAIN

## 2022-03-09 ASSESSMENT — PAIN DESCRIPTION - ORIENTATION: ORIENTATION: RIGHT;LEFT

## 2022-03-09 ASSESSMENT — PAIN DESCRIPTION - ONSET: ONSET: ON-GOING

## 2022-03-09 ASSESSMENT — PAIN - FUNCTIONAL ASSESSMENT: PAIN_FUNCTIONAL_ASSESSMENT: PREVENTS OR INTERFERES SOME ACTIVE ACTIVITIES AND ADLS

## 2022-03-09 ASSESSMENT — PAIN DESCRIPTION - DESCRIPTORS: DESCRIPTORS: ACHING;SHARP

## 2022-03-09 ASSESSMENT — PAIN DESCRIPTION - DIRECTION: RADIATING_TOWARDS: FEET

## 2022-03-09 ASSESSMENT — PAIN DESCRIPTION - PROGRESSION: CLINICAL_PROGRESSION: GRADUALLY WORSENING

## 2022-03-09 ASSESSMENT — PAIN DESCRIPTION - FREQUENCY: FREQUENCY: CONTINUOUS

## 2022-03-09 ASSESSMENT — PAIN DESCRIPTION - LOCATION: LOCATION: LEG;FOOT

## 2022-03-09 NOTE — PROGRESS NOTES
[facility] HOSPITALIST PROGRESS NOTE      PCP: Sree Morris MD    Date of Admission: 3/4/2022    Subjective: No complaints     Brief Hospital summary patient is an 42-year-old female with history of hypertension, rheumatoid arthritis who was admitted with weakness.   Patient recently had COVID-19, on admission had elevated creatinine  IV fluids started    Creatinine back to baseline with hydration  PT OT consulted, recommended SNF, pre-CERT pending    Vitals signs: Afebrile, heart rate 90s to 105 range, blood pressure 122/88, on room air    Medications: Amlodipine, aspirin, Lipitor, carbamazepine, clonidine, Plavix, Lovenox, folic acid, methotrexate, metoprolol, pantoprazole, prednisone    Lisinopril/HCTZ held    Antibiotics: None    Fluid status: Urine output not documented    Labs: Hb 8.9, WBC 9.3, Plt 355      Imaging:   X-ray left foot did not show any acute abnormalities, chronic appearing erosions along the medial and lateral aspect of third metatarsal head nonspecific    Chest x-ray showed improved bilateral opacities    Assessment/Plan:     Acute kidney injury  Failure to thrive  Elevated troponin  Essential hypertension  History of TIA  Trigeminal neuralgia  History of rheumatoid arthritis      Admitted with generalized weakness, creatinine elevated on admission, resolved with hydration    PT OT consulted, recommend SNF placement, placement pending    Continue aspirin Lipitor Plavix  Continue methotrexate and prednisone, folic acid  Continue metoprolol amlodipine, as needed hydralazine  Continue pantoprazole for GERD  Continue Lipitor for hyperlipidemia    Resume lisinopril/HCTZ, if blood pressure elevated  Currently blood pressure normotensive range  Awaiting SNF placement if refused bilateral hands will be discharged home with home health      DVT prophlaxis:   Lovenox      Physical Exam Performed:       /84   Pulse 91   Temp 98.1 °F (36.7 °C) (Oral)   Resp 16   Ht 5' 5\" (1.651 m)   Wt 118 lb 11.2 oz (53.8 kg)   SpO2 99%   BMI 19.75 kg/m²     Physical Exam  Constitutional:       General: She is not in acute distress. Appearance: Normal appearance. HENT:      Head: Normocephalic and atraumatic. Right Ear: External ear normal.      Left Ear: External ear normal.   Eyes:      Extraocular Movements: Extraocular movements intact. Pupils: Pupils are equal, round, and reactive to light. Cardiovascular:      Rate and Rhythm: Normal rate and regular rhythm. Heart sounds: No murmur heard. Pulmonary:      Effort: Pulmonary effort is normal. No respiratory distress. Breath sounds: Normal breath sounds. No wheezing. Abdominal:      General: Bowel sounds are normal. There is no distension. Palpations: Abdomen is soft. Tenderness: There is no abdominal tenderness. Musculoskeletal:         General: No swelling. Cervical back: Normal range of motion. Skin:     General: Skin is warm. Neurological:      General: No focal deficit present. Mental Status: She is alert and oriented to person, place, and time. Cranial Nerves: No cranial nerve deficit. Psychiatric:         Mood and Affect: Mood normal.         Labs:   No results for input(s): WBC, HGB, HCT, PLT in the last 72 hours. Recent Labs     03/06/22  1525 03/07/22  0529 03/08/22  0015    141 140   K 4.7 4.4 4.5    107 105   CO2 23 23 23   BUN 31* 27* 27*   CREATININE 1.0 0.9 1.0   CALCIUM 9.8 10.3 9.7     No results for input(s): AST, ALT, BILIDIR, BILITOT, ALKPHOS in the last 72 hours. No results for input(s): INR in the last 72 hours. No results for input(s): Roche Harish in the last 72 hours.     Urinalysis:      Lab Results   Component Value Date    NITRU NEGATIVE 03/04/2022    WBCUA 51 03/04/2022    BACTERIA MODERATE 03/04/2022    RBCUA NONE SEEN 03/04/2022    BLOODU NEGATIVE 03/04/2022    SPECGRAV 1.020 03/04/2022       Radiology:  XR FOOT LEFT (MIN 3 VIEWS) Final Result   1. No abnormality in the 2nd toe identified. 2. Chronic appearing erosions along the medial and lateral aspects of the 3rd   metatarsal head nonspecific but raising the possibility of gout         XR CHEST PORTABLE   Final Result   Improved bilateral airspace opacities. Central pulmonary vascular congestion, similar to the prior study.                  Dung Ricci MD  3/9/2022 4:54 AM

## 2022-03-09 NOTE — DISCHARGE INSTR - COC
Continuity of Care Form    Patient Name: Carmen Nichole   :  1934  MRN:  [id number]    Admit date:  3/4/2022  Discharge date:  3/9/22    Code Status Order: Full Code   Advance Directives:      Admitting Physician:  Andrew Bowens MD  PCP: Margaret Rosales MD    Discharging Nurse: Cindy Edwards RN  [address] Unit/Room#: 4101/4101-A  Discharging Unit Phone Number: [phone]    Emergency Contact:   Extended Emergency Contact Information  Primary Emergency Contact: Lanre Toney  Address: [address], [address] [place] of [address] Phone: [phone]  Mobile Phone: [phone]  Relation: Child    Past Surgical History:  Past Surgical History:   Procedure Laterality Date    APPENDECTOMY      CATARACT REMOVAL      CHOLECYSTECTOMY      EYE SURGERY      HYSTERECTOMY         Immunization History:   Immunization History   Administered Date(s) Administered    Tdap (Boostrix, Adacel) 10/08/2019       Active Problems:  Patient Active Problem List   Diagnosis Code    HTN (hypertension) I10    Rheumatoid arthritis ([address] M06.9    CVA, old, hemiparesis ([address] I69.359    Tic douloureux G50.0    Debility R53.81    Ataxia R27.0    Acute pain of right knee M25.561    Sepsis ([address] A41.9    Physical deconditioning R53.81    Generalized weakness R53.1    Depression F32. A    COVID-19 U07.1    Troponin I above reference range R77.8    Acute cystitis without hematuria N30.00    Self-care deficit Z78.9       Isolation/Infection:   Isolation            No Isolation          Patient Infection Status       Infection Onset Added Last Indicated Last Indicated By Review Planned Expiration Resolved Resolved By    None active    Resolved    COVID-19 22 COVID-19, Rapid   22     COVID-19 (Rule Out) 22 COVID-19, Rapid (Ordered)   22 Rule-Out Test Resulted            Nurse Assessment:  Last Vital Signs: BP (!) 119/95 Pulse 97   Temp 99 °F (37.2 °C) (Oral)   Resp 16   Ht 5' 5\" (1.651 m)   Wt 118 lb 11.2 oz (53.8 kg)   SpO2 100%   BMI 19.75 kg/m²     Last documented pain score (0-10 scale): Pain Level: 10  Last Weight:   Wt Readings from Last 1 Encounters:   03/09/22 118 lb 11.2 oz (53.8 kg)     Mental Status:  disoriented    IV Access:  - None    Nursing Mobility/ADLs:  Walking   Dependent  Transfer  Dependent  Bathing  Dependent  Dressing  Dependent  Toileting  Dependent  Feeding  Assisted  Med Admin  Dependent  Med Delivery   whole    Wound Care Documentation and Therapy:  Wound 03/08/22 Toe (Comment  which one) Left 2nd toe (Active)   Wound Image   03/08/22 1324   Wound Cleansed Cleansed with saline 03/08/22 1324   Dressing/Treatment Open to air 03/08/22 1324   Wound Length (cm) 1.7 cm 03/08/22 1324   Wound Width (cm) 2 cm 03/08/22 1324   Wound Depth (cm) 0 cm 03/08/22 1324   Wound Surface Area (cm^2) 3.4 cm^2 03/08/22 1324   Wound Volume (cm^3) 0 cm^3 03/08/22 1324   Distance Tunneling (cm) 0 cm 03/08/22 1324   Tunneling Position ___ O'Clock 0 03/08/22 1324   Undermining Starts ___ O'Clock 0 03/08/22 1324   Undermining Ends___ O'Clock 0 03/08/22 1324   Undermining Maxium Distance (cm) 0 03/08/22 1324   Drainage Amount None 03/08/22 1324   Odor None 03/08/22 1324   Jennifer-wound Assessment Intact 03/08/22 1324   Margins Attached edges 03/08/22 1324   Number of days: 0        Elimination:  Continence: Bowel: Yes  Bladder: Yes  Urinary Catheter: None   Colostomy/Ileostomy/Ileal Conduit: No       Date of Last BM: 3/9/22    Intake/Output Summary (Last 24 hours) at 3/9/2022 1217  Last data filed at 3/9/2022 0616  Gross per 24 hour   Intake 240 ml   Output --   Net 240 ml     I/O last 3 completed shifts:   In: 240 [P.O.:240]  Out: 2 [Urine:2]    Safety Concerns:     None    Impairments/Disabilities:      Wounds on feet    Nutrition Therapy:  Current Nutrition Therapy:   - Oral Diet:  General high protein nutritional supplements    Routes of Feeding: Oral  Liquids: Thin Liquids  Daily Fluid Restriction: no  Last Modified Barium Swallow with Video (Video Swallowing Test): not done    Treatments at the Time of Hospital Discharge:   Respiratory Treatments: none  Oxygen Therapy:  is not on home oxygen therapy. Ventilator:    - No ventilator support    Rehab Therapies: none-on-going  Weight Bearing Status/Restrictions: No weight bearing restrictions  Other Medical Equipment (for information only, NOT a DME order):  wheelchair and walker  Other Treatments: wounds on feet- open to airWound care- left 2nd toe blister open to air. Keep pressure off of area. Buttocks wash with soap and water rinse and dry apply moisture barrier cream every shift and prn    Patient's personal belongings (please select all that are sent with patient):  None    RN SIGNATURE:  Electronically signed by Dominik Salcido RN on 3/9/22 at 12:22 PM EST    CASE MANAGEMENT/SOCIAL WORK SECTION    Inpatient Status Date: ***    Readmission Risk Assessment Score:  Readmission Risk              Risk of Unplanned Readmission:  19           Discharging to Facility/ Agency   Name:   Address:  Phone:  Fax:    Dialysis Facility (if applicable)   Name:  Address:  Dialysis Schedule:  Phone:  Fax:    / signature: {Esignature:[id number]}    PHYSICIAN SECTION    Prognosis: Fair    Condition at Discharge: Stable    Rehab Potential (if transferring to Rehab): Good    Recommended Labs or Other Treatments After Discharge: None    Physician Certification: I certify the above information and transfer of Freddy Skelton  is necessary for the continuing treatment of the diagnosis listed and that she requires [facility] for less 30 days.      Update Admission H&P: No change in H&P    PHYSICIAN SIGNATURE:  Electronically signed by Femi Cuba MD on 3/10/22 at 2:58 PM EST

## 2022-03-10 VITALS
HEIGHT: 65 IN | BODY MASS INDEX: 19.81 KG/M2 | SYSTOLIC BLOOD PRESSURE: 104 MMHG | OXYGEN SATURATION: 97 % | HEART RATE: 64 BPM | WEIGHT: 118.9 LBS | TEMPERATURE: 98.4 F | RESPIRATION RATE: 16 BRPM | DIASTOLIC BLOOD PRESSURE: 61 MMHG

## 2022-03-10 PROCEDURE — 94761 N-INVAS EAR/PLS OXIMETRY MLT: CPT

## 2022-03-10 PROCEDURE — 6370000000 HC RX 637 (ALT 250 FOR IP): Performed by: NURSE PRACTITIONER

## 2022-03-10 PROCEDURE — 6360000002 HC RX W HCPCS: Performed by: NURSE PRACTITIONER

## 2022-03-10 PROCEDURE — 6370000000 HC RX 637 (ALT 250 FOR IP): Performed by: INTERNAL MEDICINE

## 2022-03-10 PROCEDURE — 2580000003 HC RX 258: Performed by: NURSE PRACTITIONER

## 2022-03-10 RX ADMIN — ATORVASTATIN CALCIUM 20 MG: 20 TABLET, FILM COATED ORAL at 08:37

## 2022-03-10 RX ADMIN — METHOCARBAMOL 500 MG: 500 TABLET ORAL at 08:39

## 2022-03-10 RX ADMIN — METHOCARBAMOL 500 MG: 500 TABLET ORAL at 13:50

## 2022-03-10 RX ADMIN — ACETAMINOPHEN 650 MG: 325 TABLET ORAL at 08:39

## 2022-03-10 RX ADMIN — METOPROLOL TARTRATE 25 MG: 25 TABLET, FILM COATED ORAL at 08:38

## 2022-03-10 RX ADMIN — POLYETHYLENE GLYCOL (3350) 17 G: 17 POWDER, FOR SOLUTION ORAL at 08:39

## 2022-03-10 RX ADMIN — PANTOPRAZOLE SODIUM 40 MG: 40 TABLET, DELAYED RELEASE ORAL at 06:21

## 2022-03-10 RX ADMIN — CLONIDINE HYDROCHLORIDE 0.1 MG: 0.1 TABLET ORAL at 08:38

## 2022-03-10 RX ADMIN — CARBAMAZEPINE 200 MG: 200 TABLET ORAL at 08:39

## 2022-03-10 RX ADMIN — FOLIC ACID 1 MG: 1 TABLET ORAL at 08:37

## 2022-03-10 RX ADMIN — DOCUSATE SODIUM 100 MG: 100 CAPSULE, LIQUID FILLED ORAL at 08:39

## 2022-03-10 RX ADMIN — ACETAMINOPHEN 650 MG: 325 TABLET ORAL at 03:22

## 2022-03-10 RX ADMIN — CLOPIDOGREL BISULFATE 75 MG: 75 TABLET ORAL at 08:38

## 2022-03-10 RX ADMIN — PREDNISONE 5 MG: 10 TABLET ORAL at 08:38

## 2022-03-10 RX ADMIN — ASPIRIN 81 MG 81 MG: 81 TABLET ORAL at 08:44

## 2022-03-10 RX ADMIN — AMLODIPINE BESYLATE 10 MG: 5 TABLET ORAL at 08:37

## 2022-03-10 RX ADMIN — ENOXAPARIN SODIUM 30 MG: 100 INJECTION SUBCUTANEOUS at 08:37

## 2022-03-10 RX ADMIN — SODIUM CHLORIDE, PRESERVATIVE FREE 10 ML: 5 INJECTION INTRAVENOUS at 08:51

## 2022-03-10 RX ADMIN — ACETAMINOPHEN 650 MG: 325 TABLET ORAL at 13:50

## 2022-03-10 ASSESSMENT — PAIN DESCRIPTION - FREQUENCY
FREQUENCY: INTERMITTENT
FREQUENCY: INTERMITTENT

## 2022-03-10 ASSESSMENT — PAIN SCALES - GENERAL
PAINLEVEL_OUTOF10: 10
PAINLEVEL_OUTOF10: 0
PAINLEVEL_OUTOF10: 1
PAINLEVEL_OUTOF10: 0
PAINLEVEL_OUTOF10: 0
PAINLEVEL_OUTOF10: 3
PAINLEVEL_OUTOF10: 5

## 2022-03-10 ASSESSMENT — PAIN DESCRIPTION - ORIENTATION
ORIENTATION: RIGHT;LEFT
ORIENTATION: RIGHT;LEFT

## 2022-03-10 ASSESSMENT — PAIN DESCRIPTION - PAIN TYPE
TYPE: CHRONIC PAIN
TYPE: CHRONIC PAIN

## 2022-03-10 ASSESSMENT — PAIN DESCRIPTION - LOCATION
LOCATION: FOOT;HEAD
LOCATION: FOOT;LEG

## 2022-03-10 ASSESSMENT — PAIN DESCRIPTION - ONSET
ONSET: ON-GOING
ONSET: ON-GOING

## 2022-03-10 ASSESSMENT — PAIN DESCRIPTION - DESCRIPTORS
DESCRIPTORS: ACHING
DESCRIPTORS: ACHING;HEADACHE

## 2022-03-10 NOTE — PLAN OF CARE
Problem: Falls - Risk of:  Goal: Will remain free from falls  Description: Will remain free from falls  Outcome: Ongoing  Goal: Absence of physical injury  Description: Absence of physical injury  Outcome: Ongoing     Problem: Skin Integrity:  Goal: Will show no infection signs and symptoms  Description: Will show no infection signs and symptoms  Outcome: Ongoing  Goal: Absence of new skin breakdown  Description: Absence of new skin breakdown  Outcome: Ongoing     Problem: Confusion - Acute:  Goal: Absence of continued neurological deterioration signs and symptoms  Description: Absence of continued neurological deterioration signs and symptoms  Outcome: Ongoing  Goal: Mental status will be restored to baseline  Description: Mental status will be restored to baseline  Outcome: Ongoing     Problem: Discharge Planning:  Goal: Ability to perform activities of daily living will improve  Description: Ability to perform activities of daily living will improve  Outcome: Ongoing  Goal: Participates in care planning  Description: Participates in care planning  Outcome: Ongoing     Problem: Injury - Risk of, Physical Injury:  Goal: Will remain free from falls  Description: Will remain free from falls  Outcome: Ongoing  Goal: Absence of physical injury  Description: Absence of physical injury  Outcome: Ongoing     Problem: Sensory Perception - Impaired:  Goal: Demonstrations of improved sensory functioning will increase  Description: Demonstrations of improved sensory functioning will increase  Outcome: Ongoing  Goal: Decrease in sensory misperception frequency  Description: Decrease in sensory misperception frequency  Outcome: Ongoing  Goal: Able to refrain from responding to false sensory perceptions  Description: Able to refrain from responding to false sensory perceptions  Outcome: Ongoing  Goal: Demonstrates accurate environmental perceptions  Description: Demonstrates accurate environmental perceptions  Outcome: Ongoing  Goal: Able to distinguish between reality-based and nonreality-based thinking  Description: Able to distinguish between reality-based and nonreality-based thinking  Outcome: Ongoing  Goal: Able to interrupt nonreality-based thinking  Description: Able to interrupt nonreality-based thinking  Outcome: Ongoing     Problem: Nutrition  Goal: Optimal nutrition therapy  Outcome: Ongoing     Problem: Pain:  Goal: Pain level will decrease  Description: Pain level will decrease  Outcome: Ongoing  Goal: Control of acute pain  Description: Control of acute pain  Outcome: Ongoing  Goal: Control of chronic pain  Description: Control of chronic pain  Outcome: Ongoing

## 2022-03-10 NOTE — CARE COORDINATION
Reviewed chart and discussed in IDR, plan remains to [facility] for rehab. If insurance denies SNU then plan will be home with daughter. Spoke with Jg Valencia and prior auth still pending. 5 Pt approved for [employer]. PS to Dr Da Neely.

## 2022-03-10 NOTE — PROGRESS NOTES
118 lb 14.4 oz (53.9 kg)   SpO2 100%   BMI 19.79 kg/m²     Physical Exam  Constitutional:       General: She is not in acute distress. Appearance: Normal appearance. HENT:      Head: Normocephalic and atraumatic. Right Ear: External ear normal.      Left Ear: External ear normal.   Eyes:      Extraocular Movements: Extraocular movements intact. Pupils: Pupils are equal, round, and reactive to light. Cardiovascular:      Rate and Rhythm: Normal rate and regular rhythm. Heart sounds: No murmur heard. Pulmonary:      Effort: Pulmonary effort is normal. No respiratory distress. Breath sounds: Normal breath sounds. No wheezing. Abdominal:      General: Bowel sounds are normal. There is no distension. Palpations: Abdomen is soft. Tenderness: There is no abdominal tenderness. Musculoskeletal:         General: No swelling. Cervical back: Normal range of motion. Skin:     General: Skin is warm. Neurological:      General: No focal deficit present. Mental Status: She is alert and oriented to person, place, and time. Cranial Nerves: No cranial nerve deficit. Psychiatric:         Mood and Affect: Mood normal.         Labs:   Recent Labs     03/09/22  0449   WBC 9.3   HGB 8.9*   HCT 29.7*        Recent Labs     03/08/22  0015      K 4.5      CO2 23   BUN 27*   CREATININE 1.0   CALCIUM 9.7     No results for input(s): AST, ALT, BILIDIR, BILITOT, ALKPHOS in the last 72 hours. No results for input(s): INR in the last 72 hours. No results for input(s): Aldair Henry in the last 72 hours. Urinalysis:      Lab Results   Component Value Date    NITRU NEGATIVE 03/04/2022    WBCUA 51 03/04/2022    BACTERIA MODERATE 03/04/2022    RBCUA NONE SEEN 03/04/2022    BLOODU NEGATIVE 03/04/2022    SPECGRAV 1.020 03/04/2022       Radiology:  XR FOOT LEFT (MIN 3 VIEWS)   Final Result   1. No abnormality in the 2nd toe identified.    2. Chronic appearing erosions along the medial and lateral aspects of the 3rd   metatarsal head nonspecific but raising the possibility of gout         XR CHEST PORTABLE   Final Result   Improved bilateral airspace opacities. Central pulmonary vascular congestion, similar to the prior study.                  Monica Espinoza MD  3/10/2022 7:28 AM

## 2022-03-13 NOTE — DISCHARGE SUMMARY
[facility]    Patient Demographics    Patient. Emi Mack  Date of Birth. 1934  MRN. [id number]     Primary care provider. Margie Doyle MD  (Tel: [phone])    Admit date: 3/4/2022    Discharge date (blank if same as Note Date): 3/10/2022  Note Date: 3/13/2022     Reason for Hospitalization. Chief Complaint   Patient presents with    Fatigue    Hypotension         Diagnosis. Principal Problem:    Self-care deficit  Resolved Problems:    * No resolved hospital problems. [facility] Course:  patient is an 49-year-old female with history of hypertension, rheumatoid arthritis who was admitted with weakness. Patient recently had COVID-19, on admission had elevated creatinine  IV fluids started  Creatinine back to baseline with hydration  PT OT recommended SNF placement  Patient was discharged once accepted  Stable at the time of discharge    Aspirin, Lipitor, Plavix, methotrexate, prednisone continue during hospital stay  Blood pressure remained well controlled      Invasive procedures and treatments. 1. None   2. Consults. IP CONSULT TO CASE MANAGEMENT  IP CONSULT TO HOSPITALIST  IP CONSULT TO DIETITIAN  IP CONSULT TO SOCIAL WORK  IP CONSULT TO IV TEAM    Physical examination on discharge day. /61   Pulse 64   Temp 98.4 °F (36.9 °C)   Resp 16   Ht 5' 5\" (1.651 m)   Wt 118 lb 14.4 oz (53.9 kg)   SpO2 97%   BMI 19.79 kg/m²   General appearance. Alert. Looks comfortable. HEENT. Sclera clear. Moist mucus membranes. Cardiovascular. Regular rate and rhythm, normal S1, S2. No murmur. Respiratory. Not using accessory muscles. Clear to auscultation bilaterally, no wheeze. Gastrointestinal. Abdomen soft, non-tender, not distended, normal bowel sounds  Neurology. Facial symmetry. No speech deficits. Moving all extremities equally. Extremities.  No edema in lower extremities. Skin. Warm, dry, normal turgor    Condition at time of discharge stable    Medication instructions provided to patient at discharge. Medication List      CONTINUE taking these medications    amLODIPine 10 MG tablet  Commonly known as: NORVASC     aspirin 81 MG chewable tablet     atorvastatin 20 MG tablet  Commonly known as: LIPITOR     carBAMazepine 200 MG tablet  Commonly known as: TEGRETOL     cloNIDine 0.1 MG tablet  Commonly known as: CATAPRES     clopidogrel 75 MG tablet  Commonly known as: PLAVIX     dicyclomine 10 MG capsule  Commonly known as: BENTYL     docusate sodium 100 MG capsule  Commonly known as: Colace  Take 1 capsule by mouth daily     folic acid 1 MG tablet  Commonly known as: FOLVITE     lisinopril-hydroCHLOROthiazide 20-25 MG per tablet  Commonly known as: PRINZIDE;ZESTORETIC     methotrexate 5 MG chemo tablet  Commonly known as: RHEUMATREX     metoprolol tartrate 25 MG tablet  Commonly known as: LOPRESSOR     omeprazole 20 MG delayed release capsule  Commonly known as: PRILOSEC     polyethylene glycol 17 GM/SCOOP powder  Commonly known as: GLYCOLAX     predniSONE 5 MG tablet  Commonly known as: DELTASONE            Discharge recommendations given to patient. Follow Up. PCP in 1 week   Disposition. SNF  Activity. As tolerated  Diet: No diet orders on file      Spent 23 minutes in discharge process.     Signed:  Jos Marcum MD     3/13/2022 7:54 AM

## 2022-03-14 NOTE — PROGRESS NOTES
Physician Progress Note      PATIENT:               Nick Mcneill  CSN #:                  [id number]  :                       1934  ADMIT DATE:       3/4/2022 12:16 PM  [address] [place] DATE:        3/10/2022 7:47 PM  RESPONDING  PROVIDER #:        Emilie Vaughn MD          QUERY TEXT:    Pt admitted with MOSES and has malnutrition documented. Per dietician consult on   3/6, pt meets AND/ASPEN criteria for moderate malnutrition. If possible,   please document in progress notes and discharge summary if you are evaluating   and /or treating any of the following: The medical record reflects the following:  Risk Factors: poor intake, difficulty chewing without dentures PTA  Clinical Indicators: Ht 5'5\", wt 122, BMI 20.3. Per dietician consult: Follow   as moderate nutrition risk. Treatment: regular diet, dietician consult, weights, I&O's, nutritional   supplement    KAYLIN CampbellN, RN  Clinical   [phone]  Options provided:  -- Moderate Protein calorie malnutrition  -- Mild Protein calorie malnutrition  -- Severe Protein calorie malnutrition  -- Other - I will add my own diagnosis  -- Disagree - Not applicable / Not valid  -- Disagree - Clinically unable to determine / Unknown  -- Refer to Clinical Documentation Reviewer    PROVIDER RESPONSE TEXT:    This patient has moderate protein calorie malnutrition.     Query created by: Arnav Wright on 3/14/2022 8:30 AM      Electronically signed by:  Emilie Vaughn MD 3/14/2022 11:25 AM

## 2022-04-14 PROBLEM — S91.109A OPEN TOE WOUND: Status: ACTIVE | Noted: 2022-04-14

## 2022-04-14 PROBLEM — I65.22 STENOSIS OF LEFT CAROTID ARTERY: Status: ACTIVE | Noted: 2022-04-14

## 2022-04-14 PROBLEM — I73.9 PAD (PERIPHERAL ARTERY DISEASE) (HCC): Status: ACTIVE | Noted: 2022-04-14

## 2022-05-03 ENCOUNTER — HOSPITAL ENCOUNTER (OUTPATIENT)
Dept: CARDIAC CATH/INVASIVE PROCEDURES | Age: 87
Discharge: HOME | End: 2022-05-03

## 2022-05-10 ENCOUNTER — HOSPITAL ENCOUNTER (OUTPATIENT)
Age: 87
Discharge: HOME | End: 2022-05-10
Payer: COMMERCIAL

## 2022-05-10 LAB
ALT SERPL-CCNC: 8 U/L (ref 10–40)
AST SERPL-CCNC: 16 IU/L (ref 15–37)
BASOPHILS ABSOLUTE: 0.1 K/CU MM
BASOPHILS RELATIVE PERCENT: 0.6 % (ref 0–1)
BUN BLDV-MCNC: 36 MG/DL (ref 6–23)
CREAT SERPL-MCNC: 1.2 MG/DL (ref 0.6–1.1)
DIFFERENTIAL TYPE: ABNORMAL
EOSINOPHILS ABSOLUTE: 0.2 K/CU MM
EOSINOPHILS RELATIVE PERCENT: 2.9 % (ref 0–3)
ERYTHROCYTE SEDIMENTATION RATE: 84 MM/HR (ref 0–30)
GFR AFRICAN AMERICAN: 51 ML/MIN/1.73M2
GFR NON-AFRICAN AMERICAN: 42 ML/MIN/1.73M2
HCT VFR BLD CALC: 35.2 % (ref 37–47)
HEMOGLOBIN: 11 GM/DL (ref 12.5–16)
IMMATURE NEUTROPHIL %: 0.3 % (ref 0–0.43)
LYMPHOCYTES ABSOLUTE: 2.9 K/CU MM
LYMPHOCYTES RELATIVE PERCENT: 36.3 % (ref 24–44)
MCH RBC QN AUTO: 32.2 PG (ref 27–31)
MCHC RBC AUTO-ENTMCNC: 31.3 % (ref 32–36)
MCV RBC AUTO: 102.9 FL (ref 78–100)
MONOCYTES ABSOLUTE: 0.5 K/CU MM
MONOCYTES RELATIVE PERCENT: 6.6 % (ref 0–4)
NUCLEATED RBC %: 0 %
PDW BLD-RTO: 13 % (ref 11.7–14.9)
PLATELET # BLD: 404 K/CU MM (ref 140–440)
PMV BLD AUTO: 8.8 FL (ref 7.5–11.1)
RBC # BLD: 3.42 M/CU MM (ref 4.2–5.4)
SEGMENTED NEUTROPHILS ABSOLUTE COUNT: 4.3 K/CU MM
SEGMENTED NEUTROPHILS RELATIVE PERCENT: 53.3 % (ref 36–66)
TOTAL IMMATURE NEUTOROPHIL: 0.02 K/CU MM
TOTAL NUCLEATED RBC: 0 K/CU MM
WBC # BLD: 8 K/CU MM (ref 4–10.5)

## 2022-05-10 PROCEDURE — 85652 RBC SED RATE AUTOMATED: CPT

## 2022-05-10 PROCEDURE — 36415 COLL VENOUS BLD VENIPUNCTURE: CPT

## 2022-05-10 PROCEDURE — 84520 ASSAY OF UREA NITROGEN: CPT

## 2022-05-10 PROCEDURE — 82565 ASSAY OF CREATININE: CPT

## 2022-05-10 PROCEDURE — 84460 ALANINE AMINO (ALT) (SGPT): CPT

## 2022-05-10 PROCEDURE — 84450 TRANSFERASE (AST) (SGOT): CPT

## 2022-05-10 PROCEDURE — 85025 COMPLETE CBC W/AUTO DIFF WBC: CPT

## 2022-05-23 ENCOUNTER — ANESTHESIA EVENT (OUTPATIENT)
Dept: CARDIAC CATH/INVASIVE PROCEDURES | Age: 87
End: 2022-05-23
Payer: COMMERCIAL

## 2022-05-23 NOTE — ANESTHESIA PRE PROCEDURE
Department of Anesthesiology  Preprocedure Note       Name:  Wes Taylor   Age:  80 y.o.  :  1934                                          MRN:  [id number]         Date:  2022      Surgeon: * Surgery not found *    Procedure:     Medications prior to admission:   Prior to Admission medications    Medication Sig Start Date End Date Taking? Authorizing Provider   metoprolol tartrate (LOPRESSOR) 25 MG tablet Take 25 mg by mouth daily    Historical Provider, MD   omeprazole (PRILOSEC) 20 MG delayed release capsule Take 20 mg by mouth daily    Historical Provider, MD   atorvastatin (LIPITOR) 20 MG tablet Take 20 mg by mouth daily    Historical Provider, MD   folic acid (FOLVITE) 1 MG tablet Take 1 mg by mouth daily    Historical Provider, MD   dicyclomine (BENTYL) 10 MG capsule Take 10 mg by mouth 2 times daily as needed    Historical Provider, MD   predniSONE (DELTASONE) 5 MG tablet Take 5 mg by mouth 2 times daily    Historical Provider, MD   docusate sodium (COLACE) 100 MG capsule Take 1 capsule by mouth daily 16   Corinne Hughes MD   lisinopril-hydrochlorothiazide (PRINZIDE;ZESTORETIC) 20-25 MG per tablet Take 1 tablet by mouth daily. Historical Provider, MD   carBAMazepine (TEGRETOL) 200 MG tablet Take 200 mg by mouth 2 times daily. Historical Provider, MD   amLODIPine (NORVASC) 10 MG tablet Take 10 mg by mouth daily. Historical Provider, MD   polyethylene glycol (GLYCOLAX) powder Take 17 g by mouth daily. Historical Provider, MD   methotrexate (RHEUMATREX) 5 MG chemo tablet Take 25 mg by mouth once a week. Pt takes this on . Historical Provider, MD   clopidogrel (PLAVIX) 75 MG tablet Take 75 mg by mouth daily. Historical Provider, MD   clonidine (CATAPRES) 0.1 MG tablet Take 0.1 mg by mouth daily     Historical Provider, MD   aspirin 81 MG chewable tablet Take 81 mg by mouth daily.       Historical Provider, MD       Current medications:    Current Outpatient Medications   Medication Sig Dispense Refill    metoprolol tartrate (LOPRESSOR) 25 MG tablet Take 25 mg by mouth daily      omeprazole (PRILOSEC) 20 MG delayed release capsule Take 20 mg by mouth daily      atorvastatin (LIPITOR) 20 MG tablet Take 20 mg by mouth daily      folic acid (FOLVITE) 1 MG tablet Take 1 mg by mouth daily      dicyclomine (BENTYL) 10 MG capsule Take 10 mg by mouth 2 times daily as needed      predniSONE (DELTASONE) 5 MG tablet Take 5 mg by mouth 2 times daily      docusate sodium (COLACE) 100 MG capsule Take 1 capsule by mouth daily 30 capsule 0    lisinopril-hydrochlorothiazide (PRINZIDE;ZESTORETIC) 20-25 MG per tablet Take 1 tablet by mouth daily.  carBAMazepine (TEGRETOL) 200 MG tablet Take 200 mg by mouth 2 times daily.  amLODIPine (NORVASC) 10 MG tablet Take 10 mg by mouth daily.  polyethylene glycol (GLYCOLAX) powder Take 17 g by mouth daily.  methotrexate (RHEUMATREX) 5 MG chemo tablet Take 25 mg by mouth once a week. Pt takes this on Sunday.  clopidogrel (PLAVIX) 75 MG tablet Take 75 mg by mouth daily.  clonidine (CATAPRES) 0.1 MG tablet Take 0.1 mg by mouth daily       aspirin 81 MG chewable tablet Take 81 mg by mouth daily. No current facility-administered medications for this encounter. Facility-Administered Medications Ordered in Other Encounters   Medication Dose Route Frequency Provider Last Rate Last Admin    denosumab (PROLIA) SC injection 60 mg  60 mg SubCUTAneous Once Jc Wilkinson MD           Allergies:     Allergies   Allergen Reactions    Pcn [Penicillins] Swelling       Problem List:    Patient Active Problem List   Diagnosis Code    HTN (hypertension) I10    Rheumatoid arthritis ([facility] [address] M06.9    CVA, old, hemiparesis ([facility] [address] I69.359    Tic douloureux G50.0    Debility R53.81    Ataxia R27.0    Acute pain of right knee M25.561    Sepsis ([facility] [address] A41.9    Physical deconditioning R53.81    Generalized weakness R53.1    Depression F32. A    COVID-19 U07.1    Troponin I above reference range R77.8    Acute cystitis without hematuria N30.00    Self-care deficit Z78.9    Stenosis of left carotid artery I65.22    Open toe wound S91.109A    PAD (peripheral artery disease) ([facility]) I73.9       Past Medical History:        Diagnosis Date    Arthritis     Cataract of left eye     Cataract of right eye     Chronic kidney disease     H/O cardiovascular stress test 11/1/11    There is no scintigraphic evidence of inducible myocardial ischemia. LV function is normal. EF 59%  No ECG changes Unremarkable pharmacological stress test. Normal myocardial  perfusion study.  H/O Doppler ultrasound 11/8/11    No sonographic evidence of hemodynamically significant stenosis of the carotid arteries bilaterally.  H/O Doppler ultrasound 8/21/08    Study suggestive of lack of evidence of any hemodynamically significant peripheral vascular disease at rest. Waveform analysis is triphasic throughout the lower extremities.  H/O echocardiogram 4/26/12    Left ventricular function is normal. EF >55% The transmitral spectral doppler flow pattern is suggestive of impaired LV relaxation.  H/O tilt table evaluation 10/10/08    The patient became orthostatic on tilt table study after getting Nitro. There were no neurocardiogenic episode seen.  H/O transesophageal echocardiography (MARIO) for monitoring 11/08/10    Normal MARIO.   No PFO or clots noted    Hyperlipidemia     Hypertension     Movement disorder     Neuromuscular disorder ([address]     Osteoporosis     Pneumonia     Psychiatric problem     daughter states pt has no psych problems    TIA (transient ischemic attack)     Trigeminal neuralgia of right side of face        Past Surgical History:        Procedure Laterality Date    APPENDECTOMY      CATARACT REMOVAL      CHOLECYSTECTOMY      EYE SURGERY      HYSTERECTOMY         Social History:    Social History     Tobacco Use    Smoking status: Never Smoker    Smokeless tobacco: Never Used   Substance Use Topics    Alcohol use: No     Comment: 1 - 2 per year                                Counseling given: Not Answered      Vital Signs (Current): There were no vitals filed for this visit. BP Readings from Last 3 Encounters:   04/26/22 (!) 89/56   04/14/22 (!) 142/80   03/10/22 104/61       NPO Status:                                                                                 BMI:   Wt Readings from Last 3 Encounters:   04/26/22 125 lb (56.7 kg)   03/10/22 118 lb 14.4 oz (53.9 kg)   01/31/22 122 lb (55.3 kg)     There is no height or weight on file to calculate BMI.    CBC:   Lab Results   Component Value Date    WBC 8.0 05/10/2022    RBC 3.42 05/10/2022    HGB 11.0 05/10/2022    HCT 35.2 05/10/2022    .9 05/10/2022    RDW 13.0 05/10/2022     05/10/2022       CMP:   Lab Results   Component Value Date     03/08/2022    K 4.5 03/08/2022     03/08/2022    CO2 23 03/08/2022    BUN 36 05/10/2022    CREATININE 1.2 05/10/2022    GFRAA 51 05/10/2022    LABGLOM 42 05/10/2022    GLUCOSE 103 03/08/2022    PROT 6.6 03/05/2022    PROT 7.3 03/21/2012    CALCIUM 9.7 03/08/2022    BILITOT 0.2 03/05/2022    ALKPHOS 65 03/05/2022    AST 16 05/10/2022    ALT 8 05/10/2022       POC Tests: No results for input(s): POCGLU, POCNA, POCK, POCCL, POCBUN, POCHEMO, POCHCT in the last 72 hours.     Coags:   Lab Results   Component Value Date    PROTIME 11.8 04/01/2015    PROTIME 12.4 11/20/2011    INR 1.04 04/01/2015    APTT 24.5 04/01/2015       HCG (If Applicable): No results found for: PREGTESTUR, PREGSERUM, HCG, HCGQUANT     ABGs: No results found for: PHART, PO2ART, OQW5LNL, IBQ4BIE, BEART, N3HBUVWD     Type & Screen (If Applicable):  No results found for: LABABO, LABRH    Drug/Infectious Status (If Applicable):  Lab Results   Component Value Date    HEPCAB NON REACTIVE 03/21/2012 COVID-19 Screening (If Applicable):   Lab Results   Component Value Date    COVID19 DETECTED 01/31/2022           Anesthesia Evaluation  Patient summary reviewed no history of anesthetic complications:   Airway: Mallampati: II  TM distance: >3 FB   Neck ROM: full  Mouth opening: > = 3 FB   Dental:    (+) edentulous      Pulmonary:normal exam    (+) pneumonia:                             Cardiovascular:    (+) hypertension:,                ROS comment: Summary   Left ventricular systolic function is low normal.   Ejection fraction is visually estimated at 45-50%. Sigmoid septum noted. Sclerotic, but non-stenotic aortic valve. Trace aortic regurgitation noted with color doppler. Mitral annular calcification is present. Mild-moderate mitral regurgitation is present. No evidence of any pericardial effusion. Neuro/Psych:   (+) neuromuscular disease:, TIA, psychiatric history:            GI/Hepatic/Renal:             Endo/Other:    (+) : arthritis:., .                 Abdominal:             Vascular: Other Findings:           Anesthesia Plan      MAC     ASA 3     ( Pre Anesthesia Assessment complete. Chart reviewed on 5/23/2022)  Induction: intravenous.                           NE Cintron - CRNA   5/23/2022

## 2022-05-24 ENCOUNTER — ANESTHESIA (OUTPATIENT)
Dept: CARDIAC CATH/INVASIVE PROCEDURES | Age: 87
End: 2022-05-24
Payer: COMMERCIAL

## 2022-05-24 ENCOUNTER — HOSPITAL ENCOUNTER (OUTPATIENT)
Dept: CARDIAC CATH/INVASIVE PROCEDURES | Age: 87
Discharge: HOME | End: 2022-05-24
Attending: THORACIC SURGERY (CARDIOTHORACIC VASCULAR SURGERY) | Admitting: THORACIC SURGERY (CARDIOTHORACIC VASCULAR SURGERY)
Payer: COMMERCIAL

## 2022-05-24 VITALS
WEIGHT: 120 LBS | HEIGHT: 65 IN | OXYGEN SATURATION: 100 % | DIASTOLIC BLOOD PRESSURE: 72 MMHG | RESPIRATION RATE: 16 BRPM | SYSTOLIC BLOOD PRESSURE: 114 MMHG | BODY MASS INDEX: 19.99 KG/M2 | TEMPERATURE: 96.2 F | HEART RATE: 56 BPM

## 2022-05-24 LAB
ACTIVATED CLOTTING TIME, LOW RANGE: 171 SEC
ACTIVATED CLOTTING TIME, LOW RANGE: 183 SEC
ACTIVATED CLOTTING TIME, LOW RANGE: 211 SEC
ACTIVATED CLOTTING TIME, LOW RANGE: 217 SEC
ACTIVATED CLOTTING TIME, LOW RANGE: 225 SEC
ACTIVATED CLOTTING TIME, LOW RANGE: 230 SEC
ANION GAP SERPL CALCULATED.3IONS-SCNC: 11 MMOL/L (ref 4–16)
APTT: 25.7 SECONDS (ref 25.1–37.1)
BUN BLDV-MCNC: 34 MG/DL (ref 6–23)
CALCIUM SERPL-MCNC: 9.9 MG/DL (ref 8.3–10.6)
CHLORIDE BLD-SCNC: 106 MMOL/L (ref 99–110)
CO2: 21 MMOL/L (ref 21–32)
CREAT SERPL-MCNC: 0.8 MG/DL (ref 0.6–1.1)
GFR AFRICAN AMERICAN: >60 ML/MIN/1.73M2
GFR NON-AFRICAN AMERICAN: >60 ML/MIN/1.73M2
GLUCOSE BLD-MCNC: 116 MG/DL (ref 70–99)
HCT VFR BLD CALC: 33.3 % (ref 37–47)
HEMOGLOBIN: 10.5 GM/DL (ref 12.5–16)
INR BLD: 0.89 INDEX
MCH RBC QN AUTO: 32.2 PG (ref 27–31)
MCHC RBC AUTO-ENTMCNC: 31.5 % (ref 32–36)
MCV RBC AUTO: 102.1 FL (ref 78–100)
PDW BLD-RTO: 13.9 % (ref 11.7–14.9)
PLATELET # BLD: 394 K/CU MM (ref 140–440)
PMV BLD AUTO: 8.7 FL (ref 7.5–11.1)
POTASSIUM SERPL-SCNC: 3.8 MMOL/L (ref 3.5–5.1)
PROTHROMBIN TIME: 11.5 SECONDS (ref 11.7–14.5)
RBC # BLD: 3.26 M/CU MM (ref 4.2–5.4)
SODIUM BLD-SCNC: 138 MMOL/L (ref 135–145)
WBC # BLD: 11 K/CU MM (ref 4–10.5)

## 2022-05-24 PROCEDURE — 6360000002 HC RX W HCPCS

## 2022-05-24 PROCEDURE — 3700000000 HC ANESTHESIA ATTENDED CARE

## 2022-05-24 PROCEDURE — 76937 US GUIDE VASCULAR ACCESS: CPT | Performed by: THORACIC SURGERY (CARDIOTHORACIC VASCULAR SURGERY)

## 2022-05-24 PROCEDURE — 37229 HC TIB PER TERRITORY ATHER: CPT

## 2022-05-24 PROCEDURE — 2500000003 HC RX 250 WO HCPCS

## 2022-05-24 PROCEDURE — 85730 THROMBOPLASTIN TIME PARTIAL: CPT

## 2022-05-24 PROCEDURE — 80048 BASIC METABOLIC PNL TOTAL CA: CPT

## 2022-05-24 PROCEDURE — C1724 CATH, TRANS ATHEREC,ROTATION: HCPCS

## 2022-05-24 PROCEDURE — 85027 COMPLETE CBC AUTOMATED: CPT

## 2022-05-24 PROCEDURE — 85610 PROTHROMBIN TIME: CPT

## 2022-05-24 PROCEDURE — 2580000003 HC RX 258

## 2022-05-24 PROCEDURE — 3700000001 HC ADD 15 MINUTES (ANESTHESIA)

## 2022-05-24 PROCEDURE — C1769 GUIDE WIRE: HCPCS

## 2022-05-24 PROCEDURE — 75710 ARTERY X-RAYS ARM/LEG: CPT | Performed by: THORACIC SURGERY (CARDIOTHORACIC VASCULAR SURGERY)

## 2022-05-24 PROCEDURE — 75710 ARTERY X-RAYS ARM/LEG: CPT

## 2022-05-24 PROCEDURE — 6360000004 HC RX CONTRAST MEDICATION

## 2022-05-24 PROCEDURE — 6370000000 HC RX 637 (ALT 250 FOR IP)

## 2022-05-24 PROCEDURE — 2709999900 HC NON-CHARGEABLE SUPPLY

## 2022-05-24 PROCEDURE — 37229 PR REVSC OPN/PRQ TIB/PERO W/ATHRC/ANGIOP SM VSL: CPT | Performed by: THORACIC SURGERY (CARDIOTHORACIC VASCULAR SURGERY)

## 2022-05-24 PROCEDURE — C1725 CATH, TRANSLUMIN NON-LASER: HCPCS

## 2022-05-24 PROCEDURE — C1894 INTRO/SHEATH, NON-LASER: HCPCS

## 2022-05-24 PROCEDURE — C1887 CATHETER, GUIDING: HCPCS

## 2022-05-24 PROCEDURE — 85347 COAGULATION TIME ACTIVATED: CPT

## 2022-05-24 RX ORDER — DEXTROSE MONOHYDRATE 50 MG/ML
100 INJECTION, SOLUTION INTRAVENOUS PRN
Status: CANCELLED | OUTPATIENT
Start: 2022-05-24

## 2022-05-24 RX ORDER — DIPHENHYDRAMINE HYDROCHLORIDE 50 MG/ML
12.5 INJECTION INTRAMUSCULAR; INTRAVENOUS
Status: CANCELLED | OUTPATIENT
Start: 2022-05-24 | End: 2022-05-24

## 2022-05-24 RX ORDER — SODIUM CHLORIDE 9 MG/ML
INJECTION, SOLUTION INTRAVENOUS CONTINUOUS PRN
Status: DISCONTINUED | OUTPATIENT
Start: 2022-05-24 | End: 2022-05-24 | Stop reason: SDUPTHER

## 2022-05-24 RX ORDER — HYDRALAZINE HYDROCHLORIDE 20 MG/ML
10 INJECTION INTRAMUSCULAR; INTRAVENOUS
Status: CANCELLED | OUTPATIENT
Start: 2022-05-24

## 2022-05-24 RX ORDER — FENTANYL CITRATE 50 UG/ML
INJECTION, SOLUTION INTRAMUSCULAR; INTRAVENOUS PRN
Status: DISCONTINUED | OUTPATIENT
Start: 2022-05-24 | End: 2022-05-24 | Stop reason: SDUPTHER

## 2022-05-24 RX ORDER — OXYCODONE HYDROCHLORIDE 5 MG/1
5 TABLET ORAL
Status: CANCELLED | OUTPATIENT
Start: 2022-05-24 | End: 2022-05-24

## 2022-05-24 RX ORDER — PHENYLEPHRINE HCL IN 0.9% NACL 1 MG/10 ML
SYRINGE (ML) INTRAVENOUS PRN
Status: DISCONTINUED | OUTPATIENT
Start: 2022-05-24 | End: 2022-05-24 | Stop reason: SDUPTHER

## 2022-05-24 RX ORDER — PROPOFOL 10 MG/ML
INJECTION, EMULSION INTRAVENOUS PRN
Status: DISCONTINUED | OUTPATIENT
Start: 2022-05-24 | End: 2022-05-24 | Stop reason: SDUPTHER

## 2022-05-24 RX ORDER — PROCHLORPERAZINE EDISYLATE 5 MG/ML
5 INJECTION INTRAMUSCULAR; INTRAVENOUS
Status: CANCELLED | OUTPATIENT
Start: 2022-05-24 | End: 2022-05-24

## 2022-05-24 RX ORDER — MEPERIDINE HYDROCHLORIDE 25 MG/ML
12.5 INJECTION INTRAMUSCULAR; INTRAVENOUS; SUBCUTANEOUS EVERY 5 MIN PRN
Status: CANCELLED | OUTPATIENT
Start: 2022-05-24

## 2022-05-24 RX ORDER — IPRATROPIUM BROMIDE AND ALBUTEROL SULFATE 2.5; .5 MG/3ML; MG/3ML
1 SOLUTION RESPIRATORY (INHALATION)
Status: CANCELLED | OUTPATIENT
Start: 2022-05-24 | End: 2022-05-24

## 2022-05-24 RX ORDER — SODIUM CHLORIDE 9 MG/ML
25 INJECTION, SOLUTION INTRAVENOUS PRN
Status: CANCELLED | OUTPATIENT
Start: 2022-05-24

## 2022-05-24 RX ORDER — SODIUM CHLORIDE, SODIUM LACTATE, POTASSIUM CHLORIDE, CALCIUM CHLORIDE 600; 310; 30; 20 MG/100ML; MG/100ML; MG/100ML; MG/100ML
INJECTION, SOLUTION INTRAVENOUS CONTINUOUS
Status: CANCELLED | OUTPATIENT
Start: 2022-05-24

## 2022-05-24 RX ORDER — SODIUM CHLORIDE 0.9 % (FLUSH) 0.9 %
5-40 SYRINGE (ML) INJECTION PRN
Status: CANCELLED | OUTPATIENT
Start: 2022-05-24

## 2022-05-24 RX ORDER — EPHEDRINE SULFATE 50 MG/ML
INJECTION INTRAVENOUS PRN
Status: DISCONTINUED | OUTPATIENT
Start: 2022-05-24 | End: 2022-05-24 | Stop reason: SDUPTHER

## 2022-05-24 RX ORDER — ONDANSETRON 2 MG/ML
4 INJECTION INTRAMUSCULAR; INTRAVENOUS
Status: CANCELLED | OUTPATIENT
Start: 2022-05-24 | End: 2022-05-24

## 2022-05-24 RX ORDER — MIDAZOLAM HYDROCHLORIDE 2 MG/2ML
2 INJECTION, SOLUTION INTRAMUSCULAR; INTRAVENOUS
Status: CANCELLED | OUTPATIENT
Start: 2022-05-24 | End: 2022-05-24

## 2022-05-24 RX ORDER — SODIUM CHLORIDE 0.9 % (FLUSH) 0.9 %
5-40 SYRINGE (ML) INJECTION EVERY 12 HOURS SCHEDULED
Status: CANCELLED | OUTPATIENT
Start: 2022-05-24

## 2022-05-24 RX ORDER — FENTANYL CITRATE 50 UG/ML
50 INJECTION, SOLUTION INTRAMUSCULAR; INTRAVENOUS EVERY 5 MIN PRN
Status: CANCELLED | OUTPATIENT
Start: 2022-05-24

## 2022-05-24 RX ADMIN — EPHEDRINE SULFATE 10 MG: 50 INJECTION, SOLUTION INTRAVENOUS at 10:24

## 2022-05-24 RX ADMIN — PHENYLEPHRINE HYDROCHLORIDE 40 MCG/MIN: 10 INJECTION INTRAVENOUS at 12:44

## 2022-05-24 RX ADMIN — PROPOFOL 20 MG: 10 INJECTION, EMULSION INTRAVENOUS at 10:00

## 2022-05-24 RX ADMIN — EPHEDRINE SULFATE 5 MG: 50 INJECTION, SOLUTION INTRAVENOUS at 11:29

## 2022-05-24 RX ADMIN — PROPOFOL 30 MG: 10 INJECTION, EMULSION INTRAVENOUS at 11:00

## 2022-05-24 RX ADMIN — FENTANYL CITRATE 25 MCG: 50 INJECTION, SOLUTION INTRAMUSCULAR; INTRAVENOUS at 10:00

## 2022-05-24 RX ADMIN — EPHEDRINE SULFATE 10 MG: 50 INJECTION, SOLUTION INTRAVENOUS at 10:42

## 2022-05-24 RX ADMIN — Medication 100 MCG: at 11:44

## 2022-05-24 RX ADMIN — SODIUM CHLORIDE: 900 INJECTION INTRAVENOUS at 09:50

## 2022-05-24 RX ADMIN — Medication 150 MCG: at 12:11

## 2022-05-24 RX ADMIN — PROPOFOL 30 MCG/KG/MIN: 10 INJECTION, EMULSION INTRAVENOUS at 10:01

## 2022-05-24 RX ADMIN — Medication 50 MCG: at 11:31

## 2022-05-24 RX ADMIN — Medication 100 MCG: at 11:51

## 2022-05-24 RX ADMIN — EPHEDRINE SULFATE 10 MG: 50 INJECTION, SOLUTION INTRAVENOUS at 11:08

## 2022-05-24 RX ADMIN — FENTANYL CITRATE 25 MCG: 50 INJECTION, SOLUTION INTRAMUSCULAR; INTRAVENOUS at 10:58

## 2022-05-24 RX ADMIN — FENTANYL CITRATE 25 MCG: 50 INJECTION, SOLUTION INTRAMUSCULAR; INTRAVENOUS at 10:35

## 2022-05-24 RX ADMIN — EPHEDRINE SULFATE 10 MG: 50 INJECTION, SOLUTION INTRAVENOUS at 10:27

## 2022-05-24 RX ADMIN — Medication 100 MCG: at 11:55

## 2022-05-24 RX ADMIN — FENTANYL CITRATE 25 MCG: 50 INJECTION, SOLUTION INTRAMUSCULAR; INTRAVENOUS at 10:29

## 2022-05-24 RX ADMIN — Medication 150 MCG: at 12:37

## 2022-05-24 RX ADMIN — Medication 150 MCG: at 12:26

## 2022-05-24 RX ADMIN — PROPOFOL 20 MG: 10 INJECTION, EMULSION INTRAVENOUS at 11:21

## 2022-05-24 RX ADMIN — Medication 100 MCG: at 11:38

## 2022-05-24 RX ADMIN — Medication 100 MCG: at 12:43

## 2022-05-24 RX ADMIN — EPHEDRINE SULFATE 5 MG: 50 INJECTION, SOLUTION INTRAVENOUS at 10:22

## 2022-05-24 NOTE — ANESTHESIA POSTPROCEDURE EVALUATION
Department of Anesthesiology  Postprocedure Note    Patient: Denny Persaud  MRN: [id number]  YOB: 1934  Date of evaluation: 5/24/2022  Time:  1:22 PM     Procedure Summary     Date: 05/24/22 Room / Location: [address] [place] [address]    Anesthesia Start: 2791 Anesthesia Stop: 1322    Procedure: PERIPHERAL INTERVENTION Diagnosis:     Scheduled Providers:  Responsible Provider: Parrish Cevallos MD    Anesthesia Type: MAC ASA Status: 3          Anesthesia Type: No value filed. Debi Phase I: Debi Score: 10    Debi Phase II:      Last vitals: Reviewed and per EMR flowsheets.        Anesthesia Post Evaluation    Patient location during evaluation: bedside  Patient participation: complete - patient participated  Level of consciousness: sleepy but conscious  Airway patency: patent  Nausea & Vomiting: no nausea and no vomiting  Complications: no  Cardiovascular status: blood pressure returned to baseline  Respiratory status: acceptable, spontaneous ventilation and face mask  Hydration status: euvolemic  Multimodal analgesia pain management approach

## 2022-05-24 NOTE — FLOWSHEET NOTE
Discharge instructions reviewed with patient and pt daughter, Kajal Silva. Voices understanding. Pt did not ambulate as she does not at home. Pt assisted to stand at bedside and pivoted per staff to wheeled walker. Right groin remains free of bleeding/hematoma.

## 2022-05-26 NOTE — PROCEDURES
[address], [address] [place] [address]                            CARDIAC CATHETERIZATION    PATIENT NAME: Austin Thomas                 :        1934  MED REC NO:   [id number]                          ROOM:  ACCOUNT NO:   [de-identified]                           ADMIT DATE: 2022  PROVIDER:     Manju Canela MD    DATE OF PROCEDURE:  2022    PREPROCEDURE DIAGNOSIS:  CLI. POSTPROCEDURE DIAGNOSIS:  CLI. PROCEDURE PERFORMED:  1. Ultrasound-guided access of the right femoral artery with placement  of a 5-Maltese sheath. 2.  Catheter placement in the left iliac with selective imaging of the  left lower extremity. Decision made to treat. INTERVENTION:  1. Crossing of the occluded peroneal artery into the foot with repeated  attempt to cross this region using PTA with a 2 x 150 balloon, PTA of  the peroneal artery with a 1.2 x 15 mm, PTA of the peroneal artery using  a 3 x 15 mm, and PTA of the peroneal artery using a 2 x 210 balloon. Orbital atherectomy of the proximal peroneal artery using a 1.25 Micro  CSI orbital atherectomy. 2.  Attempted access of the pedal vessels. 3.  Completion angiography. SURGEON:  Manju Canela MD.    ESTIMATED BLOOD LOSS:  10 mL. ANESTHETIC:  Monitored anesthesia by Anesthesiology was performed. PREOP:  This is an 20-year-old -American female with some history  of cognitive disorder, who presents after a referral by her podiatrist  secondary to a second toe wound that has poor healing. This is arterial  in nature. The risks and benefits of angiogram were discussed in detail  with the patient. She does have a history of hypotension and therefore,  I had Anesthesia assist with sedation to help with her hypotension. FINDINGS:  Ultrasound evaluation of the right common femoral artery  showed calcium present. Images of this were stored in the permanent  chart.   I did ultrasound the posterior tibial artery, which was unable  to be identified. The anterior tibial artery was occluded distally, and  we were unable to access this. I also ultrasounded the peroneal artery,  which had a very tiny lumen in it and our wire was present within this  lumen, but we were unable to see the wire in a retrograde fashion. ANGIOGRAM:  We initially noted that there was no left common iliac  disease, mild common femoral and SFA disease in the left side. The  popliteal artery has no significant disease, but there was about a 30%  narrowing in its distal portion. The below-the-knee vasculature is  completely occluded. The distal popliteal artery has a high-grade  stenosis with complete occlusion in the mid calf region. Anterior  tibial artery is occluded at its takeoff, but there is a large branch  that is feeding what looks to be a distal vessel in the anterior tibial  circulation. This is a calcified vessel. The peroneal artery is  completely occluded with some distal flow present at the ankle from  collaterals that filled what looked to be a very small dorsalis pedis. Postprocedure angiogram showed fairly similar imaging; however, there  was improved flow in the proximal peroneal artery. PROCEDURE:  The patient was identified, brought to cath lab, and laid in  a supine position. She was prepped and draped in normal sterile  fashion. Anesthesia was present and they sedated her. Prior to  beginning, a timeout was performed. Ultrasound-guided access to the  right femoral artery was achieved. Images of this were stored in the  permanent chart. Using Micro-Stick kit, we then placed a 4-[place]  sheath. A RIM catheter was used to go up and over with a Glide  Advantage wire and selective imaging of the left lower extremity was  performed. Decision was made to treat. We then exchanged for a 5 x 70  up-and-over sheath, and the patient was systemically heparinized.     We were able to cross the peroneal artery, which was occluded using  initially a 0.035 system to a point. Glidewire was then exchanged for a  command wire, which were able to actually feed all the way into the  pedal circulation. We could not pass a 0.014 or 0.018 catheter to track  over this. The vessels were extremely calcified, and we could get to  just above the ankle region, where the catheter would not track. We  attempted multiple different catheters with no success. Without this,  we could not use an 0.018 wire and could not do orbital atherectomy. We attempted to balloon this region first with a 2.0 x 210 balloon. We  then attempted to balloon the small region, where we had difficulty  passing using a 1.25 coronary balloon and also a 3-mm balloon. All of  these things would pass to a certain location, but we could not get it  to pass any further. At this point, we were able to actually use our  0.014 ViperWire, which we passed into the pedal circulation. We felt  that we would attempt orbital atherectomy using 1.25 Micro. We  attempted orbital atherectomy into the proximal portion of the vessel as  well, which seemed to have excellent treatment; however, we got to the  same location we were having difficulty and this was in a subintimal  plane. So at this point, we turned our attention to try to access from a pedal  circulation standpoint. We could not access the anterior tibial artery. We could get a good flush, but the wire was not tracked, but there was  no posterior tibial artery that we could identify on ultrasound. We  actually were able to identify the peroneal artery on ultrasound, and we  attempted to access this multiple times. We were able to get into it  multiple times; however, the wire would not track across the area where  we were having most difficulty.     Multiple images were taken and we noted that we had a blush at the site  of where we accessed, so we knew we were in the vessel at this point. At this point, we attempted to use a Turnpike to cross the very  calcified subintimal region and we were unable to cross with this as  well. A small tip of this was fractured into the occluded region, which  we did not feel would have any negative effect as it was in an occluded  vessel stuffed inside a calcification. At this point, after  approximately 3 hours, we felt that there was no benefit for us to  continue. We were able to actually place an Astato wire into the small pedal  vessel of the foot as well. However, this was still a situation that we  could not get a catheter or a device to track to be able to treat this  region. At this point, we had ballooned the entire proximal peroneal  artery into the distal popliteal artery. We attempted to get into the  anterior tibial artery; however, there was no distal vessel to be able  to aim for, and we could not get into this vessel either, and there was  no posterior tibial artery to identify. So at this point, we exchanged  our sheath for a short 5-Hungarian sheath. The patient tolerated the  procedure well, and she was taken to the recovery area in a stable  condition.         Kassie Hare MD    D: 05/26/2022 0:20:49       T: 05/26/2022 3:17:22     [email or web address]  Job#: [id number]     Doc#: [id number]    CC:

## 2022-05-28 NOTE — H&P
Patient was seen in pre-op. I have reviewed the history and physical.  I have examined the patient today. There are no changes noted from the H & P available in chart. The patient was counseled at length about the risks of zia Covid-19 during their perioperative period and any recovery window from their procedure. The patient was made aware that zia Covid-19  may worsen their prognosis for recovering from their procedure  and lend to a higher morbidity and/or mortality risk. All material risks, benefits, and reasonable alternatives including postponing the procedure were discussed. The patient does wish to proceed with the procedure at this time.

## 2022-07-15 ENCOUNTER — APPOINTMENT (OUTPATIENT)
Dept: GENERAL RADIOLOGY | Age: 87
DRG: 871 | End: 2022-07-15
Payer: COMMERCIAL

## 2022-07-15 ENCOUNTER — APPOINTMENT (OUTPATIENT)
Dept: CT IMAGING | Age: 87
DRG: 871 | End: 2022-07-15
Payer: COMMERCIAL

## 2022-07-15 ENCOUNTER — HOSPITAL ENCOUNTER (INPATIENT)
Age: 87
LOS: 10 days | Discharge: NURSING, REHAB OR LONG-TERM CARE | DRG: 871 | End: 2022-07-26
Attending: STUDENT IN AN ORGANIZED HEALTH CARE EDUCATION/TRAINING PROGRAM | Admitting: INTERNAL MEDICINE
Payer: COMMERCIAL

## 2022-07-15 DIAGNOSIS — R41.82 ALTERED MENTAL STATUS, UNSPECIFIED ALTERED MENTAL STATUS TYPE: ICD-10-CM

## 2022-07-15 DIAGNOSIS — N39.0 URINARY TRACT INFECTION WITHOUT HEMATURIA, SITE UNSPECIFIED: Primary | ICD-10-CM

## 2022-07-15 DIAGNOSIS — E86.0 DEHYDRATION: ICD-10-CM

## 2022-07-15 LAB
ALBUMIN SERPL-MCNC: 3.2 GM/DL (ref 3.4–5)
ALP BLD-CCNC: 80 IU/L (ref 40–129)
ALT SERPL-CCNC: 15 U/L (ref 10–40)
ANION GAP SERPL CALCULATED.3IONS-SCNC: 11 MMOL/L (ref 4–16)
AST SERPL-CCNC: 18 IU/L (ref 15–37)
BACTERIA: ABNORMAL /HPF
BASE EXCESS MIXED: 3.7 (ref 0–2.3)
BASOPHILS ABSOLUTE: 0 K/CU MM
BASOPHILS RELATIVE PERCENT: 0.1 % (ref 0–1)
BILIRUB SERPL-MCNC: 0.3 MG/DL (ref 0–1)
BILIRUBIN URINE: NEGATIVE MG/DL
BLOOD, URINE: ABNORMAL
BUN BLDV-MCNC: 46 MG/DL (ref 6–23)
CALCIUM SERPL-MCNC: 9.4 MG/DL (ref 8.3–10.6)
CHLORIDE BLD-SCNC: 101 MMOL/L (ref 99–110)
CLARITY: ABNORMAL
CO2: 24 MMOL/L (ref 21–32)
COLOR: YELLOW
COMMENT: ABNORMAL
CREAT SERPL-MCNC: 1.1 MG/DL (ref 0.6–1.1)
DIFFERENTIAL TYPE: ABNORMAL
EOSINOPHILS ABSOLUTE: 0 K/CU MM
EOSINOPHILS RELATIVE PERCENT: 0.2 % (ref 0–3)
GFR AFRICAN AMERICAN: 57 ML/MIN/1.73M2
GFR NON-AFRICAN AMERICAN: 47 ML/MIN/1.73M2
GLUCOSE BLD-MCNC: 112 MG/DL (ref 70–99)
GLUCOSE, URINE: NEGATIVE MG/DL
HCO3 VENOUS: 27.7 MMOL/L (ref 19–25)
HCT VFR BLD CALC: 28.4 % (ref 37–47)
HEMOGLOBIN: 8.9 GM/DL (ref 12.5–16)
IMMATURE NEUTROPHIL %: 0.5 % (ref 0–0.43)
KETONES, URINE: NEGATIVE MG/DL
LACTATE: 1.7 MMOL/L (ref 0.4–2)
LEUKOCYTE ESTERASE, URINE: ABNORMAL
LYMPHOCYTES ABSOLUTE: 0.9 K/CU MM
LYMPHOCYTES RELATIVE PERCENT: 10.3 % (ref 24–44)
MCH RBC QN AUTO: 33.7 PG (ref 27–31)
MCHC RBC AUTO-ENTMCNC: 31.3 % (ref 32–36)
MCV RBC AUTO: 107.6 FL (ref 78–100)
MONOCYTES ABSOLUTE: 0.7 K/CU MM
MONOCYTES RELATIVE PERCENT: 7.2 % (ref 0–4)
MUCUS: ABNORMAL HPF
NITRITE URINE, QUANTITATIVE: POSITIVE
NUCLEATED RBC %: 0 %
O2 SAT, VEN: 95.2 % (ref 50–70)
PCO2, VEN: 39 MMHG (ref 38–52)
PDW BLD-RTO: 15.4 % (ref 11.7–14.9)
PH VENOUS: 7.46 (ref 7.32–7.42)
PH, URINE: 5 (ref 5–8)
PLATELET # BLD: 185 K/CU MM (ref 140–440)
PMV BLD AUTO: 9.4 FL (ref 7.5–11.1)
PO2, VEN: 204 MMHG (ref 28–48)
POTASSIUM SERPL-SCNC: 4.1 MMOL/L (ref 3.5–5.1)
PROTEIN UA: 30 MG/DL
RBC # BLD: 2.64 M/CU MM (ref 4.2–5.4)
RBC URINE: 33 /HPF (ref 0–6)
SEGMENTED NEUTROPHILS ABSOLUTE COUNT: 7.5 K/CU MM
SEGMENTED NEUTROPHILS RELATIVE PERCENT: 81.7 % (ref 36–66)
SODIUM BLD-SCNC: 136 MMOL/L (ref 135–145)
SPECIFIC GRAVITY UA: 1.01 (ref 1–1.03)
SQUAMOUS EPITHELIAL: 1 /HPF
TOTAL IMMATURE NEUTOROPHIL: 0.05 K/CU MM
TOTAL NUCLEATED RBC: 0 K/CU MM
TOTAL PROTEIN: 6.7 GM/DL (ref 6.4–8.2)
TRICHOMONAS: ABNORMAL /HPF
UROBILINOGEN, URINE: 1 MG/DL (ref 0.2–1)
WBC # BLD: 9.1 K/CU MM (ref 4–10.5)
WBC CLUMP: ABNORMAL /HPF
WBC UA: 150 /HPF (ref 0–5)

## 2022-07-15 PROCEDURE — 96366 THER/PROPH/DIAG IV INF ADDON: CPT

## 2022-07-15 PROCEDURE — 87150 DNA/RNA AMPLIFIED PROBE: CPT

## 2022-07-15 PROCEDURE — 81001 URINALYSIS AUTO W/SCOPE: CPT

## 2022-07-15 PROCEDURE — 85025 COMPLETE CBC W/AUTO DIFF WBC: CPT

## 2022-07-15 PROCEDURE — 2580000003 HC RX 258: Performed by: STUDENT IN AN ORGANIZED HEALTH CARE EDUCATION/TRAINING PROGRAM

## 2022-07-15 PROCEDURE — 82805 BLOOD GASES W/O2 SATURATION: CPT

## 2022-07-15 PROCEDURE — 93005 ELECTROCARDIOGRAM TRACING: CPT | Performed by: STUDENT IN AN ORGANIZED HEALTH CARE EDUCATION/TRAINING PROGRAM

## 2022-07-15 PROCEDURE — 87186 SC STD MICRODIL/AGAR DIL: CPT

## 2022-07-15 PROCEDURE — 70450 CT HEAD/BRAIN W/O DYE: CPT

## 2022-07-15 PROCEDURE — 80053 COMPREHEN METABOLIC PANEL: CPT

## 2022-07-15 PROCEDURE — 83605 ASSAY OF LACTIC ACID: CPT

## 2022-07-15 PROCEDURE — 87040 BLOOD CULTURE FOR BACTERIA: CPT

## 2022-07-15 PROCEDURE — 99285 EMERGENCY DEPT VISIT HI MDM: CPT

## 2022-07-15 PROCEDURE — 6360000002 HC RX W HCPCS: Performed by: STUDENT IN AN ORGANIZED HEALTH CARE EDUCATION/TRAINING PROGRAM

## 2022-07-15 PROCEDURE — 96375 TX/PRO/DX INJ NEW DRUG ADDON: CPT

## 2022-07-15 PROCEDURE — 96365 THER/PROPH/DIAG IV INF INIT: CPT

## 2022-07-15 PROCEDURE — 71045 X-RAY EXAM CHEST 1 VIEW: CPT

## 2022-07-15 RX ORDER — SODIUM CHLORIDE 9 MG/ML
INJECTION, SOLUTION INTRAVENOUS PRN
Status: DISCONTINUED | OUTPATIENT
Start: 2022-07-15 | End: 2022-07-16

## 2022-07-15 RX ORDER — NITROFURANTOIN 25; 75 MG/1; MG/1
100 CAPSULE ORAL ONCE
Status: CANCELLED | OUTPATIENT
Start: 2022-07-15 | End: 2022-07-15

## 2022-07-15 RX ORDER — 0.9 % SODIUM CHLORIDE 0.9 %
30 INTRAVENOUS SOLUTION INTRAVENOUS ONCE
Status: COMPLETED | OUTPATIENT
Start: 2022-07-15 | End: 2022-07-15

## 2022-07-15 RX ORDER — SODIUM CHLORIDE 0.9 % (FLUSH) 0.9 %
5-40 SYRINGE (ML) INJECTION EVERY 12 HOURS SCHEDULED
Status: DISCONTINUED | OUTPATIENT
Start: 2022-07-15 | End: 2022-07-16

## 2022-07-15 RX ORDER — SODIUM CHLORIDE 0.9 % (FLUSH) 0.9 %
5-40 SYRINGE (ML) INJECTION PRN
Status: DISCONTINUED | OUTPATIENT
Start: 2022-07-15 | End: 2022-07-16

## 2022-07-15 RX ADMIN — VANCOMYCIN HYDROCHLORIDE 1250 MG: 1.25 INJECTION, POWDER, LYOPHILIZED, FOR SOLUTION INTRAVENOUS at 20:26

## 2022-07-15 RX ADMIN — SODIUM CHLORIDE 1701 ML: 9 INJECTION, SOLUTION INTRAVENOUS at 20:29

## 2022-07-15 RX ADMIN — MEROPENEM 1000 MG: 1 INJECTION, POWDER, FOR SOLUTION INTRAVENOUS at 23:59

## 2022-07-15 NOTE — ED PROVIDER NOTES
Confusion, febrile     Emergency Department Encounter    Patient: Vicky Garcia  MRN: [id number]  : 1934  Date of Evaluation: 2022  ED Provider:  [address] [place] [address],[address], DO    Triage Chief Complaint:   Fatigue (Generalized weakness. )    [place]:  Vicky Garcia is a 80 y.o. female presenting to the ED with a history of confusion and fever. Patient's daughter is by the bedside providing the history. Per daughter, patient has been confused since today. Patient has been unable to interact at her baseline. Patient was also febrile to touch. No history of falls or head injury, seizure, chest pain, SOB, abdominal pain, diarrhea, nausea or vomiting. ROS - see HPI, below listed is current ROS at time of my eval:  General:  + fevers, no chills, no weakness  Eyes:  No recent vison changes, no discharge  ENT:  No sore throat, no nasal congestion, no hearing changes  Cardiovascular:  No chest pain, no palpitations  Respiratory:  No shortness of breath, no cough, no wheezing  Gastrointestinal:  No pain, no nausea, no vomiting, no diarrhea  Musculoskeletal:  No muscle pain, no joint pain  Skin:  No rash, no pruritis, no easy bruising  Neurologic:  No speech problems, no headache, no extremity numbness, no extremity tingling, no extremity weakness  Psychiatric:  No anxiety  Genitourinary:  No dysuria, no hematuria  Endocrine:  No unexpected weight gain, no unexpected weight loss  Extremities:  no edema, no pain    Past Medical History:   Diagnosis Date    Arthritis     Cataract of left eye     Cataract of right eye     Chronic kidney disease     H/O cardiovascular stress test 11    There is no scintigraphic evidence of inducible myocardial ischemia. LV function is normal. EF 59%  No ECG changes Unremarkable pharmacological stress test. Normal myocardial  perfusion study.     H/O Doppler ultrasound 11    No sonographic evidence of hemodynamically significant stenosis of the carotid arteries bilaterally. H/O Doppler ultrasound 8/21/08    Study suggestive of lack of evidence of any hemodynamically significant peripheral vascular disease at rest. Waveform analysis is triphasic throughout the lower extremities. H/O echocardiogram 4/26/12    Left ventricular function is normal. EF >55% The transmitral spectral doppler flow pattern is suggestive of impaired LV relaxation. H/O tilt table evaluation 10/10/08    The patient became orthostatic on tilt table study after getting Nitro. There were no neurocardiogenic episode seen. H/O transesophageal echocardiography (MARIO) for monitoring 11/08/10    Normal MARIO. No PFO or clots noted    Hyperlipidemia     Hypertension     Movement disorder     Neuromuscular disorder ([address]     Osteoporosis     Pneumonia     Psychiatric problem     daughter states pt has no psych problems    TIA (transient ischemic attack)     Trigeminal neuralgia of right side of face      Past Surgical History:   Procedure Laterality Date    APPENDECTOMY      CATARACT REMOVAL      CHOLECYSTECTOMY      EYE SURGERY      HYSTERECTOMY (CERVIX STATUS UNKNOWN)       Family History   Problem Relation Age of Onset    Cancer Maternal Aunt     Cancer Maternal Grandmother     Cancer Maternal Grandfather     High Blood Pressure Maternal Grandfather      Social History     Socioeconomic History    Marital status:       Spouse name: Not on file    Number of children: 5    Years of education: Not on file    Highest education level: Not on file   Occupational History    Not on file   Tobacco Use    Smoking status: Never    Smokeless tobacco: Never   Substance and Sexual Activity    Alcohol use: No     Comment: 1 - 2 per year    Drug use: No     Comment: cig    Sexual activity: Never   Other Topics Concern    Not on file   Social History Narrative    Not on file     Social Determinants of Health     Financial Resource Strain: Not on file   Food Insecurity: Not on file   Transportation Needs: Not on file   Physical Activity: Not on file   Stress: Not on file   Social Connections: Not on file   Intimate Partner Violence: Not on file   Housing Stability: Not on file     Current Facility-Administered Medications   Medication Dose Route Frequency Provider Last Rate Last Admin    amLODIPine (NORVASC) tablet 5 mg  5 mg Oral Daily Julio DE SANTIAGO MD        aspirin chewable tablet 81 mg  81 mg Oral Daily Julio DE SANTIAGO MD        atorvastatin (LIPITOR) tablet 20 mg  20 mg Oral Daily Julio DE SANTIAGO MD        carBAMazepine (TEGRETOL) tablet 200 mg  200 mg Oral BID Julio DE SANTIAGO MD        clopidogrel (PLAVIX) tablet 75 mg  75 mg Oral Daily Julio DE SANTIAGO MD        dicyclomine (BENTYL) capsule 10 mg  10 mg Oral 4x Daily PRN Johnsonia José MD        folic acid (FOLVITE) tablet 1 mg  1 mg Oral Daily Julio DE SANTIAGO MD        lisinopril-hydroCHLOROthiazide (PRINZIDE;ZESTORETIC) 10-12.5 MG per tablet 1 tablet  1 tablet Oral Daily Margie Her MD        [START ON 7/17/2022] methotrexate (RHEUMATREX) chemo tablet 12.5 mg  12.5 mg Oral Weekly Julio DE SANTIAGO MD        metoprolol tartrate (LOPRESSOR) tablet 25 mg  25 mg Oral BID Julio DE SANTIAGO MD        pantoprazole (PROTONIX) tablet 40 mg  40 mg Oral QAM AC Johnsonia José MD        predniSONE (DELTASONE) tablet 5 mg  5 mg Oral BID John DE SANTIAGO MD        sodium chloride flush 0.9 % injection 10 mL  10 mL IntraVENous 2 times per day Julio DE SANTIAGO MD        sodium chloride flush 0.9 % injection 10 mL  10 mL IntraVENous PRN Julio DE SANTIAGO MD        0.9 % sodium chloride infusion   IntraVENous PRN Julio DE SANTIAGO MD        ondansetron (ZOFRAN-ODT) disintegrating tablet 4 mg  4 mg Oral Q8H PRN John José MD        Or    ondansetron (ZOFRAN) injection 4 mg  4 mg IntraVENous Q6H PRN John José MD        bisacodyl (DULCOLAX) EC tablet 5 mg  5 mg Oral Daily PRN Johnsonia José MD        acetaminophen (TYLENOL) tablet 650 mg  650 mg Oral Q6H PRN John José MD   650 mg at 07/16/22 0205    Or    acetaminophen (TYLENOL) suppository 650 mg  650 mg Rectal Q6H PRN John Barber MD        enoxaparin (LOVENOX) injection 40 mg  40 mg SubCUTAneous Daily Flepraful DE SANTIAGO MD        0.9 % sodium chloride infusion   IntraVENous Continuous John Barber MD 75 mL/hr at 07/16/22 0204 New Bag at 07/16/22 0204    meropenem (MERREM) 1,000 mg in sodium chloride 0.9 % 100 mL IVPB (mini-bag)  1,000 mg IntraVENous Q12H John Barber MD        vancomycin (VANCOCIN) 750 mg in dextrose 5 % 250 mL IVPB ([id number])  750 mg IntraVENous Q24H John Barber MD         Allergies   Allergen Reactions    Pcn [Penicillins] Swelling       Nursing Notes Reviewed    Physical Exam:  Triage VS:    ED Triage Vitals   Enc Vitals Group      BP 07/15/22 1724 111/72      Heart Rate 07/15/22 1724 (!) 107      Resp 07/15/22 1724 18      Temp 07/15/22 1723 (!) 102.6 °F (39.2 °C)      Temp Source 07/15/22 2200 Oral      SpO2 07/15/22 1724 100 %      Weight 07/15/22 1734 125 lb (56.7 kg)      Height 07/15/22 1734 5' 6\" (1.676 m)      Head Circumference --       Peak Flow --       Pain Score --       Pain Loc --       Pain Edu? --       Excl. in [address]? --        My pulse ox interpretation is - normal    General appearance:  No acute distress. Skin:  Warm. Dry. Eye:  Extraocular movements intact. Ears, nose, mouth and throat:  Oral mucosa moist   Neck:  Trachea midline. Extremity:  No swelling. Normal ROM     Heart:  Regular rate and rhythm, normal S1 & S2, no extra heart sounds. Perfusion:  intact  Respiratory:  Lungs clear to auscultation bilaterally. Respirations nonlabored. Abdominal:  Normal bowel sounds. Soft. Nontender. Non distended. Back:  No CVA tenderness to palpation     Neurological:  Alert and oriented times 3. No focal neuro deficits.              Psychiatric:  Appropriate    I have reviewed and interpreted all of the currently available lab results from this visit (if applicable):  Results for orders placed or performed during the hospital encounter of 07/15/22   Lactic Acid   Result Value Ref Range    Lactate 1.7 0.4 - 2.0 mMOL/L   CBC with Auto Differential   Result Value Ref Range    WBC 9.1 4.0 - 10.5 K/CU MM    RBC 2.64 (L) 4.2 - 5.4 M/CU MM    Hemoglobin 8.9 (L) 12.5 - 16.0 GM/DL    Hematocrit 28.4 (L) 37 - 47 %    .6 (H) 78 - 100 FL    MCH 33.7 (H) 27 - 31 PG    MCHC 31.3 (L) 32.0 - 36.0 %    RDW 15.4 (H) 11.7 - 14.9 %    Platelets 141 663 - 053 K/CU MM    MPV 9.4 7.5 - 11.1 FL    Differential Type AUTOMATED DIFFERENTIAL     Segs Relative 81.7 (H) 36 - 66 %    Lymphocytes % 10.3 (L) 24 - 44 %    Monocytes % 7.2 (H) 0 - 4 %    Eosinophils % 0.2 0 - 3 %    Basophils % 0.1 0 - 1 %    Segs Absolute 7.5 K/CU MM    Lymphocytes Absolute 0.9 K/CU MM    Monocytes Absolute 0.7 K/CU MM    Eosinophils Absolute 0.0 K/CU MM    Basophils Absolute 0.0 K/CU MM    Nucleated RBC % 0.0 %    Total Nucleated RBC 0.0 K/CU MM    Total Immature Neutrophil 0.05 K/CU MM    Immature Neutrophil % 0.5 (H) 0 - 0.43 %   Comprehensive Metabolic Panel w/ Reflex to MG   Result Value Ref Range    Sodium 136 135 - 145 MMOL/L    Potassium 4.1 3.5 - 5.1 MMOL/L    Chloride 101 99 - 110 mMol/L    CO2 24 21 - 32 MMOL/L    BUN 46 (H) 6 - 23 MG/DL    CREATININE 1.1 0.6 - 1.1 MG/DL    Glucose 112 (H) 70 - 99 MG/DL    Calcium 9.4 8.3 - 10.6 MG/DL    Albumin 3.2 (L) 3.4 - 5.0 GM/DL    Total Protein 6.7 6.4 - 8.2 GM/DL    Total Bilirubin 0.3 0.0 - 1.0 MG/DL    ALT 15 10 - 40 U/L    AST 18 15 - 37 IU/L    Alkaline Phosphatase 80 40 - 129 IU/L    GFR Non- 47 (L) >60 mL/min/1.73m2    GFR  57 (L) >60 mL/min/1.73m2    Anion Gap 11 4 - 16   Blood Gas, Venous   Result Value Ref Range    pH, [place] 7.46 (H) 7.32 - 7.42    pCO2, [place] 39 38 - 52 mmHG    pO2, [place] 204 (H) 28 - 48 mmHG    Base Exc, Mixed 3.7 (H) 0 - 2.3    HCO3, Venous 27.7 (H) 19 - 25 MMOL/L    O2 Sat, [place] 95.2 (H) 50 - 70 % Comment VBG    Urinalysis   Result Value Ref Range    Color, UA YELLOW YELLOW    Clarity, UA SLIGHTLY CLOUDY (A) CLEAR    Glucose, Urine NEGATIVE NEGATIVE MG/DL    Bilirubin Urine NEGATIVE NEGATIVE MG/DL    Ketones, Urine NEGATIVE NEGATIVE MG/DL    Specific Gravity, UA 1.015 1.001 - 1.035    Blood, Urine SMALL NEGATIVE    pH, Urine 5.0 5.0 - 8.0    Protein, UA 30 (A) NEGATIVE MG/DL    Urobilinogen, Urine 1.0 0.2 - 1.0 MG/DL    Nitrite Urine, Quantitative POSITIVE (A) NEGATIVE    Leukocyte Esterase, Urine LARGE NUMBER OR AMOUNT OF  (A) NEGATIVE    RBC, UA 33 (H) 0 - 6 /HPF    WBC,  (H) 0 - 5 /HPF    Bacteria, UA MANY (A) NEGATIVE /HPF    WBC Clumps, UA MANY /HPF    Squam Epithel, UA 1 /HPF    Mucus, UA FEW (A) NEGATIVE HPF    Trichomonas, UA NONE SEEN NONE SEEN /HPF   EKG 12 lead   Result Value Ref Range    Ventricular Rate 82 BPM    Atrial Rate 82 BPM    P-R Interval 156 ms    QRS Duration 88 ms    Q-T Interval 364 ms    QTc Calculation (Bazett) 425 ms    P Axis -1 degrees    R Axis -48 degrees    T Axis -52 degrees    Diagnosis       Normal sinus rhythm  Left axis deviation  Moderate voltage criteria for LVH, may be normal variant  ST & T wave abnormality, consider inferior ischemia  Abnormal ECG  When compared with ECG of 15-JUL-2022 17:26,  Significant changes have occurred        Radiographs (if obtained):  Radiologist's Report Reviewed:  CT head without contrast    Result Date: 7/15/2022  EXAMINATION: CT OF THE HEAD WITHOUT CONTRAST  7/15/2022 7:23 pm TECHNIQUE: CT of the head was performed without the administration of intravenous contrast. Automated exposure control, iterative reconstruction, and/or weight based adjustment of the mA/kV was utilized to reduce the radiation dose to as low as reasonably achievable. COMPARISON: 01/31/2022 HISTORY: ORDERING SYSTEM PROVIDED HISTORY: AMS TECHNOLOGIST PROVIDED HISTORY: Reason for exam:->AMS Has a \"code stroke\" or \"stroke alert\" been called? ->No Decision Support Exception - unselect if not a suspected or confirmed emergency medical condition->Emergency Medical Condition (MA) Reason for Exam: AMS FINDINGS: BRAIN/VENTRICLES: There is no acute intracranial hemorrhage, mass effect or midline shift. No abnormal extra-axial fluid collection. The gray-white differentiation is maintained without evidence of an acute infarct. There is no evidence of hydrocephalus. 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 ORBITS: The visualized portion of the orbits demonstrate no acute abnormality. SINUSES: The visualized paranasal sinuses and mastoid air cells demonstrate no acute abnormality. SOFT TISSUES/SKULL:  No acute abnormality of the visualized skull or soft tissues. 1. No acute intracranial abnormality. 2. Stable chronic white matter microvascular ischemic changes and chronic lacunar infarct in the left basal ganglia. XR CHEST PORTABLE    Result Date: 7/15/2022  EXAMINATION: ONE XRAY VIEW OF THE CHEST 7/15/2022 7:05 pm COMPARISON: 03/04/2022 HISTORY: ORDERING SYSTEM PROVIDED HISTORY: sepsis TECHNOLOGIST PROVIDED HISTORY: Reason for exam:->sepsis Reason for Exam: sepsis Additional signs and symptoms: unknown Relevant Medical/Surgical History: pna FINDINGS: The lungs are without acute focal process. There is no effusion or pneumothorax. The cardiomediastinal silhouette is stable. The osseous structures are stable. No acute process. Probable hiatal hernia       EKG (if obtained): (All EKG's are interpreted by myself in the absence of a cardiologist)  Normal Sinus rhythm,   LAD, LVH  - Ventricular rate 82  - HI interval 156  - QRS duration 88  - QT/Qtc 364/425  - P-R-T axes -1 -48 -52    - ST changes: NO STEMI  - QT prolongation: none  -        MDM:  80years old female presenting to the ED with a history of confusion and fever. Physical examination significant for dehydration.  Patient's work up is significant for a UTI and an elevated BUN/Cr  ratio. Patient is given tylenol and antibiotics as well as IVF as part of her sepsis work up. Patient is admitted to the hospital for AMS due to UTI and dehydration. Clinical Impression:  1. Urinary tract infection without hematuria, site unspecified    2. Altered mental status, unspecified altered mental status type    3. Dehydration      Disposition referral (if applicable):  No follow-up provider specified. Disposition medications (if applicable):  Current Discharge Medication List        ED Provider Disposition Time  DISPOSITION Admitted 07/16/2022 12:02:16 AM      Comment: Please note this report has been produced using speech recognition software and may contain errors related to that system including errors in grammar, punctuation, and spelling, as well as words and phrases that may be inappropriate. Efforts were made to edit the dictations.        [address] [place] [address],[address], DO  07/16/22 0079

## 2022-07-16 PROBLEM — N39.0 UTI (URINARY TRACT INFECTION): Status: ACTIVE | Noted: 2022-07-16

## 2022-07-16 LAB
ANION GAP SERPL CALCULATED.3IONS-SCNC: 14 MMOL/L (ref 4–16)
BASOPHILS ABSOLUTE: 0 K/CU MM
BASOPHILS RELATIVE PERCENT: 0.2 % (ref 0–1)
BUN BLDV-MCNC: 30 MG/DL (ref 6–23)
CALCIUM SERPL-MCNC: 9.1 MG/DL (ref 8.3–10.6)
CHLORIDE BLD-SCNC: 103 MMOL/L (ref 99–110)
CO2: 19 MMOL/L (ref 21–32)
CREAT SERPL-MCNC: 1 MG/DL (ref 0.6–1.1)
DIFFERENTIAL TYPE: ABNORMAL
EKG ATRIAL RATE: 82 BPM
EKG DIAGNOSIS: NORMAL
EKG P AXIS: -1 DEGREES
EKG P-R INTERVAL: 156 MS
EKG Q-T INTERVAL: 364 MS
EKG QRS DURATION: 88 MS
EKG QTC CALCULATION (BAZETT): 425 MS
EKG R AXIS: -48 DEGREES
EKG T AXIS: -52 DEGREES
EKG VENTRICULAR RATE: 82 BPM
EOSINOPHILS ABSOLUTE: 0 K/CU MM
EOSINOPHILS RELATIVE PERCENT: 0.4 % (ref 0–3)
GFR AFRICAN AMERICAN: >60 ML/MIN/1.73M2
GFR NON-AFRICAN AMERICAN: 52 ML/MIN/1.73M2
GLUCOSE BLD-MCNC: 96 MG/DL (ref 70–99)
HCT VFR BLD CALC: 30.6 % (ref 37–47)
HEMOGLOBIN: 9.4 GM/DL (ref 12.5–16)
IMMATURE NEUTROPHIL %: 0.3 % (ref 0–0.43)
LYMPHOCYTES ABSOLUTE: 1.1 K/CU MM
LYMPHOCYTES RELATIVE PERCENT: 12.4 % (ref 24–44)
MCH RBC QN AUTO: 33.6 PG (ref 27–31)
MCHC RBC AUTO-ENTMCNC: 30.7 % (ref 32–36)
MCV RBC AUTO: 109.3 FL (ref 78–100)
MONOCYTES ABSOLUTE: 1 K/CU MM
MONOCYTES RELATIVE PERCENT: 11.3 % (ref 0–4)
NUCLEATED RBC %: 0 %
PDW BLD-RTO: 15.2 % (ref 11.7–14.9)
PLATELET # BLD: 170 K/CU MM (ref 140–440)
PMV BLD AUTO: 9.2 FL (ref 7.5–11.1)
POTASSIUM SERPL-SCNC: 3.9 MMOL/L (ref 3.5–5.1)
PROCALCITONIN: 3.11
RBC # BLD: 2.8 M/CU MM (ref 4.2–5.4)
SEGMENTED NEUTROPHILS ABSOLUTE COUNT: 6.8 K/CU MM
SEGMENTED NEUTROPHILS RELATIVE PERCENT: 75.4 % (ref 36–66)
SODIUM BLD-SCNC: 136 MMOL/L (ref 135–145)
TOTAL IMMATURE NEUTOROPHIL: 0.03 K/CU MM
TOTAL NUCLEATED RBC: 0 K/CU MM
WBC # BLD: 9.1 K/CU MM (ref 4–10.5)

## 2022-07-16 PROCEDURE — 87088 URINE BACTERIA CULTURE: CPT

## 2022-07-16 PROCEDURE — 94761 N-INVAS EAR/PLS OXIMETRY MLT: CPT

## 2022-07-16 PROCEDURE — 2580000003 HC RX 258: Performed by: INTERNAL MEDICINE

## 2022-07-16 PROCEDURE — 6370000000 HC RX 637 (ALT 250 FOR IP): Performed by: INTERNAL MEDICINE

## 2022-07-16 PROCEDURE — 36415 COLL VENOUS BLD VENIPUNCTURE: CPT

## 2022-07-16 PROCEDURE — 87186 SC STD MICRODIL/AGAR DIL: CPT

## 2022-07-16 PROCEDURE — 87086 URINE CULTURE/COLONY COUNT: CPT

## 2022-07-16 PROCEDURE — 2140000000 HC CCU INTERMEDIATE R&B

## 2022-07-16 PROCEDURE — 84145 PROCALCITONIN (PCT): CPT

## 2022-07-16 PROCEDURE — 85025 COMPLETE CBC W/AUTO DIFF WBC: CPT

## 2022-07-16 PROCEDURE — 93010 ELECTROCARDIOGRAM REPORT: CPT | Performed by: INTERNAL MEDICINE

## 2022-07-16 PROCEDURE — 6360000002 HC RX W HCPCS: Performed by: INTERNAL MEDICINE

## 2022-07-16 PROCEDURE — 80048 BASIC METABOLIC PNL TOTAL CA: CPT

## 2022-07-16 RX ORDER — SODIUM CHLORIDE 0.9 % (FLUSH) 0.9 %
10 SYRINGE (ML) INJECTION PRN
Status: DISCONTINUED | OUTPATIENT
Start: 2022-07-16 | End: 2022-07-26 | Stop reason: HOSPADM

## 2022-07-16 RX ORDER — SODIUM CHLORIDE 9 MG/ML
INJECTION, SOLUTION INTRAVENOUS PRN
Status: DISCONTINUED | OUTPATIENT
Start: 2022-07-16 | End: 2022-07-26 | Stop reason: HOSPADM

## 2022-07-16 RX ORDER — ASPIRIN 81 MG/1
81 TABLET, CHEWABLE ORAL DAILY
Status: DISCONTINUED | OUTPATIENT
Start: 2022-07-16 | End: 2022-07-26 | Stop reason: HOSPADM

## 2022-07-16 RX ORDER — ENOXAPARIN SODIUM 100 MG/ML
40 INJECTION SUBCUTANEOUS DAILY
Status: DISCONTINUED | OUTPATIENT
Start: 2022-07-16 | End: 2022-07-26 | Stop reason: HOSPADM

## 2022-07-16 RX ORDER — LISINOPRIL AND HYDROCHLOROTHIAZIDE 12.5; 1 MG/1; MG/1
1 TABLET ORAL DAILY
Status: DISCONTINUED | OUTPATIENT
Start: 2022-07-16 | End: 2022-07-26 | Stop reason: HOSPADM

## 2022-07-16 RX ORDER — SODIUM CHLORIDE 9 MG/ML
INJECTION, SOLUTION INTRAVENOUS CONTINUOUS
Status: ACTIVE | OUTPATIENT
Start: 2022-07-16 | End: 2022-07-16

## 2022-07-16 RX ORDER — ATORVASTATIN CALCIUM 10 MG/1
20 TABLET, FILM COATED ORAL DAILY
Status: DISCONTINUED | OUTPATIENT
Start: 2022-07-16 | End: 2022-07-26 | Stop reason: HOSPADM

## 2022-07-16 RX ORDER — FOLIC ACID 1 MG/1
1 TABLET ORAL DAILY
Status: DISCONTINUED | OUTPATIENT
Start: 2022-07-16 | End: 2022-07-26 | Stop reason: HOSPADM

## 2022-07-16 RX ORDER — ONDANSETRON 4 MG/1
4 TABLET, ORALLY DISINTEGRATING ORAL EVERY 8 HOURS PRN
Status: DISCONTINUED | OUTPATIENT
Start: 2022-07-16 | End: 2022-07-26 | Stop reason: HOSPADM

## 2022-07-16 RX ORDER — ONDANSETRON 2 MG/ML
4 INJECTION INTRAMUSCULAR; INTRAVENOUS EVERY 6 HOURS PRN
Status: DISCONTINUED | OUTPATIENT
Start: 2022-07-16 | End: 2022-07-26 | Stop reason: HOSPADM

## 2022-07-16 RX ORDER — CARBAMAZEPINE 200 MG/1
200 TABLET ORAL 2 TIMES DAILY
Status: DISCONTINUED | OUTPATIENT
Start: 2022-07-16 | End: 2022-07-26 | Stop reason: HOSPADM

## 2022-07-16 RX ORDER — CLOPIDOGREL BISULFATE 75 MG/1
75 TABLET ORAL DAILY
Status: DISCONTINUED | OUTPATIENT
Start: 2022-07-16 | End: 2022-07-26 | Stop reason: HOSPADM

## 2022-07-16 RX ORDER — PANTOPRAZOLE SODIUM 40 MG/1
40 TABLET, DELAYED RELEASE ORAL
Status: DISCONTINUED | OUTPATIENT
Start: 2022-07-16 | End: 2022-07-26 | Stop reason: HOSPADM

## 2022-07-16 RX ORDER — DICYCLOMINE HYDROCHLORIDE 10 MG/1
10 CAPSULE ORAL 4 TIMES DAILY PRN
Status: DISCONTINUED | OUTPATIENT
Start: 2022-07-16 | End: 2022-07-26 | Stop reason: HOSPADM

## 2022-07-16 RX ORDER — PREDNISONE 1 MG/1
5 TABLET ORAL 2 TIMES DAILY
Status: DISCONTINUED | OUTPATIENT
Start: 2022-07-16 | End: 2022-07-26 | Stop reason: HOSPADM

## 2022-07-16 RX ORDER — ACETAMINOPHEN 325 MG/1
650 TABLET ORAL EVERY 6 HOURS PRN
Status: DISCONTINUED | OUTPATIENT
Start: 2022-07-16 | End: 2022-07-26 | Stop reason: HOSPADM

## 2022-07-16 RX ORDER — ACETAMINOPHEN 650 MG/1
650 SUPPOSITORY RECTAL EVERY 6 HOURS PRN
Status: DISCONTINUED | OUTPATIENT
Start: 2022-07-16 | End: 2022-07-26 | Stop reason: HOSPADM

## 2022-07-16 RX ORDER — AMLODIPINE BESYLATE 5 MG/1
5 TABLET ORAL DAILY
Status: DISCONTINUED | OUTPATIENT
Start: 2022-07-16 | End: 2022-07-26 | Stop reason: HOSPADM

## 2022-07-16 RX ORDER — SODIUM CHLORIDE 0.9 % (FLUSH) 0.9 %
10 SYRINGE (ML) INJECTION EVERY 12 HOURS SCHEDULED
Status: DISCONTINUED | OUTPATIENT
Start: 2022-07-16 | End: 2022-07-26 | Stop reason: HOSPADM

## 2022-07-16 RX ADMIN — SODIUM CHLORIDE, PRESERVATIVE FREE 10 ML: 5 INJECTION INTRAVENOUS at 09:11

## 2022-07-16 RX ADMIN — SODIUM CHLORIDE, PRESERVATIVE FREE 10 ML: 5 INJECTION INTRAVENOUS at 21:20

## 2022-07-16 RX ADMIN — METOPROLOL TARTRATE 25 MG: 25 TABLET, FILM COATED ORAL at 09:05

## 2022-07-16 RX ADMIN — ACETAMINOPHEN 325MG 650 MG: 325 TABLET ORAL at 02:05

## 2022-07-16 RX ADMIN — SODIUM CHLORIDE: 9 INJECTION, SOLUTION INTRAVENOUS at 02:04

## 2022-07-16 RX ADMIN — FOLIC ACID 1 MG: 1 TABLET ORAL at 09:04

## 2022-07-16 RX ADMIN — ASPIRIN 81 MG CHEWABLE TABLET 81 MG: 81 TABLET CHEWABLE at 09:04

## 2022-07-16 RX ADMIN — CARBAMAZEPINE 200 MG: 200 TABLET ORAL at 21:20

## 2022-07-16 RX ADMIN — METOPROLOL TARTRATE 25 MG: 25 TABLET, FILM COATED ORAL at 21:20

## 2022-07-16 RX ADMIN — LISINOPRIL AND HYDROCHLOROTHIAZIDE 1 TABLET: 12.5; 1 TABLET ORAL at 09:04

## 2022-07-16 RX ADMIN — CLOPIDOGREL BISULFATE 75 MG: 75 TABLET ORAL at 09:04

## 2022-07-16 RX ADMIN — PREDNISONE 5 MG: 5 TABLET ORAL at 21:20

## 2022-07-16 RX ADMIN — ACETAMINOPHEN 325MG 650 MG: 325 TABLET ORAL at 12:08

## 2022-07-16 RX ADMIN — MEROPENEM 1000 MG: 1 INJECTION, POWDER, FOR SOLUTION INTRAVENOUS at 23:06

## 2022-07-16 RX ADMIN — ENOXAPARIN SODIUM 40 MG: 100 INJECTION SUBCUTANEOUS at 09:04

## 2022-07-16 RX ADMIN — PREDNISONE 5 MG: 5 TABLET ORAL at 09:07

## 2022-07-16 RX ADMIN — CARBAMAZEPINE 200 MG: 200 TABLET ORAL at 09:05

## 2022-07-16 RX ADMIN — MEROPENEM 1000 MG: 1 INJECTION, POWDER, FOR SOLUTION INTRAVENOUS at 11:16

## 2022-07-16 RX ADMIN — PANTOPRAZOLE SODIUM 40 MG: 40 TABLET, DELAYED RELEASE ORAL at 06:43

## 2022-07-16 RX ADMIN — ATORVASTATIN CALCIUM 20 MG: 10 TABLET, FILM COATED ORAL at 09:04

## 2022-07-16 RX ADMIN — AMLODIPINE BESYLATE 5 MG: 5 TABLET ORAL at 09:04

## 2022-07-16 ASSESSMENT — PAIN DESCRIPTION - DESCRIPTORS: DESCRIPTORS: ACHING;DISCOMFORT

## 2022-07-16 ASSESSMENT — PAIN DESCRIPTION - LOCATION: LOCATION: FOOT

## 2022-07-16 ASSESSMENT — PAIN SCALES - GENERAL
PAINLEVEL_OUTOF10: 0
PAINLEVEL_OUTOF10: 6

## 2022-07-16 ASSESSMENT — ENCOUNTER SYMPTOMS
ABDOMINAL PAIN: 0
CHEST TIGHTNESS: 0
APNEA: 0
ABDOMINAL DISTENTION: 0

## 2022-07-16 NOTE — PROGRESS NOTES
In-Patient Progress Note    Patient:  Vicky Garcia 80 y.o. female MRN: [id number]     Date of Service: 7/16/2022    Hospital Day: 2      Chief complaint: had concerns including Fatigue (Generalized weakness. ). Assessment and Plan   Vicky Garcia, a 80 y.o. female, with a history of prior TIA, hypertension, hyperlipidemia was admitted on 7/15/2022 with complaints of had concerns including Fatigue (Generalized weakness. ). Assessment  Acute metabolic encephalopathy likely due to UTI. Patient is  demented however she was altered from her baseline and had subjective fevers. UA suggestive of UTI. Blood culture growing E. coli. Procalcitonin 3.11. Patient was started on vancomycin and meropenem. Gram-negative sepsis, likely due to UTI. Patient on empiric antibiotic coverage. We will follow final culture and sensitivity. History of hypertension, patient currently normotensive on home blood pressure medicine, amlodipine, lisinopril hydrochlorothiazide and metoprolol. History of rheumatoid arthritis, on prednisone and methotrexate every Sunday. They have been resumed. History of TIA, on dual antiplatelet therapy      Plan  Continue IV antibiotics for now till we have final cultures. Hold lisinopril/hydrochlorothiazide in view of likely dehydration  Continue other blood pressure medications  Continue methotrexate and prednisone at scheduled  Discontinue Plavix. # Peptic ulcer prophylaxis: Protonix  # DVT Prophylaxis: Lovenox SQ  #CODE STATUS: Limited with yes to resuscitative medicines          Review of System     Review of Systems   Constitutional:  Negative for activity change and appetite change. HENT:  Negative for congestion. Eyes:  Negative for visual disturbance. Respiratory:  Negative for apnea and chest tightness. Cardiovascular:  Negative for chest pain, palpitations and leg swelling. Gastrointestinal:  Negative for abdominal distention and abdominal pain. Genitourinary:  Negative for difficulty urinating, dysuria, frequency and urgency. Musculoskeletal: Negative. Skin: Negative. Neurological:  Negative for dizziness. All other systems reviewed and are negative. I have reviewed all pertinent PMHx, PSHx, FamHx, SocialHx, medications, and allergies and updated history as appropriate. Physical Exam   VITAL SIGNS:  /86   Pulse 97   Temp 98.4 °F (36.9 °C) (Oral)   Resp 20   Ht 5' 6\" (1.676 m)   Wt 117 lb 4.6 oz (53.2 kg)   SpO2 98%   BMI 18.93 kg/m²   Tmax over 24 hours:  Temp (24hrs), Av.4 °F (38 °C), Min:98.4 °F (36.9 °C), Max:102.6 °F (39.2 °C)      Patient Vitals for the past 6 hrs:   BP Temp Temp src Pulse Resp SpO2   22 0904 138/86 -- -- -- -- --   22 0800 138/86 98.4 °F (36.9 °C) Oral 97 20 98 %   22 0644 -- 99.4 °F (37.4 °C) Oral 80 13 99 %   22 0600 (!) 95/52 -- -- 81 16 99 %         Intake/Output Summary (Last 24 hours) at 2022 1140  Last data filed at 2022 0432  Gross per 24 hour   Intake --   Output 200 ml   Net -200 ml     Wt Readings from Last 2 Encounters:   22 117 lb 4.6 oz (53.2 kg)   22 125 lb (56.7 kg)     Body mass index is 18.93 kg/m². Physical Exam  HENT:      Head: Normocephalic and atraumatic. Nose: Nose normal.      Mouth/Throat:      Mouth: Mucous membranes are dry. Eyes:      Pupils: Pupils are equal, round, and reactive to light. Cardiovascular:      Rate and Rhythm: Tachycardia present. Pulmonary:      Effort: Pulmonary effort is normal.      Breath sounds: Normal breath sounds. Abdominal:      General: Abdomen is flat. Palpations: Abdomen is soft. Skin:     General: Skin is warm and dry. Neurological:      General: No focal deficit present. Mental Status: She is alert. Comments: Remembers her daughter. Could not state how old she was, which year she was born. Not oriented to time , place and situation.          Current Medications      amLODIPine  5 mg Oral Daily    aspirin  81 mg Oral Daily    atorvastatin  20 mg Oral Daily    carBAMazepine  200 mg Oral BID    clopidogrel  75 mg Oral Daily    folic acid  1 mg Oral Daily    lisinopril-hydroCHLOROthiazide  1 tablet Oral Daily    [START ON 7/17/2022] methotrexate  12.5 mg Oral Weekly    metoprolol tartrate  25 mg Oral BID    pantoprazole  40 mg Oral QAM AC    predniSONE  5 mg Oral BID    sodium chloride flush  10 mL IntraVENous 2 times per day    enoxaparin  40 mg SubCUTAneous Daily    meropenem  1,000 mg IntraVENous Q12H         Labs and Imaging Studies   Laboratory findings:  CT head without contrast    Result Date: 7/15/2022  EXAMINATION: CT OF THE HEAD WITHOUT CONTRAST  7/15/2022 7:23 pm TECHNIQUE: CT of the head was performed without the administration of intravenous contrast. Automated exposure control, iterative reconstruction, and/or weight based adjustment of the mA/kV was utilized to reduce the radiation dose to as low as reasonably achievable. COMPARISON: 01/31/2022 HISTORY: ORDERING SYSTEM PROVIDED HISTORY: AMS TECHNOLOGIST PROVIDED HISTORY: Reason for exam:->AMS Has a \"code stroke\" or \"stroke alert\" been called? ->No Decision Support Exception - unselect if not a suspected or confirmed emergency medical condition->Emergency Medical Condition (MA) Reason for Exam: AMS FINDINGS: BRAIN/VENTRICLES: There is no acute intracranial hemorrhage, mass effect or midline shift. No abnormal extra-axial fluid collection. The gray-white differentiation is maintained without evidence of an acute infarct. There is no evidence of hydrocephalus. 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 ORBITS: The visualized portion of the orbits demonstrate no acute abnormality. SINUSES: The visualized paranasal sinuses and mastoid air cells demonstrate no acute abnormality.  SOFT TISSUES/SKULL:  No acute abnormality of the visualized skull or soft tissues. 1. No acute intracranial abnormality. 2. Stable chronic white matter microvascular ischemic changes and chronic lacunar infarct in the left basal ganglia. XR CHEST PORTABLE    Result Date: 7/15/2022  EXAMINATION: ONE XRAY VIEW OF THE CHEST 7/15/2022 7:05 pm COMPARISON: 03/04/2022 HISTORY: ORDERING SYSTEM PROVIDED HISTORY: sepsis TECHNOLOGIST PROVIDED HISTORY: Reason for exam:->sepsis Reason for Exam: sepsis Additional signs and symptoms: unknown Relevant Medical/Surgical History: pna FINDINGS: The lungs are without acute focal process. There is no effusion or pneumothorax. The cardiomediastinal silhouette is stable. The osseous structures are stable. No acute process.  Probable hiatal hernia       Recent Results (from the past 24 hour(s))   Lactic Acid    Collection Time: 07/15/22  6:09 PM   Result Value Ref Range    Lactate 1.7 0.4 - 2.0 mMOL/L   CBC with Auto Differential    Collection Time: 07/15/22  6:09 PM   Result Value Ref Range    WBC 9.1 4.0 - 10.5 K/CU MM    RBC 2.64 (L) 4.2 - 5.4 M/CU MM    Hemoglobin 8.9 (L) 12.5 - 16.0 GM/DL    Hematocrit 28.4 (L) 37 - 47 %    .6 (H) 78 - 100 FL    MCH 33.7 (H) 27 - 31 PG    MCHC 31.3 (L) 32.0 - 36.0 %    RDW 15.4 (H) 11.7 - 14.9 %    Platelets 887 916 - 781 K/CU MM    MPV 9.4 7.5 - 11.1 FL    Differential Type AUTOMATED DIFFERENTIAL     Segs Relative 81.7 (H) 36 - 66 %    Lymphocytes % 10.3 (L) 24 - 44 %    Monocytes % 7.2 (H) 0 - 4 %    Eosinophils % 0.2 0 - 3 %    Basophils % 0.1 0 - 1 %    Segs Absolute 7.5 K/CU MM    Lymphocytes Absolute 0.9 K/CU MM    Monocytes Absolute 0.7 K/CU MM    Eosinophils Absolute 0.0 K/CU MM    Basophils Absolute 0.0 K/CU MM    Nucleated RBC % 0.0 %    Total Nucleated RBC 0.0 K/CU MM    Total Immature Neutrophil 0.05 K/CU MM    Immature Neutrophil % 0.5 (H) 0 - 0.43 %   Comprehensive Metabolic Panel w/ Reflex to MG    Collection Time: 07/15/22  6:09 PM   Result Value Ref Range    Sodium 136 135 - 145 MMOL/L    Potassium 4.1 3.5 - 5.1 MMOL/L    Chloride 101 99 - 110 mMol/L    CO2 24 21 - 32 MMOL/L    BUN 46 (H) 6 - 23 MG/DL    CREATININE 1.1 0.6 - 1.1 MG/DL    Glucose 112 (H) 70 - 99 MG/DL    Calcium 9.4 8.3 - 10.6 MG/DL    Albumin 3.2 (L) 3.4 - 5.0 GM/DL    Total Protein 6.7 6.4 - 8.2 GM/DL    Total Bilirubin 0.3 0.0 - 1.0 MG/DL    ALT 15 10 - 40 U/L    AST 18 15 - 37 IU/L    Alkaline Phosphatase 80 40 - 129 IU/L    GFR Non- 47 (L) >60 mL/min/1.73m2    GFR  57 (L) >60 mL/min/1.73m2    Anion Gap 11 4 - 16   Blood Gas, Venous    Collection Time: 07/15/22  6:15 PM   Result Value Ref Range    pH, L[place]h 7.46 (H) 7.32 - 7.42    pCO2, [place]okesh 39 38 - 52 mmHG    pO2, [place] 204 (H) 28 - 48 mmHG    Base Exc, Mixed 3.7 (H) 0 - 2.3    HCO3, Venous 27.7 (H) 19 - 25 MMOL/L    O2 Sat, [place]okesh 95.2 (H) 50 - 70 %    Comment VBG    EKG 12 lead    Collection Time: 07/15/22  6:54 PM   Result Value Ref Range    Ventricular Rate 82 BPM    Atrial Rate 82 BPM    P-R Interval 156 ms    QRS Duration 88 ms    Q-T Interval 364 ms    QTc Calculation (Bazett) 425 ms    P Axis -1 degrees    R Axis -48 degrees    T Axis -52 degrees    Diagnosis       Normal sinus rhythm  Left axis deviation  Moderate voltage criteria for LVH, may be normal variant  ST & T wave abnormality, consider inferior ischemia  Abnormal ECG  When compared with ECG of 15-JUL-2022 17:26,  Significant changes have occurred     Culture, Blood 1    Collection Time: 07/15/22  7:18 PM    Specimen: Blood   Result Value Ref Range    Specimen BLOOD     Special Requests       2 OUT OF 4 BOTTLES DRAWN ARE POSITIVE FOR ECOLI CALLED TO TN3 TO MISTY [email or web address] BY [facility]BO[employer] MLT RB PRINTED TO PHARMACY  PCR TESTING FOR IDENTIFICATION OF BLOOD CULTURE ISOLATE. TESTING FOR 24 TARGETS AND 3 GENES.       Culture (A)      ESCHERICHIA COLI DETECTED BY PCR SENSITIVITY IN PROGRESS   Urinalysis    Collection Time: 07/15/22  7:40 PM   Result Value Ref Range    Color, UA YELLOW YELLOW    Clarity, UA SLIGHTLY CLOUDY (A) CLEAR    Glucose, Urine NEGATIVE NEGATIVE MG/DL    Bilirubin Urine NEGATIVE NEGATIVE MG/DL    Ketones, Urine NEGATIVE NEGATIVE MG/DL    Specific Gravity, UA 1.015 1.001 - 1.035    Blood, Urine SMALL NEGATIVE    pH, Urine 5.0 5.0 - 8.0    Protein, UA 30 (A) NEGATIVE MG/DL    Urobilinogen, Urine 1.0 0.2 - 1.0 MG/DL    Nitrite Urine, Quantitative POSITIVE (A) NEGATIVE    Leukocyte Esterase, Urine LARGE NUMBER OR AMOUNT OF  (A) NEGATIVE    RBC, UA 33 (H) 0 - 6 /HPF    WBC,  (H) 0 - 5 /HPF    Bacteria, UA MANY (A) NEGATIVE /HPF    WBC Clumps, UA MANY /HPF    Squam Epithel, UA 1 /HPF    Mucus, UA FEW (A) NEGATIVE HPF    Trichomonas, UA NONE SEEN NONE SEEN /HPF   Basic Metabolic Panel w/ Reflex to MG    Collection Time: 07/16/22  9:03 AM   Result Value Ref Range    Sodium 136 135 - 145 MMOL/L    Potassium 3.9 3.5 - 5.1 MMOL/L    Chloride 103 99 - 110 mMol/L    CO2 19 (L) 21 - 32 MMOL/L    Anion Gap 14 4 - 16    BUN 30 (H) 6 - 23 MG/DL    CREATININE 1.0 0.6 - 1.1 MG/DL    Glucose 96 70 - 99 MG/DL    Calcium 9.1 8.3 - 10.6 MG/DL    GFR Non-African American 52 (L) >60 mL/min/1.73m2    GFR African American >60 >60 mL/min/1.73m2   CBC auto differential    Collection Time: 07/16/22  9:03 AM   Result Value Ref Range    WBC 9.1 4.0 - 10.5 K/CU MM    RBC 2.80 (L) 4.2 - 5.4 M/CU MM    Hemoglobin 9.4 (L) 12.5 - 16.0 GM/DL    Hematocrit 30.6 (L) 37 - 47 %    .3 (H) 78 - 100 FL    MCH 33.6 (H) 27 - 31 PG    MCHC 30.7 (L) 32.0 - 36.0 %    RDW 15.2 (H) 11.7 - 14.9 %    Platelets 044 304 - 179 K/CU MM    MPV 9.2 7.5 - 11.1 FL    Differential Type AUTOMATED DIFFERENTIAL     Segs Relative 75.4 (H) 36 - 66 %    Lymphocytes % 12.4 (L) 24 - 44 %    Monocytes % 11.3 (H) 0 - 4 %    Eosinophils % 0.4 0 - 3 %    Basophils % 0.2 0 - 1 %    Segs Absolute 6.8 K/CU MM    Lymphocytes Absolute 1.1 K/CU MM    Monocytes Absolute 1.0 K/CU MM    Eosinophils Absolute 0.0 K/CU MM    Basophils Absolute 0.0 K/CU MM    Nucleated RBC % 0.0 %    Total Nucleated RBC 0.0 K/CU MM    Total Immature Neutrophil 0.03 K/CU MM    Immature Neutrophil % 0.3 0 - 0.43 %   Procalcitonin    Collection Time: 07/16/22  9:03 AM   Result Value Ref Range    Procalcitonin 3.11            Electronically signed by Eber Rojas MD on 7/16/2022 at 11:40 AM

## 2022-07-16 NOTE — PROGRESS NOTES
Critical lab result received 2  of 4 blood cultures are positive for e coli   Dr. Soy Lenz notified.

## 2022-07-16 NOTE — H&P
History and Physical      Name:  Esperanza Spears /Age/Sex: 1934  (80 y.o. female)   MRN & CSN:  [id number] & [id number] Encounter Date/Time: 7/15/2022 10:11 PM EDT   Location:  [facility] PCP: Ronaldo Gallagher MD       Hospital Day: 1    Assessment and Plan:   Esperanza Spears is a 80 y.o. female who presents with       AMS likely due to UTI, in setting of Patient with history of dementia  Was admitted for this before, with confusion, about 3 years ago  - brenda/vanc, will continue broad-spectrum antibiotics, likely UTI as source  -Follow-up urine culture, blood cultures  -De-escalate antibiotics as appropriate  -Neurochecks    Dehyration 20>1 ration  Continue gentle hydration, EF 45 to 50% and last echo  Not fluid overload at this time  Continue to monitor    Rheumatoid arthritis  - c/w MTX , and prednisone    Hypertension   Continue home medications , reconciled with daughter [facility] bedside      D/w ED provider  Code status DNR/DNI, had goals of care at bedside, daughter Jody Warren, states that she does not want any heroic measures, chest compression, ribs breaking, intubation, even if it is a trial of intubation. She is okay with resuscitative medications. DVT PPx Lovenox       History of Present Illness:     Esperanza Spears is a 80 y.o. female p/w altered mental status, describes as confusion, lack of responsiveness from baseline, less interactive and more withdrawn. Daughter is bedside providing additional information, daughter states that she is also looking much better now given that she was given fluids. Daughter states that she has had a UTI like this before, and has been altered from this infection in the past few years ago. At this time patient has been little bit more confused, and has decreased liquid intake for the last couple days.   During my examination patient was a lot more alert, recognize daughter bedside, however otherwise a poor historian, somewhat aware that she is in the hospital, however not completely oriented x4. Patient is somewhat aware that she is in the hospital, has no acute complaints. Daughter states that she is becoming more back to baseline after hydration. Review of Systems: Need 10 Elements   Although limited historian, patient otherwise has no complaints of CP, SOB, dizziness, N/V/C/D, abdominal pain, dysuria, j rash/boils, cough or fevers. Objective:   No intake or output data in the 24 hours ending 07/15/22 2211   Vitals:   Vitals:    07/15/22 2030 07/15/22 2036 07/15/22 2100 07/15/22 2131   BP: 116/72 116/72 139/63 (!) 148/74   Pulse: 80 85 81 85   Resp: 18 16 15 20   Temp: (!) 101 °F (38.3 °C)      SpO2: 98% 97% 99%    Weight:       Height:           Medications Prior to Admission     Prior to Admission medications    Medication Sig Start Date End Date Taking? Authorizing Provider   metoprolol tartrate (LOPRESSOR) 25 MG tablet Take 25 mg by mouth daily    Historical Provider, MD   omeprazole (PRILOSEC) 20 MG delayed release capsule Take 20 mg by mouth daily    Historical Provider, MD   atorvastatin (LIPITOR) 20 MG tablet Take 20 mg by mouth daily    Historical Provider, MD   folic acid (FOLVITE) 1 MG tablet Take 1 mg by mouth daily    Historical Provider, MD   dicyclomine (BENTYL) 10 MG capsule Take 10 mg by mouth 2 times daily as needed    Historical Provider, MD   predniSONE (DELTASONE) 5 MG tablet Take 5 mg by mouth 2 times daily    Historical Provider, MD   docusate sodium (COLACE) 100 MG capsule Take 1 capsule by mouth daily 8/14/16   Pato Pedraza MD   lisinopril-hydrochlorothiazide (PRINZIDE;ZESTORETIC) 20-25 MG per tablet Take 1 tablet by mouth daily. Historical Provider, MD   carBAMazepine (TEGRETOL) 200 MG tablet Take 200 mg by mouth 2 times daily. Historical Provider, MD   amLODIPine (NORVASC) 10 MG tablet Take 10 mg by mouth daily.     Historical Provider, MD   polyethylene glycol (GLYCOLAX) powder Take 17 g by mouth daily.    Historical Provider, MD   methotrexate (RHEUMATREX) 5 MG chemo tablet Take 25 mg by mouth once a week. Pt takes this on Sunday. Historical Provider, MD   clopidogrel (PLAVIX) 75 MG tablet Take 75 mg by mouth daily. Historical Provider, MD   clonidine (CATAPRES) 0.1 MG tablet Take 0.1 mg by mouth daily     Historical Provider, MD   aspirin 81 MG chewable tablet Take 81 mg by mouth daily. Historical Provider, MD       Physical Exam: Need 8 Elements   General: NAD   Eyes: EOMI  ENT: neck supple  Cardiovascular: Regular rate. NO edema  Respiratory: Symmetrical expansion, CTA  Gastrointestinal: Soft, non tender  Genitourinary: no suprapubic tenderness  Skin: warm, dry  Neuro: Alert. Oriented to self, poor insight to medical condition, aware of daughter, intermittently cooperative, per daughter she states that patient is already improving, more interactive, closer to baseline. Has a history of dementia, per daughter patient was more withdrawn and less responsive on arrival  Psych: Mood appropriate. Past Medical History:   PMHx   Past Medical History:   Diagnosis Date    Arthritis     Cataract of left eye     Cataract of right eye     Chronic kidney disease     H/O cardiovascular stress test 11/1/11    There is no scintigraphic evidence of inducible myocardial ischemia. LV function is normal. EF 59%  No ECG changes Unremarkable pharmacological stress test. Normal myocardial  perfusion study. H/O Doppler ultrasound 11/8/11    No sonographic evidence of hemodynamically significant stenosis of the carotid arteries bilaterally. H/O Doppler ultrasound 8/21/08    Study suggestive of lack of evidence of any hemodynamically significant peripheral vascular disease at rest. Waveform analysis is triphasic throughout the lower extremities.     H/O echocardiogram 4/26/12    Left ventricular function is normal. EF >55% The transmitral spectral doppler flow pattern is suggestive of impaired LV relaxation. H/O tilt table evaluation 10/10/08    The patient became orthostatic on tilt table study after getting Nitro. There were no neurocardiogenic episode seen. H/O transesophageal echocardiography (MARIO) for monitoring 11/08/10    Normal MARIO. No PFO or clots noted    Hyperlipidemia     Hypertension     Movement disorder     Neuromuscular disorder ([address]     Osteoporosis     Pneumonia     Psychiatric problem     daughter states pt has no psych problems    TIA (transient ischemic attack)     Trigeminal neuralgia of right side of face      PSHX:  has a past surgical history that includes Appendectomy; Hysterectomy; Cholecystectomy; eye surgery; and Cataract removal.  Allergies: Allergies   Allergen Reactions    Pcn [Penicillins] Swelling     Fam HX:  family history includes Cancer in her maternal aunt, maternal grandfather, and maternal grandmother; High Blood Pressure in her maternal grandfather. Soc HX:   Social History     Socioeconomic History    Marital status:       Spouse name: None    Number of children: 5    Years of education: None    Highest education level: None   Tobacco Use    Smoking status: Never    Smokeless tobacco: Never   Substance and Sexual Activity    Alcohol use: No     Comment: 1 - 2 per year    Drug use: No     Comment: cig    Sexual activity: Never       Medications:   Medications:    sodium chloride flush  5-40 mL IntraVENous 2 times per day      Infusions:    sodium chloride       PRN Meds: sodium chloride flush, 5-40 mL, PRN  sodium chloride, , PRN        Labs      CBC:   Recent Labs     07/15/22  1809   WBC 9.1   HGB 8.9*        BMP:    Recent Labs     07/15/22  1809      K 4.1      CO2 24   BUN 46*   CREATININE 1.1   GLUCOSE 112*     Hepatic:   Recent Labs     07/15/22  1809   AST 18   ALT 15   BILITOT 0.3   ALKPHOS 80     Lipids:   Lab Results   Component Value Date/Time    CHOL 256 11/10/2015 09:40 AM    HDL 73 11/10/2015 09:40 AM    TRIG 226 11/10/2015 09:40 AM     Hemoglobin A1C:   Lab Results   Component Value Date/Time    LABA1C 6.1 05/11/2011 08:30 PM     TSH: No results found for: TSH  Troponin:   Lab Results   Component Value Date/Time    TROPONINT 0.028 03/04/2022 08:16 PM    TROPONINT 0.029 03/04/2022 04:20 PM    TROPONINT 0.059 03/04/2022 01:14 PM     Lactic Acid: No results for input(s): LACTA in the last 72 hours. BNP: No results for input(s): PROBNP in the last 72 hours. UA:  Lab Results   Component Value Date/Time    NITRU POSITIVE 07/15/2022 07:40 PM    COLORU YELLOW 07/15/2022 07:40 PM    WBCUA 150 07/15/2022 07:40 PM    RBCUA 33 07/15/2022 07:40 PM    MUCUS FEW 07/15/2022 07:40 PM    TRICHOMONAS NONE SEEN 07/15/2022 07:40 PM    YEAST MODERATE 03/04/2022 03:09 PM    BACTERIA MANY 07/15/2022 07:40 PM    CLARITYU SLIGHTLY CLOUDY 07/15/2022 07:40 PM    SPECGRAV 1.015 07/15/2022 07:40 PM    LEUKOCYTESUR LARGE NUMBER OR AMOUNT OF  07/15/2022 07:40 PM    UROBILINOGEN 1.0 07/15/2022 07:40 PM    BILIRUBINUR NEGATIVE 07/15/2022 07:40 PM    BLOODU SMALL 07/15/2022 07:40 PM    KETUA NEGATIVE 07/15/2022 07:40 PM     Urine Cultures: No results found for: Mendez Lim  Blood Cultures: No results found for: BC  No results found for: BLOODCULT2  Organism:   Lab Results   Component Value Date/Time    ORG ENC 07/25/2017 09:26 PM       Imaging/Diagnostics Last 24 Hours   CT head without contrast    Result Date: 7/15/2022  EXAMINATION: CT OF THE HEAD WITHOUT CONTRAST  7/15/2022 7:23 pm TECHNIQUE: CT of the head was performed without the administration of intravenous contrast. Automated exposure control, iterative reconstruction, and/or weight based adjustment of the mA/kV was utilized to reduce the radiation dose to as low as reasonably achievable. COMPARISON: 01/31/2022 HISTORY: ORDERING SYSTEM PROVIDED HISTORY: AMS TECHNOLOGIST PROVIDED HISTORY: Reason for exam:->AMS Has a \"code stroke\" or \"stroke alert\" been called? ->No Decision Support Exception - unselect if not a suspected or confirmed emergency medical condition->Emergency Medical Condition (MA) Reason for Exam: AMS FINDINGS: BRAIN/VENTRICLES: There is no acute intracranial hemorrhage, mass effect or midline shift. No abnormal extra-axial fluid collection. The gray-white differentiation is maintained without evidence of an acute infarct. There is no evidence of hydrocephalus. 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 ORBITS: The visualized portion of the orbits demonstrate no acute abnormality. SINUSES: The visualized paranasal sinuses and mastoid air cells demonstrate no acute abnormality. SOFT TISSUES/SKULL:  No acute abnormality of the visualized skull or soft tissues. 1. No acute intracranial abnormality. 2. Stable chronic white matter microvascular ischemic changes and chronic lacunar infarct in the left basal ganglia. XR CHEST PORTABLE    Result Date: 7/15/2022  EXAMINATION: ONE XRAY VIEW OF THE CHEST 7/15/2022 7:05 pm COMPARISON: 03/04/2022 HISTORY: ORDERING SYSTEM PROVIDED HISTORY: sepsis TECHNOLOGIST PROVIDED HISTORY: Reason for exam:->sepsis Reason for Exam: sepsis Additional signs and symptoms: unknown Relevant Medical/Surgical History: pna FINDINGS: The lungs are without acute focal process. There is no effusion or pneumothorax. The cardiomediastinal silhouette is stable. The osseous structures are stable. No acute process.  Probable hiatal hernia           Electronically signed by Livia Gardner MD on 7/15/2022 at 10:11 PM

## 2022-07-16 NOTE — PROGRESS NOTES
[address] [facility]  consulted by Dr. Arie Malik for monitoring and adjustment. Indication for treatment: Sepsis  Goal trough: [] 10-15 mcg/mL or [x] 15-20 mcg/ml  AUC/LINDA: [] <500 or [x] 400-600    Pertinent Laboratory Values:   Temp Readings from Last 3 Encounters:   07/16/22 (!) 101.3 °F (38.5 °C) (Oral)   05/24/22 96.2 °F (35.7 °C) (Temporal)   03/10/22 98.4 °F (36.9 °C)     Recent Labs     07/15/22  1809   WBC 9.1   LACTATE 1.7     Recent Labs     07/15/22  1809   BUN 46*   CREATININE 1.1     Estimated Creatinine Clearance: 32 mL/min (based on SCr of 1.1 mg/dL). No intake or output data in the 24 hours ending 07/16/22 0202    Pertinent Cultures:  Date    Source    Results  07/15   Blood    Collected           Assessment:  SCr = 1.1, BUN = 46, and no I/O data  Noted dehydration  Day(s) of therapy: 1 of 7  Vancomycin concentration:   07/17 - To be collected    Plan:  Vancomycin 1,250 mg IV given in ED; Follow with Vancomycin 750 mg IV Q 24 Hours  Predicted AUC: 466; Predicted Trough: 15  Pharmacy will continue to monitor patient and adjust therapy as indicated    [address] 07/17/2022 @ 6 AM    Thank you for the consult.   Tom Ceja [facility]  7/16/2022 2:02 AM

## 2022-07-16 NOTE — ED NOTES
Care assumed by this RN. Pt awake and oriented to her normal LOC, asks for her daughter, calm, not aware of location or dates. Hx of dementia, daughter states at baseline currently.         Abraham Garcia RN  07/15/22 1769

## 2022-07-17 LAB
ANION GAP SERPL CALCULATED.3IONS-SCNC: 12 MMOL/L (ref 4–16)
BASOPHILS ABSOLUTE: 0 K/CU MM
BASOPHILS RELATIVE PERCENT: 0.2 % (ref 0–1)
BUN BLDV-MCNC: 25 MG/DL (ref 6–23)
CALCIUM SERPL-MCNC: 9.3 MG/DL (ref 8.3–10.6)
CHLORIDE BLD-SCNC: 105 MMOL/L (ref 99–110)
CO2: 22 MMOL/L (ref 21–32)
CREAT SERPL-MCNC: 1 MG/DL (ref 0.6–1.1)
DIFFERENTIAL TYPE: ABNORMAL
EOSINOPHILS ABSOLUTE: 0.1 K/CU MM
EOSINOPHILS RELATIVE PERCENT: 0.8 % (ref 0–3)
GFR AFRICAN AMERICAN: >60 ML/MIN/1.73M2
GFR NON-AFRICAN AMERICAN: 52 ML/MIN/1.73M2
GLUCOSE BLD-MCNC: 88 MG/DL (ref 70–99)
HCT VFR BLD CALC: 28.1 % (ref 37–47)
HEMOGLOBIN: 8.7 GM/DL (ref 12.5–16)
IMMATURE NEUTROPHIL %: 0.6 % (ref 0–0.43)
LYMPHOCYTES ABSOLUTE: 0.9 K/CU MM
LYMPHOCYTES RELATIVE PERCENT: 10 % (ref 24–44)
MCH RBC QN AUTO: 33.9 PG (ref 27–31)
MCHC RBC AUTO-ENTMCNC: 31 % (ref 32–36)
MCV RBC AUTO: 109.3 FL (ref 78–100)
MONOCYTES ABSOLUTE: 1.1 K/CU MM
MONOCYTES RELATIVE PERCENT: 12.4 % (ref 0–4)
NUCLEATED RBC %: 0 %
PDW BLD-RTO: 15.2 % (ref 11.7–14.9)
PLATELET # BLD: 167 K/CU MM (ref 140–440)
PMV BLD AUTO: 9.7 FL (ref 7.5–11.1)
POTASSIUM SERPL-SCNC: 4.3 MMOL/L (ref 3.5–5.1)
RBC # BLD: 2.57 M/CU MM (ref 4.2–5.4)
SEGMENTED NEUTROPHILS ABSOLUTE COUNT: 6.8 K/CU MM
SEGMENTED NEUTROPHILS RELATIVE PERCENT: 76 % (ref 36–66)
SODIUM BLD-SCNC: 139 MMOL/L (ref 135–145)
TOTAL IMMATURE NEUTOROPHIL: 0.05 K/CU MM
TOTAL NUCLEATED RBC: 0 K/CU MM
WBC # BLD: 9 K/CU MM (ref 4–10.5)

## 2022-07-17 PROCEDURE — 80048 BASIC METABOLIC PNL TOTAL CA: CPT

## 2022-07-17 PROCEDURE — 85025 COMPLETE CBC W/AUTO DIFF WBC: CPT

## 2022-07-17 PROCEDURE — 6360000002 HC RX W HCPCS: Performed by: INTERNAL MEDICINE

## 2022-07-17 PROCEDURE — 6370000000 HC RX 637 (ALT 250 FOR IP): Performed by: INTERNAL MEDICINE

## 2022-07-17 PROCEDURE — 94761 N-INVAS EAR/PLS OXIMETRY MLT: CPT

## 2022-07-17 PROCEDURE — 2580000003 HC RX 258: Performed by: INTERNAL MEDICINE

## 2022-07-17 PROCEDURE — 2140000000 HC CCU INTERMEDIATE R&B

## 2022-07-17 RX ADMIN — ASPIRIN 81 MG CHEWABLE TABLET 81 MG: 81 TABLET CHEWABLE at 08:20

## 2022-07-17 RX ADMIN — PANTOPRAZOLE SODIUM 40 MG: 40 TABLET, DELAYED RELEASE ORAL at 06:53

## 2022-07-17 RX ADMIN — SODIUM CHLORIDE, PRESERVATIVE FREE 10 ML: 5 INJECTION INTRAVENOUS at 08:21

## 2022-07-17 RX ADMIN — METOPROLOL TARTRATE 25 MG: 25 TABLET, FILM COATED ORAL at 21:27

## 2022-07-17 RX ADMIN — ATORVASTATIN CALCIUM 20 MG: 10 TABLET, FILM COATED ORAL at 08:20

## 2022-07-17 RX ADMIN — CARBAMAZEPINE 200 MG: 200 TABLET ORAL at 08:20

## 2022-07-17 RX ADMIN — AMLODIPINE BESYLATE 5 MG: 5 TABLET ORAL at 08:20

## 2022-07-17 RX ADMIN — ACETAMINOPHEN 325MG 650 MG: 325 TABLET ORAL at 17:43

## 2022-07-17 RX ADMIN — CLOPIDOGREL BISULFATE 75 MG: 75 TABLET ORAL at 08:21

## 2022-07-17 RX ADMIN — CEFTRIAXONE SODIUM 1000 MG: 1 INJECTION, POWDER, FOR SOLUTION INTRAMUSCULAR; INTRAVENOUS at 08:39

## 2022-07-17 RX ADMIN — CARBAMAZEPINE 200 MG: 200 TABLET ORAL at 21:27

## 2022-07-17 RX ADMIN — METOPROLOL TARTRATE 25 MG: 25 TABLET, FILM COATED ORAL at 08:20

## 2022-07-17 RX ADMIN — METHOTREXATE 12.5 MG: 2.5 TABLET ORAL at 08:21

## 2022-07-17 RX ADMIN — ENOXAPARIN SODIUM 40 MG: 100 INJECTION SUBCUTANEOUS at 08:21

## 2022-07-17 RX ADMIN — SODIUM CHLORIDE, PRESERVATIVE FREE 10 ML: 5 INJECTION INTRAVENOUS at 21:27

## 2022-07-17 RX ADMIN — PREDNISONE 5 MG: 5 TABLET ORAL at 21:27

## 2022-07-17 RX ADMIN — PREDNISONE 5 MG: 5 TABLET ORAL at 08:20

## 2022-07-17 RX ADMIN — FOLIC ACID 1 MG: 1 TABLET ORAL at 08:20

## 2022-07-17 ASSESSMENT — PAIN DESCRIPTION - LOCATION: LOCATION: HEAD

## 2022-07-17 ASSESSMENT — ENCOUNTER SYMPTOMS
ABDOMINAL PAIN: 0
APNEA: 0
CHEST TIGHTNESS: 0
ABDOMINAL DISTENTION: 0

## 2022-07-17 ASSESSMENT — PAIN SCALES - GENERAL: PAINLEVEL_OUTOF10: 3

## 2022-07-17 ASSESSMENT — PAIN - FUNCTIONAL ASSESSMENT: PAIN_FUNCTIONAL_ASSESSMENT: ACTIVITIES ARE NOT PREVENTED

## 2022-07-17 ASSESSMENT — PAIN DESCRIPTION - DESCRIPTORS: DESCRIPTORS: ACHING

## 2022-07-17 NOTE — PROGRESS NOTES
In-Patient Progress Note    Patient:  Esperanza Spears 80 y.o. female MRN: [id number]     Date of Service: 7/17/2022    Hospital Day: 3      Chief complaint: had concerns including Fatigue (Generalized weakness. ). Assessment and Plan   Esperanza Spears, a 80 y.o. female, with a history of prior TIA, hypertension, hyperlipidemia was admitted on 7/15/2022 with complaints of had concerns including Fatigue (Generalized weakness. ). Seen and examined in the AM.  She is eating her breakfast at the time of examination. She seems more oriented today. Was able to answer how old she was, which years she was born and location. She has no complaints. Assessment  Acute metabolic encephalopathy likely due to UTI, likely at baseline now. Blood culture growing E. coli. Procalcitonin 3.11. Patient was started on vancomycin and meropenem. Gram-negative sepsis, likely due to UTI. Patient on empiric antibiotic coverage. We will follow final culture and sensitivity. History of hypertension, patient currently normotensive on home blood pressure medicine, amlodipine, lisinopril hydrochlorothiazide and metoprolol. History of rheumatoid arthritis, on prednisone and methotrexate every Sunday. They have been resumed. History of TIA, on dual antiplatelet therapy      Plan  Change antibiotics to ceftriaxone. Awaiting final cultures and sensitivity. Continue to hold lisinopril/hydrochlorothiazide in view of likely dehydration  Continue other blood pressure medications  Continue methotrexate and prednisone at scheduled        # Peptic ulcer prophylaxis: Protonix  # DVT Prophylaxis: Lovenox SQ  #CODE STATUS: Limited with yes to resuscitative medicines          Review of System     Review of Systems   Constitutional:  Negative for activity change and appetite change. HENT:  Negative for congestion. Eyes:  Negative for visual disturbance. Respiratory:  Negative for apnea and chest tightness. Cardiovascular:  Negative for chest pain, palpitations and leg swelling. Gastrointestinal:  Negative for abdominal distention and abdominal pain. Genitourinary:  Negative for difficulty urinating, dysuria, frequency and urgency. Musculoskeletal: Negative. Skin: Negative. Neurological:  Negative for dizziness. All other systems reviewed and are negative. I have reviewed all pertinent PMHx, PSHx, FamHx, SocialHx, medications, and allergies and updated history as appropriate. Physical Exam   VITAL SIGNS:  BP (!) 120/57   Pulse 53   Temp 97.6 °F (36.4 °C) (Oral)   Resp 15   Ht 5' 6\" (1.676 m)   Wt 117 lb 4.6 oz (53.2 kg)   SpO2 98%   BMI 18.93 kg/m²   Tmax over 24 hours:  Temp (24hrs), Av.5 °F (36.9 °C), Min:97.6 °F (36.4 °C), Max:99.2 °F (37.3 °C)      Patient Vitals for the past 6 hrs:   BP Temp Temp src Pulse Resp SpO2   22 1000 (!) 120/57 -- -- 53 15 --   22 0800 (!) 147/62 97.6 °F (36.4 °C) Oral 54 14 98 %           Intake/Output Summary (Last 24 hours) at 2022 1323  Last data filed at 2022 1003  Gross per 24 hour   Intake 130 ml   Output --   Net 130 ml       Wt Readings from Last 2 Encounters:   22 117 lb 4.6 oz (53.2 kg)   22 125 lb (56.7 kg)     Body mass index is 18.93 kg/m². Physical Exam  HENT:      Head: Normocephalic and atraumatic. Nose: Nose normal.      Mouth/Throat:      Mouth: Mucous membranes are dry. Eyes:      Pupils: Pupils are equal, round, and reactive to light. Cardiovascular:      Rate and Rhythm: Tachycardia present. Pulmonary:      Effort: Pulmonary effort is normal.      Breath sounds: Normal breath sounds. Abdominal:      General: Abdomen is flat. Palpations: Abdomen is soft. Skin:     General: Skin is warm and dry. Neurological:      General: No focal deficit present. Mental Status: She is alert. Comments: Remembers her daughter.   Could not state how old she was, which year she was born.  Not oriented to time , place and situation. Current Medications      cefTRIAXone (ROCEPHIN) IV  1,000 mg IntraVENous Q24H    amLODIPine  5 mg Oral Daily    aspirin  81 mg Oral Daily    atorvastatin  20 mg Oral Daily    carBAMazepine  200 mg Oral BID    clopidogrel  75 mg Oral Daily    folic acid  1 mg Oral Daily    [Held by provider] lisinopril-hydroCHLOROthiazide  1 tablet Oral Daily    methotrexate  12.5 mg Oral Weekly    metoprolol tartrate  25 mg Oral BID    pantoprazole  40 mg Oral QAM AC    predniSONE  5 mg Oral BID    sodium chloride flush  10 mL IntraVENous 2 times per day    enoxaparin  40 mg SubCUTAneous Daily         Labs and Imaging Studies   Laboratory findings:  CT head without contrast    Result Date: 7/15/2022  EXAMINATION: CT OF THE HEAD WITHOUT CONTRAST  7/15/2022 7:23 pm TECHNIQUE: CT of the head was performed without the administration of intravenous contrast. Automated exposure control, iterative reconstruction, and/or weight based adjustment of the mA/kV was utilized to reduce the radiation dose to as low as reasonably achievable. COMPARISON: 01/31/2022 HISTORY: ORDERING SYSTEM PROVIDED HISTORY: AMS TECHNOLOGIST PROVIDED HISTORY: Reason for exam:->AMS Has a \"code stroke\" or \"stroke alert\" been called? ->No Decision Support Exception - unselect if not a suspected or confirmed emergency medical condition->Emergency Medical Condition (MA) Reason for Exam: AMS FINDINGS: BRAIN/VENTRICLES: There is no acute intracranial hemorrhage, mass effect or midline shift. No abnormal extra-axial fluid collection. The gray-white differentiation is maintained without evidence of an acute infarct. There is no evidence of hydrocephalus. 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 ORBITS: The visualized portion of the orbits demonstrate no acute abnormality.  SINUSES: The visualized paranasal sinuses and mastoid air cells demonstrate no acute abnormality. SOFT TISSUES/SKULL:  No acute abnormality of the visualized skull or soft tissues. 1. No acute intracranial abnormality. 2. Stable chronic white matter microvascular ischemic changes and chronic lacunar infarct in the left basal ganglia. XR CHEST PORTABLE    Result Date: 7/15/2022  EXAMINATION: ONE XRAY VIEW OF THE CHEST 7/15/2022 7:05 pm COMPARISON: 03/04/2022 HISTORY: ORDERING SYSTEM PROVIDED HISTORY: sepsis TECHNOLOGIST PROVIDED HISTORY: Reason for exam:->sepsis Reason for Exam: sepsis Additional signs and symptoms: unknown Relevant Medical/Surgical History: pna FINDINGS: The lungs are without acute focal process. There is no effusion or pneumothorax. The cardiomediastinal silhouette is stable. The osseous structures are stable. No acute process.  Probable hiatal hernia       Recent Results (from the past 24 hour(s))   Basic Metabolic Panel w/ Reflex to MG    Collection Time: 07/17/22 11:09 AM   Result Value Ref Range    Sodium 139 135 - 145 MMOL/L    Potassium 4.3 3.5 - 5.1 MMOL/L    Chloride 105 99 - 110 mMol/L    CO2 22 21 - 32 MMOL/L    Anion Gap 12 4 - 16    BUN 25 (H) 6 - 23 MG/DL    CREATININE 1.0 0.6 - 1.1 MG/DL    Glucose 88 70 - 99 MG/DL    Calcium 9.3 8.3 - 10.6 MG/DL    GFR Non-African American 52 (L) >60 mL/min/1.73m2    GFR African American >60 >60 mL/min/1.73m2   CBC auto differential    Collection Time: 07/17/22 11:09 AM   Result Value Ref Range    WBC 9.0 4.0 - 10.5 K/CU MM    RBC 2.57 (L) 4.2 - 5.4 M/CU MM    Hemoglobin 8.7 (L) 12.5 - 16.0 GM/DL    Hematocrit 28.1 (L) 37 - 47 %    .3 (H) 78 - 100 FL    MCH 33.9 (H) 27 - 31 PG    MCHC 31.0 (L) 32.0 - 36.0 %    RDW 15.2 (H) 11.7 - 14.9 %    Platelets 860 070 - 535 K/CU MM    MPV 9.7 7.5 - 11.1 FL    Differential Type AUTOMATED DIFFERENTIAL     Segs Relative 76.0 (H) 36 - 66 %    Lymphocytes % 10.0 (L) 24 - 44 %    Monocytes % 12.4 (H) 0 - 4 %    Eosinophils % 0.8 0 - 3 %    Basophils % 0.2 0 - 1 %    Segs Absolute 6.8 K/CU MM    Lymphocytes Absolute 0.9 K/CU MM    Monocytes Absolute 1.1 K/CU MM    Eosinophils Absolute 0.1 K/CU MM    Basophils Absolute 0.0 K/CU MM    Nucleated RBC % 0.0 %    Total Nucleated RBC 0.0 K/CU MM    Total Immature Neutrophil 0.05 K/CU MM    Immature Neutrophil % 0.6 (H) 0 - 0.43 %           Electronically signed by Meagan Alegre MD on 7/17/2022 at 1:23 PM

## 2022-07-18 LAB
ANION GAP SERPL CALCULATED.3IONS-SCNC: 9 MMOL/L (ref 4–16)
BASOPHILS ABSOLUTE: 0 K/CU MM
BASOPHILS RELATIVE PERCENT: 0.2 % (ref 0–1)
BUN BLDV-MCNC: 31 MG/DL (ref 6–23)
CALCIUM SERPL-MCNC: 9.5 MG/DL (ref 8.3–10.6)
CHLORIDE BLD-SCNC: 105 MMOL/L (ref 99–110)
CO2: 24 MMOL/L (ref 21–32)
CREAT SERPL-MCNC: 0.9 MG/DL (ref 0.6–1.1)
CULTURE: ABNORMAL
DIFFERENTIAL TYPE: ABNORMAL
EOSINOPHILS ABSOLUTE: 0.1 K/CU MM
EOSINOPHILS RELATIVE PERCENT: 0.8 % (ref 0–3)
GFR AFRICAN AMERICAN: >60 ML/MIN/1.73M2
GFR NON-AFRICAN AMERICAN: 59 ML/MIN/1.73M2
GLUCOSE BLD-MCNC: 96 MG/DL (ref 70–99)
HCT VFR BLD CALC: 27.7 % (ref 37–47)
HEMOGLOBIN: 8.7 GM/DL (ref 12.5–16)
IMMATURE NEUTROPHIL %: 0.4 % (ref 0–0.43)
LYMPHOCYTES ABSOLUTE: 1.9 K/CU MM
LYMPHOCYTES RELATIVE PERCENT: 22.7 % (ref 24–44)
Lab: ABNORMAL
Lab: ABNORMAL
MCH RBC QN AUTO: 33.3 PG (ref 27–31)
MCHC RBC AUTO-ENTMCNC: 31.4 % (ref 32–36)
MCV RBC AUTO: 106.1 FL (ref 78–100)
MONOCYTES ABSOLUTE: 1.3 K/CU MM
MONOCYTES RELATIVE PERCENT: 15 % (ref 0–4)
NUCLEATED RBC %: 0 %
PDW BLD-RTO: 14.7 % (ref 11.7–14.9)
PLATELET # BLD: 232 K/CU MM (ref 140–440)
PMV BLD AUTO: 9.7 FL (ref 7.5–11.1)
POTASSIUM SERPL-SCNC: 4.1 MMOL/L (ref 3.5–5.1)
RBC # BLD: 2.61 M/CU MM (ref 4.2–5.4)
SEGMENTED NEUTROPHILS ABSOLUTE COUNT: 5.2 K/CU MM
SEGMENTED NEUTROPHILS RELATIVE PERCENT: 60.9 % (ref 36–66)
SODIUM BLD-SCNC: 138 MMOL/L (ref 135–145)
SPECIMEN: ABNORMAL
SPECIMEN: ABNORMAL
TOTAL IMMATURE NEUTOROPHIL: 0.03 K/CU MM
TOTAL NUCLEATED RBC: 0 K/CU MM
WBC # BLD: 8.6 K/CU MM (ref 4–10.5)

## 2022-07-18 PROCEDURE — 80048 BASIC METABOLIC PNL TOTAL CA: CPT

## 2022-07-18 PROCEDURE — 85025 COMPLETE CBC W/AUTO DIFF WBC: CPT

## 2022-07-18 PROCEDURE — 6370000000 HC RX 637 (ALT 250 FOR IP): Performed by: INTERNAL MEDICINE

## 2022-07-18 PROCEDURE — 97167 OT EVAL HIGH COMPLEX 60 MIN: CPT

## 2022-07-18 PROCEDURE — 36415 COLL VENOUS BLD VENIPUNCTURE: CPT

## 2022-07-18 PROCEDURE — 1200000000 HC SEMI PRIVATE

## 2022-07-18 PROCEDURE — 94761 N-INVAS EAR/PLS OXIMETRY MLT: CPT

## 2022-07-18 PROCEDURE — 2580000003 HC RX 258: Performed by: INTERNAL MEDICINE

## 2022-07-18 PROCEDURE — 6360000002 HC RX W HCPCS: Performed by: INTERNAL MEDICINE

## 2022-07-18 PROCEDURE — 97535 SELF CARE MNGMENT TRAINING: CPT

## 2022-07-18 PROCEDURE — 97530 THERAPEUTIC ACTIVITIES: CPT

## 2022-07-18 RX ORDER — CEFDINIR 300 MG/1
300 CAPSULE ORAL EVERY 12 HOURS SCHEDULED
Status: COMPLETED | OUTPATIENT
Start: 2022-07-18 | End: 2022-07-19

## 2022-07-18 RX ADMIN — CARBAMAZEPINE 200 MG: 200 TABLET ORAL at 08:11

## 2022-07-18 RX ADMIN — SODIUM CHLORIDE, PRESERVATIVE FREE 10 ML: 5 INJECTION INTRAVENOUS at 08:12

## 2022-07-18 RX ADMIN — ACETAMINOPHEN 325MG 650 MG: 325 TABLET ORAL at 14:54

## 2022-07-18 RX ADMIN — METOPROLOL TARTRATE 25 MG: 25 TABLET, FILM COATED ORAL at 21:48

## 2022-07-18 RX ADMIN — CARBAMAZEPINE 200 MG: 200 TABLET ORAL at 21:48

## 2022-07-18 RX ADMIN — ATORVASTATIN CALCIUM 20 MG: 10 TABLET, FILM COATED ORAL at 08:12

## 2022-07-18 RX ADMIN — FOLIC ACID 1 MG: 1 TABLET ORAL at 08:12

## 2022-07-18 RX ADMIN — ASPIRIN 81 MG CHEWABLE TABLET 81 MG: 81 TABLET CHEWABLE at 08:12

## 2022-07-18 RX ADMIN — CLOPIDOGREL BISULFATE 75 MG: 75 TABLET ORAL at 08:12

## 2022-07-18 RX ADMIN — CEFDINIR 300 MG: 300 CAPSULE ORAL at 09:43

## 2022-07-18 RX ADMIN — PREDNISONE 5 MG: 5 TABLET ORAL at 08:12

## 2022-07-18 RX ADMIN — PREDNISONE 5 MG: 5 TABLET ORAL at 21:48

## 2022-07-18 RX ADMIN — AMLODIPINE BESYLATE 5 MG: 5 TABLET ORAL at 08:12

## 2022-07-18 RX ADMIN — METOPROLOL TARTRATE 25 MG: 25 TABLET, FILM COATED ORAL at 08:12

## 2022-07-18 RX ADMIN — ENOXAPARIN SODIUM 40 MG: 100 INJECTION SUBCUTANEOUS at 08:12

## 2022-07-18 RX ADMIN — CEFTRIAXONE SODIUM 1000 MG: 1 INJECTION, POWDER, FOR SOLUTION INTRAMUSCULAR; INTRAVENOUS at 08:16

## 2022-07-18 RX ADMIN — PANTOPRAZOLE SODIUM 40 MG: 40 TABLET, DELAYED RELEASE ORAL at 06:07

## 2022-07-18 ASSESSMENT — PAIN SCALES - GENERAL: PAINLEVEL_OUTOF10: 3

## 2022-07-18 ASSESSMENT — ENCOUNTER SYMPTOMS
ABDOMINAL PAIN: 0
CHEST TIGHTNESS: 0
ABDOMINAL DISTENTION: 0
APNEA: 0

## 2022-07-18 ASSESSMENT — PAIN DESCRIPTION - ORIENTATION: ORIENTATION: RIGHT;LEFT

## 2022-07-18 ASSESSMENT — PAIN DESCRIPTION - LOCATION: LOCATION: EYE

## 2022-07-18 NOTE — PROGRESS NOTES
Occupational Therapy    [facility] ACUTE CARE OCCUPATIONAL THERAPY EVALUATION  Beryl Alejandre, 1934, 3102/3102-A, 7/18/2022    History  [place]:  The primary encounter diagnosis was Urinary tract infection without hematuria, site unspecified. Diagnoses of Altered mental status, unspecified altered mental status type and Dehydration were also pertinent to this visit. Patient  has a past medical history of Arthritis, Cataract of left eye, Cataract of right eye, Chronic kidney disease, H/O cardiovascular stress test, H/O Doppler ultrasound, H/O Doppler ultrasound, H/O echocardiogram, H/O tilt table evaluation, H/O transesophageal echocardiography (MARIO) for monitoring, Hyperlipidemia, Hypertension, Movement disorder, Neuromuscular disorder ([address] Osteoporosis, Pneumonia, Psychiatric problem, TIA (transient ischemic attack), and Trigeminal neuralgia of right side of face. Patient  has a past surgical history that includes Appendectomy; Hysterectomy; Cholecystectomy; eye surgery; and Cataract removal.    Subjective:  Patient states:  \"I think they are thinking of taking my eye out\" Pt confused. Pain:  Denies. Communication with other providers:  Handoff to RN  Restrictions: General Precautions, Fall Risk    Home Setup/Prior level of function   Pt is a poor historian.  Below information taken from last evaluation on 3/7/2022    Social/Functional History  Lives With: Joselyn Shah (daughter - Darlene Marin)  Type of Home: House  Home Layout: One level  Home Access: Ramped entrance  Bathroom Shower/Tub: Tub/Shower unit,Shower chair without back (currently bedsides baths)  Bathroom Toilet: Standard  Bathroom Equipment: Grab bars in shower  Bathroom Accessibility: Accessible  Home Equipment: [address] [facility] bed,Lift chair  Receives Help From: Family  ADL Assistance: Needs assistance  Homemaking Assistance: Needs assistance  Homemaking Responsibilities: No  Ambulation Assistance: Needs assistance  Transfer Assistance: Needs assistance  Active : No     Examination of body systems (includes body structures/functions, activity/participation limitations):  Observation:  Supine in bed upon arrival, agreeable to therapy  Vision:  L eye pt reports decreased vision, R eye WFL; pt unable to state when vision deficits started  Hearing:  [place]  Cardiopulmonary:  No 02 needs. Body Systems and functions:  ROM R/L:  ~90 degrees bilateral shoulder flexion, distal WFL  Strength R/L:  3-/5,   Sensation: Denies numbness  Tone: Normal  Coordination: L eye decreased gross/fine motor coordination, ~75% accuracy w/ greatly increased time  Perception: min decreased initiation/sequencing w/ multiple VC's and tactile cues to overcome    Activities of Daily Living (ADLs):  Feeding: Setup  Grooming: Setup/CGA (washing hands/face w/ warm washcloth)  UB bathing: SBA  LB bathing: maxA (washing buzz area/buttocks rolling L/R after pt soiled self)  UB dressing: SBA  LB dressing: maxA (doffing/donning depends rolling L/R)  Toileting: maxA (See LB bathing/dressing for details, pt soiled self during session, unaware)    Cognitive and Psychosocial Functioning:  Overall cognitive status: AxO to self and place only not time or situation, decreased problem solving/safety awareness, min re-direction to task, decreased short term memory, follows one step commands w/ increased time, increased processing time  Affect: Pleasantly confused       Mobility:  Supine to sit:  maxA  Transfers: dependent/total, unable to extend into stand at EOB despite re-direction  Sitting balance:  SBA ~8 minutes before fatiguing . Standing balance:  DNT.   Functional Mobility DNT  Toilet/Shower Transfers: DNT  Sit to supine: maxA  Rolling L/R: Tamika  Scooting to HOB: maxA             AM-PAC Daily Activity Inpatient   How much help for putting on and taking off regular lower body clothing?: Total  How much help for Bathing?: A Lot  How much help for Toileting?: Total  How much help for putting on and taking off regular upper body clothing?: A Little  How much help for taking care of personal grooming?: A Little  How much help for eating meals?: A Little  AM-[facility] Inpatient Daily Activity Raw Score: 13  AM-PAC Inpatient ADL T-Scale Score : 32.03  ADL Inpatient CMS 0-100% Score: 63.03  ADL Inpatient CMS G-Code Modifier : CL    Treatment:  Self Care Training:   Cues were given for safety, sequence, UE/LE placement, visual cues, and balance. Activities performed today included LB bathing/dressing, grooming, toileting    Therapeutic Activity Training:   Therapeutic activity training was instructed today. Cues were given for safety, sequence, UE/LE placement, awareness, and balance. Activities performed today included bed mobility training, sup-sit, sit-stand, stand to sit, sit to supine, scooting to HOB, rolling L/R     Safety: patient left in bed  with bed  alarm, call light within reach, RN notified, gait belt used. Assessment:  Pt is a 81 yo female admitted from home for UTI. Pt at baseline needs assistance for ADLs needs assistance for high level IADLs and needs assistance for functional transfers/mobility w/ AD. Pt currently presents w/ deficits in ADL and high level IADL independence, functional activity tolerance, dynamic sitting and standing balance and tolerance and functional transfers, BUE strength/ROM, initiation/sequencing and cognition. Pt would benefit from continued acute care OT services w/ discharge to SNF  Complexity: High  Prognosis: Good  Plan  Times per Week: 3x+  Times per Day: Daily  Current Treatment Recommendations: Strengthening, ROM, Balance training, Functional mobility training, Endurance training, Neuromuscular re-education, Cognitive reorientation, Safety education & training, Pain management, Patient/Caregiver education & training, Equipment evaluation, education, & procurement, Positioning, Self-Care / ADL, Home management training, Coordination training, Cognitive/Perceptual training     Equipment: defer    Goals:  Pt goal: go home  Time Frame for STGs: discharge  Goal 1: Pt will perform UE ADLs Supervision  Goal 2: Pt will perform LE ADLs Tamika  Goal 3: Pt will perform toileting Tamika  Goal 4: Pt will perform functional transfer w/ AD Tamika  Goal 5: Pt will perform all aspects of bed mobility SBA  Goal 6: Pt will perform therex/theract in order to increase functional activity tolerance and dynamic standing balance    Treatment plan:  Pt will perform functional task in sit  reaching in all 3 planes in order to increase dynamic sitting  balance and functional activity tolerance    Recommendations for NURSING activity: Ambar NEWTON vs KONSTANTIN      Time:   Time in: 1245  Time out: 1315  Timed treatment minutes: 25 minutes  Total time: 30 minutes    Electronically signed by:    Ana HOWE/L [id number]  1:32 PM,7/18/2022

## 2022-07-18 NOTE — CARE COORDINATION
LSW reviewed patient medical record and discussed patient in IDT. Per the notes patient is confused. Pt daughter is the POA. LSW called patient daughter Thang Graham and left a voicemail. Patient daughter Thang Graham is in the patient room. She is out to the [employer]. LSW informed her of the OT recommendations for SNF. Patient still needs to be seen by PT. Whiteboard placed. Patient daughter would like for patient to discharge to [facility]. Call to [facility] with the referral. Faxed the facesheet.

## 2022-07-18 NOTE — PROGRESS NOTES
Comprehensive Nutrition Assessment    Type and Reason for Visit:  Initial (low BMI)    Nutrition Recommendations/Plan:   Add standard oral nutrition supplement  Encourage po intake as able  Please document all po intake  Will monitor po intake, weight trends, poc     Malnutrition Assessment:  Malnutrition Status:  Insufficient data (07/18/22 1612)    Context:  Acute Illness       Nutrition Assessment:    Pt admitted for UTI, PMH: PNA, HTN, HLD, CKD, pt currently on regular diet with varied po intake per documentation (1-100%), underweight, pt unavailable at time of visit, will follow at high nutrition risk    Nutrition Related Findings:    H/H: 8.7/27.7, Meds; Deltasone, Folic Acid Wound Type: None       Current Nutrition Intake & Therapies:    Average Meal Intake: 1-25%, 51-75%, %  Average Supplements Intake: None Ordered  ADULT DIET; Regular    Anthropometric Measures:  Height: 5' 5.98\" (167.6 cm)  Ideal Body Weight (IBW): 130 lbs (59 kg)    Current Body Weight: 120 lb 13 oz (54.8 kg), 92.9 % IBW.    Current BMI (kg/m2): 19.5  BMI Categories: Underweight (BMI less than 22) age over 72    Estimated Daily Nutrient Needs:  Energy Requirements Based On: Kcal/kg  Weight Used for Energy Requirements: Current  Energy (kcal/day): 6307-2536 (28-32 kcal/kg)  Protein (g/day): 66-77 (1.2-1.4 g/kg)  Method Used for Fluid Requirements: 1 ml/kcal    Nutrition Diagnosis:   Underweight related to inadequate protein-energy intake as evidenced by BMI  Nutrition Interventions:   Food and/or Nutrient Delivery: Continue Current Diet, Start Oral Nutrition Supplement  Nutrition Education/Counseling: No recommendation at this time  Coordination of Nutrition Care: Continue to monitor while inpatient    Goals:  Goals: PO intake 75% or greater, by next RD assessment    Nutrition Monitoring and Evaluation:   Behavioral-Environmental Outcomes: None Identified  Food/Nutrient Intake Outcomes: Food and Nutrient Intake, Supplement Intake  Physical Signs/Symptoms Outcomes: Biochemical Data, GI Status, Hemodynamic Status, Weight, Meal Time Behavior    Discharge Planning:     Too soon to determine     Taryn Chen [address] [place] [address],   Contact: [phone]

## 2022-07-18 NOTE — DISCHARGE INSTR - COC
Continuity of Care Form    Patient Name: Kevan Whitt   :  1934  MRN:  [id number]    Admit date:  7/15/2022  Discharge date:  2022`    Code Status Order: Limited   Advance Directives:     Admitting Physician:  Alesia Velazquez MD  PCP: Kurtis Phoenix, MD    Discharging Nurse: Carli Suárez [facility] Unit/Room#: 3102/3102-A  Discharging Unit Phone Number: [phone]     Emergency Contact:   Extended Emergency Contact Information  Primary Emergency Contact: Bintajerome Livingston  Address: [address], [address] [place] [address] Phone: [phone]  Mobile Phone: [phone]  Relation: Child    Past Surgical History:  Past Surgical History:   Procedure Laterality Date    APPENDECTOMY      CATARACT REMOVAL      CHOLECYSTECTOMY      EYE SURGERY      HYSTERECTOMY (CERVIX STATUS UNKNOWN)         Immunization History:   Immunization History   Administered Date(s) Administered    Tdap (Boostrix, Adacel) 10/08/2019       Active Problems:  Patient Active Problem List   Diagnosis Code    HTN (hypertension) I10    Rheumatoid arthritis ([address] M06.9    CVA, old, hemiparesis ([address] I69.359    Tic douloure[place] G50.0    Debility R53.81    Ataxia R27.0    Acute pain of right knee M25.561    Sepsis ([address] A41.9    Physical deconditioning R53.81    Generalized weakness R53.1    Depression F32. A    COVID-19 U07.1    Troponin I above reference range R77.8    Acute cystitis without hematuria N30.00    Self-care deficit Z78.9    Stenosis of left carotid artery I65.22    Open toe wound S91.109A    PAD (peripheral artery disease) ([facility]) I73.9    UTI (urinary tract infection) N39.0       Isolation/Infection:   Isolation            No Isolation          Patient Infection Status       Infection Onset Added Last Indicated Last Indicated By Review Planned Expiration Resolved Resolved By    None active    Resolved    COVID-19 22 COVID-19, Rapid   22 {[id number]}    PHYSICIAN SECTION    Nutrition Therapy:  Current Nutrition Therapy:   - Oral Diet:  General  - Oral Nutrition Supplement:  High Protein Pudding  twice a day and High Calorie supplement twice a day    Routes of Feeding: Oral  Liquids: No Restrictions  Daily Fluid Restriction: no  Last Modified Barium Swallow with Video (Video Swallowing Test): not done    Treatments at the Time of Hospital Discharge:   Respiratory Treatments: PRN albuterol  Oxygen Therapy:  is not on home oxygen therapy. Ventilator:    - No ventilator support    Rehab Therapies: Physical Therapy and Occupational Therapy  Weight Bearing Status/Restrictions: No weight bearing restrictions  Other Medical Equipment (for information only, NOT a DME order):  PT/OT to assess  Other Treatments:   PT plan-   Time Frame for Short term goals: 1 week  Short term goal 1: Pt to complete all bed mobility SBA  Short term goal 2: Pt to complete stand pivot transfer with LRAD mod A  Short term goal 3: Pt to propel manual WC 25' with min A      Prognosis: Fair    Condition at Discharge: Stable    Rehab Potential (if transferring to Rehab): Fair    Recommended Labs or Other Treatments After Discharge: BP checks    Physician Certification: I certify the above information and transfer of Sharon Hernández  is necessary for the continuing treatment of the diagnosis listed and that she requires [facility] for greater 30 days. Update Admission H&P: Changes in H&P as follows - Patient mental status improved. She complained of some back pain on the day of discharge. Plan was to start her on warm compress, topical analgesic and lidocaine patch.  Her lisinopril-HCTZ on hold - will need reassessment in the future    PHYSICIAN SIGNATURE:  Electronically signed by Dayna Barrett MD on 7/26/22 at 2:31 PM EDT

## 2022-07-18 NOTE — PROGRESS NOTES
In-Patient Progress Note    Patient:  Rafael Cha 80 y.o. female MRN: [id number]     Date of Service: 7/18/2022    Hospital Day: 4      Chief complaint: had concerns including Fatigue (Generalized weakness. ). Assessment and Plan   Rafael Cha, a 80 y.o. female, with a history of prior TIA, hypertension, hyperlipidemia was admitted on 7/15/2022 with complaints of had concerns including Fatigue (Generalized weakness. ). Seen and examined in the AM.  She is eating her breakfast at the time of examination. She seems more oriented today. Was able to answer how old she was, which years she was born and location. She has no complaints. Assessment  Acute metabolic encephalopathy likely due to UTI, likely at baseline now. Blood culture growing E. coli. Procalcitonin 3.11. Patient was started on vancomycin and meropenem, which were de-escalated to ceftriaxone yesterday. Patient growing pansensitive E. coli. Gram-negative sepsis, likely due to UTI. Patient on empiric antibiotic coverage. Patient growing pansensitive E. coli. History of hypertension, patient currently normotensive on home blood pressure medicine, amlodipine, lisinopril hydrochlorothiazide and metoprolol. History of rheumatoid arthritis, on prednisone and methotrexate every Sunday. They have been resumed. History of TIA, on dual antiplatelet therapy      Plan  Switch to p.o. Omnicef. Continue to hold lisinopril/hydrochlorothiazide in view of likely dehydration  Continue other blood pressure medications  Continue methotrexate and prednisone at scheduled        # Peptic ulcer prophylaxis: Protonix  # DVT Prophylaxis: Lovenox SQ  #CODE STATUS: Limited with yes to resuscitative medicines          Review of System     Review of Systems   Constitutional:  Negative for activity change and appetite change. HENT:  Negative for congestion. Eyes:  Negative for visual disturbance.    Respiratory:  Negative for apnea and chest tightness. Cardiovascular:  Negative for chest pain, palpitations and leg swelling. Gastrointestinal:  Negative for abdominal distention and abdominal pain. Genitourinary:  Negative for difficulty urinating, dysuria, frequency and urgency. Musculoskeletal: Negative. Skin: Negative. Neurological:  Negative for dizziness. All other systems reviewed and are negative. I have reviewed all pertinent PMHx, PSHx, FamHx, SocialHx, medications, and allergies and updated history as appropriate. Physical Exam   VITAL SIGNS:  BP (!) 151/91   Pulse 68   Temp 98.2 °F (36.8 °C) (Axillary)   Resp 17   Ht 5' 6\" (1.676 m)   Wt 120 lb 13 oz (54.8 kg)   SpO2 100%   BMI 19.50 kg/m²   Tmax over 24 hours:  Temp (24hrs), Av.3 °F (36.8 °C), Min:98 °F (36.7 °C), Max:98.5 °F (36.9 °C)      Patient Vitals for the past 6 hrs:   BP Temp Temp src Pulse Resp SpO2   22 1100 (!) 151/91 98.2 °F (36.8 °C) Axillary 68 17 100 %           Intake/Output Summary (Last 24 hours) at 2022 1425  Last data filed at 2022 0807  Gross per 24 hour   Intake 310 ml   Output 1000 ml   Net -690 ml       Wt Readings from Last 2 Encounters:   22 120 lb 13 oz (54.8 kg)   22 125 lb (56.7 kg)     Body mass index is 19.5 kg/m². Physical Exam  HENT:      Head: Normocephalic and atraumatic. Nose: Nose normal.      Mouth/Throat:      Mouth: Mucous membranes are dry. Eyes:      Pupils: Pupils are equal, round, and reactive to light. Cardiovascular:      Rate and Rhythm: Tachycardia present. Pulmonary:      Effort: Pulmonary effort is normal.      Breath sounds: Normal breath sounds. Abdominal:      General: Abdomen is flat. Palpations: Abdomen is soft. Skin:     General: Skin is warm and dry. Neurological:      General: No focal deficit present. Mental Status: She is alert. Comments: Remembers her daughter. Could not state how old she was, which year she was born.   Not oriented to time , place and situation. Current Medications      cefdinir  300 mg Oral 2 times per day    amLODIPine  5 mg Oral Daily    aspirin  81 mg Oral Daily    atorvastatin  20 mg Oral Daily    carBAMazepine  200 mg Oral BID    clopidogrel  75 mg Oral Daily    folic acid  1 mg Oral Daily    [Held by provider] lisinopril-hydroCHLOROthiazide  1 tablet Oral Daily    methotrexate  12.5 mg Oral Weekly    metoprolol tartrate  25 mg Oral BID    pantoprazole  40 mg Oral QAM AC    predniSONE  5 mg Oral BID    sodium chloride flush  10 mL IntraVENous 2 times per day    enoxaparin  40 mg SubCUTAneous Daily         Labs and Imaging Studies   Laboratory findings:  CT head without contrast    Result Date: 7/15/2022  EXAMINATION: CT OF THE HEAD WITHOUT CONTRAST  7/15/2022 7:23 pm TECHNIQUE: CT of the head was performed without the administration of intravenous contrast. Automated exposure control, iterative reconstruction, and/or weight based adjustment of the mA/kV was utilized to reduce the radiation dose to as low as reasonably achievable. COMPARISON: 01/31/2022 HISTORY: ORDERING SYSTEM PROVIDED HISTORY: AMS TECHNOLOGIST PROVIDED HISTORY: Reason for exam:->AMS Has a \"code stroke\" or \"stroke alert\" been called? ->No Decision Support Exception - unselect if not a suspected or confirmed emergency medical condition->Emergency Medical Condition (MA) Reason for Exam: AMS FINDINGS: BRAIN/VENTRICLES: There is no acute intracranial hemorrhage, mass effect or midline shift. No abnormal extra-axial fluid collection. The gray-white differentiation is maintained without evidence of an acute infarct. There is no evidence of hydrocephalus. 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 ORBITS: The visualized portion of the orbits demonstrate no acute abnormality.  SINUSES: The visualized paranasal sinuses and mastoid air cells demonstrate no acute abnormality. SOFT TISSUES/SKULL:  No acute abnormality of the visualized skull or soft tissues. 1. No acute intracranial abnormality. 2. Stable chronic white matter microvascular ischemic changes and chronic lacunar infarct in the left basal ganglia. XR CHEST PORTABLE    Result Date: 7/15/2022  EXAMINATION: ONE XRAY VIEW OF THE CHEST 7/15/2022 7:05 pm COMPARISON: 03/04/2022 HISTORY: ORDERING SYSTEM PROVIDED HISTORY: sepsis TECHNOLOGIST PROVIDED HISTORY: Reason for exam:->sepsis Reason for Exam: sepsis Additional signs and symptoms: unknown Relevant Medical/Surgical History: pna FINDINGS: The lungs are without acute focal process. There is no effusion or pneumothorax. The cardiomediastinal silhouette is stable. The osseous structures are stable. No acute process.  Probable hiatal hernia       Recent Results (from the past 24 hour(s))   Basic Metabolic Panel w/ Reflex to MG    Collection Time: 07/18/22  5:28 AM   Result Value Ref Range    Sodium 138 135 - 145 MMOL/L    Potassium 4.1 3.5 - 5.1 MMOL/L    Chloride 105 99 - 110 mMol/L    CO2 24 21 - 32 MMOL/L    Anion Gap 9 4 - 16    BUN 31 (H) 6 - 23 MG/DL    CREATININE 0.9 0.6 - 1.1 MG/DL    Glucose 96 70 - 99 MG/DL    Calcium 9.5 8.3 - 10.6 MG/DL    GFR Non- 59 (L) >60 mL/min/1.73m2    GFR African American >60 >60 mL/min/1.73m2   CBC auto differential    Collection Time: 07/18/22  5:28 AM   Result Value Ref Range    WBC 8.6 4.0 - 10.5 K/CU MM    RBC 2.61 (L) 4.2 - 5.4 M/CU MM    Hemoglobin 8.7 (L) 12.5 - 16.0 GM/DL    Hematocrit 27.7 (L) 37 - 47 %    .1 (H) 78 - 100 FL    MCH 33.3 (H) 27 - 31 PG    MCHC 31.4 (L) 32.0 - 36.0 %    RDW 14.7 11.7 - 14.9 %    Platelets 321 578 - 420 K/CU MM    MPV 9.7 7.5 - 11.1 FL    Differential Type AUTOMATED DIFFERENTIAL     Segs Relative 60.9 36 - 66 %    Lymphocytes % 22.7 (L) 24 - 44 %    Monocytes % 15.0 (H) 0 - 4 %    Eosinophils % 0.8 0 - 3 %    Basophils % 0.2 0 - 1 %    Segs Absolute 5.2

## 2022-07-19 LAB
ANION GAP SERPL CALCULATED.3IONS-SCNC: 10 MMOL/L (ref 4–16)
BASOPHILS ABSOLUTE: 0 K/CU MM
BASOPHILS RELATIVE PERCENT: 0.3 % (ref 0–1)
BUN BLDV-MCNC: 26 MG/DL (ref 6–23)
CALCIUM SERPL-MCNC: 9.3 MG/DL (ref 8.3–10.6)
CHLORIDE BLD-SCNC: 105 MMOL/L (ref 99–110)
CO2: 25 MMOL/L (ref 21–32)
CREAT SERPL-MCNC: 0.7 MG/DL (ref 0.6–1.1)
DIFFERENTIAL TYPE: ABNORMAL
EOSINOPHILS ABSOLUTE: 0.1 K/CU MM
EOSINOPHILS RELATIVE PERCENT: 0.8 % (ref 0–3)
FERRITIN: 302 NG/ML (ref 15–150)
FOLATE: >20 NG/ML (ref 3.1–17.5)
GFR AFRICAN AMERICAN: >60 ML/MIN/1.73M2
GFR NON-AFRICAN AMERICAN: >60 ML/MIN/1.73M2
GLUCOSE BLD-MCNC: 97 MG/DL (ref 70–99)
HCT VFR BLD CALC: 26.6 % (ref 37–47)
HEMOGLOBIN: 8.5 GM/DL (ref 12.5–16)
IMMATURE NEUTROPHIL %: 0.5 % (ref 0–0.43)
IRON: 62 UG/DL (ref 37–145)
LYMPHOCYTES ABSOLUTE: 1.6 K/CU MM
LYMPHOCYTES RELATIVE PERCENT: 21.1 % (ref 24–44)
MCH RBC QN AUTO: 34 PG (ref 27–31)
MCHC RBC AUTO-ENTMCNC: 32 % (ref 32–36)
MCV RBC AUTO: 106.4 FL (ref 78–100)
MONOCYTES ABSOLUTE: 0.5 K/CU MM
MONOCYTES RELATIVE PERCENT: 6.1 % (ref 0–4)
NUCLEATED RBC %: 0 %
PCT TRANSFERRIN: 36 % (ref 10–44)
PDW BLD-RTO: 14.9 % (ref 11.7–14.9)
PLATELET # BLD: 262 K/CU MM (ref 140–440)
PMV BLD AUTO: 9.7 FL (ref 7.5–11.1)
POTASSIUM SERPL-SCNC: 4.2 MMOL/L (ref 3.5–5.1)
RBC # BLD: 2.5 M/CU MM (ref 4.2–5.4)
RETICULOCYTE COUNT PCT: 1 % (ref 0.2–2.2)
SEGMENTED NEUTROPHILS ABSOLUTE COUNT: 5.3 K/CU MM
SEGMENTED NEUTROPHILS RELATIVE PERCENT: 71.2 % (ref 36–66)
SODIUM BLD-SCNC: 140 MMOL/L (ref 135–145)
TOTAL IMMATURE NEUTOROPHIL: 0.04 K/CU MM
TOTAL IRON BINDING CAPACITY: 171 UG/DL (ref 250–450)
TOTAL NUCLEATED RBC: 0 K/CU MM
UNSATURATED IRON BINDING CAPACITY: 109 UG/DL (ref 110–370)
VITAMIN B-12: 628.9 PG/ML (ref 211–911)
WBC # BLD: 7.5 K/CU MM (ref 4–10.5)

## 2022-07-19 PROCEDURE — 87040 BLOOD CULTURE FOR BACTERIA: CPT

## 2022-07-19 PROCEDURE — 1200000000 HC SEMI PRIVATE

## 2022-07-19 PROCEDURE — 97530 THERAPEUTIC ACTIVITIES: CPT

## 2022-07-19 PROCEDURE — 6370000000 HC RX 637 (ALT 250 FOR IP): Performed by: INTERNAL MEDICINE

## 2022-07-19 PROCEDURE — 85025 COMPLETE CBC W/AUTO DIFF WBC: CPT

## 2022-07-19 PROCEDURE — 85045 AUTOMATED RETICULOCYTE COUNT: CPT

## 2022-07-19 PROCEDURE — 82607 VITAMIN B-12: CPT

## 2022-07-19 PROCEDURE — 97162 PT EVAL MOD COMPLEX 30 MIN: CPT

## 2022-07-19 PROCEDURE — 80048 BASIC METABOLIC PNL TOTAL CA: CPT

## 2022-07-19 PROCEDURE — 82746 ASSAY OF FOLIC ACID SERUM: CPT

## 2022-07-19 PROCEDURE — 36415 COLL VENOUS BLD VENIPUNCTURE: CPT

## 2022-07-19 PROCEDURE — 6360000002 HC RX W HCPCS: Performed by: INTERNAL MEDICINE

## 2022-07-19 PROCEDURE — 82728 ASSAY OF FERRITIN: CPT

## 2022-07-19 PROCEDURE — 83550 IRON BINDING TEST: CPT

## 2022-07-19 PROCEDURE — 83540 ASSAY OF IRON: CPT

## 2022-07-19 PROCEDURE — 94761 N-INVAS EAR/PLS OXIMETRY MLT: CPT

## 2022-07-19 RX ADMIN — METOPROLOL TARTRATE 25 MG: 25 TABLET, FILM COATED ORAL at 21:55

## 2022-07-19 RX ADMIN — CARBAMAZEPINE 200 MG: 200 TABLET ORAL at 21:55

## 2022-07-19 RX ADMIN — METOPROLOL TARTRATE 25 MG: 25 TABLET, FILM COATED ORAL at 10:28

## 2022-07-19 RX ADMIN — ENOXAPARIN SODIUM 40 MG: 100 INJECTION SUBCUTANEOUS at 10:28

## 2022-07-19 RX ADMIN — CLOPIDOGREL BISULFATE 75 MG: 75 TABLET ORAL at 10:28

## 2022-07-19 RX ADMIN — PREDNISONE 5 MG: 5 TABLET ORAL at 21:55

## 2022-07-19 RX ADMIN — ACETAMINOPHEN 325MG 650 MG: 325 TABLET ORAL at 18:23

## 2022-07-19 RX ADMIN — ASPIRIN 81 MG CHEWABLE TABLET 81 MG: 81 TABLET CHEWABLE at 10:28

## 2022-07-19 RX ADMIN — PANTOPRAZOLE SODIUM 40 MG: 40 TABLET, DELAYED RELEASE ORAL at 10:28

## 2022-07-19 RX ADMIN — CARBAMAZEPINE 200 MG: 200 TABLET ORAL at 10:28

## 2022-07-19 RX ADMIN — CEFDINIR 300 MG: 300 CAPSULE ORAL at 21:55

## 2022-07-19 RX ADMIN — AMLODIPINE BESYLATE 5 MG: 5 TABLET ORAL at 10:28

## 2022-07-19 RX ADMIN — CEFDINIR 300 MG: 300 CAPSULE ORAL at 01:17

## 2022-07-19 RX ADMIN — FOLIC ACID 1 MG: 1 TABLET ORAL at 10:28

## 2022-07-19 RX ADMIN — ATORVASTATIN CALCIUM 20 MG: 10 TABLET, FILM COATED ORAL at 10:28

## 2022-07-19 RX ADMIN — PREDNISONE 5 MG: 5 TABLET ORAL at 10:28

## 2022-07-19 RX ADMIN — CEFDINIR 300 MG: 300 CAPSULE ORAL at 12:28

## 2022-07-19 ASSESSMENT — PAIN SCALES - GENERAL: PAINLEVEL_OUTOF10: 10

## 2022-07-19 ASSESSMENT — PAIN DESCRIPTION - LOCATION: LOCATION: FOOT;TOE (COMMENT WHICH ONE)

## 2022-07-19 ASSESSMENT — PAIN DESCRIPTION - ORIENTATION: ORIENTATION: LEFT

## 2022-07-19 NOTE — CARE COORDINATION
Reviewed chart and discussed in IDR, pt ready for discharge to [facility] once prior auth completed. Spoke with Andrew Vaughn at [facility] she is starting prior auth since PT eval completed. Updated pt/daughter. CM will continue to follow.

## 2022-07-19 NOTE — PLAN OF CARE
Problem: Discharge Planning  Goal: Discharge to home or other facility with appropriate resources  Outcome: Progressing Towards Goal     Problem: Skin/Tissue Integrity  Goal: Absence of new skin breakdown  Description: 1. Monitor for areas of redness and/or skin breakdown  2. Assess vascular access sites hourly  3. Every 4-6 hours minimum:  Change oxygen saturation probe site  4. Every 4-6 hours:  If on nasal continuous positive airway pressure, respiratory therapy assess nares and determine need for appliance change or resting period.   Outcome: Progressing Towards Goal     Problem: Safety - Adult  Goal: Free from fall injury  Outcome: Progressing Towards Goal     Problem: ABCDS Injury Assessment  Goal: Absence of physical injury  Outcome: Progressing Towards Goal     Problem: Neurosensory - Adult  Goal: Achieves stable or improved neurological status  Outcome: Progressing Towards Goal     Problem: Cardiovascular - Adult  Goal: Maintains optimal cardiac output and hemodynamic stability  Outcome: Progressing Towards Goal  Goal: Absence of cardiac dysrhythmias or at baseline  Outcome: Progressing Towards Goal     Problem: Skin/Tissue Integrity - Adult  Goal: Skin integrity remains intact  Outcome: Progressing Towards Goal     Problem: Musculoskeletal - Adult  Goal: Return mobility to safest level of function  Outcome: Progressing Towards Goal  Goal: Return ADL status to a safe level of function  Outcome: Progressing Towards Goal     Problem: Gastrointestinal - Adult  Goal: Maintains adequate nutritional intake  Outcome: Progressing Towards Goal     Problem: Genitourinary - Adult  Goal: Absence of urinary retention  Outcome: Progressing Towards Goal     Problem: Nutrition Deficit:  Goal: Optimize nutritional status  Outcome: Progressing Towards Goal

## 2022-07-19 NOTE — PROGRESS NOTES
V2.0  [facility] Progress Note      Name:  Liang Eldridge /Age/Sex: 1934  (80 y.o. female)   MRN & CSN:  [id number] & [id number] Encounter Date/Time: 2022 6:50 PM EDT    Location:  [address] [place] [address] PCP: Navneet Casillas MD       Hospital Day: 5    Assessment and Plan:   Liang Eldridge is a 80 y.o. female with  UTI (urinary tract infection)    #Acute metabolic encephalopathy likely due to UTI-resolved  #Ecoli sepsis  -Blood culture growing E. coli. Procalcitonin 3.11.    -Patient was started on vancomycin and meropenem, which were de-escalated to ceftriaxone 2 days ago-->  Patient growing pansensitive E. coli. And then deescalated to cefdinir yesterday    Plan:  Continue cefdinir  Repeat Bcx to ensure ecoli has cleared    History of hypertension, patient currently normotensive on home blood pressure medicine, amlodipine, lisinopril hydrochlorothiazide and metoprolol. History of rheumatoid arthritis, on prednisone and methotrexate every . They have been resumed. History of TIA, on dual antiplatelet therapy    Diet ADULT DIET; Regular  ADULT ORAL NUTRITION SUPPLEMENT; Breakfast, Dinner; Standard High Calorie/High Protein Oral Supplement   DVT Prophylaxis [x] Lovenox, []  Heparin, [] SCDs, [] Ambulation,  [] Eliquis, [] Xarelto  [] Coumadin   Code Status Limited   Disposition From: home  Expected Disposition: SNF  Estimated Date of Discharge: 2022  Patient requires continued admission due to UTI with bacteremia and sepsis   Surrogate Decision Maker/ CARIDAD Orlando     Subjective:     Chief Complaint: Fatigue (Generalized weakness. )       Patient has no complaints this morning. She is doing well.  Was seen while she was eating breakfast.          Review of Systems:    Review of Systems    General: No fever, no chills, no lightheadedness, no dizziness, no decreased appetite, no changes in weight  Heart: No chest pain, no palpitations, no PND, no orthopnea  Lungs: No shortness of breath, no cough  Abdomen: No abdominal pain, no nausea, no vomiting, no constipation, no diarrhea  : No frequency, no dysuria, no urgency, no decreased urination  MSK: No lower extremity swelling  Neuro: No confusion, no weakness  Skin: No rashes    Objective:      Intake/Output Summary (Last 24 hours) at 7/19/2022 1850  Last data filed at 7/19/2022 0816  Gross per 24 hour   Intake 320 ml   Output --   Net 320 ml        Vitals:   Vitals:    07/19/22 1433   BP: 125/77   Pulse: 70   Resp: 18   Temp: 98.3 °F (36.8 °C)   SpO2: 95%       Physical Exam:   General: NAD, AAO x1-2  Eyes: EOMI, no scleral icterus, no conjunctival pallor  HENT: Atraumatic  CVS: Normal S1 and S2, no murmurs, RRR  Respiratory: Normal breath sounds, clear to auscultation bilaterally, no respiratory distress  GI: Normal bowel sounds, soft, nondistended, nontender  MSK: Normal ROM, no lower extremity edema  Skin: Intact, dry, warm, no rashes  Neuro: CN II to XII grossly intact  Psych: Normal mood    Medications:   Medications:    cefdinir  300 mg Oral 2 times per day    amLODIPine  5 mg Oral Daily    aspirin  81 mg Oral Daily    atorvastatin  20 mg Oral Daily    carBAMazepine  200 mg Oral BID    clopidogrel  75 mg Oral Daily    folic acid  1 mg Oral Daily    [Held by provider] lisinopril-hydroCHLOROthiazide  1 tablet Oral Daily    methotrexate  12.5 mg Oral Weekly    metoprolol tartrate  25 mg Oral BID    pantoprazole  40 mg Oral QAM AC    predniSONE  5 mg Oral BID    sodium chloride flush  10 mL IntraVENous 2 times per day    enoxaparin  40 mg SubCUTAneous Daily      Infusions:    sodium chloride       PRN Meds: dicyclomine, 10 mg, 4x Daily PRN  sodium chloride flush, 10 mL, PRN  sodium chloride, , PRN  ondansetron, 4 mg, Q8H PRN   Or  ondansetron, 4 mg, Q6H PRN  bisacodyl, 5 mg, Daily PRN  acetaminophen, 650 mg, Q6H PRN   Or  acetaminophen, 650 mg, Q6H PRN        Labs      Recent Results (from the past 24 hour(s))   Basic Metabolic Panel w/ Reflex to MG    Collection Time: 07/19/22  6:09 AM   Result Value Ref Range    Sodium 140 135 - 145 MMOL/L    Potassium 4.2 3.5 - 5.1 MMOL/L    Chloride 105 99 - 110 mMol/L    CO2 25 21 - 32 MMOL/L    Anion Gap 10 4 - 16    BUN 26 (H) 6 - 23 MG/DL    CREATININE 0.7 0.6 - 1.1 MG/DL    Glucose 97 70 - 99 MG/DL    Calcium 9.3 8.3 - 10.6 MG/DL    GFR Non-African American >60 >60 mL/min/1.73m2    GFR African American >60 >60 mL/min/1.73m2   CBC auto differential    Collection Time: 07/19/22  6:09 AM   Result Value Ref Range    WBC 7.5 4.0 - 10.5 K/CU MM    RBC 2.50 (L) 4.2 - 5.4 M/CU MM    Hemoglobin 8.5 (L) 12.5 - 16.0 GM/DL    Hematocrit 26.6 (L) 37 - 47 %    .4 (H) 78 - 100 FL    MCH 34.0 (H) 27 - 31 PG    MCHC 32.0 32.0 - 36.0 %    RDW 14.9 11.7 - 14.9 %    Platelets 714 690 - 463 K/CU MM    MPV 9.7 7.5 - 11.1 FL    Differential Type AUTOMATED DIFFERENTIAL     Segs Relative 71.2 (H) 36 - 66 %    Lymphocytes % 21.1 (L) 24 - 44 %    Monocytes % 6.1 (H) 0 - 4 %    Eosinophils % 0.8 0 - 3 %    Basophils % 0.3 0 - 1 %    Segs Absolute 5.3 K/CU MM    Lymphocytes Absolute 1.6 K/CU MM    Monocytes Absolute 0.5 K/CU MM    Eosinophils Absolute 0.1 K/CU MM    Basophils Absolute 0.0 K/CU MM    Nucleated RBC % 0.0 %    Total Nucleated RBC 0.0 K/CU MM    Total Immature Neutrophil 0.04 K/CU MM    Immature Neutrophil % 0.5 (H) 0 - 0.43 %   Vitamin B12 & Folate    Collection Time: 07/19/22  9:46 AM   Result Value Ref Range    Vitamin B-12 628.9 211 - 911 pg/ml    Folate >20.0 (H) 3.1 - 17.5 NG/ML   Ferritin    Collection Time: 07/19/22  9:46 AM   Result Value Ref Range    Ferritin 302 (H) 15 - 150 NG/ML   Reticulocytes    Collection Time: 07/19/22  9:46 AM   Result Value Ref Range    Retic Ct Pct 1.0 0.2 - 2.20 %   Iron and TIBC    Collection Time: 07/19/22  9:46 AM   Result Value Ref Range    Iron 62 37 - 145 ug/dL    UIBC 109 (L) 110 - 370 ug/dL    TIBC 171 (L) 250 - 450 ug/dL    Transferrin % 36 10 - 44 % Imaging/Diagnostics Last 24 Hours   No results found.     Electronically signed by Martin Bradley MD on 7/19/2022 at 6:50 PM

## 2022-07-19 NOTE — CONSULTS
with stand pivot)  Active : No    **taken from prior evaluation on 3/5/2022 and updated/confirmed with daughter this date as pt poor historian    Examination of body systems (includes body structures/functions, activity/participation limitations):  Observation:  Pt supine in bed with daughter present upon arrival and agreeable to therapy  Vision:  [facility]  Hearing:  [facility]  Cardiopulmonary:  no O2 needs  Cognition: impaired- oriented to self and location, not oriented to date, see OT/SLP note for further evaluation. Musculoskeletal  ROM R/L:  WFL. Strength R/L:  3/5, moderate impairment in function and endurance. Neuro:  WFL      Mobility:  Rolling L/R:  SBA with cues for sequencing  Supine to sit:  min A with assist at hips, increased time as pt easily distracted. Cues provided for sequencing  Transfers: pt completed stand pivot transfer from bed to chair max A with cues for sequencing  Sitting balance:  fair+, pt sat EOB ~7 minutes CGA progressing to SBA with bilateral UE support and cues for hand placement. Standing balance:  poor. Gait: NT as pt does not ambulate at baseline    [facility] 6 Clicks Inpatient Mobility:  AM-PAC Inpatient Mobility Raw Score : 12    Treatment:  Safety: patient left in chair with alarm on, daughter in room, call light within reach, RN notified, gait belt used. Assessment:  Pt is an 80 y.o. female admitted to the hospital for a UTI. Pt is typically mod I with manual WC but requires assist for stand pivots. Pt is currently performing bed mobility min A, transferring max A, and sitting balance CGA. Pt is presenting with decreased endurance, impaired balance, impaired bed mobility, impaired transfers, impaired gait, and decreased strength. Pt would benefit from continued acute care PT as well as SNF placement upon discharge to continue to address impairments. Complexity: moderate    Prognosis: Good, no significant barriers to participation at this time.      Plan: 3-5 times per week/week     Equipment: TBD at next level of care    Goals:  Short Term Goals  Time Frame for Short term goals: 1 week  Short term goal 1: Pt to complete all bed mobility SBA  Short term goal 2: Pt to complete stand pivot transfer with LRAD mod A  Short term goal 3: Pt to propel manual WC 25' with min A       Treatment plan:  Bed mobility, transfers, balance, gait, TA, TX    Recommendations for NURSING mobility: stand pivot max A with gait belt    Time:   Time in: 1122  Time out: 1140  Timed treatment minutes: 8  Total time: 18    Electronically signed by:    Rosemary Lao, PT  7/19/2022, 11:51 AM

## 2022-07-20 LAB
ANION GAP SERPL CALCULATED.3IONS-SCNC: 8 MMOL/L (ref 4–16)
BASOPHILS ABSOLUTE: 0 K/CU MM
BASOPHILS RELATIVE PERCENT: 0.4 % (ref 0–1)
BUN BLDV-MCNC: 27 MG/DL (ref 6–23)
CALCIUM SERPL-MCNC: 9.5 MG/DL (ref 8.3–10.6)
CHLORIDE BLD-SCNC: 106 MMOL/L (ref 99–110)
CO2: 24 MMOL/L (ref 21–32)
CREAT SERPL-MCNC: 0.7 MG/DL (ref 0.6–1.1)
CULTURE: NORMAL
DIFFERENTIAL TYPE: ABNORMAL
EOSINOPHILS ABSOLUTE: 0.1 K/CU MM
EOSINOPHILS RELATIVE PERCENT: 1.7 % (ref 0–3)
GFR AFRICAN AMERICAN: >60 ML/MIN/1.73M2
GFR NON-AFRICAN AMERICAN: >60 ML/MIN/1.73M2
GLUCOSE BLD-MCNC: 98 MG/DL (ref 70–99)
HCT VFR BLD CALC: 26.9 % (ref 37–47)
HEMOGLOBIN: 8.1 GM/DL (ref 12.5–16)
IMMATURE NEUTROPHIL %: 0.5 % (ref 0–0.43)
LYMPHOCYTES ABSOLUTE: 2.1 K/CU MM
LYMPHOCYTES RELATIVE PERCENT: 27.3 % (ref 24–44)
Lab: NORMAL
MCH RBC QN AUTO: 33.1 PG (ref 27–31)
MCHC RBC AUTO-ENTMCNC: 30.1 % (ref 32–36)
MCV RBC AUTO: 109.8 FL (ref 78–100)
MONOCYTES ABSOLUTE: 0.5 K/CU MM
MONOCYTES RELATIVE PERCENT: 6.9 % (ref 0–4)
NUCLEATED RBC %: 0 %
PDW BLD-RTO: 14.9 % (ref 11.7–14.9)
PLATELET # BLD: 333 K/CU MM (ref 140–440)
PMV BLD AUTO: 9.7 FL (ref 7.5–11.1)
POTASSIUM SERPL-SCNC: 4.4 MMOL/L (ref 3.5–5.1)
RBC # BLD: 2.45 M/CU MM (ref 4.2–5.4)
SEGMENTED NEUTROPHILS ABSOLUTE COUNT: 4.8 K/CU MM
SEGMENTED NEUTROPHILS RELATIVE PERCENT: 63.2 % (ref 36–66)
SODIUM BLD-SCNC: 138 MMOL/L (ref 135–145)
SPECIMEN: NORMAL
TOTAL IMMATURE NEUTOROPHIL: 0.04 K/CU MM
TOTAL NUCLEATED RBC: 0 K/CU MM
WBC # BLD: 7.5 K/CU MM (ref 4–10.5)

## 2022-07-20 PROCEDURE — 97530 THERAPEUTIC ACTIVITIES: CPT

## 2022-07-20 PROCEDURE — 1200000000 HC SEMI PRIVATE

## 2022-07-20 PROCEDURE — 85025 COMPLETE CBC W/AUTO DIFF WBC: CPT

## 2022-07-20 PROCEDURE — 6360000002 HC RX W HCPCS: Performed by: INTERNAL MEDICINE

## 2022-07-20 PROCEDURE — 6370000000 HC RX 637 (ALT 250 FOR IP): Performed by: INTERNAL MEDICINE

## 2022-07-20 PROCEDURE — 97535 SELF CARE MNGMENT TRAINING: CPT

## 2022-07-20 PROCEDURE — 36415 COLL VENOUS BLD VENIPUNCTURE: CPT

## 2022-07-20 PROCEDURE — 80048 BASIC METABOLIC PNL TOTAL CA: CPT

## 2022-07-20 PROCEDURE — 94761 N-INVAS EAR/PLS OXIMETRY MLT: CPT

## 2022-07-20 RX ADMIN — CARBAMAZEPINE 200 MG: 200 TABLET ORAL at 21:00

## 2022-07-20 RX ADMIN — METOPROLOL TARTRATE 25 MG: 25 TABLET, FILM COATED ORAL at 09:38

## 2022-07-20 RX ADMIN — ACETAMINOPHEN 325MG 650 MG: 325 TABLET ORAL at 16:32

## 2022-07-20 RX ADMIN — METOPROLOL TARTRATE 25 MG: 25 TABLET, FILM COATED ORAL at 21:00

## 2022-07-20 RX ADMIN — PANTOPRAZOLE SODIUM 40 MG: 40 TABLET, DELAYED RELEASE ORAL at 09:38

## 2022-07-20 RX ADMIN — ENOXAPARIN SODIUM 40 MG: 100 INJECTION SUBCUTANEOUS at 09:38

## 2022-07-20 RX ADMIN — PREDNISONE 5 MG: 5 TABLET ORAL at 09:38

## 2022-07-20 RX ADMIN — CARBAMAZEPINE 200 MG: 200 TABLET ORAL at 09:38

## 2022-07-20 RX ADMIN — AMLODIPINE BESYLATE 5 MG: 5 TABLET ORAL at 09:37

## 2022-07-20 RX ADMIN — ASPIRIN 81 MG CHEWABLE TABLET 81 MG: 81 TABLET CHEWABLE at 09:37

## 2022-07-20 RX ADMIN — PREDNISONE 5 MG: 5 TABLET ORAL at 21:00

## 2022-07-20 RX ADMIN — FOLIC ACID 1 MG: 1 TABLET ORAL at 09:38

## 2022-07-20 RX ADMIN — ATORVASTATIN CALCIUM 20 MG: 10 TABLET, FILM COATED ORAL at 09:38

## 2022-07-20 RX ADMIN — CLOPIDOGREL BISULFATE 75 MG: 75 TABLET ORAL at 09:37

## 2022-07-20 ASSESSMENT — PAIN SCALES - GENERAL: PAINLEVEL_OUTOF10: 10

## 2022-07-20 ASSESSMENT — PAIN DESCRIPTION - LOCATION: LOCATION: GENERALIZED

## 2022-07-20 NOTE — PROGRESS NOTES
Physical Therapy  Name: Obdulio Dodson MRN: [id number] :   1934   Date:  2022   Admission Date: 7/15/2022 Room:  [address] [place], [place] [address]   Restrictions/Precautions:        general precautions, fall risk    Communication with other providers:  Loy German requests cotx at end of OT session for patient safety    Subjective:  Patient states:  Patient is agreeable for rehab  Pain:   Location, Type, Intensity (0/10 to 10/10):  none    Objective:    Observation:  Patient is sitting on BSC having successful BM. After clean up, patient is assisted for STS for RUELAS to pull recliner up to patient    Treatment, including education/measures:    Transfers with line management of none  Sit to stand : Max A x 1 with HHA x 2 sets. and knee blocking and with patient needing to wrap arms around therapist. Seated rest between sets for patient to overcome global fatigue. Standing balance: Poor + with Max A HHA. She can tolerate ~2-3' standing with assist. Patient demo's poor hip and knee extension in stance  Stand to sit : Max A x1 for safe hip guidance to recliner    Therapeutic exercises were instructed today. Cues were given for technique, safety, recruitment, and rationale. Cues were verbal and/or tactile. Safety  Patient left safely in the recliner, with call light/phone in reach with alarm applied. Gait belt and mask were used for transfers and gait.     Assessment / Impression:     Patient's tolerance of treatment:  Fair  Adverse Reaction: fatigue with standing  Significant change in status and impact:  none  Barriers to improvement:  strength, endurance and ROM    Plan for Next Session:    Will cont to work towards pt's goals per patient tolerance  Time in:  1103  Time out:  1113  Timed treatment minutes:  10  Total treatment time:  10  Previously filed items:     Short Term Goals  Time Frame for Short term goals: 1 week  Short term goal 1: Pt to complete all bed mobility SBA  Short term goal 2: Pt to complete stand pivot transfer with LRAD mod A  Short term goal 3: Pt to propel manual WC 25' with min A       Electronically signed by:     Valerie He, PTA  7/20/2022, 1:01 PM

## 2022-07-20 NOTE — CARE COORDINATION
TC to [facility], spoke with Zofia, pt's precert remains pending, she will update once received.

## 2022-07-20 NOTE — PLAN OF CARE
Problem: Discharge Planning  Goal: Discharge to home or other facility with appropriate resources  Outcome: Progressing     Problem: Skin/Tissue Integrity  Goal: Absence of new skin breakdown  Description: 1. Monitor for areas of redness and/or skin breakdown  2. Assess vascular access sites hourly  3. Every 4-6 hours minimum:  Change oxygen saturation probe site  4. Every 4-6 hours:  If on nasal continuous positive airway pressure, respiratory therapy assess nares and determine need for appliance change or resting period.   Outcome: Progressing     Problem: Safety - Adult  Goal: Free from fall injury  Outcome: Progressing  Flowsheets (Taken 7/20/2022 0058)  Free From Fall Injury: Instruct family/caregiver on patient safety     Problem: ABCDS Injury Assessment  Goal: Absence of physical injury  Outcome: Progressing     Problem: Neurosensory - Adult  Goal: Achieves stable or improved neurological status  Outcome: Progressing     Problem: Cardiovascular - Adult  Goal: Maintains optimal cardiac output and hemodynamic stability  Outcome: Progressing  Goal: Absence of cardiac dysrhythmias or at baseline  Outcome: Progressing     Problem: Skin/Tissue Integrity - Adult  Goal: Skin integrity remains intact  Outcome: Progressing  Flowsheets (Taken 7/20/2022 0058)  Skin Integrity Remains Intact: Monitor for areas of redness and/or skin breakdown     Problem: Musculoskeletal - Adult  Goal: Return mobility to safest level of function  Outcome: Progressing  Goal: Return ADL status to a safe level of function  Outcome: Progressing     Problem: Gastrointestinal - Adult  Goal: Maintains adequate nutritional intake  Outcome: Progressing     Problem: Genitourinary - Adult  Goal: Absence of urinary retention  Outcome: Progressing     Problem: Nutrition Deficit:  Goal: Optimize nutritional status  Outcome: Progressing

## 2022-07-20 NOTE — PROGRESS NOTES
V2.0  [facility] [place] Hospitalist Progress Note      Name:  Izzy Henderson /Age/Sex: 1934  (80 y.o. female)   MRN & CSN:  [id number] & [id number] Encounter Date/Time: 2022 6:50 PM EDT    Location:  [address] [place] [address]-A PCP: Lanre Gayle MD       Hospital Day: 6    Assessment and Plan:   Izzy Henderson is a 80 y.o. female with  UTI (urinary tract infection)    #Acute metabolic encephalopathy likely due to UTI-resolved  #Ecoli sepsis  -Blood culture growing E. coli. Procalcitonin 3.11.    -Patient was started on vancomycin and meropenem, which were de-escalated to ceftriaxone 2 days ago-->  Patient growing pansensitive E. coli. And then deescalated to cefdinir yesterday  -Repeat blood cultures showing no growth at 24 hours    Plan:  Continue cefdinir, for total of 14 days with end of treatment pain   Repeat Bcx to ensure ecoli has cleared    #Anemia of chronic disease  -Folate and vitamin B12 are WNL  -Elevated ferritin, reticulocyte count is inappropriately normal and low TIBC    Plan:  Continue to monitor and transfuse if hemoglobin is less than 7    History of hypertension, patient currently normotensive on home blood pressure medicine, amlodipine, lisinopril hydrochlorothiazide and metoprolol. History of rheumatoid arthritis, on prednisone and methotrexate every . They have been resumed. History of TIA, on dual antiplatelet therapy    Diet ADULT DIET;  Regular  ADULT ORAL NUTRITION SUPPLEMENT; Breakfast, Dinner; Standard High Calorie/High Protein Oral Supplement   DVT Prophylaxis [x] Lovenox, []  Heparin, [] SCDs, [] Ambulation,  [] Eliquis, [] Xarelto  [] Coumadin   Code Status Limited   Disposition From: home  Expected Disposition: SNF  Estimated Date of Discharge: 2022  Patient requires continued admission while awaiting placement, patient is currently medically ready to be discharged   Surrogate Decision Maker/ CARIDAD Pan     Subjective:     Chief Complaint: Fatigue (Generalized weakness. )       This morning, patient was feeling very sleepy however had no other complaints.          Review of Systems:    Review of Systems    Unable to obtain because patient was very sleepy    Objective:   No intake or output data in the 24 hours ending 07/20/22 0846       Vitals:   Vitals:    07/20/22 0455   BP: 139/78   Pulse: 61   Resp: 16   Temp: 97.3 °F (36.3 °C)   SpO2: 100%       Physical Exam:   General: NAD, AAO x1-2  HENT: Atraumatic  Respiratory: no respiratory distress  GI: Normal bowel sounds, soft, nondistended, nontender  MSK: Normal ROM, no lower extremity edema  Skin: Intact, dry, warm, no rashes  Neuro: CN II to XII grossly intact  Psych: Normal mood    Medications:   Medications:    amLODIPine  5 mg Oral Daily    aspirin  81 mg Oral Daily    atorvastatin  20 mg Oral Daily    carBAMazepine  200 mg Oral BID    clopidogrel  75 mg Oral Daily    folic acid  1 mg Oral Daily    [Held by provider] lisinopril-hydroCHLOROthiazide  1 tablet Oral Daily    methotrexate  12.5 mg Oral Weekly    metoprolol tartrate  25 mg Oral BID    pantoprazole  40 mg Oral QAM AC    predniSONE  5 mg Oral BID    sodium chloride flush  10 mL IntraVENous 2 times per day    enoxaparin  40 mg SubCUTAneous Daily      Infusions:    sodium chloride       PRN Meds: dicyclomine, 10 mg, 4x Daily PRN  sodium chloride flush, 10 mL, PRN  sodium chloride, , PRN  ondansetron, 4 mg, Q8H PRN   Or  ondansetron, 4 mg, Q6H PRN  bisacodyl, 5 mg, Daily PRN  acetaminophen, 650 mg, Q6H PRN   Or  acetaminophen, 650 mg, Q6H PRN      Labs      Recent Results (from the past 24 hour(s))   Vitamin B12 & Folate    Collection Time: 07/19/22  9:46 AM   Result Value Ref Range    Vitamin B-12 628.9 211 - 911 pg/ml    Folate >20.0 (H) 3.1 - 17.5 NG/ML   Ferritin    Collection Time: 07/19/22  9:46 AM   Result Value Ref Range    Ferritin 302 (H) 15 - 150 NG/ML   Reticulocytes    Collection Time: 07/19/22  9:46 AM   Result Value Ref Range    Retic Ct Pct 1.0 0.2 - 2.20 %   Iron and TIBC    Collection Time: 07/19/22  9:46 AM   Result Value Ref Range    Iron 62 37 - 145 ug/dL    UIBC 109 (L) 110 - 370 ug/dL    TIBC 171 (L) 250 - 450 ug/dL    Transferrin % 36 10 - 44 %   Basic Metabolic Panel w/ Reflex to MG    Collection Time: 07/20/22  4:36 AM   Result Value Ref Range    Sodium 138 135 - 145 MMOL/L    Potassium 4.4 3.5 - 5.1 MMOL/L    Chloride 106 99 - 110 mMol/L    CO2 24 21 - 32 MMOL/L    Anion Gap 8 4 - 16    BUN 27 (H) 6 - 23 MG/DL    CREATININE 0.7 0.6 - 1.1 MG/DL    Glucose 98 70 - 99 MG/DL    Calcium 9.5 8.3 - 10.6 MG/DL    GFR Non-African American >60 >60 mL/min/1.73m2    GFR African American >60 >60 mL/min/1.73m2   CBC auto differential    Collection Time: 07/20/22  4:36 AM   Result Value Ref Range    WBC 7.5 4.0 - 10.5 K/CU MM    RBC 2.45 (L) 4.2 - 5.4 M/CU MM    Hemoglobin 8.1 (L) 12.5 - 16.0 GM/DL    Hematocrit 26.9 (L) 37 - 47 %    .8 (H) 78 - 100 FL    MCH 33.1 (H) 27 - 31 PG    MCHC 30.1 (L) 32.0 - 36.0 %    RDW 14.9 11.7 - 14.9 %    Platelets 223 672 - 231 K/CU MM    MPV 9.7 7.5 - 11.1 FL    Differential Type AUTOMATED DIFFERENTIAL     Segs Relative 63.2 36 - 66 %    Lymphocytes % 27.3 24 - 44 %    Monocytes % 6.9 (H) 0 - 4 %    Eosinophils % 1.7 0 - 3 %    Basophils % 0.4 0 - 1 %    Segs Absolute 4.8 K/CU MM    Lymphocytes Absolute 2.1 K/CU MM    Monocytes Absolute 0.5 K/CU MM    Eosinophils Absolute 0.1 K/CU MM    Basophils Absolute 0.0 K/CU MM    Nucleated RBC % 0.0 %    Total Nucleated RBC 0.0 K/CU MM    Total Immature Neutrophil 0.04 K/CU MM    Immature Neutrophil % 0.5 (H) 0 - 0.43 %        Imaging/Diagnostics Last 24 Hours   No results found.     Electronically signed by Ani Moreno MD on 7/20/2022 at 8:46 AM

## 2022-07-20 NOTE — PROGRESS NOTES
Occupational Therapy  . Occupational Therapy Treatment Note    Name: Marce Sandoval MRN: [id number] :   1934   Date:  2022   Admission Date: 7/15/2022 Room:  [address] [place], [place] [address]     . Pt would benefit from continued acute care OT services w/ discharge to SNF    Primary Problem:      Restrictions/Precautions:          General precautions, fall risk      Communication with other providers: Per chart review, patient ok for tx. Partial cotx with PTA for assist.       Subjective:  Patient states:  its short. (Referring to name)  Pain:   Location, Type, Intensity (0/10 to 10/10):  none stated    Objective:    Observation: patient in semi fowlers. Very quiet. Only spoke when spoken too. Pleasantly confused. Objective Measures:  pocket tele.     Treatment, including education:    ADL activity training was instructed today. Cues were given for safety, sequence, UE/LE placement, visual cues, and balance. Activities performed today included dressing, toileting, hand hygiene, and grooming. Toileting- patient has some BM incontinence in diaper. Patient able to transfer to [facility] and complete BM. Buzz care DEP in stance with Max A for standing balance from PTA. Therapeutic activity training was instructed today. Cues were given for safety, sequence, UE/LE placement, awareness, and balance. Activities performed today included bed mobility training, sup-sit, sit-stand, SPT. Supine to EOB- 1st trial- Min A for trunk mostly. Increased time . Bridged to scoot hips closer to Eob while supine. 2nd trial- Max A.   EOB sitting balance-1st trial- initially CGA progressed to SBA. While EOB patient demos  marching in place for increased time despite cues for rest breaks. 2nd trial- SBA. Cues for safety. Return supine- Max A.   SPT to drop down BSC- Max A. Sitting balance on commode- Fair  Patient stood from [facility] x2 trials with Max A via therapy hu for buzz care. Recliner switched out with BSC.    Sit to recliner- Max A for safe and slow descent. Scooting hips back in recliner- DEP x2    Patient educated on role of OT , benefits of OT and rationale for therapeutic intervention. Benefit of OOB/EOB activities, benefit of movement. AE/AD, WS/EC,     All therapeutic intervention performed c emphasis on dynamic balance / standing tolerance to increase strength, endurance and activity tolerance for increased Davie c ADL tasks and func transfers / mobility. Patient left safely in bedside chair at end of session, with call light in reach, alarm on and nursing aware. Gait belt was used for func transfers / mobility.         Assessment / Impression:    Patient's tolerance of treatment: good  Adverse Reaction: none  Significant change in status and impact:  none  Barriers to improvement: cognition, weakness      Plan for Next Session:    Continue with OT POC      Time in:  1036  Time out:  1114  Timed treatment minutes:  38  Total treatment time:  38      Electronically signed by:    TRACY Harris COTA/L [id number]    7/20/2022, 10:58 AM

## 2022-07-21 LAB
ANION GAP SERPL CALCULATED.3IONS-SCNC: 9 MMOL/L (ref 4–16)
BASOPHILS ABSOLUTE: 0 K/CU MM
BASOPHILS RELATIVE PERCENT: 0.4 % (ref 0–1)
BUN BLDV-MCNC: 26 MG/DL (ref 6–23)
CALCIUM SERPL-MCNC: 9.9 MG/DL (ref 8.3–10.6)
CHLORIDE BLD-SCNC: 104 MMOL/L (ref 99–110)
CO2: 25 MMOL/L (ref 21–32)
CREAT SERPL-MCNC: 0.7 MG/DL (ref 0.6–1.1)
DIFFERENTIAL TYPE: ABNORMAL
EOSINOPHILS ABSOLUTE: 0.1 K/CU MM
EOSINOPHILS RELATIVE PERCENT: 1.4 % (ref 0–3)
GFR AFRICAN AMERICAN: >60 ML/MIN/1.73M2
GFR NON-AFRICAN AMERICAN: >60 ML/MIN/1.73M2
GLUCOSE BLD-MCNC: 95 MG/DL (ref 70–99)
HCT VFR BLD CALC: 27.4 % (ref 37–47)
HEMOGLOBIN: 8.8 GM/DL (ref 12.5–16)
IMMATURE NEUTROPHIL %: 0.5 % (ref 0–0.43)
LYMPHOCYTES ABSOLUTE: 2.3 K/CU MM
LYMPHOCYTES RELATIVE PERCENT: 24.2 % (ref 24–44)
MCH RBC QN AUTO: 34.1 PG (ref 27–31)
MCHC RBC AUTO-ENTMCNC: 32.1 % (ref 32–36)
MCV RBC AUTO: 106.2 FL (ref 78–100)
MONOCYTES ABSOLUTE: 0.5 K/CU MM
MONOCYTES RELATIVE PERCENT: 5.8 % (ref 0–4)
NUCLEATED RBC %: 0 %
PDW BLD-RTO: 14.8 % (ref 11.7–14.9)
PLATELET # BLD: 402 K/CU MM (ref 140–440)
PMV BLD AUTO: 9.2 FL (ref 7.5–11.1)
POTASSIUM SERPL-SCNC: 4.4 MMOL/L (ref 3.5–5.1)
RBC # BLD: 2.58 M/CU MM (ref 4.2–5.4)
SEGMENTED NEUTROPHILS ABSOLUTE COUNT: 6.3 K/CU MM
SEGMENTED NEUTROPHILS RELATIVE PERCENT: 67.7 % (ref 36–66)
SODIUM BLD-SCNC: 138 MMOL/L (ref 135–145)
TOTAL IMMATURE NEUTOROPHIL: 0.05 K/CU MM
TOTAL NUCLEATED RBC: 0 K/CU MM
WBC # BLD: 9.3 K/CU MM (ref 4–10.5)

## 2022-07-21 PROCEDURE — 6370000000 HC RX 637 (ALT 250 FOR IP): Performed by: INTERNAL MEDICINE

## 2022-07-21 PROCEDURE — 6360000002 HC RX W HCPCS: Performed by: INTERNAL MEDICINE

## 2022-07-21 PROCEDURE — 1200000000 HC SEMI PRIVATE

## 2022-07-21 PROCEDURE — 36415 COLL VENOUS BLD VENIPUNCTURE: CPT

## 2022-07-21 PROCEDURE — 80048 BASIC METABOLIC PNL TOTAL CA: CPT

## 2022-07-21 PROCEDURE — 85025 COMPLETE CBC W/AUTO DIFF WBC: CPT

## 2022-07-21 RX ORDER — GABAPENTIN 100 MG/1
100 CAPSULE ORAL 3 TIMES DAILY
Status: DISCONTINUED | OUTPATIENT
Start: 2022-07-21 | End: 2022-07-26 | Stop reason: HOSPADM

## 2022-07-21 RX ADMIN — METOPROLOL TARTRATE 25 MG: 25 TABLET, FILM COATED ORAL at 09:42

## 2022-07-21 RX ADMIN — CARBAMAZEPINE 200 MG: 200 TABLET ORAL at 09:42

## 2022-07-21 RX ADMIN — CLOPIDOGREL BISULFATE 75 MG: 75 TABLET ORAL at 09:42

## 2022-07-21 RX ADMIN — FOLIC ACID 1 MG: 1 TABLET ORAL at 09:42

## 2022-07-21 RX ADMIN — PREDNISONE 5 MG: 5 TABLET ORAL at 09:42

## 2022-07-21 RX ADMIN — PREDNISONE 5 MG: 5 TABLET ORAL at 20:45

## 2022-07-21 RX ADMIN — ACETAMINOPHEN 325MG 650 MG: 325 TABLET ORAL at 20:46

## 2022-07-21 RX ADMIN — ATORVASTATIN CALCIUM 20 MG: 10 TABLET, FILM COATED ORAL at 09:42

## 2022-07-21 RX ADMIN — AMLODIPINE BESYLATE 5 MG: 5 TABLET ORAL at 09:42

## 2022-07-21 RX ADMIN — ASPIRIN 81 MG CHEWABLE TABLET 81 MG: 81 TABLET CHEWABLE at 09:42

## 2022-07-21 RX ADMIN — GABAPENTIN 100 MG: 100 CAPSULE ORAL at 09:46

## 2022-07-21 RX ADMIN — GABAPENTIN 100 MG: 100 CAPSULE ORAL at 13:11

## 2022-07-21 RX ADMIN — GABAPENTIN 100 MG: 100 CAPSULE ORAL at 20:45

## 2022-07-21 RX ADMIN — PANTOPRAZOLE SODIUM 40 MG: 40 TABLET, DELAYED RELEASE ORAL at 06:22

## 2022-07-21 RX ADMIN — CARBAMAZEPINE 200 MG: 200 TABLET ORAL at 20:45

## 2022-07-21 RX ADMIN — METOPROLOL TARTRATE 25 MG: 25 TABLET, FILM COATED ORAL at 20:45

## 2022-07-21 RX ADMIN — ENOXAPARIN SODIUM 40 MG: 100 INJECTION SUBCUTANEOUS at 09:42

## 2022-07-21 ASSESSMENT — PAIN SCALES - GENERAL: PAINLEVEL_OUTOF10: 10

## 2022-07-21 ASSESSMENT — PAIN DESCRIPTION - DESCRIPTORS: DESCRIPTORS: ACHING

## 2022-07-21 ASSESSMENT — PAIN DESCRIPTION - ORIENTATION: ORIENTATION: LEFT

## 2022-07-21 ASSESSMENT — PAIN DESCRIPTION - LOCATION: LOCATION: TOE (COMMENT WHICH ONE)

## 2022-07-21 NOTE — PLAN OF CARE
Problem: Discharge Planning  Goal: Discharge to home or other facility with appropriate resources  Outcome: Progressing     Problem: Skin/Tissue Integrity  Goal: Absence of new skin breakdown  Description: 1. Monitor for areas of redness and/or skin breakdown  2. Assess vascular access sites hourly  3. Every 4-6 hours minimum:  Change oxygen saturation probe site  4. Every 4-6 hours:  If on nasal continuous positive airway pressure, respiratory therapy assess nares and determine need for appliance change or resting period.   Outcome: Progressing     Problem: Safety - Adult  Goal: Free from fall injury  Outcome: Progressing     Problem: ABCDS Injury Assessment  Goal: Absence of physical injury  Outcome: Progressing     Problem: Neurosensory - Adult  Goal: Achieves stable or improved neurological status  Outcome: Progressing     Problem: Cardiovascular - Adult  Goal: Maintains optimal cardiac output and hemodynamic stability  Outcome: Progressing  Goal: Absence of cardiac dysrhythmias or at baseline  Outcome: Progressing     Problem: Skin/Tissue Integrity - Adult  Goal: Skin integrity remains intact  Outcome: Progressing     Problem: Musculoskeletal - Adult  Goal: Return mobility to safest level of function  Outcome: Progressing  Goal: Return ADL status to a safe level of function  Outcome: Progressing     Problem: Gastrointestinal - Adult  Goal: Maintains adequate nutritional intake  Outcome: Progressing     Problem: Genitourinary - Adult  Goal: Absence of urinary retention  Outcome: Progressing     Problem: Nutrition Deficit:  Goal: Optimize nutritional status  Outcome: Progressing

## 2022-07-21 NOTE — PROGRESS NOTES
V2.0  [facility] [place]      Name:  Pamella Han /Age/Sex: 1934  (80 y.o. female)   MRN & CSN:  [id number] & [id number] Encounter Date/Time: 2022 6:50 PM EDT    Location:  [address] [place] [address]-A PCP: Douglas Stanford MD       Hospital Day: 7    Assessment and Plan:   Pamella Han is a 80 y.o. female with  UTI (urinary tract infection)    #Acute metabolic encephalopathy likely due to UTI-resolved  #Ecoli sepsis-resolved  -Blood culture growing E. coli. Procalcitonin 3.11.    -Patient was started on vancomycin and meropenem, which were de-escalated to ceftriaxone 2 days ago-->  Patient growing pansensitive E. coli. And then deescalated to cefdinir yesterday  -Repeat blood cultures showing no growth at 24 hours    Plan:  Continue cefdinir, for total of 14 days with end of treatment pain   Repeat Bcx to ensure ecoli has cleared    #Bilateral foot pain  -States that she always has this pain, located between the first and second toes    Plan:  Start gabapentin 100 mg 3 times daily    #Anemia of chronic disease  -Folate and vitamin B12 are WNL  -Elevated ferritin, reticulocyte count is inappropriately normal and low TIBC    Plan:  Continue to monitor and transfuse if hemoglobin is less than 7    History of hypertension, patient currently normotensive on home blood pressure medicine, amlodipine, lisinopril hydrochlorothiazide and metoprolol. History of rheumatoid arthritis, on prednisone and methotrexate every . They have been resumed. History of TIA, on dual antiplatelet therapy    Diet ADULT DIET;  Regular  ADULT ORAL NUTRITION SUPPLEMENT; Breakfast, Dinner; Standard High Calorie/High Protein Oral Supplement   DVT Prophylaxis [x] Lovenox, []  Heparin, [] SCDs, [] Ambulation,  [] Eliquis, [] Xarelto  [] Coumadin   Code Status Limited   Disposition From: home  Expected Disposition: SNF  Estimated Date of Discharge: 2022  Patient requires continued admission while awaiting placement, patient is currently medically ready to be discharged   Surrogate Decision Maker/ CARIDAD Pagan Sensing     Subjective:     Chief Complaint: Fatigue (Generalized weakness. )       This morning, was eating her breakfast.  He was complaining of bilateral foot pain. Review of Systems:    Review of Systems    General: No fever, no chills  Heart: No chest pain  Lungs: No shortness of breath, no cough  Abdomen: No abdominal pain, no nausea, no vomiting, no constipation, no diarrhea  : No frequency  MSK: No lower extremity swelling, + bilateral foot pain  Neuro: No confusion, no weakness  Skin: No rashes    Objective:      Intake/Output Summary (Last 24 hours) at 7/21/2022 1408  Last data filed at 7/21/2022 0016  Gross per 24 hour   Intake --   Output 2 ml   Net -2 ml          Vitals:   Vitals:    07/21/22 1329   BP: 121/75   Pulse: 63   Resp: 16   Temp: 97.2 °F (36.2 °C)   SpO2: 100%       Physical Exam:   General: NAD, AAO x1-2  HENT: Atraumatic  Respiratory: no respiratory distress  GI: Normal bowel sounds, soft, nondistended, nontender  MSK: Normal ROM, no lower extremity edema, tenderness in pad of bilateral feet, no visual deformities seen  Skin: Intact, dry, warm, no rashes  Neuro: CN II to XII grossly intact  Psych: Normal mood    Medications:   Medications:    gabapentin  100 mg Oral TID    amLODIPine  5 mg Oral Daily    aspirin  81 mg Oral Daily    atorvastatin  20 mg Oral Daily    carBAMazepine  200 mg Oral BID    clopidogrel  75 mg Oral Daily    folic acid  1 mg Oral Daily    [Held by provider] lisinopril-hydroCHLOROthiazide  1 tablet Oral Daily    methotrexate  12.5 mg Oral Weekly    metoprolol tartrate  25 mg Oral BID    pantoprazole  40 mg Oral QAM AC    predniSONE  5 mg Oral BID    sodium chloride flush  10 mL IntraVENous 2 times per day    enoxaparin  40 mg SubCUTAneous Daily      Infusions:    sodium chloride       PRN Meds: dicyclomine, 10 mg, 4x Daily PRN  sodium chloride flush, 10 mL, PRN  sodium chloride, , PRN  ondansetron, 4 mg, Q8H PRN   Or  ondansetron, 4 mg, Q6H PRN  bisacodyl, 5 mg, Daily PRN  acetaminophen, 650 mg, Q6H PRN   Or  acetaminophen, 650 mg, Q6H PRN      Labs      Recent Results (from the past 24 hour(s))   Basic Metabolic Panel w/ Reflex to MG    Collection Time: 07/21/22  6:07 AM   Result Value Ref Range    Sodium 138 135 - 145 MMOL/L    Potassium 4.4 3.5 - 5.1 MMOL/L    Chloride 104 99 - 110 mMol/L    CO2 25 21 - 32 MMOL/L    Anion Gap 9 4 - 16    BUN 26 (H) 6 - 23 MG/DL    Creatinine 0.7 0.6 - 1.1 MG/DL    Glucose 95 70 - 99 MG/DL    Calcium 9.9 8.3 - 10.6 MG/DL    GFR Non-African American >60 >60 mL/min/1.73m2    GFR African American >60 >60 mL/min/1.73m2   CBC auto differential    Collection Time: 07/21/22  6:07 AM   Result Value Ref Range    WBC 9.3 4.0 - 10.5 K/CU MM    RBC 2.58 (L) 4.2 - 5.4 M/CU MM    Hemoglobin 8.8 (L) 12.5 - 16.0 GM/DL    Hematocrit 27.4 (L) 37 - 47 %    .2 (H) 78 - 100 FL    MCH 34.1 (H) 27 - 31 PG    MCHC 32.1 32.0 - 36.0 %    RDW 14.8 11.7 - 14.9 %    Platelets 840 608 - 266 K/CU MM    MPV 9.2 7.5 - 11.1 FL    Differential Type AUTOMATED DIFFERENTIAL     Segs Relative 67.7 (H) 36 - 66 %    Lymphocytes % 24.2 24 - 44 %    Monocytes % 5.8 (H) 0 - 4 %    Eosinophils % 1.4 0 - 3 %    Basophils % 0.4 0 - 1 %    Segs Absolute 6.3 K/CU MM    Lymphocytes Absolute 2.3 K/CU MM    Monocytes Absolute 0.5 K/CU MM    Eosinophils Absolute 0.1 K/CU MM    Basophils Absolute 0.0 K/CU MM    Nucleated RBC % 0.0 %    Total Nucleated RBC 0.0 K/CU MM    Total Immature Neutrophil 0.05 K/CU MM    Immature Neutrophil % 0.5 (H) 0 - 0.43 %        Imaging/Diagnostics Last 24 Hours   No results found.     Electronically signed by Emanuel Mendoza MD on 7/21/2022 at 2:08 PM

## 2022-07-22 PROBLEM — E43 SEVERE MALNUTRITION (HCC): Status: ACTIVE | Noted: 2022-07-22

## 2022-07-22 PROCEDURE — 6370000000 HC RX 637 (ALT 250 FOR IP): Performed by: INTERNAL MEDICINE

## 2022-07-22 PROCEDURE — 1200000000 HC SEMI PRIVATE

## 2022-07-22 PROCEDURE — 6360000002 HC RX W HCPCS: Performed by: INTERNAL MEDICINE

## 2022-07-22 PROCEDURE — 97530 THERAPEUTIC ACTIVITIES: CPT

## 2022-07-22 PROCEDURE — 94761 N-INVAS EAR/PLS OXIMETRY MLT: CPT

## 2022-07-22 RX ADMIN — CLOPIDOGREL BISULFATE 75 MG: 75 TABLET ORAL at 08:50

## 2022-07-22 RX ADMIN — ATORVASTATIN CALCIUM 20 MG: 10 TABLET, FILM COATED ORAL at 08:50

## 2022-07-22 RX ADMIN — GABAPENTIN 100 MG: 100 CAPSULE ORAL at 23:10

## 2022-07-22 RX ADMIN — PANTOPRAZOLE SODIUM 40 MG: 40 TABLET, DELAYED RELEASE ORAL at 06:20

## 2022-07-22 RX ADMIN — METOPROLOL TARTRATE 25 MG: 25 TABLET, FILM COATED ORAL at 08:50

## 2022-07-22 RX ADMIN — PREDNISONE 5 MG: 5 TABLET ORAL at 23:10

## 2022-07-22 RX ADMIN — FOLIC ACID 1 MG: 1 TABLET ORAL at 08:49

## 2022-07-22 RX ADMIN — CARBAMAZEPINE 200 MG: 200 TABLET ORAL at 08:49

## 2022-07-22 RX ADMIN — GABAPENTIN 100 MG: 100 CAPSULE ORAL at 08:49

## 2022-07-22 RX ADMIN — CARBAMAZEPINE 200 MG: 200 TABLET ORAL at 23:10

## 2022-07-22 RX ADMIN — ASPIRIN 81 MG CHEWABLE TABLET 81 MG: 81 TABLET CHEWABLE at 08:49

## 2022-07-22 RX ADMIN — AMLODIPINE BESYLATE 5 MG: 5 TABLET ORAL at 08:49

## 2022-07-22 RX ADMIN — PREDNISONE 5 MG: 5 TABLET ORAL at 08:49

## 2022-07-22 RX ADMIN — ENOXAPARIN SODIUM 40 MG: 100 INJECTION SUBCUTANEOUS at 08:50

## 2022-07-22 RX ADMIN — METOPROLOL TARTRATE 25 MG: 25 TABLET, FILM COATED ORAL at 23:11

## 2022-07-22 RX ADMIN — GABAPENTIN 100 MG: 100 CAPSULE ORAL at 14:28

## 2022-07-22 ASSESSMENT — PAIN SCALES - GENERAL
PAINLEVEL_OUTOF10: 0
PAINLEVEL_OUTOF10: 0

## 2022-07-22 NOTE — PROGRESS NOTES
Physical Therapy Treatment Note  Name: Rafael Cha MRN: [id number] :   1934   Date:  2022   Admission Date: 7/15/2022 Room:  [address] [place] [address]     Restrictions/Precautions:          general precautions, fall risk, telesitter    Communication with other providers:  RN    Subjective:  Patient states:  \"I just try to be happy each day\"  Pain:   Location, Type, Intensity (0/10 to 10/10):  denies pain    Objective:    Observation:  Pt laying sideways in bed with head on pillow against bedrail and feet hanging off bed. Pt agreeable to session    Treatment, including education/measures:    Transfers: pt completed stand pivot transfer from bed to chair max A with cues for sequencing    Exercises: pt deferred exercises this date    Balance: Pt sat EOB CGA progressing to SBA with cues for sitting posture.  Pt sat ~5 minutes    Assessment / Impression:    Pt up in chair at end of session with needs in reach, alarm on, and telesitter on    Patient's tolerance of treatment:  fair   Adverse Reaction: n/a  Significant change in status and impact:  n/a  Barriers to improvement:  decreased endurance, impaired transfers    Plan for Next Session:    Continue to address bed mobility, transfer training, and therapeutic exercise in future sessions    Time in:  1104  Time out:  1114  Timed treatment minutes:  10  Total treatment time:  10    Previously filed items:           Short Term Goals  Time Frame for Short term goals: 1 week  Short term goal 1: Pt to complete all bed mobility SBA  Short term goal 2: Pt to complete stand pivot transfer with LRAD mod A  Short term goal 3: Pt to propel manual WC 25' with min A    Electronically signed by:    Lety Yip, PT  2022, 11:56 AM

## 2022-07-22 NOTE — CARE COORDINATION
TC to [facility], spoke with Luis Miguel Toney remains pending- she will call me if received today, if not, if received this weekend she will notify Sinan Ackerman CM covering this weekend.

## 2022-07-22 NOTE — PROGRESS NOTES
V2.0  [facility] [place] Hospitalist Progress Note      Name:  Amada Stoll /Age/Sex: 1934  (80 y.o. female)   MRN & CSN:  [id number] & [id number] Encounter Date/Time: 2022 6:50 PM EDT    Location:  [address] [place] [address]-A PCP: Blane Panda MD       Hospital Day: 8    Assessment and Plan:   Amada Stoll is a 80 y.o. female with  UTI (urinary tract infection)    #Acute metabolic encephalopathy likely due to UTI-resolved  #Ecoli sepsis-resolved  -Blood culture growing E. coli. Procalcitonin 3.11.    -Patient was started on vancomycin and meropenem, which were de-escalated to ceftriaxone 2 days ago-->  Patient growing pansensitive E. coli. And then deescalated to cefdinir yesterday  -Repeat blood cultures showing no growth at 24 hours    Plan:  Continue cefdinir, for total of 14 days with end of treatment pain   Repeat Bcx to ensure ecoli has cleared    #Bilateral foot pain  -States that she always has this pain, located between the first and second toes    Plan:  Continue gabapentin 100 mg 3 times daily    #Anemia of chronic disease  -Folate and vitamin B12 are WNL  -Elevated ferritin, reticulocyte count is inappropriately normal and low TIBC    Plan:  Continue to monitor and transfuse if hemoglobin is less than 7    History of hypertension, patient currently normotensive on home blood pressure medicine, amlodipine, lisinopril hydrochlorothiazide and metoprolol. History of rheumatoid arthritis, on prednisone and methotrexate every . They have been resumed. History of TIA, on dual antiplatelet therapy    Diet ADULT DIET;  Regular  ADULT ORAL NUTRITION SUPPLEMENT; Breakfast, Dinner; Standard High Calorie/High Protein Oral Supplement   DVT Prophylaxis [x] Lovenox, []  Heparin, [] SCDs, [] Ambulation,  [] Eliquis, [] Xarelto  [] Coumadin   Code Status Limited   Disposition From: home  Expected Disposition: SNF  Estimated Date of Discharge: 2022  Patient requires continued admission while awaiting placement, patient is currently medically ready to be discharged   Surrogate Decision Maker/ CARIDAD Jackson     Subjective:     Chief Complaint: Fatigue (Generalized weakness. )       Today, patient states she was feeling really well. She was eating her breakfast, which she liked. No complaints this morning.          Review of Systems:    Review of Systems    General: No fever, no chills  Heart: No chest pain  Lungs: No shortness of breath, no cough  Abdomen: No abdominal pain  : No frequency  MSK: No lower extremity swelling  Neuro: No confusion, no weakness  Skin: No rashes    Objective:   No intake or output data in the 24 hours ending 07/22/22 1502       Vitals:   Vitals:    07/22/22 0848   BP: (!) 148/83   Pulse: 71   Resp: 19   Temp: 97.9 °F (36.6 °C)   SpO2: 99%       Physical Exam:   General: NAD, AAO x1-2  HENT: Atraumatic  Respiratory: no respiratory distress  GI: Normal bowel sounds, soft, nondistended, nontender  MSK: no lower extremity edema  Skin: Intact, dry, warm, no rashes  Neuro: CN II to XII grossly intact  Psych: Normal mood    Medications:   Medications:    gabapentin  100 mg Oral TID    amLODIPine  5 mg Oral Daily    aspirin  81 mg Oral Daily    atorvastatin  20 mg Oral Daily    carBAMazepine  200 mg Oral BID    clopidogrel  75 mg Oral Daily    folic acid  1 mg Oral Daily    [Held by provider] lisinopril-hydroCHLOROthiazide  1 tablet Oral Daily    methotrexate  12.5 mg Oral Weekly    metoprolol tartrate  25 mg Oral BID    pantoprazole  40 mg Oral QAM AC    predniSONE  5 mg Oral BID    sodium chloride flush  10 mL IntraVENous 2 times per day    enoxaparin  40 mg SubCUTAneous Daily      Infusions:    sodium chloride       PRN Meds: dicyclomine, 10 mg, 4x Daily PRN  sodium chloride flush, 10 mL, PRN  sodium chloride, , PRN  ondansetron, 4 mg, Q8H PRN   Or  ondansetron, 4 mg, Q6H PRN  bisacodyl, 5 mg, Daily PRN  acetaminophen, 650 mg, Q6H PRN   Or  acetaminophen, 650 mg, Q6H PRN      Labs      No results found for this or any previous visit (from the past 24 hour(s)). Imaging/Diagnostics Last 24 Hours   No results found.     Electronically signed by Bertha Bahena MD on 7/22/2022 at 3:02 PM

## 2022-07-22 NOTE — PROGRESS NOTES
Comprehensive Nutrition Assessment    Type and Reason for Visit:  Reassess    Nutrition Recommendations/Plan:   Assist feed/set up meals to probe pt to consume po intake at meal time   Document PO intakes in I/O to monitor for appetite changes   Continue current diet and supplements  Encouraged adequate hydration and protein intake      Malnutrition Assessment:  Malnutrition Status:  Severe malnutrition (07/22/22 1300)    Context:  Acute Illness     Findings of the 6 clinical characteristics of malnutrition:  Energy Intake:  75% or less of estimated energy requirements for 7 or more days  Weight Loss:  Greater than 7.5% over 3 months     Body Fat Loss: Moderate body fat loss Orbital, Buccal region   Muscle Mass Loss:   (Severe muscle mass loss) Temples (temporalis), Clavicles (pectoralis & deltoids), Thigh (quadraceps), Calf (gastrocnemius), Hand (interosseous)  Fluid Accumulation:  Unable to assess     Strength:  Not Performed    Nutrition Assessment:    Pt continues to vary po intakes in regular diet. Pt seemed forgetful at visit, unable to share appetite status. Pt reports not drinking adequate water, encourage fluids and fruit intake during admission. Pt enjoys oral nutrition supplememts. Reports that she has loss alot of weight recently, unable to recall, performed NFPE, moderate to severe wasting noted. Per chart review, pt had significant wt loss x 3 months. Meets criteria for malnutrition. Continue as high nutrition risk. Nutrition Related Findings:    BUN 26; reports needs a wheelchair for mobility; able to feed self;  in room. Wound Type: None       Current Nutrition Intake & Therapies:    Average Meal Intake: 1-25%, 26-50%, 51-75%, %  Average Supplements Intake: %  ADULT DIET;  Regular  ADULT ORAL NUTRITION SUPPLEMENT; Breakfast, Dinner; Standard High Calorie/High Protein Oral Supplement    Anthropometric Measures:  Height: 5' 5.98\" (167.6 cm)  Ideal Body Weight (IBW): 130 lbs (59 kg)

## 2022-07-23 PROCEDURE — 1200000000 HC SEMI PRIVATE

## 2022-07-23 PROCEDURE — 6370000000 HC RX 637 (ALT 250 FOR IP): Performed by: INTERNAL MEDICINE

## 2022-07-23 PROCEDURE — 94761 N-INVAS EAR/PLS OXIMETRY MLT: CPT

## 2022-07-23 PROCEDURE — 97110 THERAPEUTIC EXERCISES: CPT

## 2022-07-23 PROCEDURE — 6360000002 HC RX W HCPCS: Performed by: INTERNAL MEDICINE

## 2022-07-23 PROCEDURE — 97530 THERAPEUTIC ACTIVITIES: CPT

## 2022-07-23 RX ADMIN — PANTOPRAZOLE SODIUM 40 MG: 40 TABLET, DELAYED RELEASE ORAL at 08:33

## 2022-07-23 RX ADMIN — PREDNISONE 5 MG: 5 TABLET ORAL at 21:59

## 2022-07-23 RX ADMIN — METOPROLOL TARTRATE 25 MG: 25 TABLET, FILM COATED ORAL at 08:33

## 2022-07-23 RX ADMIN — GABAPENTIN 100 MG: 100 CAPSULE ORAL at 08:33

## 2022-07-23 RX ADMIN — ASPIRIN 81 MG CHEWABLE TABLET 81 MG: 81 TABLET CHEWABLE at 08:33

## 2022-07-23 RX ADMIN — ENOXAPARIN SODIUM 40 MG: 100 INJECTION SUBCUTANEOUS at 08:33

## 2022-07-23 RX ADMIN — GABAPENTIN 100 MG: 100 CAPSULE ORAL at 21:59

## 2022-07-23 RX ADMIN — CLOPIDOGREL BISULFATE 75 MG: 75 TABLET ORAL at 08:33

## 2022-07-23 RX ADMIN — CARBAMAZEPINE 200 MG: 200 TABLET ORAL at 21:59

## 2022-07-23 RX ADMIN — GABAPENTIN 100 MG: 100 CAPSULE ORAL at 13:13

## 2022-07-23 RX ADMIN — FOLIC ACID 1 MG: 1 TABLET ORAL at 08:33

## 2022-07-23 RX ADMIN — CARBAMAZEPINE 200 MG: 200 TABLET ORAL at 08:33

## 2022-07-23 RX ADMIN — ATORVASTATIN CALCIUM 20 MG: 10 TABLET, FILM COATED ORAL at 08:33

## 2022-07-23 RX ADMIN — AMLODIPINE BESYLATE 5 MG: 5 TABLET ORAL at 08:33

## 2022-07-23 RX ADMIN — METOPROLOL TARTRATE 25 MG: 25 TABLET, FILM COATED ORAL at 22:01

## 2022-07-23 RX ADMIN — PREDNISONE 5 MG: 5 TABLET ORAL at 08:33

## 2022-07-23 ASSESSMENT — PAIN SCALES - GENERAL
PAINLEVEL_OUTOF10: 0

## 2022-07-23 NOTE — PLAN OF CARE
Problem: Discharge Planning  Goal: Discharge to home or other facility with appropriate resources  7/23/2022 0838 by Manasa Bernal LPN  Outcome: Progressing  7/23/2022 0140 by Susanne Ventura RN  Outcome: Progressing     Problem: Skin/Tissue Integrity  Goal: Absence of new skin breakdown  Description: 1. Monitor for areas of redness and/or skin breakdown  2. Assess vascular access sites hourly  3. Every 4-6 hours minimum:  Change oxygen saturation probe site  4. Every 4-6 hours:  If on nasal continuous positive airway pressure, respiratory therapy assess nares and determine need for appliance change or resting period.   7/23/2022 0838 by Manasa Bernal LPN  Outcome: Progressing  7/23/2022 0140 by Susanne Ventura RN  Outcome: Progressing     Problem: Safety - Adult  Goal: Free from fall injury  7/23/2022 0838 by Manasa Bernal LPN  Outcome: Progressing  7/23/2022 0140 by Susanne Ventura RN  Outcome: Progressing  Flowsheets (Taken 7/23/2022 0139)  Free From Fall Injury: Instruct family/caregiver on patient safety     Problem: ABCDS Injury Assessment  Goal: Absence of physical injury  7/23/2022 0838 by Manasa Bernal LPN  Outcome: Progressing  7/23/2022 0140 by Susanne Ventura RN  Outcome: Progressing     Problem: Neurosensory - Adult  Goal: Achieves stable or improved neurological status  7/23/2022 0838 by Manasa Bernal LPN  Outcome: Progressing  7/23/2022 0140 by Susanne Ventura RN  Outcome: Progressing     Problem: Cardiovascular - Adult  Goal: Maintains optimal cardiac output and hemodynamic stability  7/23/2022 0838 by Manasa Bernal LPN  Outcome: Progressing  7/23/2022 0140 by Susanne Ventura RN  Outcome: Progressing  Goal: Absence of cardiac dysrhythmias or at baseline  7/23/2022 0838 by Manasa Bernal LPN  Outcome: Progressing  7/23/2022 0140 by Susanne Ventura RN  Outcome: Progressing     Problem: Skin/Tissue Integrity - Adult  Goal: Skin integrity remains intact  7/23/2022 0838 by Manasa Bernal LPN  Outcome: Progressing  7/23/2022 0140 by Leon Duong RN  Outcome: Progressing  Flowsheets (Taken 7/23/2022 0140)  Skin Integrity Remains Intact: Monitor for areas of redness and/or skin breakdown     Problem: Musculoskeletal - Adult  Goal: Return mobility to safest level of function  7/23/2022 0838 by Allen Alfonso LPN  Outcome: Progressing  7/23/2022 0140 by Leon Duong RN  Outcome: Progressing  Goal: Return ADL status to a safe level of function  7/23/2022 0838 by Allen Alfonso LPN  Outcome: Progressing  7/23/2022 0140 by Leon Duong RN  Outcome: Progressing     Problem: Gastrointestinal - Adult  Goal: Maintains adequate nutritional intake  7/23/2022 0838 by Allen Alfonso LPN  Outcome: Progressing  7/23/2022 0140 by Leon Duong RN  Outcome: Progressing     Problem: Genitourinary - Adult  Goal: Absence of urinary retention  7/23/2022 0838 by Allen Alfonso LPN  Outcome: Progressing  7/23/2022 0140 by Leon Duong RN  Outcome: Progressing     Problem: Nutrition Deficit:  Goal: Optimize nutritional status  7/23/2022 0838 by Allen Alfonso LPN  Outcome: Progressing  7/23/2022 0140 by Leon Duong RN  Outcome: Progressing  Flowsheets (Taken 7/22/2022 1253 by Petr Beltran, JUAN, LD)  Nutrient intake appropriate for improving, restoring, or maintaining nutritional needs:   Assess nutritional status and recommend course of action   Recommend appropriate diets, oral nutritional supplements, and vitamin/mineral supplements   Monitor oral intake, labs, and treatment plans     Problem: Pain  Goal: Verbalizes/displays adequate comfort level or baseline comfort level  7/23/2022 0838 by Allen Alfonso LPN  Outcome: Progressing  7/23/2022 0140 by Leon Duong RN  Outcome: Progressing

## 2022-07-23 NOTE — PROGRESS NOTES
Physical Therapy  Facility/Department: [facility] 4N  Daily Treatment Note  NAME: Tika Barnhart  : 1934  MRN: [id number]    Date of Service: 2022    Discharge Recommendations: pending          Patient Diagnosis(es): The primary encounter diagnosis was Urinary tract infection without hematuria, site unspecified. Diagnoses of Altered mental status, unspecified altered mental status type and Dehydration were also pertinent to this visit. Restrictions/Precautions:        General Precautions, Fall Risk, Tele    Communication with other providers:  MISTY Martines cleared patient for physical therapy. Subjective:  Patient states: \"My feet hurt so bad! \"  Pain:   Location, Type, Intensity (0/10 to 10/10):  10/10 bilateral feet    Objective:    Observation:  Patient semi fowlers upon therapist arrival.   A&O x4  Vitals: NA    Treatment, including education/measures:  Transfers  Supine to sit :minimal assistance to clean lower extremities from bed  Patient utilized bed rails for transfer. Sit to supine :SBA  Scooting :SBA to scoot up in bed  Rolling :NT  Sit to stand :NT  Stand to sit :NT  SPT:NT    Therapeutic Exercise:  Therapeutic exercises were instructed today. Instructions were given for technique, safety, recruitment, and rationale. Instructions were verbal and/or tactile. Seated: Ankle pumps 1 sets of 10  Long arc quads 3 sets of 10  Marching 3 sets of 10  Hip abduction / adduction 1 sets of 10  Patient sat EOB 15 minutes to complete lower extremity exercises. Patient presents with forward flexed posture and lateral lean to the left during sitting balance. Safety  Patient left safely in the bed, with call light/phone in reach with alarm applied. Gait belt was used for transfers and gait.       Assessment / Impression:    Patient's tolerance of treatment:  well   Adverse Reaction: none  Significant change in status and impact:  none  Barriers to improvement:  none  Discharge plan: pending    Plan for Next Session:    Per POC  Time in:  5234  Time out:  1017  Timed treatment minutes:  27  Total treatment time:  27    Previously filed items:     Short Term Goals  Time Frame for Short term goals: 1 week  Short term goal 1: Pt to complete all bed mobility SBA  Short term goal 2: Pt to complete stand pivot transfer with LRAD mod A  Short term goal 3: Pt to propel manual WC 25' with min A       Electronically signed by:    Mariia Chanel PTA [id number]

## 2022-07-23 NOTE — PLAN OF CARE
Problem: Discharge Planning  Goal: Discharge to home or other facility with appropriate resources  Outcome: Progressing     Problem: Skin/Tissue Integrity  Goal: Absence of new skin breakdown  Description: 1. Monitor for areas of redness and/or skin breakdown  2. Assess vascular access sites hourly  3. Every 4-6 hours minimum:  Change oxygen saturation probe site  4. Every 4-6 hours:  If on nasal continuous positive airway pressure, respiratory therapy assess nares and determine need for appliance change or resting period.   Outcome: Progressing     Problem: Safety - Adult  Goal: Free from fall injury  Outcome: Progressing  Flowsheets (Taken 7/23/2022 0139)  Free From Fall Injury: Instruct family/caregiver on patient safety     Problem: ABCDS Injury Assessment  Goal: Absence of physical injury  Outcome: Progressing     Problem: Neurosensory - Adult  Goal: Achieves stable or improved neurological status  Outcome: Progressing     Problem: Cardiovascular - Adult  Goal: Maintains optimal cardiac output and hemodynamic stability  Outcome: Progressing  Goal: Absence of cardiac dysrhythmias or at baseline  Outcome: Progressing     Problem: Skin/Tissue Integrity - Adult  Goal: Skin integrity remains intact  Outcome: Progressing  Flowsheets (Taken 7/23/2022 0140)  Skin Integrity Remains Intact: Monitor for areas of redness and/or skin breakdown     Problem: Musculoskeletal - Adult  Goal: Return mobility to safest level of function  Outcome: Progressing  Goal: Return ADL status to a safe level of function  Outcome: Progressing     Problem: Gastrointestinal - Adult  Goal: Maintains adequate nutritional intake  Outcome: Progressing     Problem: Genitourinary - Adult  Goal: Absence of urinary retention  Outcome: Progressing     Problem: Nutrition Deficit:  Goal: Optimize nutritional status  Outcome: Progressing  Flowsheets (Taken 7/22/2022 1253 by Jack Biswas, JUAN, LD)  Nutrient intake appropriate for improving, restoring, or maintaining nutritional needs:   Assess nutritional status and recommend course of action   Recommend appropriate diets, oral nutritional supplements, and vitamin/mineral supplements   Monitor oral intake, labs, and treatment plans     Problem: Pain  Goal: Verbalizes/displays adequate comfort level or baseline comfort level  Outcome: Progressing

## 2022-07-23 NOTE — PROGRESS NOTES
V2.0  [facility] [place]      Name:  Omar Cool /Age/Sex: 1934  (80 y.o. female)   MRN & CSN:  [id number] & [id number] Encounter Date/Time: 2022 6:50 PM EDT    Location:  [address] [place] [address]-A PCP: Arturo Brenner MD       Hospital Day: 9    Assessment and Plan:   Omar Cool is a 80 y.o. female with  UTI (urinary tract infection)    #Acute metabolic encephalopathy likely due to UTI-resolved  #Ecoli sepsis-resolved  -Blood culture growing E. coli. Procalcitonin 3.11.    -Patient was started on vancomycin and meropenem, which were de-escalated to ceftriaxone 2 days ago-->  Patient growing pansensitive E. coli. And then deescalated to cefdinir yesterday  -Repeat blood cultures showing no growth at 24 hours    Plan:  Continue cefdinir, for total of 14 days with end of treatment pain   Repeat Bcx to ensure ecoli has cleared    #Bilateral foot pain  -States that she always has this pain, located between the first and second toes    Plan:  Continue gabapentin 100 mg 3 times daily    #Anemia of chronic disease  -Folate and vitamin B12 are WNL  -Elevated ferritin, reticulocyte count is inappropriately normal and low TIBC    Plan:  Continue to monitor and transfuse if hemoglobin is less than 7    History of hypertension, patient currently normotensive on home blood pressure medicine, amlodipine, lisinopril hydrochlorothiazide and metoprolol. History of rheumatoid arthritis, on prednisone and methotrexate every . They have been resumed. History of TIA, on dual antiplatelet therapy    Diet ADULT DIET;  Regular  ADULT ORAL NUTRITION SUPPLEMENT; Breakfast, Dinner; Standard High Calorie/High Protein Oral Supplement   DVT Prophylaxis [x] Lovenox, []  Heparin, [] SCDs, [] Ambulation,  [] Eliquis, [] Xarelto  [] Coumadin   Code Status Limited   Disposition From: home  Expected Disposition: SNF  Estimated Date of Discharge: 2022  Patient requires continued admission while awaiting placement, patient is currently medically ready to be discharged   Surrogate Decision Maker/ POA  Sukhwinder Zhang     Subjective:     Chief Complaint: Fatigue (Generalized weakness. )       Today, patient worked with physical therapy and had a good time. She has no complaints this morning.          Review of Systems:    Review of Systems    General: No fever, no chills  Heart: No chest pain  Lungs: No shortness of breath, no cough  Abdomen: No abdominal pain  : No frequency  MSK: No lower extremity swelling  Neuro: No confusion, no weakness  Skin: No rashes    Objective:   No intake or output data in the 24 hours ending 07/23/22 0838       Vitals:   Vitals:    07/23/22 0830   BP: (!) 140/74   Pulse: 65   Resp: 18   Temp: 97.9 °F (36.6 °C)   SpO2: 100%       Physical Exam:   General: NAD, AAO x1-2  HENT: Atraumatic  Respiratory: no respiratory distress  GI: Normal bowel sounds, soft, nondistended, nontender  MSK: no lower extremity edema  Skin: Intact, dry, warm, no rashes  Neuro: CN II to XII grossly intact  Psych: Normal mood    Medications:   Medications:    gabapentin  100 mg Oral TID    amLODIPine  5 mg Oral Daily    aspirin  81 mg Oral Daily    atorvastatin  20 mg Oral Daily    carBAMazepine  200 mg Oral BID    clopidogrel  75 mg Oral Daily    folic acid  1 mg Oral Daily    [Held by provider] lisinopril-hydroCHLOROthiazide  1 tablet Oral Daily    methotrexate  12.5 mg Oral Weekly    metoprolol tartrate  25 mg Oral BID    pantoprazole  40 mg Oral QAM AC    predniSONE  5 mg Oral BID    sodium chloride flush  10 mL IntraVENous 2 times per day    enoxaparin  40 mg SubCUTAneous Daily      Infusions:    sodium chloride       PRN Meds: dicyclomine, 10 mg, 4x Daily PRN  sodium chloride flush, 10 mL, PRN  sodium chloride, , PRN  ondansetron, 4 mg, Q8H PRN   Or  ondansetron, 4 mg, Q6H PRN  bisacodyl, 5 mg, Daily PRN  acetaminophen, 650 mg, Q6H PRN   Or  acetaminophen, 650 mg, Q6H PRN      Labs      No results found for this or any previous visit (from the past 24 hour(s)). Imaging/Diagnostics Last 24 Hours   No results found.     Electronically signed by Marlene Cook MD on 7/23/2022 at 8:38 AM

## 2022-07-23 NOTE — PLAN OF CARE
Problem: Discharge Planning  Goal: Discharge to home or other facility with appropriate resources  7/23/2022 1948 by Sabina Moreland RN  Outcome: Progressing  7/23/2022 0838 by Magalys Gallagher LPN  Outcome: Progressing     Problem: Skin/Tissue Integrity  Goal: Absence of new skin breakdown  Description: 1. Monitor for areas of redness and/or skin breakdown  2. Assess vascular access sites hourly  3. Every 4-6 hours minimum:  Change oxygen saturation probe site  4. Every 4-6 hours:  If on nasal continuous positive airway pressure, respiratory therapy assess nares and determine need for appliance change or resting period.   7/23/2022 1948 by Sabina Moreland RN  Outcome: Progressing  7/23/2022 0838 by Magalys Gallagher LPN  Outcome: Progressing     Problem: Safety - Adult  Goal: Free from fall injury  7/23/2022 1948 by Sabina Moreland RN  Outcome: Progressing  7/23/2022 0838 by Magalys Gallagher LPN  Outcome: Progressing     Problem: ABCDS Injury Assessment  Goal: Absence of physical injury  7/23/2022 1948 by Sabina Moreland RN  Outcome: Progressing  7/23/2022 0838 by Magalys Gallagher LPN  Outcome: Progressing     Problem: Neurosensory - Adult  Goal: Achieves stable or improved neurological status  7/23/2022 1948 by Sabina Moreland RN  Outcome: Progressing  7/23/2022 0838 by Magalys Gallagher LPN  Outcome: Progressing     Problem: Cardiovascular - Adult  Goal: Maintains optimal cardiac output and hemodynamic stability  7/23/2022 1948 by Sabina Moreland RN  Outcome: Progressing  7/23/2022 0838 by Magalys Gallagher LPN  Outcome: Progressing  Goal: Absence of cardiac dysrhythmias or at baseline  7/23/2022 1948 by Sabina Moreland RN  Outcome: Progressing  7/23/2022 0838 by Magalys Gallagher LPN  Outcome: Progressing     Problem: Skin/Tissue Integrity - Adult  Goal: Skin integrity remains intact  7/23/2022 1948 by Sabina Moreland RN  Outcome: Progressing  7/23/2022 0838 by Magalys Gallagher LPN  Outcome: Progressing     Problem: Musculoskeletal - Adult  Goal: Return mobility to safest level of function  7/23/2022 1948 by Chanda Garcia RN  Outcome: Progressing  7/23/2022 0838 by Bernadette Ortiz LPN  Outcome: Progressing  Goal: Return ADL status to a safe level of function  7/23/2022 1948 by Chanda Garcia RN  Outcome: Progressing  7/23/2022 0838 by Bernadette Ortiz LPN  Outcome: Progressing     Problem: Gastrointestinal - Adult  Goal: Maintains adequate nutritional intake  7/23/2022 1948 by Chanda Garcia RN  Outcome: Progressing  7/23/2022 0838 by Bernadette Ortiz LPN  Outcome: Progressing     Problem: Genitourinary - Adult  Goal: Absence of urinary retention  7/23/2022 1948 by Chanda Garcia RN  Outcome: Progressing  7/23/2022 0838 by Bernadette Ortiz LPN  Outcome: Progressing     Problem: Nutrition Deficit:  Goal: Optimize nutritional status  7/23/2022 1948 by Chanda Garcia RN  Outcome: Progressing  7/23/2022 0838 by Bernadette Ortiz LPN  Outcome: Progressing     Problem: Pain  Goal: Verbalizes/displays adequate comfort level or baseline comfort level  7/23/2022 1948 by Chanda Garcia RN  Outcome: Progressing  7/23/2022 0838 by Bernadette Ortiz LPN  Outcome: Progressing

## 2022-07-24 LAB
CULTURE: NORMAL
CULTURE: NORMAL
Lab: NORMAL
Lab: NORMAL
SPECIMEN: NORMAL
SPECIMEN: NORMAL

## 2022-07-24 PROCEDURE — 6370000000 HC RX 637 (ALT 250 FOR IP): Performed by: INTERNAL MEDICINE

## 2022-07-24 PROCEDURE — 6360000002 HC RX W HCPCS: Performed by: INTERNAL MEDICINE

## 2022-07-24 PROCEDURE — 94761 N-INVAS EAR/PLS OXIMETRY MLT: CPT

## 2022-07-24 PROCEDURE — 1200000000 HC SEMI PRIVATE

## 2022-07-24 RX ADMIN — ACETAMINOPHEN 325MG 650 MG: 325 TABLET ORAL at 21:15

## 2022-07-24 RX ADMIN — METOPROLOL TARTRATE 25 MG: 25 TABLET, FILM COATED ORAL at 21:15

## 2022-07-24 RX ADMIN — FOLIC ACID 1 MG: 1 TABLET ORAL at 09:26

## 2022-07-24 RX ADMIN — ASPIRIN 81 MG CHEWABLE TABLET 81 MG: 81 TABLET CHEWABLE at 09:25

## 2022-07-24 RX ADMIN — GABAPENTIN 100 MG: 100 CAPSULE ORAL at 09:26

## 2022-07-24 RX ADMIN — METOPROLOL TARTRATE 25 MG: 25 TABLET, FILM COATED ORAL at 09:26

## 2022-07-24 RX ADMIN — PANTOPRAZOLE SODIUM 40 MG: 40 TABLET, DELAYED RELEASE ORAL at 06:24

## 2022-07-24 RX ADMIN — CARBAMAZEPINE 200 MG: 200 TABLET ORAL at 09:26

## 2022-07-24 RX ADMIN — GABAPENTIN 100 MG: 100 CAPSULE ORAL at 13:52

## 2022-07-24 RX ADMIN — PREDNISONE 5 MG: 5 TABLET ORAL at 09:26

## 2022-07-24 RX ADMIN — PREDNISONE 5 MG: 5 TABLET ORAL at 21:15

## 2022-07-24 RX ADMIN — CLOPIDOGREL BISULFATE 75 MG: 75 TABLET ORAL at 09:26

## 2022-07-24 RX ADMIN — CARBAMAZEPINE 200 MG: 200 TABLET ORAL at 21:15

## 2022-07-24 RX ADMIN — GABAPENTIN 100 MG: 100 CAPSULE ORAL at 21:15

## 2022-07-24 RX ADMIN — METHOTREXATE 12.5 MG: 2.5 TABLET ORAL at 09:27

## 2022-07-24 RX ADMIN — AMLODIPINE BESYLATE 5 MG: 5 TABLET ORAL at 09:25

## 2022-07-24 RX ADMIN — ATORVASTATIN CALCIUM 20 MG: 10 TABLET, FILM COATED ORAL at 09:25

## 2022-07-24 RX ADMIN — ENOXAPARIN SODIUM 40 MG: 100 INJECTION SUBCUTANEOUS at 09:26

## 2022-07-24 ASSESSMENT — PAIN DESCRIPTION - PAIN TYPE: TYPE: ACUTE PAIN

## 2022-07-24 ASSESSMENT — PAIN DESCRIPTION - FREQUENCY: FREQUENCY: INTERMITTENT

## 2022-07-24 ASSESSMENT — PAIN SCALES - GENERAL
PAINLEVEL_OUTOF10: 0
PAINLEVEL_OUTOF10: 0
PAINLEVEL_OUTOF10: 3

## 2022-07-24 ASSESSMENT — PAIN - FUNCTIONAL ASSESSMENT: PAIN_FUNCTIONAL_ASSESSMENT: ACTIVITIES ARE NOT PREVENTED

## 2022-07-24 ASSESSMENT — PAIN DESCRIPTION - DESCRIPTORS: DESCRIPTORS: SORE

## 2022-07-24 ASSESSMENT — PAIN DESCRIPTION - ONSET: ONSET: ON-GOING

## 2022-07-24 ASSESSMENT — PAIN DESCRIPTION - ORIENTATION: ORIENTATION: LEFT

## 2022-07-24 ASSESSMENT — PAIN DESCRIPTION - LOCATION: LOCATION: FOOT

## 2022-07-25 LAB
ANION GAP SERPL CALCULATED.3IONS-SCNC: 10 MMOL/L (ref 4–16)
BASOPHILS ABSOLUTE: 0 K/CU MM
BASOPHILS RELATIVE PERCENT: 0.2 % (ref 0–1)
BUN BLDV-MCNC: 46 MG/DL (ref 6–23)
CALCIUM SERPL-MCNC: 10.3 MG/DL (ref 8.3–10.6)
CHLORIDE BLD-SCNC: 106 MMOL/L (ref 99–110)
CO2: 26 MMOL/L (ref 21–32)
CREAT SERPL-MCNC: 1 MG/DL (ref 0.6–1.1)
DIFFERENTIAL TYPE: ABNORMAL
EOSINOPHILS ABSOLUTE: 0.1 K/CU MM
EOSINOPHILS RELATIVE PERCENT: 1.3 % (ref 0–3)
GFR AFRICAN AMERICAN: >60 ML/MIN/1.73M2
GFR NON-AFRICAN AMERICAN: 52 ML/MIN/1.73M2
GLUCOSE BLD-MCNC: 115 MG/DL (ref 70–99)
HCT VFR BLD CALC: 27.8 % (ref 37–47)
HEMOGLOBIN: 8.3 GM/DL (ref 12.5–16)
IMMATURE NEUTROPHIL %: 0.5 % (ref 0–0.43)
LYMPHOCYTES ABSOLUTE: 2.6 K/CU MM
LYMPHOCYTES RELATIVE PERCENT: 24.6 % (ref 24–44)
MCH RBC QN AUTO: 33.3 PG (ref 27–31)
MCHC RBC AUTO-ENTMCNC: 29.9 % (ref 32–36)
MCV RBC AUTO: 111.6 FL (ref 78–100)
MONOCYTES ABSOLUTE: 0.8 K/CU MM
MONOCYTES RELATIVE PERCENT: 7.2 % (ref 0–4)
NUCLEATED RBC %: 0 %
PDW BLD-RTO: 15 % (ref 11.7–14.9)
PLATELET # BLD: 497 K/CU MM (ref 140–440)
PMV BLD AUTO: 8.9 FL (ref 7.5–11.1)
POTASSIUM SERPL-SCNC: 4.3 MMOL/L (ref 3.5–5.1)
RBC # BLD: 2.49 M/CU MM (ref 4.2–5.4)
SEGMENTED NEUTROPHILS ABSOLUTE COUNT: 7.1 K/CU MM
SEGMENTED NEUTROPHILS RELATIVE PERCENT: 66.2 % (ref 36–66)
SODIUM BLD-SCNC: 142 MMOL/L (ref 135–145)
TOTAL IMMATURE NEUTOROPHIL: 0.05 K/CU MM
TOTAL NUCLEATED RBC: 0 K/CU MM
WBC # BLD: 10.7 K/CU MM (ref 4–10.5)

## 2022-07-25 PROCEDURE — 94761 N-INVAS EAR/PLS OXIMETRY MLT: CPT

## 2022-07-25 PROCEDURE — 97110 THERAPEUTIC EXERCISES: CPT

## 2022-07-25 PROCEDURE — 85025 COMPLETE CBC W/AUTO DIFF WBC: CPT

## 2022-07-25 PROCEDURE — 6370000000 HC RX 637 (ALT 250 FOR IP): Performed by: INTERNAL MEDICINE

## 2022-07-25 PROCEDURE — 80048 BASIC METABOLIC PNL TOTAL CA: CPT

## 2022-07-25 PROCEDURE — 1200000000 HC SEMI PRIVATE

## 2022-07-25 PROCEDURE — 36415 COLL VENOUS BLD VENIPUNCTURE: CPT

## 2022-07-25 PROCEDURE — 97530 THERAPEUTIC ACTIVITIES: CPT

## 2022-07-25 PROCEDURE — 6360000002 HC RX W HCPCS: Performed by: INTERNAL MEDICINE

## 2022-07-25 RX ADMIN — CLOPIDOGREL BISULFATE 75 MG: 75 TABLET ORAL at 09:58

## 2022-07-25 RX ADMIN — PREDNISONE 5 MG: 5 TABLET ORAL at 09:58

## 2022-07-25 RX ADMIN — PREDNISONE 5 MG: 5 TABLET ORAL at 21:59

## 2022-07-25 RX ADMIN — CARBAMAZEPINE 200 MG: 200 TABLET ORAL at 09:58

## 2022-07-25 RX ADMIN — CARBAMAZEPINE 200 MG: 200 TABLET ORAL at 21:59

## 2022-07-25 RX ADMIN — GABAPENTIN 100 MG: 100 CAPSULE ORAL at 15:13

## 2022-07-25 RX ADMIN — ENOXAPARIN SODIUM 40 MG: 100 INJECTION SUBCUTANEOUS at 09:58

## 2022-07-25 RX ADMIN — METOPROLOL TARTRATE 25 MG: 25 TABLET, FILM COATED ORAL at 09:58

## 2022-07-25 RX ADMIN — AMLODIPINE BESYLATE 5 MG: 5 TABLET ORAL at 09:58

## 2022-07-25 RX ADMIN — GABAPENTIN 100 MG: 100 CAPSULE ORAL at 09:58

## 2022-07-25 RX ADMIN — FOLIC ACID 1 MG: 1 TABLET ORAL at 09:58

## 2022-07-25 RX ADMIN — GABAPENTIN 100 MG: 100 CAPSULE ORAL at 22:00

## 2022-07-25 RX ADMIN — PANTOPRAZOLE SODIUM 40 MG: 40 TABLET, DELAYED RELEASE ORAL at 06:16

## 2022-07-25 RX ADMIN — ATORVASTATIN CALCIUM 20 MG: 10 TABLET, FILM COATED ORAL at 09:58

## 2022-07-25 RX ADMIN — METOPROLOL TARTRATE 25 MG: 25 TABLET, FILM COATED ORAL at 21:59

## 2022-07-25 RX ADMIN — ASPIRIN 81 MG CHEWABLE TABLET 81 MG: 81 TABLET CHEWABLE at 09:58

## 2022-07-25 NOTE — PROGRESS NOTES
V2.0  [facility] [place] Hospitalist Progress Note      Name:  Nessa Vitale /Age/Sex: 1934  (80 y.o. female)   MRN & CSN:  [id number] & [id number] Encounter Date/Time: 2022 6:50 PM EDT    Location:  [address] [place] [address]-A PCP: Missy Galicia MD       Hospital Day: 11    Assessment and Plan:   Nessa Vitale is a 80 y.o. female with  UTI (urinary tract infection)    #Acute metabolic encephalopathy likely due to UTI-resolved  #Ecoli sepsis-resolved  -Blood culture growing E. coli. Procalcitonin 3.11.    -Patient was started on vancomycin and meropenem, which were de-escalated to ceftriaxone 2 days ago-->  Patient growing pansensitive E. coli. And then deescalated to cefdinir yesterday  -Repeat blood cultures showing no growth     Plan:  Continue cefdinir, for total of 14 days with end of treatment pain     #Bilateral foot pain  -States that she always has this pain, located between the first and second toes    Plan:  Continue gabapentin 100 mg 3 times daily    #Anemia of chronic disease  -Folate and vitamin B12 are WNL  -Elevated ferritin, reticulocyte count is inappropriately normal and low TIBC    Plan:  Continue to monitor and transfuse if hemoglobin is less than 7    History of hypertension, patient currently normotensive on home blood pressure medicine, amlodipine, lisinopril hydrochlorothiazide and metoprolol. History of rheumatoid arthritis, on prednisone and methotrexate every . They have been resumed. History of TIA, on dual antiplatelet therapy    Diet ADULT DIET;  Regular  ADULT ORAL NUTRITION SUPPLEMENT; Breakfast, Dinner; Standard High Calorie/High Protein Oral Supplement   DVT Prophylaxis [x] Lovenox, []  Heparin, [] SCDs, [] Ambulation,  [] Eliquis, [] Xarelto  [] Coumadin   Code Status Limited   Disposition From: home  Expected Disposition: SNF  Estimated Date of Discharge: 2022  Patient requires continued admission while awaiting placement, patient is currently medically ready to be discharged   Surrogate Decision Maker/ CARIDAD Zhang     Subjective:     Chief Complaint: Fatigue (Generalized weakness. )       Today, patient was sleeping today. Review of Systems:    Review of Systems    Not able to get    Objective:      Intake/Output Summary (Last 24 hours) at 7/25/2022 1137  Last data filed at 7/25/2022 5823  Gross per 24 hour   Intake --   Output 701 ml   Net -701 ml          Vitals:   Vitals:    07/25/22 0949   BP: 116/69   Pulse: 80   Resp: 16   Temp: 97 °F (36.1 °C)   SpO2: 100%       Physical Exam:   General: NAD, AAO x1-2  HENT: Atraumatic  Respiratory: no respiratory distress  GI: Normal bowel sounds, soft, nondistended, nontender  MSK: no lower extremity edema  Skin: Intact, dry, warm, no rashes  Neuro: CN II to XII grossly intact  Psych: Normal mood    Medications:   Medications:    gabapentin  100 mg Oral TID    amLODIPine  5 mg Oral Daily    aspirin  81 mg Oral Daily    atorvastatin  20 mg Oral Daily    carBAMazepine  200 mg Oral BID    clopidogrel  75 mg Oral Daily    folic acid  1 mg Oral Daily    [Held by provider] lisinopril-hydroCHLOROthiazide  1 tablet Oral Daily    methotrexate  12.5 mg Oral Weekly    metoprolol tartrate  25 mg Oral BID    pantoprazole  40 mg Oral QAM AC    predniSONE  5 mg Oral BID    sodium chloride flush  10 mL IntraVENous 2 times per day    enoxaparin  40 mg SubCUTAneous Daily      Infusions:    sodium chloride       PRN Meds: dicyclomine, 10 mg, 4x Daily PRN  sodium chloride flush, 10 mL, PRN  sodium chloride, , PRN  ondansetron, 4 mg, Q8H PRN   Or  ondansetron, 4 mg, Q6H PRN  bisacodyl, 5 mg, Daily PRN  acetaminophen, 650 mg, Q6H PRN   Or  acetaminophen, 650 mg, Q6H PRN      Labs      Recent Results (from the past 24 hour(s))   CBC with Auto Differential    Collection Time: 07/25/22 10:49 AM   Result Value Ref Range    WBC 10.7 (H) 4.0 - 10.5 K/CU MM    RBC 2.49 (L) 4.2 - 5.4 M/CU MM    Hemoglobin 8.3 (L) 12.5 - 16.0 GM/DL Hematocrit 27.8 (L) 37 - 47 %    .6 (H) 78 - 100 FL    MCH 33.3 (H) 27 - 31 PG    MCHC 29.9 (L) 32.0 - 36.0 %    RDW 15.0 (H) 11.7 - 14.9 %    Platelets 817 (H) 093 - 440 K/CU MM    MPV 8.9 7.5 - 11.1 FL    Differential Type AUTOMATED DIFFERENTIAL     Segs Relative 66.2 (H) 36 - 66 %    Lymphocytes % 24.6 24 - 44 %    Monocytes % 7.2 (H) 0 - 4 %    Eosinophils % 1.3 0 - 3 %    Basophils % 0.2 0 - 1 %    Segs Absolute 7.1 K/CU MM    Lymphocytes Absolute 2.6 K/CU MM    Monocytes Absolute 0.8 K/CU MM    Eosinophils Absolute 0.1 K/CU MM    Basophils Absolute 0.0 K/CU MM    Nucleated RBC % 0.0 %    Total Nucleated RBC 0.0 K/CU MM    Total Immature Neutrophil 0.05 K/CU MM    Immature Neutrophil % 0.5 (H) 0 - 0.43 %          Imaging/Diagnostics Last 24 Hours   No results found.     Electronically signed by Latasha Cabrera MD on 7/25/2022 at 11:37 AM

## 2022-07-25 NOTE — PROGRESS NOTES
Physical Therapy  Name: Kevan Whitt MRN: [id number] :   1934   Date:  2022   Admission Date: 7/15/2022 Room:  [address] [place], [place] [address]A   Restrictions/Precautions:        fall risk  Communication with other providers:  CM needed updated PT notes  Subjective:  Patient states: \" she is fine right here\"  Pain:   Location, Type, Intensity (0/10 to 10/10):  all over, did not rate but pointed to her L ankle and LE  Objective:    Observation:  pt was sitting up in the recliner  Treatment, including education/measures:   Exercises: Ankle pumps x 10  Quad sets x 10  Heel slides x 5  Hip abd x 10  Hip IR/ER x 10  Therapeutic Exercise:  Therapeutic exercises were instructed today. Cues were given for technique, safety, recruitment, and rationale. Cues were verbal and/or tactile. Safety  Patient left safely in the chair, with call light/phone in reach with alarm applied. Assessment / Impression:     Patient's tolerance of treatment:  good, pt very talkative today about her family   Adverse Reaction: none  Significant change in status and impact:  none  Barriers to improvement:  pain  Plan for Next Session:    Will cont to work towards pt's goals per her tolerance  Time in:  1600  Time out:  1613  Timed treatment minutes:  13  Total treatment time:  13  Previously filed items:     Short Term Goals  Time Frame for Short term goals: 1 week  Short term goal 1: Pt to complete all bed mobility SBA  Short term goal 2: Pt to complete stand pivot transfer with LRAD mod A  Short term goal 3: Pt to propel manual WC 25' with min A     Electronically signed by:     Hilda Cheek PTA  2022, 4:08 PM

## 2022-07-25 NOTE — CARE COORDINATION
TC to Zofia with [facility], precert remains pending, need updated PT/OT- placed whiteboard note.

## 2022-07-25 NOTE — PLAN OF CARE
Problem: Discharge Planning  Goal: Discharge to home or other facility with appropriate resources  Outcome: Progressing     Problem: Skin/Tissue Integrity  Goal: Absence of new skin breakdown  Description: 1. Monitor for areas of redness and/or skin breakdown  2. Assess vascular access sites hourly  3. Every 4-6 hours minimum:  Change oxygen saturation probe site  4. Every 4-6 hours:  If on nasal continuous positive airway pressure, respiratory therapy assess nares and determine need for appliance change or resting period.   Outcome: Progressing     Problem: Safety - Adult  Goal: Free from fall injury  Outcome: Progressing     Problem: ABCDS Injury Assessment  Goal: Absence of physical injury  Outcome: Progressing     Problem: Neurosensory - Adult  Goal: Achieves stable or improved neurological status  Outcome: Progressing     Problem: Cardiovascular - Adult  Goal: Maintains optimal cardiac output and hemodynamic stability  Outcome: Progressing  Goal: Absence of cardiac dysrhythmias or at baseline  Outcome: Progressing     Problem: Skin/Tissue Integrity - Adult  Goal: Skin integrity remains intact  Outcome: Progressing     Problem: Musculoskeletal - Adult  Goal: Return mobility to safest level of function  Outcome: Progressing  Goal: Return ADL status to a safe level of function  Outcome: Progressing     Problem: Gastrointestinal - Adult  Goal: Maintains adequate nutritional intake  Outcome: Progressing     Problem: Genitourinary - Adult  Goal: Absence of urinary retention  Outcome: Progressing     Problem: Nutrition Deficit:  Goal: Optimize nutritional status  Outcome: Progressing     Problem: Pain  Goal: Verbalizes/displays adequate comfort level or baseline comfort level  Outcome: Progressing

## 2022-07-25 NOTE — PROGRESS NOTES
Occupational Therapy    Occupational Therapy Treatment Note    Name: Tika Barnhart MRN: [id number] :   1934   Date:  2022   Admission Date: 7/15/2022 Room:  [address] [place] [address]-A     Primary Problem:  UTI    Restrictions/Precautions:          General precautions, fall risk     Communication with other providers: Handoff from RN     Subjective:  Patient states: \"You're alright\" Pt minimally verbal during session   Pain:   Location, Type, Intensity (0/10 to 10/10): No pain reported     Objective:    Observation: Pt supine in bed,agreeable to therapy, family at bedside    Objective Measures: On room air    Treatment, including education:  Therapeutic Activity Training:   Therapeutic activity training was instructed today. Cues were given for safety, sequence, UE/LE placement, awareness, and balance. Activities performed today included bed mobility training, sup-sit, SPT, UE exercise. Supine-sit: SBA Pt able to walk legs off side of bed and bring torso upright with increased time   SPT: maxA Pt assisted to complete stand pivot transfer from EOB to reclining chair  UE exercise: Attempted to complete UE exercise w/ pt, pt unable to participate d/t limited ability to follow commands and complete return demo.  Pt required [place] assist to complete first rep of exercise but was unable to continue without [place] assist.    Safety: Pt left in chair w/ chair alarm, call light within reach, RN notified, gait belt used     Assessment / Impression:    Patient's tolerance of treatment: Good   Adverse Reaction: n/a  Significant change in status and impact:  n/a  Barriers to improvement: Cognition       Plan for Next Session:    Continue per OT POC     Time in:  1258  Time out:  1314  Timed treatment minutes:  16  Total treatment time:  16      Electronically signed by:    Yumiko Finley S/OT   2022, 1:17 PM

## 2022-07-26 VITALS
HEIGHT: 66 IN | OXYGEN SATURATION: 100 % | BODY MASS INDEX: 18.81 KG/M2 | DIASTOLIC BLOOD PRESSURE: 57 MMHG | SYSTOLIC BLOOD PRESSURE: 104 MMHG | TEMPERATURE: 97.3 F | HEART RATE: 64 BPM | WEIGHT: 117.06 LBS | RESPIRATION RATE: 13 BRPM

## 2022-07-26 LAB
GLUCOSE BLD-MCNC: 102 MG/DL (ref 70–99)
GLUCOSE BLD-MCNC: 127 MG/DL (ref 70–99)
SARS-COV-2, NAAT: NOT DETECTED
SOURCE: NORMAL

## 2022-07-26 PROCEDURE — 82962 GLUCOSE BLOOD TEST: CPT

## 2022-07-26 PROCEDURE — 6370000000 HC RX 637 (ALT 250 FOR IP): Performed by: INTERNAL MEDICINE

## 2022-07-26 PROCEDURE — 6360000002 HC RX W HCPCS: Performed by: INTERNAL MEDICINE

## 2022-07-26 PROCEDURE — 97112 NEUROMUSCULAR REEDUCATION: CPT

## 2022-07-26 PROCEDURE — 97535 SELF CARE MNGMENT TRAINING: CPT

## 2022-07-26 PROCEDURE — 87635 SARS-COV-2 COVID-19 AMP PRB: CPT

## 2022-07-26 PROCEDURE — 94761 N-INVAS EAR/PLS OXIMETRY MLT: CPT

## 2022-07-26 PROCEDURE — 97530 THERAPEUTIC ACTIVITIES: CPT

## 2022-07-26 RX ORDER — TROLAMINE SALICYLATE 10 G/100G
CREAM TOPICAL
Qty: 226 G | Refills: 1
Start: 2022-07-26 | End: 2022-08-02

## 2022-07-26 RX ORDER — DICYCLOMINE HYDROCHLORIDE 10 MG/1
10 CAPSULE ORAL 4 TIMES DAILY PRN
Qty: 120 CAPSULE | Refills: 3
Start: 2022-07-26

## 2022-07-26 RX ORDER — TROLAMINE SALICYLATE 10 G/100G
CREAM TOPICAL 2 TIMES DAILY PRN
Status: DISCONTINUED | OUTPATIENT
Start: 2022-07-26 | End: 2022-07-26 | Stop reason: HOSPADM

## 2022-07-26 RX ORDER — LIDOCAINE 4 G/G
1 PATCH TOPICAL DAILY
Status: DISCONTINUED | OUTPATIENT
Start: 2022-07-26 | End: 2022-07-26 | Stop reason: HOSPADM

## 2022-07-26 RX ORDER — LIDOCAINE 4 G/G
1 PATCH TOPICAL DAILY
Qty: 10 PATCH | Refills: 0
Start: 2022-07-26 | End: 2022-08-05

## 2022-07-26 RX ORDER — GABAPENTIN 100 MG/1
100 CAPSULE ORAL 3 TIMES DAILY
Qty: 90 CAPSULE | Refills: 0
Start: 2022-07-26 | End: 2022-10-03 | Stop reason: ALTCHOICE

## 2022-07-26 RX ADMIN — AMLODIPINE BESYLATE 5 MG: 5 TABLET ORAL at 09:34

## 2022-07-26 RX ADMIN — PREDNISONE 5 MG: 5 TABLET ORAL at 09:34

## 2022-07-26 RX ADMIN — GABAPENTIN 100 MG: 100 CAPSULE ORAL at 09:36

## 2022-07-26 RX ADMIN — ASPIRIN 81 MG CHEWABLE TABLET 81 MG: 81 TABLET CHEWABLE at 09:36

## 2022-07-26 RX ADMIN — CARBAMAZEPINE 200 MG: 200 TABLET ORAL at 09:33

## 2022-07-26 RX ADMIN — GABAPENTIN 100 MG: 100 CAPSULE ORAL at 14:25

## 2022-07-26 RX ADMIN — ATORVASTATIN CALCIUM 20 MG: 10 TABLET, FILM COATED ORAL at 09:35

## 2022-07-26 RX ADMIN — FOLIC ACID 1 MG: 1 TABLET ORAL at 09:35

## 2022-07-26 RX ADMIN — METOPROLOL TARTRATE 25 MG: 25 TABLET, FILM COATED ORAL at 09:35

## 2022-07-26 RX ADMIN — PANTOPRAZOLE SODIUM 40 MG: 40 TABLET, DELAYED RELEASE ORAL at 05:40

## 2022-07-26 RX ADMIN — ACETAMINOPHEN 325MG 650 MG: 325 TABLET ORAL at 02:26

## 2022-07-26 RX ADMIN — ACETAMINOPHEN 325MG 650 MG: 325 TABLET ORAL at 11:50

## 2022-07-26 RX ADMIN — ENOXAPARIN SODIUM 40 MG: 100 INJECTION SUBCUTANEOUS at 09:37

## 2022-07-26 RX ADMIN — CLOPIDOGREL BISULFATE 75 MG: 75 TABLET ORAL at 09:34

## 2022-07-26 ASSESSMENT — PAIN DESCRIPTION - ORIENTATION
ORIENTATION: MID
ORIENTATION: OTHER (COMMENT)

## 2022-07-26 ASSESSMENT — PAIN SCALES - GENERAL
PAINLEVEL_OUTOF10: 10
PAINLEVEL_OUTOF10: 3
PAINLEVEL_OUTOF10: 10

## 2022-07-26 ASSESSMENT — PAIN DESCRIPTION - LOCATION
LOCATION: BACK

## 2022-07-26 ASSESSMENT — PAIN DESCRIPTION - DESCRIPTORS
DESCRIPTORS: ACHING
DESCRIPTORS: ACHING

## 2022-07-26 NOTE — DISCHARGE SUMMARY
Discharge Summary    Name:  Kaleb Rose /Age/Sex: 1934  (80 y.o. female)   MRN & CSN:  [id number] & [id number] Admission Date/Time: 7/15/2022  5:19 PM   Attending:  Sandy Macdonald MD Discharging Physician: Sandy Macdonald MD       Admission Diagnosis:   Acute Encephalopathy 2/2 likely UTI  Dehydration  RA  HTN    Discharge Diagnosis:  Acute Metabolic and Septic Encephalopathy 2/2 Sepsis 2/2 E Coli UTI - Resolved  Sepsis 2/2 E Coli bacteremia and UTI - Resolved  UTI - Resolved  Severe Protein Calorie Malnutrition  Back Soreness  B/L Chronic Foot Pain  Anemia of Chronic Disease  H/O HTN  H/O RA  H/O TIA    Discharge Exam  BP (!) 104/57   Pulse 64   Temp 97.3 °F (36.3 °C)   Resp 13   Ht 5' 5.98\" (1.676 m)   Wt 117 lb 1 oz (53.1 kg)   SpO2 100%   BMI 18.90 kg/m²   Physical Exam  Vitals and nursing note reviewed. Constitutional:       General: She is not in acute distress. Appearance: She is not ill-appearing, toxic-appearing or diaphoretic. HENT:      Head: Normocephalic and atraumatic. Right Ear: External ear normal.      Left Ear: External ear normal.      Nose: Nose normal.      Mouth/Throat:      Pharynx: Oropharynx is clear. No oropharyngeal exudate or posterior oropharyngeal erythema. Eyes:      General: No scleral icterus. Right eye: No discharge. Left eye: No discharge. Conjunctiva/sclera: Conjunctivae normal.   Cardiovascular:      Rate and Rhythm: Normal rate and regular rhythm. Pulses: Normal pulses. Heart sounds: Normal heart sounds. No murmur heard. No friction rub. No gallop. Pulmonary:      Effort: Pulmonary effort is normal. No respiratory distress. Breath sounds: Normal breath sounds. No stridor. No wheezing, rhonchi or rales. Abdominal:      General: Bowel sounds are normal. There is no distension. Palpations: Abdomen is soft. There is no mass. Tenderness: There is no abdominal tenderness.  There is no right CVA tenderness, left CVA tenderness, guarding or rebound. Hernia: No hernia is present. Musculoskeletal:         General: No tenderness. Right lower leg: No edema. Left lower leg: No edema. Skin:     General: Skin is warm. Capillary Refill: Capillary refill takes less than 2 seconds. Neurological:      Mental Status: She is alert and oriented to person, place, and time. Psychiatric:         Mood and Affect: Mood normal.       Hospital Course:   Surinder Castillo is a 80 y.o.  female  who presents with UTI (urinary tract infection)    Patient was admitted on 7/15/2022 after being brought in due to confusion, lack of responsiveness from baseline, less interactive and more withdrawn. Daughter provide the history. Fluid was given in the ER and the patient appeared to improve per daughter. Per daughter, the patient has had UTIs in the past that have resulted in altered mental status. Apparently, the patient had also been decreasing less fluids over the past couple of days prior to admission. Per admitting team, the patient was already more alert and recognize daughter at bedside. The patient was a poor historian however. The patient was oriented as well. Patient was found to have a temperature of 101 °F on admission. Patient was found to have E. coli in the urine as well as blood. She was initially started on vancomycin and meropenem which was de-escalated to ceftriaxone. It was subsequently further de-escalated to cefdinir. Repeat blood cultures were negative. Plan was to continue the cefdinir till 7/28/2022. Patient was back to baseline on my examination today. Patient was deemed stable enough for discharge as well. The patient was subsequently accepted to skilled nursing facility today. The patient expressed appropriate understanding of and agreement with the discharge recommendations, medications, and plan.      Consults this admission:  IP CONSULT TO HOSPITALIST      Discharge Instruction:   Handoff to PCP:     Follow up appointments: PCP after discharge from SNF    Diet:   Oral Diet:  General  - Oral Nutrition Supplement:  High Protein Pudding  twice a day and High Calorie supplement twice a day    Activity: activity as tolerated  Disposition: Discharged to:   []Home, []C, [x]SNF, []Acute Rehab, []Hospice   Condition on discharge: Stable    Discharge Medications:        Medication List        START taking these medications      bisacodyl 5 MG EC tablet  Commonly known as: DULCOLAX  Take 1 tablet by mouth daily as needed for Constipation     gabapentin 100 MG capsule  Commonly known as: NEURONTIN  Take 1 capsule by mouth in the morning and 1 capsule at noon and 1 capsule before bedtime. Do all this for 30 days. lidocaine 4 % external patch  Place 1 patch onto the skin in the morning for 10 days. trolamine salicylate 10 % cream  Commonly known as: ASPERCREME  Apply topically as needed.             CHANGE how you take these medications      dicyclomine 10 MG capsule  Commonly known as: BENTYL  Take 1 capsule by mouth 4 times daily as needed (Abdominal spasmodic pain)  What changed:   when to take this  reasons to take this     methotrexate 2.5 MG chemo tablet  Commonly known as: RHEUMATREX  Take 5 tablets by mouth once a week  Start taking on: July 31, 2022  What changed:   medication strength  how much to take  additional instructions     metoprolol tartrate 25 MG tablet  Commonly known as: LOPRESSOR  Take 1 tablet by mouth in the morning and at bedtime  What changed: when to take this            CONTINUE taking these medications      amLODIPine 10 MG tablet  Commonly known as: NORVASC     aspirin 81 MG chewable tablet     atorvastatin 20 MG tablet  Commonly known as: LIPITOR     carBAMazepine 200 MG tablet  Commonly known as: TEGRETOL     clopidogrel 75 MG tablet  Commonly known as: PLAVIX     docusate sodium 100 MG capsule  Commonly known as: Colace  Take 1 capsule by mouth daily     folic acid 1 MG tablet  Commonly known as: FOLVITE     lisinopril-hydroCHLOROthiazide 20-25 MG per tablet  Commonly known as: PRINZIDE;ZESTORETIC     omeprazole 20 MG delayed release capsule  Commonly known as: PRILOSEC     polyethylene glycol 17 GM/SCOOP powder  Commonly known as: GLYCOLAX     predniSONE 5 MG tablet  Commonly known as: Marcelo Jumper            STOP taking these medications      cloNIDine 0.1 MG tablet  Commonly known as: CATAPRES               Where to Get Your Medications        Information about where to get these medications is not yet available    Ask your nurse or doctor about these medications  bisacodyl 5 MG EC tablet  dicyclomine 10 MG capsule  gabapentin 100 MG capsule  lidocaine 4 % external patch  methotrexate 2.5 MG chemo tablet  metoprolol tartrate 25 MG tablet  trolamine salicylate 10 % cream         Objective Findings at Discharge:       BMP/CBC  Recent Labs     07/25/22  1049      K 4.3      CO2 26   BUN 46*   CREATININE 1.0   WBC 10.7*   HCT 27.8*   *       IMAGING:  CT head w/o Contrast (7/15/2022)   1. No acute intracranial abnormality. 2. 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 CXR (7/15/2022)   No acute process. Probable hiatal hernia       Additional Information: Patient seen and examined day of discharge.  For more information regarding patient's care please contact [facility] records [phone]    Discharge Time of 33 minutes    Electronically signed by Vish Suarez MD on 7/26/2022 at 6:12 PM

## 2022-07-26 NOTE — CARE COORDINATION
Received call from [facility] with approval.  Sent PS to Dr. Schmitz Number to update.

## 2022-07-26 NOTE — PROGRESS NOTES
Occupational Therapy  . Occupational Therapy Treatment Note    Name: Demetrio Angeles MRN: [id number] :   1934   Date:  2022   Admission Date: 7/15/2022 Room:  [address] [place] [address]A     Pt would benefit from continued acute care OT services w/ discharge to SNF    Primary Problem:      Restrictions/Precautions:          General precautions, fall risk      Communication with other providers: Per chart review, patient ok for tx. Subjective:  Patient states:  I want to lay down, bless you  Pain:   Location, Type, Intensity (0/10 to 10/10):  none stated    Objective:    Observation: patient in recliner. l very soft spoken. Wanting to return to bed. Objective Measures:  pocket tele    Treatment, including education:    ADL activity training was instructed today. Cues were given for safety, sequence, UE/LE placement, visual cues, and balance. Activities performed today included dressing, toileting,     Toileting- DEP. Urine soaked diaper and very small BM. Jennifer care side lying. LB dressing- DEP for socks  UB dressing- Max A for hair cap and glasses. Therapeutic activity training was instructed today. Cues were given for safety, sequence, UE/LE placement, awareness, and balance. Activities performed today included bed mobility training, sup-sit, sit-stand, SPT. Recliner to EOB SPT- heavy Max A/DEP. Cues for hand placement. Return supine- Max A  Rolling- Min A with use of bed features   Bridging- good. Boost to [facility]- in trendelenburg. HHA to place BLE into flexed position to assist. Needing Max A    Patient educated on role of OT , benefits of OT and rationale for therapeutic intervention. Benefit of OOB/EOB activities, benefit of movement. AE/AD, WS/EC,     All therapeutic intervention performed c emphasis on dynamic balance / standing tolerance to increase strength, endurance and activity tolerance for increased [place] c ADL tasks and func transfers / mobility.     Patient left safely in bed at end of session, with call light in reach, alarm on and nursing aware. Gait belt was used for func transfers / mobility.     Assessment / Impression:    Patient's tolerance of treatment: good  Adverse Reaction: none  Significant change in status and impact:  none  Barriers to improvement: weakness      Plan for Next Session:    Continue with OT POC      Time in:  1345  Time out:  1410  Timed treatment minutes:  25  Total treatment time:  25      Electronically signed by:    TRACY Emery COTA/XOCHITL [id number]    7/26/2022, 2:14 PM

## 2022-07-26 NOTE — PROGRESS NOTES
Physical Therapy Treatment Note  Name: Taryn Busch MRN: [id number] :   1934   Date:  2022   Admission Date: 7/15/2022 Room:  [address] [place], [place] [address]     Restrictions/Precautions:          general precautions, fall risk    Communication with other providers:  RN    Subjective:  Patient states: \"My stomach hurts so bad\"  Pain:   Location, Type, Intensity (0/10 to 10/10):  does not numerically rate but pt complaining of stomach pain throughout entire session. RN notified    Objective:    Observation:  Pt L sidelying in bed and agreeable to session    Treatment, including education/measures:    Bed mobility: pt completed rolling to R SBA with cues for sequencing. Pt completed supine to sitting EOB min A with assist at trunk, increased time to complete, and cues for sequencing    Balance: pt sat EOB ~8 minutes with bilateral UE support CGA with one slight LOB backward requiring min A to recover. Cues provided for sitting posture throughout    Transfers: Pt completed stand pivot transfer from bed to chair max A with cues for sequencing and hand placement    Assessment / Impression:    Pt up in chair at end of session with needs in reach and alarm on. Rn notified of stomach pain.      Patient's tolerance of treatment:  fair   Adverse Reaction: n/a  Significant change in status and impact:  n/a  Barriers to improvement:  decreased endurance, increased pain, impaired transfers    Plan for Next Session:    Continue to address bed mobility, transfer training, and gait training in future sessions    Time in:  1034  Time out:  1100  Timed treatment minutes:  26  Total treatment time:  26    Previously filed items:           Short Term Goals  Time Frame for Short term goals: 1 week  Short term goal 1: Pt to complete all bed mobility SBA  Short term goal 2: Pt to complete stand pivot transfer with LRAD mod A  Short term goal 3: Pt to propel manual WC 25' with min A    Electronically signed by:    Babak Dash PT  7/26/2022, 11:39 AM

## 2022-07-26 NOTE — PLAN OF CARE
Problem: Discharge Planning  Goal: Discharge to home or other facility with appropriate resources  Outcome: Resolved/Not Met     Problem: Skin/Tissue Integrity  Goal: Absence of new skin breakdown  Description: 1. Monitor for areas of redness and/or skin breakdown  2. Assess vascular access sites hourly  3. Every 4-6 hours minimum:  Change oxygen saturation probe site  4. Every 4-6 hours:  If on nasal continuous positive airway pressure, respiratory therapy assess nares and determine need for appliance change or resting period.   Outcome: Resolved/Not Met     Problem: Safety - Adult  Goal: Free from fall injury  Outcome: Resolved/Not Met     Problem: ABCDS Injury Assessment  Goal: Absence of physical injury  Outcome: Resolved/Not Met     Problem: Neurosensory - Adult  Goal: Achieves stable or improved neurological status  Outcome: Resolved/Not Met  Flowsheets (Taken 7/26/2022 5348)  Achieves stable or improved neurological status:   Assess for and report changes in neurological status   Initiate measures to prevent increased intracranial pressure     Problem: Cardiovascular - Adult  Goal: Maintains optimal cardiac output and hemodynamic stability  Outcome: Resolved/Not Met  Goal: Absence of cardiac dysrhythmias or at baseline  Outcome: Resolved/Not Met     Problem: Skin/Tissue Integrity - Adult  Goal: Skin integrity remains intact  Outcome: Resolved/Not Met  Flowsheets (Taken 7/26/2022 2557)  Skin Integrity Remains Intact:   Monitor for areas of redness and/or skin breakdown   Assess vascular access sites hourly     Problem: Musculoskeletal - Adult  Goal: Return mobility to safest level of function  Outcome: Resolved/Not Met  Goal: Return ADL status to a safe level of function  Outcome: Resolved/Not Met     Problem: Gastrointestinal - Adult  Goal: Maintains adequate nutritional intake  Outcome: Resolved/Not Met     Problem: Genitourinary - Adult  Goal: Absence of urinary retention  Outcome: Resolved/Not Met Problem: Nutrition Deficit:  Goal: Optimize nutritional status  Outcome: Resolved/Not Met     Problem: Pain  Goal: Verbalizes/displays adequate comfort level or baseline comfort level  Outcome: Resolved/Not Met

## 2022-07-26 NOTE — CARE COORDINATION
Reviewed chart and discussed in IDR , pt approved for [facility] today Dr Soha Azevedo is aware. Plan d/c today    1600 Pt on discharge , set up with [facility] for 1900. Nursing and Zofia at [facility] agreeable. Vm left for pt's daughter Rachel Huffman.

## 2022-08-15 PROBLEM — N39.0 UTI (URINARY TRACT INFECTION): Status: RESOLVED | Noted: 2022-07-16 | Resolved: 2022-08-15

## 2022-10-18 ENCOUNTER — HOSPITAL ENCOUNTER (OUTPATIENT)
Age: 87
Discharge: HOME | End: 2022-10-18
Payer: COMMERCIAL

## 2022-10-18 LAB
ALT SERPL-CCNC: 15 U/L (ref 10–40)
AST SERPL-CCNC: 16 IU/L (ref 15–37)
BASOPHILS ABSOLUTE: 0 K/CU MM
BASOPHILS RELATIVE PERCENT: 0.4 % (ref 0–1)
BUN BLDV-MCNC: 34 MG/DL (ref 6–23)
CREAT SERPL-MCNC: 1 MG/DL (ref 0.6–1.1)
DIFFERENTIAL TYPE: ABNORMAL
EOSINOPHILS ABSOLUTE: 0.2 K/CU MM
EOSINOPHILS RELATIVE PERCENT: 1.9 % (ref 0–3)
ERYTHROCYTE SEDIMENTATION RATE: 38 MM/HR (ref 0–30)
GFR SERPL CREATININE-BSD FRML MDRD: 54 ML/MIN/1.73M2
HCT VFR BLD CALC: 33.5 % (ref 37–47)
HEMOGLOBIN: 10.3 GM/DL (ref 12.5–16)
IMMATURE NEUTROPHIL %: 0.2 % (ref 0–0.43)
LYMPHOCYTES ABSOLUTE: 1.9 K/CU MM
LYMPHOCYTES RELATIVE PERCENT: 22.8 % (ref 24–44)
MCH RBC QN AUTO: 33.4 PG (ref 27–31)
MCHC RBC AUTO-ENTMCNC: 30.7 % (ref 32–36)
MCV RBC AUTO: 108.8 FL (ref 78–100)
MONOCYTES ABSOLUTE: 0.5 K/CU MM
MONOCYTES RELATIVE PERCENT: 6 % (ref 0–4)
NUCLEATED RBC %: 0 %
PDW BLD-RTO: 14 % (ref 11.7–14.9)
PLATELET # BLD: 278 K/CU MM (ref 140–440)
PMV BLD AUTO: 9.3 FL (ref 7.5–11.1)
RBC # BLD: 3.08 M/CU MM (ref 4.2–5.4)
SEGMENTED NEUTROPHILS ABSOLUTE COUNT: 5.8 K/CU MM
SEGMENTED NEUTROPHILS RELATIVE PERCENT: 68.7 % (ref 36–66)
TOTAL IMMATURE NEUTOROPHIL: 0.02 K/CU MM
TOTAL NUCLEATED RBC: 0 K/CU MM
WBC # BLD: 8.5 K/CU MM (ref 4–10.5)

## 2022-10-18 PROCEDURE — 82565 ASSAY OF CREATININE: CPT

## 2022-10-18 PROCEDURE — 85025 COMPLETE CBC W/AUTO DIFF WBC: CPT

## 2022-10-18 PROCEDURE — 84460 ALANINE AMINO (ALT) (SGPT): CPT

## 2022-10-18 PROCEDURE — 84520 ASSAY OF UREA NITROGEN: CPT

## 2022-10-18 PROCEDURE — 84450 TRANSFERASE (AST) (SGOT): CPT

## 2022-10-18 PROCEDURE — 85652 RBC SED RATE AUTOMATED: CPT

## 2022-10-18 PROCEDURE — 36415 COLL VENOUS BLD VENIPUNCTURE: CPT

## 2023-02-20 ENCOUNTER — HOSPITAL ENCOUNTER (OUTPATIENT)
Age: 88
Discharge: HOME | End: 2023-02-20
Payer: COMMERCIAL

## 2023-02-20 LAB
ALT SERPL-CCNC: 15 U/L (ref 10–40)
AST SERPL-CCNC: 14 IU/L (ref 15–37)
BASOPHILS ABSOLUTE: 0 K/CU MM
BASOPHILS RELATIVE PERCENT: 0.3 % (ref 0–1)
BUN SERPL-MCNC: 37 MG/DL (ref 6–23)
CREAT SERPL-MCNC: 1.1 MG/DL (ref 0.6–1.1)
DIFFERENTIAL TYPE: ABNORMAL
EOSINOPHILS ABSOLUTE: 0.2 K/CU MM
EOSINOPHILS RELATIVE PERCENT: 1.7 % (ref 0–3)
ERYTHROCYTE SEDIMENTATION RATE: 22 MM/HR (ref 0–30)
GFR SERPL CREATININE-BSD FRML MDRD: 48 ML/MIN/1.73M2
HCT VFR BLD CALC: 34 % (ref 37–47)
HEMOGLOBIN: 10.4 GM/DL (ref 12.5–16)
IMMATURE NEUTROPHIL %: 0.3 % (ref 0–0.43)
LYMPHOCYTES ABSOLUTE: 2.2 K/CU MM
LYMPHOCYTES RELATIVE PERCENT: 21.7 % (ref 24–44)
MCH RBC QN AUTO: 34.1 PG (ref 27–31)
MCHC RBC AUTO-ENTMCNC: 30.6 % (ref 32–36)
MCV RBC AUTO: 111.5 FL (ref 78–100)
MONOCYTES ABSOLUTE: 0.7 K/CU MM
MONOCYTES RELATIVE PERCENT: 6.6 % (ref 0–4)
NUCLEATED RBC %: 0 %
PDW BLD-RTO: 13.8 % (ref 11.7–14.9)
PLATELET # BLD: 257 K/CU MM (ref 140–440)
PMV BLD AUTO: 9.5 FL (ref 7.5–11.1)
RBC # BLD: 3.05 M/CU MM (ref 4.2–5.4)
SEGMENTED NEUTROPHILS ABSOLUTE COUNT: 7.2 K/CU MM
SEGMENTED NEUTROPHILS RELATIVE PERCENT: 69.4 % (ref 36–66)
TOTAL IMMATURE NEUTOROPHIL: 0.03 K/CU MM
TOTAL NUCLEATED RBC: 0 K/CU MM
WBC # BLD: 10.3 K/CU MM (ref 4–10.5)

## 2023-02-20 PROCEDURE — 82565 ASSAY OF CREATININE: CPT

## 2023-02-20 PROCEDURE — 85652 RBC SED RATE AUTOMATED: CPT

## 2023-02-20 PROCEDURE — 84520 ASSAY OF UREA NITROGEN: CPT

## 2023-02-20 PROCEDURE — 85025 COMPLETE CBC W/AUTO DIFF WBC: CPT

## 2023-02-20 PROCEDURE — 84450 TRANSFERASE (AST) (SGOT): CPT

## 2023-02-20 PROCEDURE — 84460 ALANINE AMINO (ALT) (SGPT): CPT

## 2023-02-20 PROCEDURE — 36415 COLL VENOUS BLD VENIPUNCTURE: CPT

## 2023-02-21 ENCOUNTER — HOSPITAL ENCOUNTER (OUTPATIENT)
Age: 88
Setting detail: SPECIMEN
Discharge: HOME | End: 2023-02-21
Payer: COMMERCIAL

## 2023-02-21 PROCEDURE — 80053 COMPREHEN METABOLIC PANEL: CPT

## 2023-02-21 PROCEDURE — 84443 ASSAY THYROID STIM HORMONE: CPT

## 2023-02-22 LAB
ALBUMIN SERPL-MCNC: 3.8 GM/DL (ref 3.4–5)
ALP BLD-CCNC: 69 IU/L (ref 40–129)
ALT SERPL-CCNC: 16 U/L (ref 10–40)
ANION GAP SERPL CALCULATED.3IONS-SCNC: 11 MMOL/L (ref 4–16)
AST SERPL-CCNC: 14 IU/L (ref 15–37)
BILIRUB SERPL-MCNC: 0.2 MG/DL (ref 0–1)
BUN SERPL-MCNC: 38 MG/DL (ref 6–23)
CALCIUM SERPL-MCNC: 10 MG/DL (ref 8.3–10.6)
CHLORIDE BLD-SCNC: 104 MMOL/L (ref 99–110)
CO2: 26 MMOL/L (ref 21–32)
CREAT SERPL-MCNC: 1.1 MG/DL (ref 0.6–1.1)
GFR SERPL CREATININE-BSD FRML MDRD: 48 ML/MIN/1.73M2
GLUCOSE SERPL-MCNC: 199 MG/DL (ref 70–99)
POTASSIUM SERPL-SCNC: 3.9 MMOL/L (ref 3.5–5.1)
SODIUM BLD-SCNC: 141 MMOL/L (ref 135–145)
TOTAL PROTEIN: 6.7 GM/DL (ref 6.4–8.2)
TSH SERPL DL<=0.005 MIU/L-ACNC: 0.66 UIU/ML (ref 0.27–4.2)

## 2023-07-12 ENCOUNTER — HOSPITAL ENCOUNTER (OUTPATIENT)
Age: 88
Discharge: HOME | End: 2023-07-12
Payer: COMMERCIAL

## 2023-07-12 LAB
ALT SERPL-CCNC: 20 U/L (ref 10–40)
AST SERPL-CCNC: 17 IU/L (ref 15–37)
BASOPHILS ABSOLUTE: 0 K/CU MM
BASOPHILS RELATIVE PERCENT: 0.4 % (ref 0–1)
BUN SERPL-MCNC: 42 MG/DL (ref 6–23)
CREAT SERPL-MCNC: 1.3 MG/DL (ref 0.6–1.1)
DIFFERENTIAL TYPE: ABNORMAL
EOSINOPHILS ABSOLUTE: 0.3 K/CU MM
EOSINOPHILS RELATIVE PERCENT: 3.1 % (ref 0–3)
ERYTHROCYTE SEDIMENTATION RATE: 16 MM/HR (ref 0–30)
GFR SERPL CREATININE-BSD FRML MDRD: 40 ML/MIN/1.73M2
HCT VFR BLD CALC: 33.5 % (ref 37–47)
HEMOGLOBIN: 10.3 GM/DL (ref 12.5–16)
IMMATURE NEUTROPHIL %: 1.1 % (ref 0–0.43)
LYMPHOCYTES ABSOLUTE: 1.7 K/CU MM
LYMPHOCYTES RELATIVE PERCENT: 20.6 % (ref 24–44)
MCH RBC QN AUTO: 32.9 PG (ref 27–31)
MCHC RBC AUTO-ENTMCNC: 30.7 % (ref 32–36)
MCV RBC AUTO: 107 FL (ref 78–100)
MONOCYTES ABSOLUTE: 0.5 K/CU MM
MONOCYTES RELATIVE PERCENT: 6.1 % (ref 0–4)
NUCLEATED RBC %: 0 %
PDW BLD-RTO: 13.3 % (ref 11.7–14.9)
PLATELET # BLD: 244 K/CU MM (ref 140–440)
PMV BLD AUTO: 9.3 FL (ref 7.5–11.1)
RBC # BLD: 3.13 M/CU MM (ref 4.2–5.4)
SEGMENTED NEUTROPHILS ABSOLUTE COUNT: 5.6 K/CU MM
SEGMENTED NEUTROPHILS RELATIVE PERCENT: 68.7 % (ref 36–66)
TOTAL IMMATURE NEUTOROPHIL: 0.09 K/CU MM
TOTAL NUCLEATED RBC: 0 K/CU MM
WBC # BLD: 8.2 K/CU MM (ref 4–10.5)

## 2023-07-12 PROCEDURE — 85652 RBC SED RATE AUTOMATED: CPT

## 2023-07-12 PROCEDURE — 82565 ASSAY OF CREATININE: CPT

## 2023-07-12 PROCEDURE — 84450 TRANSFERASE (AST) (SGOT): CPT

## 2023-07-12 PROCEDURE — 85025 COMPLETE CBC W/AUTO DIFF WBC: CPT

## 2023-07-12 PROCEDURE — 84520 ASSAY OF UREA NITROGEN: CPT

## 2023-07-12 PROCEDURE — 36415 COLL VENOUS BLD VENIPUNCTURE: CPT

## 2023-07-12 PROCEDURE — 84460 ALANINE AMINO (ALT) (SGPT): CPT

## 2024-03-12 ENCOUNTER — HOSPITAL ENCOUNTER (OUTPATIENT)
Age: 89
Discharge: HOME | End: 2024-03-12
Payer: COMMERCIAL

## 2024-03-12 LAB
ALT SERPL-CCNC: 22 U/L (ref 10–40)
AST SERPL-CCNC: 21 IU/L (ref 15–37)
BASOPHILS ABSOLUTE: 0 K/CU MM
BASOPHILS RELATIVE PERCENT: 0.3 % (ref 0–1)
BUN SERPL-MCNC: 36 MG/DL (ref 6–23)
CREAT SERPL-MCNC: 1.1 MG/DL (ref 0.6–1.1)
DIFFERENTIAL TYPE: ABNORMAL
EOSINOPHILS ABSOLUTE: 0.1 K/CU MM
EOSINOPHILS RELATIVE PERCENT: 1.2 % (ref 0–3)
ERYTHROCYTE SEDIMENTATION RATE: 16 MM/HR (ref 0–30)
GFR SERPL CREATININE-BSD FRML MDRD: 48 ML/MIN/1.73M2
HCT VFR BLD CALC: 35.7 % (ref 37–47)
HEMOGLOBIN: 10.3 GM/DL (ref 12.5–16)
IMMATURE NEUTROPHIL %: 0.2 % (ref 0–0.43)
LYMPHOCYTES ABSOLUTE: 2 K/CU MM
LYMPHOCYTES RELATIVE PERCENT: 19.9 % (ref 24–44)
MCH RBC QN AUTO: 33.7 PG (ref 27–31)
MCHC RBC AUTO-ENTMCNC: 28.9 % (ref 32–36)
MCV RBC AUTO: 116.7 FL (ref 78–100)
MONOCYTES ABSOLUTE: 0.6 K/CU MM
MONOCYTES RELATIVE PERCENT: 5.7 % (ref 0–4)
NUCLEATED RBC %: 0 %
PDW BLD-RTO: 13.7 % (ref 11.7–14.9)
PLATELET # BLD: 286 K/CU MM (ref 140–440)
PMV BLD AUTO: 9.4 FL (ref 7.5–11.1)
RBC # BLD: 3.06 M/CU MM (ref 4.2–5.4)
SEGMENTED NEUTROPHILS ABSOLUTE COUNT: 7.2 K/CU MM
SEGMENTED NEUTROPHILS RELATIVE PERCENT: 72.7 % (ref 36–66)
TOTAL IMMATURE NEUTOROPHIL: 0.02 K/CU MM
TOTAL NUCLEATED RBC: 0 K/CU MM
WBC # BLD: 10 K/CU MM (ref 4–10.5)

## 2024-03-12 PROCEDURE — 84460 ALANINE AMINO (ALT) (SGPT): CPT

## 2024-03-12 PROCEDURE — 85652 RBC SED RATE AUTOMATED: CPT

## 2024-03-12 PROCEDURE — 84520 ASSAY OF UREA NITROGEN: CPT

## 2024-03-12 PROCEDURE — 82565 ASSAY OF CREATININE: CPT

## 2024-03-12 PROCEDURE — 85025 COMPLETE CBC W/AUTO DIFF WBC: CPT

## 2024-03-12 PROCEDURE — 84450 TRANSFERASE (AST) (SGOT): CPT

## 2024-03-12 PROCEDURE — 36415 COLL VENOUS BLD VENIPUNCTURE: CPT

## 2024-04-26 ENCOUNTER — APPOINTMENT (OUTPATIENT)
Dept: GENERAL RADIOLOGY | Age: 89
End: 2024-04-26
Payer: COMMERCIAL

## 2024-04-26 ENCOUNTER — APPOINTMENT (OUTPATIENT)
Dept: CT IMAGING | Age: 89
End: 2024-04-26
Payer: COMMERCIAL

## 2024-04-26 ENCOUNTER — HOSPITAL ENCOUNTER (INPATIENT)
Age: 89
LOS: 5 days | Discharge: HOME | End: 2024-05-01
Attending: EMERGENCY MEDICINE | Admitting: STUDENT IN AN ORGANIZED HEALTH CARE EDUCATION/TRAINING PROGRAM
Payer: COMMERCIAL

## 2024-04-26 DIAGNOSIS — N39.0 URINARY TRACT INFECTION WITH HEMATURIA, SITE UNSPECIFIED: ICD-10-CM

## 2024-04-26 DIAGNOSIS — R31.9 URINARY TRACT INFECTION WITH HEMATURIA, SITE UNSPECIFIED: ICD-10-CM

## 2024-04-26 DIAGNOSIS — R47.01 APHASIA: ICD-10-CM

## 2024-04-26 DIAGNOSIS — G93.40 ACUTE ENCEPHALOPATHY: Primary | ICD-10-CM

## 2024-04-26 DIAGNOSIS — R79.89 TROPONIN I ABOVE REFERENCE RANGE: ICD-10-CM

## 2024-04-26 PROBLEM — R65.20 SEVERE SEPSIS (HCC): Status: ACTIVE | Noted: 2024-04-26

## 2024-04-26 PROBLEM — A41.9 SEVERE SEPSIS (HCC): Status: ACTIVE | Noted: 2024-04-26

## 2024-04-26 LAB
ALBUMIN SERPL-MCNC: 3.8 GM/DL (ref 3.4–5)
ALCOHOL SCREEN SERUM: <0.01 %WT/VOL
ALP BLD-CCNC: 81 IU/L (ref 40–129)
ALT SERPL-CCNC: 24 U/L (ref 10–40)
AMMONIA: 21 UMOL/L (ref 11–51)
AMPHETAMINES: NEGATIVE
ANION GAP SERPL CALCULATED.3IONS-SCNC: 11 MMOL/L (ref 7–16)
AST SERPL-CCNC: 29 IU/L (ref 15–37)
BACTERIA: ABNORMAL /HPF
BARBITURATE SCREEN URINE: NEGATIVE
BASE EXCESS MIXED: 2.8 (ref 0–3)
BASOPHILS ABSOLUTE: 0 K/CU MM
BASOPHILS RELATIVE PERCENT: 0.3 % (ref 0–1)
BENZODIAZEPINE SCREEN, URINE: NEGATIVE
BILIRUB SERPL-MCNC: 0.2 MG/DL (ref 0–1)
BILIRUBIN URINE: NEGATIVE MG/DL
BLOOD, URINE: ABNORMAL
BUN SERPL-MCNC: 43 MG/DL (ref 6–23)
CALCIUM SERPL-MCNC: 9.6 MG/DL (ref 8.3–10.6)
CANNABINOID SCREEN URINE: NEGATIVE
CHLORIDE BLD-SCNC: 102 MMOL/L (ref 99–110)
CLARITY: ABNORMAL
CO2: 27 MMOL/L (ref 21–32)
COCAINE METABOLITE: NEGATIVE
COLOR: YELLOW
COMMENT: ABNORMAL
CREAT SERPL-MCNC: 1.3 MG/DL (ref 0.6–1.1)
DIFFERENTIAL TYPE: ABNORMAL
EOSINOPHILS ABSOLUTE: 0.1 K/CU MM
EOSINOPHILS RELATIVE PERCENT: 0.7 % (ref 0–3)
FENTANYL URINE: NEGATIVE
GFR SERPL CREATININE-BSD FRML MDRD: 39 ML/MIN/1.73M2
GLUCOSE SERPL-MCNC: 164 MG/DL (ref 70–99)
GLUCOSE, URINE: NEGATIVE MG/DL
HCO3 VENOUS: 29.4 MMOL/L (ref 22–29)
HCT VFR BLD CALC: 36.8 % (ref 37–47)
HEMOGLOBIN: 11.4 GM/DL (ref 12.5–16)
IMMATURE NEUTROPHIL %: 0.6 % (ref 0–0.43)
INFLUENZA A ANTIGEN: NOT DETECTED
INFLUENZA B ANTIGEN: NOT DETECTED
INR BLD: 1 INDEX
KETONES, URINE: NEGATIVE MG/DL
LEUKOCYTE ESTERASE, URINE: ABNORMAL
LYMPHOCYTES ABSOLUTE: 0.4 K/CU MM
LYMPHOCYTES RELATIVE PERCENT: 5.3 % (ref 24–44)
MCH RBC QN AUTO: 35 PG (ref 27–31)
MCHC RBC AUTO-ENTMCNC: 31 % (ref 32–36)
MCV RBC AUTO: 112.9 FL (ref 78–100)
MONOCYTES ABSOLUTE: 0.1 K/CU MM
MONOCYTES RELATIVE PERCENT: 1.5 % (ref 0–4)
NEUTROPHILS RELATIVE PERCENT: 91.6 % (ref 36–66)
NITRITE URINE, QUANTITATIVE: POSITIVE
NUCLEATED RBC %: 0 %
O2 SAT, VEN: 74.8 % (ref 50–70)
OPIATES, URINE: NEGATIVE
OXYCODONE: NEGATIVE
PCO2, VEN: 52 MMHG (ref 41–51)
PDW BLD-RTO: 13 % (ref 11.7–14.9)
PH VENOUS: 7.36 (ref 7.32–7.43)
PH, URINE: 5.5 (ref 5–8)
PLATELET # BLD: 255 K/CU MM (ref 140–440)
PMV BLD AUTO: 8.9 FL (ref 7.5–11.1)
PO2, VEN: 50 MMHG (ref 28–48)
POTASSIUM SERPL-SCNC: 4.4 MMOL/L (ref 3.5–5.1)
PROTEIN UA: NEGATIVE MG/DL
PROTHROMBIN TIME: 13.3 SECONDS (ref 11.7–14.5)
RBC # BLD: 3.26 M/CU MM (ref 4.2–5.4)
RBC URINE: 64 /HPF (ref 0–6)
SARS-COV-2 RDRP RESP QL NAA+PROBE: NOT DETECTED
SEGMENTED NEUTROPHILS ABSOLUTE COUNT: 6.6 K/CU MM
SODIUM BLD-SCNC: 140 MMOL/L (ref 135–145)
SOURCE: NORMAL
SPECIFIC GRAVITY UA: 1.01 (ref 1–1.03)
SQUAMOUS EPITHELIAL: 7 /HPF
TOTAL IMMATURE NEUTOROPHIL: 0.04 K/CU MM
TOTAL NUCLEATED RBC: 0 K/CU MM
TOTAL PROTEIN: 7.1 GM/DL (ref 6.4–8.2)
TRICHOMONAS: ABNORMAL /HPF
TROPONIN, HIGH SENSITIVITY: 37 NG/L (ref 0–14)
UROBILINOGEN, URINE: 0.2 MG/DL (ref 0.2–1)
WBC # BLD: 7.2 K/CU MM (ref 4–10.5)
WBC UA: 94 /HPF (ref 0–5)

## 2024-04-26 PROCEDURE — 6360000002 HC RX W HCPCS: Performed by: PHYSICIAN ASSISTANT

## 2024-04-26 PROCEDURE — 85610 PROTHROMBIN TIME: CPT

## 2024-04-26 PROCEDURE — G0480 DRUG TEST DEF 1-7 CLASSES: HCPCS

## 2024-04-26 PROCEDURE — 82140 ASSAY OF AMMONIA: CPT

## 2024-04-26 PROCEDURE — 87635 SARS-COV-2 COVID-19 AMP PRB: CPT

## 2024-04-26 PROCEDURE — 96374 THER/PROPH/DIAG INJ IV PUSH: CPT

## 2024-04-26 PROCEDURE — 71045 X-RAY EXAM CHEST 1 VIEW: CPT

## 2024-04-26 PROCEDURE — 81001 URINALYSIS AUTO W/SCOPE: CPT

## 2024-04-26 PROCEDURE — 87150 DNA/RNA AMPLIFIED PROBE: CPT

## 2024-04-26 PROCEDURE — 87040 BLOOD CULTURE FOR BACTERIA: CPT

## 2024-04-26 PROCEDURE — 80053 COMPREHEN METABOLIC PANEL: CPT

## 2024-04-26 PROCEDURE — 87186 SC STD MICRODIL/AGAR DIL: CPT

## 2024-04-26 PROCEDURE — 84484 ASSAY OF TROPONIN QUANT: CPT

## 2024-04-26 PROCEDURE — 87086 URINE CULTURE/COLONY COUNT: CPT

## 2024-04-26 PROCEDURE — 70450 CT HEAD/BRAIN W/O DYE: CPT

## 2024-04-26 PROCEDURE — 1200000000 HC SEMI PRIVATE

## 2024-04-26 PROCEDURE — 70498 CT ANGIOGRAPHY NECK: CPT

## 2024-04-26 PROCEDURE — 6370000000 HC RX 637 (ALT 250 FOR IP): Performed by: PHYSICIAN ASSISTANT

## 2024-04-26 PROCEDURE — 6360000004 HC RX CONTRAST MEDICATION: Performed by: EMERGENCY MEDICINE

## 2024-04-26 PROCEDURE — 82805 BLOOD GASES W/O2 SATURATION: CPT

## 2024-04-26 PROCEDURE — 87077 CULTURE AEROBIC IDENTIFY: CPT

## 2024-04-26 PROCEDURE — 85025 COMPLETE CBC W/AUTO DIFF WBC: CPT

## 2024-04-26 PROCEDURE — 99285 EMERGENCY DEPT VISIT HI MDM: CPT

## 2024-04-26 PROCEDURE — 93005 ELECTROCARDIOGRAM TRACING: CPT | Performed by: EMERGENCY MEDICINE

## 2024-04-26 PROCEDURE — 74176 CT ABD & PELVIS W/O CONTRAST: CPT

## 2024-04-26 PROCEDURE — 87502 INFLUENZA DNA AMP PROBE: CPT

## 2024-04-26 PROCEDURE — 80307 DRUG TEST PRSMV CHEM ANLYZR: CPT

## 2024-04-26 PROCEDURE — 2580000003 HC RX 258: Performed by: PHYSICIAN ASSISTANT

## 2024-04-26 RX ORDER — ATORVASTATIN CALCIUM 10 MG/1
20 TABLET, FILM COATED ORAL DAILY
Status: DISCONTINUED | OUTPATIENT
Start: 2024-04-27 | End: 2024-05-01 | Stop reason: HOSPADM

## 2024-04-26 RX ORDER — POTASSIUM CHLORIDE 7.45 MG/ML
10 INJECTION INTRAVENOUS PRN
Status: DISCONTINUED | OUTPATIENT
Start: 2024-04-26 | End: 2024-04-27 | Stop reason: ALTCHOICE

## 2024-04-26 RX ORDER — ACETAMINOPHEN 500 MG
1000 TABLET ORAL ONCE
Status: COMPLETED | OUTPATIENT
Start: 2024-04-26 | End: 2024-04-26

## 2024-04-26 RX ORDER — ACETAMINOPHEN 325 MG/1
650 TABLET ORAL EVERY 6 HOURS PRN
Status: DISCONTINUED | OUTPATIENT
Start: 2024-04-26 | End: 2024-05-01 | Stop reason: HOSPADM

## 2024-04-26 RX ORDER — ONDANSETRON 4 MG/1
4 TABLET, ORALLY DISINTEGRATING ORAL EVERY 8 HOURS PRN
Status: DISCONTINUED | OUTPATIENT
Start: 2024-04-26 | End: 2024-05-01 | Stop reason: HOSPADM

## 2024-04-26 RX ORDER — 0.9 % SODIUM CHLORIDE 0.9 %
500 INTRAVENOUS SOLUTION INTRAVENOUS ONCE
Status: COMPLETED | OUTPATIENT
Start: 2024-04-26 | End: 2024-04-26

## 2024-04-26 RX ORDER — DOCUSATE SODIUM 100 MG/1
100 CAPSULE, LIQUID FILLED ORAL DAILY
Status: DISCONTINUED | OUTPATIENT
Start: 2024-04-27 | End: 2024-05-01 | Stop reason: HOSPADM

## 2024-04-26 RX ORDER — SODIUM CHLORIDE 0.9 % (FLUSH) 0.9 %
5-40 SYRINGE (ML) INJECTION EVERY 12 HOURS SCHEDULED
Status: DISCONTINUED | OUTPATIENT
Start: 2024-04-26 | End: 2024-05-01 | Stop reason: HOSPADM

## 2024-04-26 RX ORDER — SODIUM CHLORIDE, SODIUM LACTATE, POTASSIUM CHLORIDE, CALCIUM CHLORIDE 600; 310; 30; 20 MG/100ML; MG/100ML; MG/100ML; MG/100ML
INJECTION, SOLUTION INTRAVENOUS CONTINUOUS
Status: DISPENSED | OUTPATIENT
Start: 2024-04-26 | End: 2024-04-27

## 2024-04-26 RX ORDER — AMLODIPINE BESYLATE 5 MG/1
5 TABLET ORAL DAILY
Status: DISCONTINUED | OUTPATIENT
Start: 2024-04-27 | End: 2024-05-01 | Stop reason: HOSPADM

## 2024-04-26 RX ORDER — POLYETHYLENE GLYCOL 3350 17 G/17G
17 POWDER, FOR SOLUTION ORAL DAILY PRN
Status: DISCONTINUED | OUTPATIENT
Start: 2024-04-26 | End: 2024-05-01 | Stop reason: HOSPADM

## 2024-04-26 RX ORDER — ACETAMINOPHEN 650 MG/1
650 SUPPOSITORY RECTAL EVERY 6 HOURS PRN
Status: DISCONTINUED | OUTPATIENT
Start: 2024-04-26 | End: 2024-05-01 | Stop reason: HOSPADM

## 2024-04-26 RX ORDER — FOLIC ACID 1 MG/1
1 TABLET ORAL DAILY
Status: DISCONTINUED | OUTPATIENT
Start: 2024-04-27 | End: 2024-05-01 | Stop reason: HOSPADM

## 2024-04-26 RX ORDER — ENOXAPARIN SODIUM 100 MG/ML
40 INJECTION SUBCUTANEOUS DAILY
Status: DISCONTINUED | OUTPATIENT
Start: 2024-04-27 | End: 2024-04-27

## 2024-04-26 RX ORDER — MAGNESIUM SULFATE IN WATER 40 MG/ML
2000 INJECTION, SOLUTION INTRAVENOUS PRN
Status: DISCONTINUED | OUTPATIENT
Start: 2024-04-26 | End: 2024-04-27 | Stop reason: ALTCHOICE

## 2024-04-26 RX ORDER — BISACODYL 5 MG/1
5 TABLET, DELAYED RELEASE ORAL DAILY PRN
Status: DISCONTINUED | OUTPATIENT
Start: 2024-04-26 | End: 2024-05-01 | Stop reason: HOSPADM

## 2024-04-26 RX ORDER — LISINOPRIL AND HYDROCHLOROTHIAZIDE 25; 20 MG/1; MG/1
0.5 TABLET ORAL DAILY
Status: DISCONTINUED | OUTPATIENT
Start: 2024-04-27 | End: 2024-05-01 | Stop reason: HOSPADM

## 2024-04-26 RX ORDER — CARBAMAZEPINE 200 MG/1
200 TABLET ORAL 2 TIMES DAILY
Status: DISCONTINUED | OUTPATIENT
Start: 2024-04-26 | End: 2024-05-01 | Stop reason: HOSPADM

## 2024-04-26 RX ORDER — SODIUM CHLORIDE 9 MG/ML
INJECTION, SOLUTION INTRAVENOUS PRN
Status: DISCONTINUED | OUTPATIENT
Start: 2024-04-26 | End: 2024-05-01 | Stop reason: HOSPADM

## 2024-04-26 RX ORDER — POTASSIUM CHLORIDE 20 MEQ/1
40 TABLET, EXTENDED RELEASE ORAL PRN
Status: DISCONTINUED | OUTPATIENT
Start: 2024-04-26 | End: 2024-04-27 | Stop reason: ALTCHOICE

## 2024-04-26 RX ORDER — CLOPIDOGREL BISULFATE 75 MG/1
75 TABLET ORAL DAILY
Status: DISCONTINUED | OUTPATIENT
Start: 2024-04-27 | End: 2024-05-01 | Stop reason: HOSPADM

## 2024-04-26 RX ORDER — ASPIRIN 81 MG/1
81 TABLET, CHEWABLE ORAL DAILY
Status: DISCONTINUED | OUTPATIENT
Start: 2024-04-27 | End: 2024-05-01 | Stop reason: HOSPADM

## 2024-04-26 RX ORDER — ONDANSETRON 2 MG/ML
4 INJECTION INTRAMUSCULAR; INTRAVENOUS EVERY 6 HOURS PRN
Status: DISCONTINUED | OUTPATIENT
Start: 2024-04-26 | End: 2024-05-01 | Stop reason: HOSPADM

## 2024-04-26 RX ORDER — SODIUM CHLORIDE 0.9 % (FLUSH) 0.9 %
5-40 SYRINGE (ML) INJECTION PRN
Status: DISCONTINUED | OUTPATIENT
Start: 2024-04-26 | End: 2024-05-01 | Stop reason: HOSPADM

## 2024-04-26 RX ADMIN — WATER 1000 MG: 1 INJECTION INTRAMUSCULAR; INTRAVENOUS; SUBCUTANEOUS at 22:27

## 2024-04-26 RX ADMIN — SODIUM CHLORIDE 500 ML: 9 INJECTION, SOLUTION INTRAVENOUS at 21:07

## 2024-04-26 RX ADMIN — ACETAMINOPHEN 1000 MG: 500 TABLET ORAL at 21:07

## 2024-04-26 RX ADMIN — IOPAMIDOL 75 ML: 755 INJECTION, SOLUTION INTRAVENOUS at 19:37

## 2024-04-26 NOTE — ED NOTES
ALEKSANDR Juarezt to bedside to assess Pt. Pt having difficulty speaking at this time. Daughter of pt states she normally speaks clearly. ALEKSANDR Juarezt called Stroke alert at this time

## 2024-04-26 NOTE — ED PROVIDER NOTES
with no acute abnormality.      Electronically signed by David Ceja MD      CT HEAD WO CONTRAST   Final Result      1. Stable exam with no acute intracranial abnormality.      Electronically signed by David Ceaj MD      CTA HEAD NECK W CONTRAST   Final Result      Neck      1. No acute vascular abnormality in the neck.   2. Bilateral carotid calcifications with motion artifact limiting detailed evaluation. Mild stenosis of the proximal right internal carotid artery estimated at 25-50%.       Head      1.  No acute intracranial vascular abnormality. No large vessel occlusion.      Electronically signed by David Ceja MD      CT ABDOMEN PELVIS WO CONTRAST Additional Contrast? None    (Results Pending)     No results found.    PROCEDURES   Unless otherwise noted below, none      CRITICAL CARE   CRITICAL CARE NOTE:  CRITICAL CARE NOTE:  There was a high probability of clinically significant life-threatening deterioration of the patient's condition requiring my urgent intervention due to altered mental status, urinary tract infection, possible stroke/CVA.  IV fluids, IV antibiotics, frequent reevaluations, patient counseling, family counseling, antipyretics was performed to address this.   Total critical care time is  45 minutes.    This includes vital sign monitoring, pulse oximetry monitoring, telemetry monitoring, clinical response to the IV medications, reviewing the nursing notes, consultation time, dictation/documentation time, and interpretation of the lab work. This time excludes time spent performing procedures and separately billable procedures and family discussion time.  N/A    CONSULTS:  None      EMERGENCY DEPARTMENT COURSE and MDM:   Vitals:    Vitals:    04/26/24 2023 04/26/24 2042 04/26/24 2102 04/26/24 2118   BP: (!) 120/100 (!) 129/102 130/75    Pulse: (!) 107 (!) 107 (!) 111 (!) 108   Resp: 20 19 14 15   Temp:       TempSrc:       SpO2:       Weight:           Patient was given  is on blood thinning medications.  Again has history of some neuromuscular disease.    Patient CT scan of the head, CTA head and neck demonstrating no signs of acute stroke symptoms.  Dr. Olmstead did speak with [facility] neurocritical care, patient is not a candidate for acute intervention however secondary to the unclear, acute nature of her symptoms we will recommend further stroke evaluation within the hospital.  Patient did develop a fever while in the ED, was also tachycardic, showed signs of urinary tract infection, will provide judicious fluids, will also give empiric antibiotics.  Will send out for CT scan abdomen pelvis without contrast to rule out any obstructive process.  This could be also contributing to patient's altered mental status at this point in time.    Will look to admit for further management of the AMS, urinary tract infection-will need further stroke workup within the hospital.  See supervising/attending's note for any further details.    History from : Patient, Family  , and EMS    Limitations to history : Altered Mental Status    Patient was given the following medications:  Medications   sodium chloride 0.9 % bolus 500 mL (500 mLs IntraVENous New Bag 4/26/24 2107)   cefTRIAXone (ROCEPHIN) 1,000 mg in sterile water 10 mL IV syringe (has no administration in time range)   iopamidol (ISOVUE-370) 76 % injection 75 mL (75 mLs IntraVENous Given 4/26/24 1937)   acetaminophen (TYLENOL) tablet 1,000 mg (1,000 mg Oral Given 4/26/24 2107)       Independent Imaging Interpretation by me: CT scan of the head, CTA head and neck demonstrate-no acute intercranial abnormality, chest x-ray demonstrates-no acute cardiopulmonary process    EKG (if obtained): See supervising physician's note for EKG interpretation     Chronic conditions affecting care: Neuromuscular disease, chronic kidney disease,    Discussion with Other Profesionals : Admitting Team   and Consultant [facility] neurocritical care, no acute interventions

## 2024-04-27 LAB
ALBUMIN SERPL-MCNC: 3.7 GM/DL (ref 3.4–5)
ALP BLD-CCNC: 73 IU/L (ref 40–128)
ALT SERPL-CCNC: 24 U/L (ref 10–40)
ANION GAP SERPL CALCULATED.3IONS-SCNC: 12 MMOL/L (ref 7–16)
AST SERPL-CCNC: 33 IU/L (ref 15–37)
BASOPHILS ABSOLUTE: 0 K/CU MM
BASOPHILS RELATIVE PERCENT: 0.3 % (ref 0–1)
BILIRUB SERPL-MCNC: 0.2 MG/DL (ref 0–1)
BUN SERPL-MCNC: 41 MG/DL (ref 6–23)
CALCIUM SERPL-MCNC: 9.4 MG/DL (ref 8.3–10.6)
CHLORIDE BLD-SCNC: 105 MMOL/L (ref 99–110)
CO2: 24 MMOL/L (ref 21–32)
CREAT SERPL-MCNC: 1.2 MG/DL (ref 0.6–1.1)
DIFFERENTIAL TYPE: ABNORMAL
EOSINOPHILS ABSOLUTE: 0.1 K/CU MM
EOSINOPHILS RELATIVE PERCENT: 0.4 % (ref 0–3)
GFR SERPL CREATININE-BSD FRML MDRD: 43 ML/MIN/1.73M2
GLUCOSE SERPL-MCNC: 82 MG/DL (ref 70–99)
HCT VFR BLD CALC: 36.8 % (ref 37–47)
HEMOGLOBIN: 10.6 GM/DL (ref 12.5–16)
IMMATURE NEUTROPHIL %: 0.3 % (ref 0–0.43)
INR BLD: 1 INDEX
LACTATE: 2.1 MMOL/L (ref 0.5–1.9)
LACTIC ACID, SEPSIS: 2.6 MMOL/L (ref 0.4–2)
LYMPHOCYTES ABSOLUTE: 1.3 K/CU MM
LYMPHOCYTES RELATIVE PERCENT: 11.4 % (ref 24–44)
MCH RBC QN AUTO: 34.6 PG (ref 27–31)
MCHC RBC AUTO-ENTMCNC: 28.8 % (ref 32–36)
MCV RBC AUTO: 120.3 FL (ref 78–100)
MONOCYTES ABSOLUTE: 1.1 K/CU MM
MONOCYTES RELATIVE PERCENT: 8.9 % (ref 0–4)
NEUTROPHILS RELATIVE PERCENT: 78.7 % (ref 36–66)
NUCLEATED RBC %: 0 %
PDW BLD-RTO: 13.2 % (ref 11.7–14.9)
PLATELET # BLD: 205 K/CU MM (ref 140–440)
PMV BLD AUTO: 8.7 FL (ref 7.5–11.1)
POTASSIUM SERPL-SCNC: 4.1 MMOL/L (ref 3.5–5.1)
PROTHROMBIN TIME: 13.7 SECONDS (ref 11.7–14.5)
RBC # BLD: 3.06 M/CU MM (ref 4.2–5.4)
SEGMENTED NEUTROPHILS ABSOLUTE COUNT: 9.3 K/CU MM
SODIUM BLD-SCNC: 141 MMOL/L (ref 135–145)
TOTAL IMMATURE NEUTOROPHIL: 0.04 K/CU MM
TOTAL NUCLEATED RBC: 0 K/CU MM
TOTAL PROTEIN: 6.6 GM/DL (ref 6.4–8.2)
TROPONIN, HIGH SENSITIVITY: 46 NG/L (ref 0–14)
TROPONIN, HIGH SENSITIVITY: 56 NG/L (ref 0–14)
WBC # BLD: 11.8 K/CU MM (ref 4–10.5)

## 2024-04-27 PROCEDURE — 6370000000 HC RX 637 (ALT 250 FOR IP): Performed by: STUDENT IN AN ORGANIZED HEALTH CARE EDUCATION/TRAINING PROGRAM

## 2024-04-27 PROCEDURE — 85610 PROTHROMBIN TIME: CPT

## 2024-04-27 PROCEDURE — 94761 N-INVAS EAR/PLS OXIMETRY MLT: CPT

## 2024-04-27 PROCEDURE — 80053 COMPREHEN METABOLIC PANEL: CPT

## 2024-04-27 PROCEDURE — 6360000002 HC RX W HCPCS: Performed by: HOSPITALIST

## 2024-04-27 PROCEDURE — 85025 COMPLETE CBC W/AUTO DIFF WBC: CPT

## 2024-04-27 PROCEDURE — 83605 ASSAY OF LACTIC ACID: CPT

## 2024-04-27 PROCEDURE — 84484 ASSAY OF TROPONIN QUANT: CPT

## 2024-04-27 PROCEDURE — 2580000003 HC RX 258: Performed by: STUDENT IN AN ORGANIZED HEALTH CARE EDUCATION/TRAINING PROGRAM

## 2024-04-27 PROCEDURE — 36415 COLL VENOUS BLD VENIPUNCTURE: CPT

## 2024-04-27 PROCEDURE — 1200000000 HC SEMI PRIVATE

## 2024-04-27 PROCEDURE — 6360000002 HC RX W HCPCS: Performed by: STUDENT IN AN ORGANIZED HEALTH CARE EDUCATION/TRAINING PROGRAM

## 2024-04-27 PROCEDURE — 2580000003 HC RX 258: Performed by: HOSPITALIST

## 2024-04-27 RX ORDER — POLYETHYLENE GLYCOL 3350 17 G/17G
17 POWDER, FOR SOLUTION ORAL DAILY
Status: DISCONTINUED | OUTPATIENT
Start: 2024-04-28 | End: 2024-05-01 | Stop reason: HOSPADM

## 2024-04-27 RX ORDER — 0.9 % SODIUM CHLORIDE 0.9 %
500 INTRAVENOUS SOLUTION INTRAVENOUS ONCE
Status: COMPLETED | OUTPATIENT
Start: 2024-04-27 | End: 2024-04-27

## 2024-04-27 RX ORDER — ENOXAPARIN SODIUM 100 MG/ML
30 INJECTION SUBCUTANEOUS DAILY
Status: DISCONTINUED | OUTPATIENT
Start: 2024-04-27 | End: 2024-05-01 | Stop reason: HOSPADM

## 2024-04-27 RX ORDER — SENNOSIDES A AND B 8.6 MG/1
1 TABLET, FILM COATED ORAL 2 TIMES DAILY
Status: DISCONTINUED | OUTPATIENT
Start: 2024-04-27 | End: 2024-05-01 | Stop reason: HOSPADM

## 2024-04-27 RX ADMIN — CLOPIDOGREL BISULFATE 75 MG: 75 TABLET ORAL at 09:55

## 2024-04-27 RX ADMIN — SODIUM CHLORIDE, POTASSIUM CHLORIDE, SODIUM LACTATE AND CALCIUM CHLORIDE: 600; 310; 30; 20 INJECTION, SOLUTION INTRAVENOUS at 01:17

## 2024-04-27 RX ADMIN — CARBAMAZEPINE 200 MG: 200 TABLET ORAL at 09:55

## 2024-04-27 RX ADMIN — ASPIRIN 81 MG: 81 TABLET, CHEWABLE ORAL at 09:55

## 2024-04-27 RX ADMIN — VANCOMYCIN HYDROCHLORIDE 1000 MG: 1 INJECTION, POWDER, LYOPHILIZED, FOR SOLUTION INTRAVENOUS at 04:58

## 2024-04-27 RX ADMIN — DOCUSATE SODIUM 100 MG: 100 CAPSULE, LIQUID FILLED ORAL at 09:55

## 2024-04-27 RX ADMIN — ENOXAPARIN SODIUM 30 MG: 100 INJECTION SUBCUTANEOUS at 09:55

## 2024-04-27 RX ADMIN — ATORVASTATIN CALCIUM 20 MG: 10 TABLET, FILM COATED ORAL at 09:55

## 2024-04-27 RX ADMIN — SODIUM CHLORIDE 500 ML: 9 INJECTION, SOLUTION INTRAVENOUS at 03:04

## 2024-04-27 RX ADMIN — FOLIC ACID 1 MG: 1 TABLET ORAL at 09:55

## 2024-04-27 RX ADMIN — CEFTRIAXONE SODIUM 2000 MG: 2 INJECTION, POWDER, FOR SOLUTION INTRAMUSCULAR; INTRAVENOUS at 22:45

## 2024-04-27 RX ADMIN — SODIUM CHLORIDE 25 ML: 9 INJECTION, SOLUTION INTRAVENOUS at 22:44

## 2024-04-27 RX ADMIN — SODIUM CHLORIDE, PRESERVATIVE FREE 10 ML: 5 INJECTION INTRAVENOUS at 20:52

## 2024-04-27 RX ADMIN — CARBAMAZEPINE 200 MG: 200 TABLET ORAL at 20:45

## 2024-04-27 NOTE — H&P
History and Physical      Name:  Beryl Alejandre /Age/Sex: 1934  (89 y.o. female)   MRN & CSN:  [id number] & [id number] Encounter Date/Time: 2024 10:52 PM EDT   Location:  ED25/ED-25 PCP: Holly Morillo MD       Hospital Day: 1    Assessment and Plan:     Patient is a 89 y.o. female who presented with AMS     Severe Sepsis 2/2 UTI  Acute Encephalopathy   Lactic Acidosis   - Presented with worsening   - On admit, hemodynamically stable, febrile, no leukocytosis. LA 2.6.   - UA suggestive of infection, previous urine cx with E.coli, klebsiella and enterococcus   - Received IVF and Rocephin in ED  -Evaluated by Tele stroke and suggested likely metabolic 2/2 infection and to consider MRI if symptoms don't improve or CTA abnormal     - Hold MRI at this time   - Continue Rocephin and Vanc (due to penicillin allergy and coverage for possible enterococcus)  - Follow-up cultures  - IVF      Acute encephalopathy superimposed on Chronic dementia  -2/2 to UTI  -Delirium precautions     HTN  -Hold due to sepsis     HLD  -Continue statin       Trigeminal neuralgia   -Continue carbamazepine      Checklist:  Advanced directive:  daughter is HCPOA, limited code yes to everything but intubation   Diet: cardiac  DVT ppx: Lovenox      Disposition: admit to inpatient.  Estimated discharge: 4 day(s).  Current living situation: home.  Expected disposition: home.    Spoke with ED provider who recommended admission for the patient and I agree with that plan.  Personally reviewed lab studies and imaging.  EKG interpreted personally and results as stated above.  Imaging that was interpreted personally and results as stated above.    History of Present Illness:     Chief Complaint:    Chief Complaint   Patient presents with    Altered Mental Status     Pt normally Aox4 Pt having difficulty speaking and shaking in arms        Patient is a 89 y.o. female with a PMHx of TIA, chronic dementia, Trigeminal neuralgia , HTN,  bedtime 7/26/22   Iraj Gliman MD   bisacodyl (DULCOLAX) 5 MG EC tablet Take 1 tablet by mouth daily as needed for Constipation 7/26/22   Iraj Gilman MD   dicyclomine (BENTYL) 10 MG capsule Take 1 capsule by mouth 4 times daily as needed (Abdominal spasmodic pain)  Patient taking differently: Take 10 mg by mouth 2 times daily as needed (Abdominal spasmodic pain) 7/26/22   Iraj Gilman MD   omeprazole (PRILOSEC) 20 MG delayed release capsule Take 20 mg by mouth daily    Barrington Griffiths MD   atorvastatin (LIPITOR) 20 MG tablet Take 20 mg by mouth daily    Barrington Griffiths MD   folic acid (FOLVITE) 1 MG tablet Take 1 mg by mouth daily    Barrington Griffiths MD   predniSONE (DELTASONE) 5 MG tablet Take 5 mg by mouth 2 times daily    Barrington Griffiths MD   docusate sodium (COLACE) 100 MG capsule Take 1 capsule by mouth daily 8/14/16   Alton Garcia MD   lisinopril-hydrochlorothiazide (PRINZIDE;ZESTORETIC) 20-25 MG per tablet Take 0.5 tablets by mouth in the morning.    Barrington Griffiths MD   carBAMazepine (TEGRETOL) 200 MG tablet Take 200 mg by mouth 2 times daily.    Barrington Griffiths MD   amLODIPine (NORVASC) 10 MG tablet Take 5 mg by mouth in the morning.    Barrington Griffiths MD   clopidogrel (PLAVIX) 75 MG tablet Take 75 mg by mouth daily.      Barrington Griffiths MD   aspirin 81 MG chewable tablet Take 81 mg by mouth daily.      Barrington Griffiths MD       Medications:     Medications:        Infusions:       PRN Meds:       Data:     CBC:   Recent Labs     04/26/24 2019   WBC 7.2   HGB 11.4*      .9*   RDW 13.0   LYMPHOPCT 5.3*   MONOPCT 1.5   BASOPCT 0.3   MONOSABS 0.1   LYMPHSABS 0.4   EOSABS 0.1   BASOSABS 0.0     CMP:    Recent Labs     04/26/24 2019      K 4.4      CO2 27   BUN 43*   CREATININE 1.3*   GLUCOSE 164*   CALCIUM 9.6   BILITOT 0.2   ALKPHOS 81   AST 29   ALT 24     Lipids:   Lab Results   Component Value

## 2024-04-27 NOTE — ED PROVIDER NOTES
HPI: In brief, for evaluation of acute altered mental status.  Reportedly was doing okay around 5 or 5:15 PM.  The daughter noticed that she was acutely changed around 6 PM.  Patient has had episodes of confusion was not talking right seem to be confused as to what her daughter was saying as well.  She also appeared to be diffusely weak.  No prior episodes of anything similar to this that they can recall.  No known fevers.  No nausea or vomiting.  No pain that she complained about.  No recent falls or injuries.  No change to bowel movements..    Focused exam revealed     General appearance:  No acute distress.   Skin:  Warm. Dry.   Eye:  Extraocular movements intact.   PERRLA, no nystagmus, reassuring test of skew  Ears, nose, mouth and throat:  Oral mucosa moist   Neck:  Trachea midline.  Supple, no meningismus.  Extremity:  No swelling.  Normal ROM     Heart:  Regular rhythm and tachycardic rate  Perfusion:  intact distal pulses  Respiratory:  Lungs clear to auscultation bilaterally.  Respirations nonlabored.     Abdominal:  Normal bowel sounds.  Soft.  Nontender.  Non distended.  Back:  No CVA tenderness to palpation     Neurological:  Alert and oriented times 1.  Seems to have some confusion here possibly some aphasia.  Difficult to obtain a full NIHSS secondary to the patient's confusion.  She does appear to be moving all 4 extremities and does appear to have sensation throughout.          Psychiatric:  Appropriate  .    ED course/MDM: Concern for metabolic encephalopathy but given the acute onset of her symptoms stroke alert was called.  She was found to be febrile when she returned from CT.  Pursued sepsis evaluation at this point but she does not have a lactic acidosis nor evidence of endorgan damage so lower suspicion for severe sepsis.  CT imaging showed chronic changes but no other acute irregularities.  The patient had CT imaging of the abdomen pelvis just to evaluate for any obstructive uropathy but

## 2024-04-27 NOTE — PROGRESS NOTES
PHARMACY VANCOMYCIN MONITORING SERVICE  Pharmacy consulted by Dr. Nazia Amos for monitoring and adjustment.    Indication for treatment: Vancomycin indication: Other: Sepsis due to UTI  Goal trough: Trough Goal: 10-15 mcg/mL  AUC/LINDA: <500    Risk Factors for MRSA Identified:   None    Pertinent Laboratory Values:   Temp Readings from Last 3 Encounters:   04/26/24 100.2 °F (37.9 °C)   07/26/22 97.3 °F (36.3 °C)   05/24/22 96.2 °F (35.7 °C) (Temporal)     Recent Labs     04/26/24  2019 04/27/24  0016   WBC 7.2 11.8*     Recent Labs     04/26/24  2019 04/27/24  0016   BUN 43* 41*   CREATININE 1.3* 1.2*     Estimated Creatinine Clearance: 28 mL/min (A) (based on SCr of 1.2 mg/dL (H)).  No intake or output data in the 24 hours ending 04/27/24 0421  Last Encounter Weight:  Wt Readings from Last 3 Encounters:   04/26/24 55.2 kg (121 lb 12.8 oz)   10/03/22 53.1 kg (117 lb)   07/24/22 53.1 kg (117 lb 1 oz)       Pertinent Cultures:   Date    Source    Results  4/26   Blood    In process  4/26   Influenza A/B              Negative  4/26   Urine                          To be collected      Vancomycin level:   TROUGH:  No results for input(s): \"VANCOTROUGH\" in the last 72 hours.  RANDOM:  No results for input(s): \"VANCORANDOM\" in the last 72 hours.    Assessment:  HPI: 89 year old female with a history of hypertension, hyperlipidemia, trigeminal neuralgia and dementia presented to the ED with altered mental status. UA suggestive of infection and previous urine culture grew E-coli, Klebsiella and enterococcus.  SCr, BUN, and urine output: Scr = 1.2, elevated BUN, No I/O data  Day(s) of therapy: 1 of 3  Vancomycin concentration: To be collected    Plan:  Vancomycin 1000 mg x 1  Intermittent dosing based on levels due to possible poor renal clearance  Pharmacy will continue to monitor patient and adjust therapy as indicated    VANCOMYCIN CONCENTRATION SCHEDULED FOR 4/28 @0600    Thank you for the consult.  Dionisio Cesar

## 2024-04-27 NOTE — PROGRESS NOTES
Notified MD regarding abnormal labs, Lactate and Troponin. Message received and acknowledged, No further orders at this time. Patient is not having and chest pain or complaints at this time.

## 2024-04-27 NOTE — PROGRESS NOTES
LOVENOX PROPHYLAXIS EVALUATION  (Populations not addressed in this protocol: trauma, obstetrics, or COVID-19)    Wt Readings from Last 3 Encounters:   04/26/24 55.2 kg (121 lb 12.8 oz)   10/03/22 53.1 kg (117 lb)   07/24/22 53.1 kg (117 lb 1 oz)       Estimated Creatinine Clearance: 28 mL/min (A) (based on SCr of 1.2 mg/dL (H)).  Recent Labs     04/26/24 2019 04/27/24  0016   BUN 43* 41*   CREATININE 1.3* 1.2*    205   HGB 11.4* 10.6*   HCT 36.8* 36.8*   INR 1.0 1.0       Weight Range: .9 kg    CRCL = 15-29.99    50.9 kg   and below     .9  kg   101-150.9 kg   151-174.9  kg   175 kg  or greater     Heparin 5,000 units  subq BID     30mg subq daily       30mg subq  daily   40mg subq  daily   60mg subq daily       Per P/T protocol for appropriate subq anticoagulation by weight and CRCL change to:    Enoxparin 30mg subq daily      Dionisio Kaur RPH  2:58 AM  04/27/24

## 2024-04-27 NOTE — PROGRESS NOTES
4 Eyes Skin Assessment     NAME:  Beryl Alejandre  YOB: 1934  MEDICAL RECORD NUMBER:  [id number]    The patient is being assessed for  Admission    I agree that at least one RN has performed a thorough Head to Toe Skin Assessment on the patient. ALL assessment sites listed below have been assessed.      Areas assessed by both nurses:    Head, Face, Ears, Shoulders, Back, Chest, Arms, Elbows, Hands, Sacrum. Buttock, Coccyx, Ischium, Legs. Feet and Heels, and Under Medical Devices         Does the Patient have a Wound? No noted wound(s)       Gil Prevention initiated by RN: Yes  Wound Care Orders initiated by RN: No    Pressure Injury (Stage 3,4, Unstageable, DTI, NWPT, and Complex wounds) if present, place Wound referral order by RN under : No    New Ostomies, if present place, Ostomy referral order under : No     Nurse 1 eSignature: Electronically signed by Madeline Khan RN on 4/27/24 at 1:25 AM EDT    **SHARE this note so that the co-signing nurse can place an eSignature**    Nurse 2 eSignature: Electronically signed by Danielle Haynes LPN on 4/27/24 at 2:55 AM EDT

## 2024-04-27 NOTE — ED NOTES
ED TO INPATIENT SBAR HANDOFF    Patient Name: Beryl Alejandre   :  1934  89 y.o.   Preferred Name  Beryl  Family/Caregiver Present yes   Restraints no   C-SSRS: Risk of Suicide: No Risk  Sitter no   Sepsis Risk Score Sepsis Risk Score: 9.1      Situation  Chief Complaint   Patient presents with    Altered Mental Status     Pt normally Aox4 Pt having difficulty speaking and shaking in arms      Brief Description of Patient's Condition: Pt came in for having AMS. Pt being admitted for AMS and UTI. Stroke alert called while in ED due to the AMS. NIH difficult to perform due to limitations of Pt. Pt does have some confusion due to dementia. Pt Aox2 difficult for her to know the date and what brought her into the ED. Daughter at bedside states pt is normally more alert ( Aox3) and quicker to answer questions. NIH was 13 due to the pt unable to move arms and legs very well and inattention. Pt also has trouble formulating coherent language. Pt was able to complete swallow eval.   Mental Status: oriented, alert, coherent, logical, thought processes intact, and able to concentrate and follow conversation  Arrived from: home    Imaging:   XR CHEST PORTABLE   Final Result   1. Stable exam with no acute abnormality.      Electronically signed by David Ceja MD      CT HEAD WO CONTRAST   Final Result      1. Stable exam with no acute intracranial abnormality.      Electronically signed by David Ceja MD      CTA HEAD NECK W CONTRAST   Final Result      Neck      1. No acute vascular abnormality in the neck.   2. Bilateral carotid calcifications with motion artifact limiting detailed evaluation. Mild stenosis of the proximal right internal carotid artery estimated at 25-50%.       Head      1.  No acute intracranial vascular abnormality. No large vessel occlusion.      Electronically signed by David Ceja MD      CT ABDOMEN PELVIS WO CONTRAST Additional Contrast? None    (Results Pending)     Abnormal    Peripheral IV 04/26/24 Right Antecubital (Active)       Pertinent or High Risk Medications/Drips: no   If Yes, please provide details: na  Blood Product Administration: no  If Yes, please provide details: na    Recommendation    Incomplete orders none  Additional Comments: none   If any further questions, please call Sending RN at [phone]    Electronically signed by: Electronically signed by Shelton Solorio RN on 4/26/2024 at 9:35 PM

## 2024-04-27 NOTE — PROGRESS NOTES
Comprehensive Nutrition Assessment    Type and Reason for Visit:   Initial (low BMI for age)_    Nutrition Recommendations/Plan:   Continue regular diet  Offer standard and frozen oral nutrition supplements daily  Monitor weights, po intakes, labs, POC     Malnutrition Assessment:  Malnutrition Status:  Insufficient data (04/27/24 1269)    Context:  Acute Illness       Nutrition Assessment:    Admitted w/ severe sepsis, PMHx of TIA, chronic dementia, Trigeminal neuralgia , HTN, HLD. Pt sleeping soundly in bed, no visitors in room. Noted to have dementia as baseline but has had worsening mentation lately, similar to when she's had UTIs in the past. Pt w/ low BMI for age, however wt appears consistently low over the past year or more. No po intakes documented, will offer oral supplements. Follow at high nutrition risk.    Nutrition Related Findings:    +LR @ 100ml/hr, lactic acid 2.6, GFR 43 Wound Type: None       Current Nutrition Intake & Therapies:    Average Meal Intake: Unable to assess  Average Supplements Intake: None Ordered  ADULT DIET; Regular    Anthropometric Measures:  Height: 167.6 cm (5' 6\")  Ideal Body Weight (IBW): 130 lbs (59 kg)    Admission Body Weight: 55.2 kg (121 lb 11.1 oz)  Current Body Weight: 55.2 kg (121 lb 11.1 oz),   IBW. Weight Source: Bed Scale  Current BMI (kg/m2): 19.7  Usual Body Weight: 53.5 kg (118 lb) (4/2023 office visit)  % Weight Change (Calculated): 3.1  Weight Adjustment For: No Adjustment                 BMI Categories: Underweight (BMI less than 22) age over 65    Estimated Daily Nutrient Needs:  Energy Requirements Based On: Kcal/kg  Weight Used for Energy Requirements: Current  Energy (kcal/day): 9484-7442 (30-35kcal/kg)  Weight Used for Protein Requirements: Current  Protein (g/day): 55-66 (1.0-1.2g/kg)  Method Used for Fluid Requirements: 1 ml/kcal  Fluid (ml/day): 1700    Nutrition Diagnosis:   Predicted inadequate energy intake related to cognitive or neurological  impairment, acute injury/trauma as evidenced by BMI (limited po intake data)    Nutrition Interventions:   Food and/or Nutrient Delivery: Continue Current Diet, Start Oral Nutrition Supplement  Nutrition Education/Counseling: No recommendation at this time  Coordination of Nutrition Care: Continue to monitor while inpatient, Coordination of Care       Goals:     Goals: PO intake 50% or greater, by next RD assessment       Nutrition Monitoring and Evaluation:   Behavioral-Environmental Outcomes: None Identified  Food/Nutrient Intake Outcomes: Food and Nutrient Intake, Supplement Intake  Physical Signs/Symptoms Outcomes: Weight, Nutrition Focused Physical Findings, Meal Time Behavior, Biochemical Data    Discharge Planning:    Continue current diet, Continue Oral Nutrition Supplement     Karely Vergara RD  Contact: [phone]

## 2024-04-27 NOTE — PROGRESS NOTES
V2.0+  [facility] [place]      Name:  Beryl Alejandre /Age/Sex: 1934  (89 y.o. female)   MRN & CSN:  [id number] & [id number] Encounter Date/Time: 2024 4:01 PM EDT    Location:  [address] [place] [address]-A PCP: Holly Morillo MD       Hospital Day: 2    Assessment and Plan:   Beryl Alejandre is a 89 y.o. female who presents with altered mental status    Severe Sepsis due to E. coli bacteremia likely due to UTI  Lactic Acidosis   - UA suggestive of infection,   - Received IVF and Rocephin in ED  -Evaluated by Tele stroke and suggested likely metabolic 2/2 infection and to consider MRI if symptoms don't improve or CTA abnormal    - Hold MRI at this time   - Continue Rocephin and Vanc (due to penicillin allergy and coverage for possible enterococcus)  - Follow-up cultures  - IVF  - BCx : E. coli  bottles.  Continue Rocephin.  Stop vancomycin.  Improved lactate 2.1.  CTA head and neck no LVO.  Awaiting urine culture results.     Acute encephalopathy superimposed on Chronic dementia  -2/2 to UTI  -Delirium precautions  - Mental status appears improved.    Acute kidney injury  - Likely due to sepsis.  Received IVF.  Improved creatinine 1.2.    Elevated troponin  - Continue to trend.  Likely due to sepsis.  Not complain of any chest pain.  Check EKG.    Constipation  - CT abdomen pelvis shows large colonic stool burden with mild distention of rectum.  Start senna and MiraLAX.     HTN  -Hold due to sepsis      HLD  -Continue statin         Trigeminal neuralgia   -Continue carbamazepine    Comment: Please note this report has been produced using speech recognition software and may contain errors related to that system including errors in grammar, punctuation, and spelling, as well as words and phrases that may be inappropriate. If there are any questions or concerns please feel free to contact the dictating provider for clarification.      Diet ADULT DIET; Regular  ADULT ORAL NUTRITION SUPPLEMENT;

## 2024-04-27 NOTE — CARE COORDINATION
LSW received a consult and reason for admission.  No sure what is needed however LSW will talk with pt dght.  Pt lives with dght and is dependent of her ADLs.  LSW has requested doctor in [place] to order PT/OT.  WB note placed.  LSW called pt dght and had to leave a message asking for a return call.  CM following.       PS sent to doctor asking for PT/OT orders.

## 2024-04-28 PROBLEM — G93.40 ACUTE ENCEPHALOPATHY: Status: ACTIVE | Noted: 2024-04-28

## 2024-04-28 LAB
ALBUMIN SERPL-MCNC: 3.2 GM/DL (ref 3.4–5)
ALP BLD-CCNC: 64 IU/L (ref 40–128)
ALT SERPL-CCNC: 17 U/L (ref 10–40)
ANION GAP SERPL CALCULATED.3IONS-SCNC: 13 MMOL/L (ref 7–16)
AST SERPL-CCNC: 24 IU/L (ref 15–37)
BASOPHILS ABSOLUTE: 0 K/CU MM
BASOPHILS RELATIVE PERCENT: 0.3 % (ref 0–1)
BILIRUB SERPL-MCNC: 0.2 MG/DL (ref 0–1)
BUN SERPL-MCNC: 26 MG/DL (ref 6–23)
CALCIUM SERPL-MCNC: 8.8 MG/DL (ref 8.3–10.6)
CHLORIDE BLD-SCNC: 103 MMOL/L (ref 99–110)
CO2: 22 MMOL/L (ref 21–32)
CREAT SERPL-MCNC: 1 MG/DL (ref 0.6–1.1)
DIFFERENTIAL TYPE: ABNORMAL
EOSINOPHILS ABSOLUTE: 0.1 K/CU MM
EOSINOPHILS RELATIVE PERCENT: 0.7 % (ref 0–3)
GFR SERPL CREATININE-BSD FRML MDRD: 54 ML/MIN/1.73M2
GLUCOSE SERPL-MCNC: 74 MG/DL (ref 70–99)
HCT VFR BLD CALC: 29 % (ref 37–47)
HEMOGLOBIN: 9 GM/DL (ref 12.5–16)
IMMATURE NEUTROPHIL %: 0.5 % (ref 0–0.43)
LYMPHOCYTES ABSOLUTE: 2.2 K/CU MM
LYMPHOCYTES RELATIVE PERCENT: 19.5 % (ref 24–44)
MCH RBC QN AUTO: 34.5 PG (ref 27–31)
MCHC RBC AUTO-ENTMCNC: 31 % (ref 32–36)
MCV RBC AUTO: 111.1 FL (ref 78–100)
MONOCYTES ABSOLUTE: 1.3 K/CU MM
MONOCYTES RELATIVE PERCENT: 11.2 % (ref 0–4)
NEUTROPHILS RELATIVE PERCENT: 67.8 % (ref 36–66)
NUCLEATED RBC %: 0 %
PDW BLD-RTO: 13.2 % (ref 11.7–14.9)
PLATELET # BLD: 177 K/CU MM (ref 140–440)
PMV BLD AUTO: 9.2 FL (ref 7.5–11.1)
POTASSIUM SERPL-SCNC: 3.8 MMOL/L (ref 3.5–5.1)
RBC # BLD: 2.61 M/CU MM (ref 4.2–5.4)
SEGMENTED NEUTROPHILS ABSOLUTE COUNT: 7.6 K/CU MM
SODIUM BLD-SCNC: 138 MMOL/L (ref 135–145)
TOTAL IMMATURE NEUTOROPHIL: 0.06 K/CU MM
TOTAL NUCLEATED RBC: 0 K/CU MM
TOTAL PROTEIN: 5.5 GM/DL (ref 6.4–8.2)
TROPONIN, HIGH SENSITIVITY: 114 NG/L (ref 0–14)
WBC # BLD: 11.2 K/CU MM (ref 4–10.5)

## 2024-04-28 PROCEDURE — 97530 THERAPEUTIC ACTIVITIES: CPT

## 2024-04-28 PROCEDURE — 36415 COLL VENOUS BLD VENIPUNCTURE: CPT

## 2024-04-28 PROCEDURE — 99222 1ST HOSP IP/OBS MODERATE 55: CPT | Performed by: INTERNAL MEDICINE

## 2024-04-28 PROCEDURE — 6360000002 HC RX W HCPCS: Performed by: STUDENT IN AN ORGANIZED HEALTH CARE EDUCATION/TRAINING PROGRAM

## 2024-04-28 PROCEDURE — 1200000000 HC SEMI PRIVATE

## 2024-04-28 PROCEDURE — 2580000003 HC RX 258: Performed by: HOSPITALIST

## 2024-04-28 PROCEDURE — 80053 COMPREHEN METABOLIC PANEL: CPT

## 2024-04-28 PROCEDURE — 97166 OT EVAL MOD COMPLEX 45 MIN: CPT

## 2024-04-28 PROCEDURE — 6360000002 HC RX W HCPCS: Performed by: HOSPITALIST

## 2024-04-28 PROCEDURE — 2580000003 HC RX 258: Performed by: STUDENT IN AN ORGANIZED HEALTH CARE EDUCATION/TRAINING PROGRAM

## 2024-04-28 PROCEDURE — 94761 N-INVAS EAR/PLS OXIMETRY MLT: CPT

## 2024-04-28 PROCEDURE — 97162 PT EVAL MOD COMPLEX 30 MIN: CPT

## 2024-04-28 PROCEDURE — 85025 COMPLETE CBC W/AUTO DIFF WBC: CPT

## 2024-04-28 PROCEDURE — 80202 ASSAY OF VANCOMYCIN: CPT

## 2024-04-28 PROCEDURE — 93005 ELECTROCARDIOGRAM TRACING: CPT | Performed by: HOSPITALIST

## 2024-04-28 PROCEDURE — 84484 ASSAY OF TROPONIN QUANT: CPT

## 2024-04-28 PROCEDURE — 6370000000 HC RX 637 (ALT 250 FOR IP): Performed by: HOSPITALIST

## 2024-04-28 PROCEDURE — APPNB30 APP NON BILLABLE TIME 0-30 MINS: Performed by: NURSE PRACTITIONER

## 2024-04-28 PROCEDURE — 6370000000 HC RX 637 (ALT 250 FOR IP): Performed by: STUDENT IN AN ORGANIZED HEALTH CARE EDUCATION/TRAINING PROGRAM

## 2024-04-28 RX ADMIN — SENNOSIDES 8.6 MG: 8.6 TABLET, FILM COATED ORAL at 09:08

## 2024-04-28 RX ADMIN — ATORVASTATIN CALCIUM 20 MG: 10 TABLET, FILM COATED ORAL at 09:08

## 2024-04-28 RX ADMIN — SODIUM CHLORIDE, PRESERVATIVE FREE 10 ML: 5 INJECTION INTRAVENOUS at 20:28

## 2024-04-28 RX ADMIN — CEFTRIAXONE SODIUM 2000 MG: 2 INJECTION, POWDER, FOR SOLUTION INTRAMUSCULAR; INTRAVENOUS at 21:21

## 2024-04-28 RX ADMIN — ASPIRIN 81 MG: 81 TABLET, CHEWABLE ORAL at 09:08

## 2024-04-28 RX ADMIN — CARBAMAZEPINE 200 MG: 200 TABLET ORAL at 09:08

## 2024-04-28 RX ADMIN — DOCUSATE SODIUM 100 MG: 100 CAPSULE, LIQUID FILLED ORAL at 09:08

## 2024-04-28 RX ADMIN — METOPROLOL TARTRATE 25 MG: 25 TABLET, FILM COATED ORAL at 20:23

## 2024-04-28 RX ADMIN — ENOXAPARIN SODIUM 30 MG: 100 INJECTION SUBCUTANEOUS at 09:08

## 2024-04-28 RX ADMIN — POLYETHYLENE GLYCOL 3350 17 G: 17 POWDER, FOR SOLUTION ORAL at 09:08

## 2024-04-28 RX ADMIN — SENNOSIDES 8.6 MG: 8.6 TABLET, FILM COATED ORAL at 20:22

## 2024-04-28 RX ADMIN — CLOPIDOGREL BISULFATE 75 MG: 75 TABLET ORAL at 09:08

## 2024-04-28 RX ADMIN — FOLIC ACID 1 MG: 1 TABLET ORAL at 09:08

## 2024-04-28 RX ADMIN — CARBAMAZEPINE 200 MG: 200 TABLET ORAL at 20:22

## 2024-04-28 NOTE — CARE COORDINATION
LSW spoke with pt dght.  PT/OT is room at time of visit.  Waiting for notes.  LSW spoke with dght about possible SNF.  Dght is agreeable and requested [facility]. Pt has jose j there before.  LSW called [facility] and gave referral.

## 2024-04-28 NOTE — PROGRESS NOTES
Occupational Therapy  [facility] ACUTE CARE OCCUPATIONAL THERAPY EVALUATION    Rosashona HARRIS Alejandre, 9/5/1934, [id number], 4/28/2024    Discharge Recommendation: Encourage facility for moderate post-acute rehabilitation, anticipate 1-2 hours per day and 5 days per week.        History:  [place]:  The primary encounter diagnosis was Acute encephalopathy. Diagnoses of Urinary tract infection with hematuria, site unspecified, Aphasia, and Troponin I above reference range were also pertinent to this visit.  Past Medical History:   Diagnosis Date    Arthritis     Cataract of left eye     Cataract of right eye     Chronic kidney disease     H/O cardiovascular stress test 11/1/11    There is no scintigraphic evidence of inducible myocardial ischemia. LV function is normal. EF 59%  No ECG changes Unremarkable pharmacological stress test. Normal myocardial  perfusion study.    H/O Doppler ultrasound 11/8/11    No sonographic evidence of hemodynamically significant stenosis of the carotid arteries bilaterally.    H/O Doppler ultrasound 8/21/08    Study suggestive of lack of evidence of any hemodynamically significant peripheral vascular disease at rest. Waveform analysis is triphasic throughout the lower extremities.    H/O echocardiogram 4/26/12    Left ventricular function is normal. EF >55% The transmitral spectral doppler flow pattern is suggestive of impaired LV relaxation.    H/O tilt table evaluation 10/10/08    The patient became orthostatic on tilt table study after getting Nitro. There were no neurocardiogenic episode seen.    H/O transesophageal echocardiography (MARIO) for monitoring 11/08/10    Normal MARIO.  No PFO or clots noted    Hyperlipidemia     Hypertension     Movement disorder     Neuromuscular disorder (HCC)     Osteoporosis     Pneumonia     Psychiatric problem     daughter states pt has no psych problems    TIA (transient ischemic attack)     Trigeminal neuralgia of right side of face

## 2024-04-28 NOTE — CONSULTS
[facility] ACUTE CARE PHYSICAL THERAPY EVALUATION  Beryl Alejandre, 9/5/1934, 4119/4119-A, 4/28/2024    Discharge Recommendation: Encourage facility for moderate post-acute rehabilitation, anticipate 1-2 hours per day and 5 days per week.     Equipment: defer    History  [place]:  The primary encounter diagnosis was Acute encephalopathy. Diagnoses of Urinary tract infection with hematuria, site unspecified, Aphasia, and Troponin I above reference range were also pertinent to this visit.    Subjective:  Patient states:  agreeable to PT eval. Dtr at bedside says mentation is about at baseline    Pain:  denies.    Communication with other providers: OT  Restrictions: fall risk    Home Setup/Prior level of function   Lives With: Family,Daughter (daughter - Melody)  Type of Home: House  Home Layout: One level  Home Access: Ramped entrance  Bathroom Shower/Tub: Tub/Shower unit,Shower chair without back (currently bedsides baths)  Bathroom Toilet: Standard  Bathroom Equipment: Grab bars in shower  Bathroom Accessibility: Accessible    Does not ambulate at baseline. Dtr provides assist with all ADLs, typically a CGA pivot transfer to . Oriented typically just to self     Examination of body systems (includes body structures/functions, activity/participation limitations):  Observation:  Supine in bed upon arrival, pleasantly confused  Vision:  WFL   Hearing:  WFL  Cardiopulmonary:  VSS  Cognition: oriented only to self. Follows some one step commands intermittently, see OT/SLP note for further evaluation.    Body Structures/Function  ROM R/L:  WFL  Strength R/L:  3/5 grossly, weakness  observed in function.    Neuro:  WFL      Mobility:  Rolling L/R:  min A  Supine to sit:  max A   Transfers: max A   Sitting balance:  CGA.    Standing balance:  max A .    Gait: unable    Heavy assist to EOB, cues for all sequencing. Difficulty with seated scooting, poor anterior weight shift. Max A pivot to chair with poor SL  stance stability, fwd flexed posture.     [facility] 6 Clicks Inpatient Mobility:   Current: 10/24  PLOF: 14/24    Treatment:  Therapeutic Activity Training:   Therapeutic activity training was instructed today.  Cues were given for safety, sequence, UE/LE placement, awareness, and balance.    Activities performed today included bed mobility training, sup-sit, sit-stand, SPT.   Safety:  patient left in chair with chair alarm, call light within reach, RN notified, gait belt used.    Assessment:  Pt is a 88 yo female with PMHx dementia who presents to [facility] with sepsis from UTI. At baseline, she requires assist from dtr for all OOB mobility and ADLs. Pt currently with increased weakness and decreased activity tolerance and is requiring max A for bed mob and transfers. Given that she is functioning below reported baseline, may benefit from continued skilled PT and continued subacute moderately intensive rehab at [facility] to address deficits and optimize function.  Activity Tolerance: good  Complexity: Moderate  Prognosis: fair  Plan  days/week: 2-3/ 1 week         Goals:  1. Pt will mobilize in bed sup<-> sit at min A   2. Pt will sit<-> stand with LRAD at min A         Treatment plan:  Bed mobility, transfers, balance, gait, TA, TX, device training, safety education, stairs    Recommendations for NURSING mobility: 2 person assist to BSC     Time:   Time in: 1218  Time out: 1234  Timed treatment minutes: 8  Total time: 16    Electronically signed by:    Az Gupta PT  4/28/2024, 1:08 PM

## 2024-04-28 NOTE — PROGRESS NOTES
V2.0+  [facility] Hospitalist Progress Note      Name:  Beryl Alejandre /Age/Sex: 1934  (89 y.o. female)   MRN & CSN:  [id number] & [id number] Encounter Date/Time: 2024 4:01 PM EDT    Location:  [address] [place] [address]-A PCP: Holly Morillo MD       Hospital Day: 3    Assessment and Plan:   Beryl Alejandre is a 89 y.o. female who presents with altered mental status    Severe Sepsis due to E. coli bacteremia likely due to UTI  Lactic Acidosis   - UA suggestive of infection,   - Received IVF and Rocephin in ED  -Evaluated by Tele stroke and suggested likely metabolic 2/2 infection and to consider MRI if symptoms don't improve or CTA abnormal    - Hold MRI at this time   - BCx : E. coli  bottles.  Continue Rocephin.  Stop vancomycin.  Improved lactate 2.1.  CTA head and neck no LVO.  --- --Follow-up blood culture sensitivities and follow up urine culture  --Repeat BCx     Acute encephalopathy superimposed on Chronic dementia  -2/2 to UTI  -Delirium precautions  - Mental status appears improved.    Acute kidney injury  - Likely due to sepsis.  Received IVF.  Improved creatinine 1.0    Elevated troponin  - Continue to trend. No complain of any chest pain.    --EKG is unremarkable, however troponin more than doubled  --Follow up 2D echo  --Cardio consulted, will appreciate recs    Constipation  - CT abdomen pelvis shows large colonic stool burden with mild distention of rectum.  Continue bowel regimen     HTN  -Antihypertensives were held, in the setting of sepsis   --Will restart depending on trends     HLD  -Continue statin       Trigeminal neuralgia   -Continue carbamazepine    Diet ADULT DIET; Regular  ADULT ORAL NUTRITION SUPPLEMENT; Breakfast; Standard High Calorie/High Protein Oral Supplement  ADULT ORAL NUTRITION SUPPLEMENT; Dinner; Frozen Oral Supplement    DVT Prophylaxis [x] Lovenox, [] Heparin, [] Eliquis, [] Xarelto  [] SCDs     Code Status Full Code   Disposition Expected Disposition:

## 2024-04-28 NOTE — PROGRESS NOTES
Cardiology follow up     Consulted for elevated troponin    Patient seen chart reviewed full note to follow    Plan:  Elevated troponin: troponin more than doubled overnight. Check echo in the morning. If has WMA will go from there with discussing plan    PAD: sp PTA Dr. Bradley 5/2022.      Electronically signed by NE Chadwick CNP on 4/28/2024 at 9:34 AM    Echo 2/2022  Summary   Left ventricular systolic function is low normal.   Ejection fraction is visually estimated at 45-50%.   Sigmoid septum noted.   Sclerotic, but non-stenotic aortic valve.   Trace aortic regurgitation noted with color doppler.   Mitral annular calcification is present.   Mild-moderate mitral regurgitation is present.   No evidence of any pericardial effusion

## 2024-04-28 NOTE — PLAN OF CARE

## 2024-04-28 NOTE — DISCHARGE INSTR - COC
Continuity of Care Form    Patient Name: Beryl Alejandre   :  1934  MRN:  [id number]    Admit date:  2024  Discharge date:  ***    Code Status Order: Full Code   Advance Directives:     Admitting Physician:  Nazia Amos MD  PCP: Holly Morillo MD    Discharging Nurse: ***  Discharging Hospital Unit/Room#: 4119/4119-A  Discharging Unit Phone Number: ***    Emergency Contact:   Extended Emergency Contact Information  Primary Emergency Contact: Melody Alejandre  Address: [address] [place] [address]  Home Phone: [phone]  Mobile Phone: [phone]  Relation: Child    Past Surgical History:  Past Surgical History:   Procedure Laterality Date    APPENDECTOMY      CATARACT REMOVAL      CHOLECYSTECTOMY      EYE SURGERY      HYSTERECTOMY (CERVIX STATUS UNKNOWN)         Immunization History:   Immunization History   Administered Date(s) Administered    TDaP, ADACEL (age 10y-64y), BOOSTRIX (age 10y+), IM, 0.5mL 10/08/2019       Active Problems:  Patient Active Problem List   Diagnosis Code    HTN (hypertension) I10    Rheumatoid arthritis ([facility]) M06.9    CVA, old, hemiparesis ([facility]) I69.359    Tic douloureux G50.0    Debility R53.81    Ataxia R27.0    Acute pain of right knee M25.561    Sepsis ([facility]) A41.9    Physical deconditioning R53.81    Generalized weakness R53.1    Depression F32.A    COVID-19 U07.1    Troponin I above reference range R79.89    Acute cystitis without hematuria N30.00    Self-care deficit Z78.9    Stenosis of left carotid artery I65.22    Open toe wound S91.109A    PAD (peripheral artery disease) ([facility]) I73.9    Severe malnutrition ([facility]) E43    Severe sepsis ([facility]) A41.9, R65.20       Isolation/Infection:   Isolation            No Isolation          Patient Infection Status       Infection Onset Added Last Indicated Last Indicated By Review Planned Expiration Resolved Resolved By    None active    Resolved    COVID-19 22

## 2024-04-29 ENCOUNTER — APPOINTMENT (OUTPATIENT)
Dept: NON INVASIVE DIAGNOSTICS | Age: 89
End: 2024-04-29
Attending: STUDENT IN AN ORGANIZED HEALTH CARE EDUCATION/TRAINING PROGRAM
Payer: COMMERCIAL

## 2024-04-29 LAB
ALBUMIN SERPL-MCNC: 2.9 GM/DL (ref 3.4–5)
ALP BLD-CCNC: 64 IU/L (ref 40–129)
ALT SERPL-CCNC: 18 U/L (ref 10–40)
ANION GAP SERPL CALCULATED.3IONS-SCNC: 11 MMOL/L (ref 7–16)
AST SERPL-CCNC: 30 IU/L (ref 15–37)
BASOPHILS ABSOLUTE: 0 K/CU MM
BASOPHILS RELATIVE PERCENT: 0.2 % (ref 0–1)
BILIRUB SERPL-MCNC: 0.1 MG/DL (ref 0–1)
BUN SERPL-MCNC: 27 MG/DL (ref 6–23)
CALCIUM SERPL-MCNC: 9 MG/DL (ref 8.3–10.6)
CHLORIDE BLD-SCNC: 106 MMOL/L (ref 99–110)
CO2: 23 MMOL/L (ref 21–32)
CREAT SERPL-MCNC: 1 MG/DL (ref 0.6–1.1)
CULTURE: ABNORMAL
DIFFERENTIAL TYPE: ABNORMAL
ECHO AO ROOT DIAM: 3 CM
ECHO AO ROOT INDEX: 1.85 CM/M2
ECHO AV AREA PEAK VELOCITY: 1.8 CM2
ECHO AV AREA VTI: 1.7 CM2
ECHO AV AREA/BSA PEAK VELOCITY: 1.1 CM2/M2
ECHO AV AREA/BSA VTI: 1 CM2/M2
ECHO AV MEAN GRADIENT: 6 MMHG
ECHO AV MEAN VELOCITY: 1.2 M/S
ECHO AV PEAK GRADIENT: 9 MMHG
ECHO AV PEAK VELOCITY: 1.5 M/S
ECHO AV VELOCITY RATIO: 0.6
ECHO AV VTI: 29.4 CM
ECHO BSA: 1.6 M2
ECHO EST RA PRESSURE: 3 MMHG
ECHO IVC PROX: 1.1 CM
ECHO LA AREA 4C: 15.9 CM2
ECHO LA DIAMETER INDEX: 1.67 CM/M2
ECHO LA DIAMETER: 2.7 CM
ECHO LA MAJOR AXIS: 5.2 CM
ECHO LA TO AORTIC ROOT RATIO: 0.9
ECHO LA VOL MOD A4C: 39 ML (ref 22–52)
ECHO LA VOLUME INDEX MOD A4C: 24 ML/M2 (ref 16–34)
ECHO LV E' LATERAL VELOCITY: 4 CM/S
ECHO LV E' SEPTAL VELOCITY: 4 CM/S
ECHO LV EDV A4C: 65 ML
ECHO LV EDV INDEX A4C: 40 ML/M2
ECHO LV EJECTION FRACTION A4C: 42 %
ECHO LV ESV A4C: 38 ML
ECHO LV ESV INDEX A4C: 23 ML/M2
ECHO LVOT AREA: 3.1 CM2
ECHO LVOT AV VTI INDEX: 0.55
ECHO LVOT DIAM: 2 CM
ECHO LVOT MEAN GRADIENT: 2 MMHG
ECHO LVOT PEAK GRADIENT: 3 MMHG
ECHO LVOT PEAK VELOCITY: 0.9 M/S
ECHO LVOT STROKE VOLUME INDEX: 31.4 ML/M2
ECHO LVOT SV: 50.9 ML
ECHO LVOT VTI: 16.2 CM
ECHO MV A VELOCITY: 1.25 M/S
ECHO MV E VELOCITY: 0.65 M/S
ECHO MV E/A RATIO: 0.52
ECHO MV E/E' LATERAL: 16.25
ECHO MV E/E' RATIO (AVERAGED): 16.25
ECHO RIGHT VENTRICULAR SYSTOLIC PRESSURE (RVSP): 31 MMHG
ECHO RV MID DIMENSION: 2.9 CM
ECHO TV REGURGITANT MAX VELOCITY: 2.65 M/S
ECHO TV REGURGITANT PEAK GRADIENT: 28 MMHG
EKG ATRIAL RATE: 100 BPM
EKG ATRIAL RATE: 109 BPM
EKG DIAGNOSIS: NORMAL
EKG DIAGNOSIS: NORMAL
EKG P AXIS: 56 DEGREES
EKG P AXIS: 74 DEGREES
EKG P-R INTERVAL: 156 MS
EKG P-R INTERVAL: 174 MS
EKG Q-T INTERVAL: 334 MS
EKG Q-T INTERVAL: 338 MS
EKG QRS DURATION: 106 MS
EKG QRS DURATION: 112 MS
EKG QTC CALCULATION (BAZETT): 430 MS
EKG QTC CALCULATION (BAZETT): 455 MS
EKG R AXIS: -36 DEGREES
EKG R AXIS: -40 DEGREES
EKG T AXIS: 70 DEGREES
EKG T AXIS: 92 DEGREES
EKG VENTRICULAR RATE: 100 BPM
EKG VENTRICULAR RATE: 109 BPM
EOSINOPHILS ABSOLUTE: 0.2 K/CU MM
EOSINOPHILS RELATIVE PERCENT: 1.7 % (ref 0–3)
GFR SERPL CREATININE-BSD FRML MDRD: 54 ML/MIN/1.73M2
GLUCOSE SERPL-MCNC: 114 MG/DL (ref 70–99)
HCT VFR BLD CALC: 28.3 % (ref 37–47)
HEMOGLOBIN: 8.9 GM/DL (ref 12.5–16)
IMMATURE NEUTROPHIL %: 0.4 % (ref 0–0.43)
LYMPHOCYTES ABSOLUTE: 2.3 K/CU MM
LYMPHOCYTES RELATIVE PERCENT: 25.3 % (ref 24–44)
Lab: ABNORMAL
MCH RBC QN AUTO: 34.4 PG (ref 27–31)
MCHC RBC AUTO-ENTMCNC: 31.4 % (ref 32–36)
MCV RBC AUTO: 109.3 FL (ref 78–100)
MONOCYTES ABSOLUTE: 1.3 K/CU MM
MONOCYTES RELATIVE PERCENT: 15 % (ref 0–4)
NEUTROPHILS RELATIVE PERCENT: 57.4 % (ref 36–66)
NUCLEATED RBC %: 0 %
PDW BLD-RTO: 13 % (ref 11.7–14.9)
PLATELET # BLD: 198 K/CU MM (ref 140–440)
PMV BLD AUTO: 9.5 FL (ref 7.5–11.1)
POTASSIUM SERPL-SCNC: 4.2 MMOL/L (ref 3.5–5.1)
RBC # BLD: 2.59 M/CU MM (ref 4.2–5.4)
SEGMENTED NEUTROPHILS ABSOLUTE COUNT: 5.1 K/CU MM
SODIUM BLD-SCNC: 140 MMOL/L (ref 135–145)
SPECIMEN: ABNORMAL
TOTAL IMMATURE NEUTOROPHIL: 0.04 K/CU MM
TOTAL NUCLEATED RBC: 0 K/CU MM
TOTAL PROTEIN: 5.2 GM/DL (ref 6.4–8.2)
WBC # BLD: 8.9 K/CU MM (ref 4–10.5)

## 2024-04-29 PROCEDURE — 85025 COMPLETE CBC W/AUTO DIFF WBC: CPT

## 2024-04-29 PROCEDURE — APPSS60 APP SPLIT SHARED TIME 46-60 MINUTES: Performed by: NURSE PRACTITIONER

## 2024-04-29 PROCEDURE — 80053 COMPREHEN METABOLIC PANEL: CPT

## 2024-04-29 PROCEDURE — 93306 TTE W/DOPPLER COMPLETE: CPT

## 2024-04-29 PROCEDURE — 93010 ELECTROCARDIOGRAM REPORT: CPT | Performed by: INTERNAL MEDICINE

## 2024-04-29 PROCEDURE — 93306 TTE W/DOPPLER COMPLETE: CPT | Performed by: INTERNAL MEDICINE

## 2024-04-29 PROCEDURE — 94761 N-INVAS EAR/PLS OXIMETRY MLT: CPT

## 2024-04-29 PROCEDURE — 2580000003 HC RX 258: Performed by: STUDENT IN AN ORGANIZED HEALTH CARE EDUCATION/TRAINING PROGRAM

## 2024-04-29 PROCEDURE — 6370000000 HC RX 637 (ALT 250 FOR IP): Performed by: HOSPITALIST

## 2024-04-29 PROCEDURE — 1200000000 HC SEMI PRIVATE

## 2024-04-29 PROCEDURE — 36415 COLL VENOUS BLD VENIPUNCTURE: CPT

## 2024-04-29 PROCEDURE — 6360000002 HC RX W HCPCS: Performed by: STUDENT IN AN ORGANIZED HEALTH CARE EDUCATION/TRAINING PROGRAM

## 2024-04-29 PROCEDURE — 6370000000 HC RX 637 (ALT 250 FOR IP): Performed by: STUDENT IN AN ORGANIZED HEALTH CARE EDUCATION/TRAINING PROGRAM

## 2024-04-29 PROCEDURE — 6370000000 HC RX 637 (ALT 250 FOR IP): Performed by: INTERNAL MEDICINE

## 2024-04-29 RX ORDER — ACETAMINOPHEN 325 MG/1
650 TABLET ORAL ONCE
Status: COMPLETED | OUTPATIENT
Start: 2024-04-29 | End: 2024-04-29

## 2024-04-29 RX ADMIN — SODIUM CHLORIDE, PRESERVATIVE FREE 10 ML: 5 INJECTION INTRAVENOUS at 09:51

## 2024-04-29 RX ADMIN — METOPROLOL TARTRATE 25 MG: 25 TABLET, FILM COATED ORAL at 09:49

## 2024-04-29 RX ADMIN — DOCUSATE SODIUM 100 MG: 100 CAPSULE, LIQUID FILLED ORAL at 09:50

## 2024-04-29 RX ADMIN — ATORVASTATIN CALCIUM 20 MG: 10 TABLET, FILM COATED ORAL at 09:50

## 2024-04-29 RX ADMIN — CARBAMAZEPINE 200 MG: 200 TABLET ORAL at 09:50

## 2024-04-29 RX ADMIN — CARBAMAZEPINE 200 MG: 200 TABLET ORAL at 21:28

## 2024-04-29 RX ADMIN — SENNOSIDES 8.6 MG: 8.6 TABLET, FILM COATED ORAL at 21:41

## 2024-04-29 RX ADMIN — SENNOSIDES 8.6 MG: 8.6 TABLET, FILM COATED ORAL at 09:50

## 2024-04-29 RX ADMIN — POLYETHYLENE GLYCOL 3350 17 G: 17 POWDER, FOR SOLUTION ORAL at 09:49

## 2024-04-29 RX ADMIN — METOPROLOL TARTRATE 25 MG: 25 TABLET, FILM COATED ORAL at 21:41

## 2024-04-29 RX ADMIN — CLOPIDOGREL BISULFATE 75 MG: 75 TABLET ORAL at 09:50

## 2024-04-29 RX ADMIN — ACETAMINOPHEN 650 MG: 325 TABLET ORAL at 09:49

## 2024-04-29 RX ADMIN — ENOXAPARIN SODIUM 30 MG: 100 INJECTION SUBCUTANEOUS at 09:50

## 2024-04-29 RX ADMIN — FOLIC ACID 1 MG: 1 TABLET ORAL at 09:50

## 2024-04-29 RX ADMIN — SODIUM CHLORIDE, PRESERVATIVE FREE 10 ML: 5 INJECTION INTRAVENOUS at 21:41

## 2024-04-29 RX ADMIN — ASPIRIN 81 MG: 81 TABLET, CHEWABLE ORAL at 09:50

## 2024-04-29 NOTE — CARE COORDINATION
Spoke with Raisa at [facility], they are able to take pt once prior auth completed. Starting auth process today. CM will continue to follow.

## 2024-04-29 NOTE — PROGRESS NOTES
V2.0+  [facility] Hospitalist Progress Note      Name:  Beryl Alejandre /Age/Sex: 1934  (89 y.o. female)   MRN & CSN:  [id number] & [id number] Encounter Date/Time: 2024 4:01 PM EDT    Location:  [address] [place] [address]-A PCP: Holly Morillo MD       Hospital Day: 4    Assessment and Plan:   Beryl Alejandre is a 89 y.o. female who presents with altered mental status        -She is on ceftriaxone 2000 mg IV daily-the E. coli in the blood and urine are sensitive to it-continue    Severe Sepsis due to E. coli bacteremia likely due to UTI  Lactic Acidosis   - UA suggestive of infection,   - Received IVF and Rocephin in ED  -Evaluated by Tele stroke and suggested likely metabolic 2/2 infection and to consider MRI if symptoms don't improve or CTA abnormal    - Hold MRI at this time   - BCx : E. coli  bottles.  Continue Rocephin.  Stop vancomycin.  Improved lactate 2.1.  CTA head and neck no LVO.  --- --Follow-up blood culture sensitivities and follow up urine culture  --Repeat BCx     Acute encephalopathy superimposed on Chronic dementia  -2/2 to UTI  -Delirium precautions  - Mental status appears improved.    Acute kidney injury  - Likely due to sepsis.  Received IVF.  Improved creatinine 1.0    Elevated troponin  - Continue to trend. No complain of any chest pain.    --EKG is unremarkable, however troponin more than doubled  --Follow up 2D echo  --Cardio consulted, will appreciate recs    Constipation  - CT abdomen pelvis shows large colonic stool burden with mild distention of rectum.  Continue bowel regimen     HTN  -Antihypertensives were held, in the setting of sepsis   --Will restart depending on trends     HLD  -Continue statin       Trigeminal neuralgia   -Continue carbamazepine    Diet ADULT DIET; Regular  ADULT ORAL NUTRITION SUPPLEMENT; Breakfast; Standard High Calorie/High Protein Oral Supplement  ADULT ORAL NUTRITION SUPPLEMENT; Dinner; Frozen Oral Supplement    DVT Prophylaxis [x]

## 2024-04-29 NOTE — PROGRESS NOTES
Physician Progress Note      PATIENT:               CARIDAD COOK  CSN #:                  [id number]  :                       1934  ADMIT DATE:       2024 7:06 PM  DISCH DATE:  RESPONDING  PROVIDER #:        Uriah Salazar MD        QUERY TEXT:    Stage of Chronic Kidney Disease: Please provide further specificity, if known.    Clinical indicators include:  Options provided:  -- Chronic kidney disease stage 1  -- Chronic kidney disease stage 2  -- Chronic kidney disease stage 3  -- Chronic kidney disease stage 3a  -- Chronic kidney disease stage 3b  -- Chronic kidney disease stage 4  -- Chronic kidney disease stage 5  -- Chronic kidney disease stage 5, requiring dialysis  -- End stage renal disease  -- Other - I will add my own diagnosis  -- Disagree - Not applicable / Not valid  -- Disagree - Clinically Unable to determine / Unknown        PROVIDER RESPONSE TEXT:    She appears to have stage 3a chronic kidney disease.      Electronically signed by:  Uriah Salazar MD 2024 1:09 PM

## 2024-04-29 NOTE — PROGRESS NOTES
Cardiology Progress Note      Today's Plan: will sign off     Admit Date:  4/26/2024    Consult reason/ Seen today for: elevated troponin.       Assessment / Plan / Recommendation:     Elevated Troponin : in setting of renal failure, sepsis and acute infection, doubt ACS.  Most probably demand ischemia related leak.  Echo showed LVEF 40-45% slightly reduced.  Apical lateral wall appear akinetic, sigmoid septum noted on echo in 2022.  LVEF was 50 to 45% at that time.  Not a candidate for aggressive intervention given advanced age and encephalopathy.  Patient appears euvolemic at this time.  HTN: BP stable on continue with Lopressor 25 mg twice daily.  Norvasc and Prinzide held due to renal function and previous hypotension  PAD: hx PTA per Dr. Bradley in 2022.  Continue aspirin and Plavix.  UTI: Primary managing   Dyslipidemia: continue statins  DVT prophylaxis if not contraindicated.     Electronically signed by NE Hernandez CNP on 4/29/2024 at 4:41 PM

## 2024-04-30 LAB
ANION GAP SERPL CALCULATED.3IONS-SCNC: 10 MMOL/L (ref 7–16)
BASOPHILS ABSOLUTE: 0 K/CU MM
BASOPHILS RELATIVE PERCENT: 0.3 % (ref 0–1)
BUN SERPL-MCNC: 20 MG/DL (ref 6–23)
CALCIUM SERPL-MCNC: 9.2 MG/DL (ref 8.3–10.6)
CHLORIDE BLD-SCNC: 110 MMOL/L (ref 99–110)
CO2: 25 MMOL/L (ref 21–32)
CREAT SERPL-MCNC: 0.9 MG/DL (ref 0.6–1.1)
DIFFERENTIAL TYPE: ABNORMAL
EOSINOPHILS ABSOLUTE: 0.2 K/CU MM
EOSINOPHILS RELATIVE PERCENT: 3.1 % (ref 0–3)
GFR SERPL CREATININE-BSD FRML MDRD: 61 ML/MIN/1.73M2
GLUCOSE SERPL-MCNC: 93 MG/DL (ref 70–99)
HCT VFR BLD CALC: 31.6 % (ref 37–47)
HEMOGLOBIN: 9.7 GM/DL (ref 12.5–16)
IMMATURE NEUTROPHIL %: 0.4 % (ref 0–0.43)
LYMPHOCYTES ABSOLUTE: 2.2 K/CU MM
LYMPHOCYTES RELATIVE PERCENT: 29.2 % (ref 24–44)
MCH RBC QN AUTO: 33.6 PG (ref 27–31)
MCHC RBC AUTO-ENTMCNC: 30.7 % (ref 32–36)
MCV RBC AUTO: 109.3 FL (ref 78–100)
MONOCYTES ABSOLUTE: 1.1 K/CU MM
MONOCYTES RELATIVE PERCENT: 14.2 % (ref 0–4)
NEUTROPHILS RELATIVE PERCENT: 52.8 % (ref 36–66)
NUCLEATED RBC %: 0 %
PDW BLD-RTO: 13 % (ref 11.7–14.9)
PLATELET # BLD: 227 K/CU MM (ref 140–440)
PMV BLD AUTO: 9.3 FL (ref 7.5–11.1)
POTASSIUM SERPL-SCNC: 4.1 MMOL/L (ref 3.5–5.1)
RBC # BLD: 2.89 M/CU MM (ref 4.2–5.4)
SEGMENTED NEUTROPHILS ABSOLUTE COUNT: 4 K/CU MM
SODIUM BLD-SCNC: 145 MMOL/L (ref 135–145)
TOTAL IMMATURE NEUTOROPHIL: 0.03 K/CU MM
TOTAL NUCLEATED RBC: 0 K/CU MM
WBC # BLD: 7.5 K/CU MM (ref 4–10.5)

## 2024-04-30 PROCEDURE — 6370000000 HC RX 637 (ALT 250 FOR IP): Performed by: HOSPITALIST

## 2024-04-30 PROCEDURE — 1200000000 HC SEMI PRIVATE

## 2024-04-30 PROCEDURE — 85025 COMPLETE CBC W/AUTO DIFF WBC: CPT

## 2024-04-30 PROCEDURE — 80048 BASIC METABOLIC PNL TOTAL CA: CPT

## 2024-04-30 PROCEDURE — 6370000000 HC RX 637 (ALT 250 FOR IP): Performed by: INTERNAL MEDICINE

## 2024-04-30 PROCEDURE — 36415 COLL VENOUS BLD VENIPUNCTURE: CPT

## 2024-04-30 PROCEDURE — 6360000002 HC RX W HCPCS: Performed by: INTERNAL MEDICINE

## 2024-04-30 PROCEDURE — 6370000000 HC RX 637 (ALT 250 FOR IP): Performed by: STUDENT IN AN ORGANIZED HEALTH CARE EDUCATION/TRAINING PROGRAM

## 2024-04-30 PROCEDURE — 2580000003 HC RX 258: Performed by: STUDENT IN AN ORGANIZED HEALTH CARE EDUCATION/TRAINING PROGRAM

## 2024-04-30 PROCEDURE — 6360000002 HC RX W HCPCS: Performed by: STUDENT IN AN ORGANIZED HEALTH CARE EDUCATION/TRAINING PROGRAM

## 2024-04-30 PROCEDURE — 2580000003 HC RX 258: Performed by: INTERNAL MEDICINE

## 2024-04-30 PROCEDURE — 94761 N-INVAS EAR/PLS OXIMETRY MLT: CPT

## 2024-04-30 RX ORDER — LORAZEPAM 0.5 MG/1
0.25 TABLET ORAL NIGHTLY PRN
COMMUNITY

## 2024-04-30 RX ORDER — METHOTREXATE 2.5 MG/1
12.5 TABLET ORAL WEEKLY
Status: DISCONTINUED | OUTPATIENT
Start: 2024-05-05 | End: 2024-05-01 | Stop reason: HOSPADM

## 2024-04-30 RX ORDER — PANTOPRAZOLE SODIUM 40 MG/1
40 TABLET, DELAYED RELEASE ORAL
Status: DISCONTINUED | OUTPATIENT
Start: 2024-05-01 | End: 2024-05-01 | Stop reason: HOSPADM

## 2024-04-30 RX ORDER — METHOTREXATE 2.5 MG/1
15 TABLET ORAL DAILY
Status: ON HOLD | COMMUNITY
Start: 2022-10-27 | End: 2024-05-01

## 2024-04-30 RX ORDER — PAROXETINE HYDROCHLORIDE 20 MG/1
20 TABLET, FILM COATED ORAL DAILY
COMMUNITY

## 2024-04-30 RX ORDER — PAROXETINE HYDROCHLORIDE 20 MG/1
20 TABLET, FILM COATED ORAL DAILY
Status: DISCONTINUED | OUTPATIENT
Start: 2024-04-30 | End: 2024-05-01 | Stop reason: HOSPADM

## 2024-04-30 RX ORDER — RAMELTEON 8 MG/1
8 TABLET ORAL NIGHTLY
COMMUNITY

## 2024-04-30 RX ORDER — PREDNISONE 5 MG/1
5 TABLET ORAL 2 TIMES DAILY
Status: DISCONTINUED | OUTPATIENT
Start: 2024-04-30 | End: 2024-05-01 | Stop reason: HOSPADM

## 2024-04-30 RX ADMIN — SENNOSIDES 8.6 MG: 8.6 TABLET, FILM COATED ORAL at 09:16

## 2024-04-30 RX ADMIN — PAROXETINE HYDROCHLORIDE 20 MG: 20 TABLET, FILM COATED ORAL at 15:02

## 2024-04-30 RX ADMIN — CARBAMAZEPINE 200 MG: 200 TABLET ORAL at 21:00

## 2024-04-30 RX ADMIN — POLYETHYLENE GLYCOL 3350 17 G: 17 POWDER, FOR SOLUTION ORAL at 09:16

## 2024-04-30 RX ADMIN — METOPROLOL TARTRATE 25 MG: 25 TABLET, FILM COATED ORAL at 20:59

## 2024-04-30 RX ADMIN — METOPROLOL TARTRATE 25 MG: 25 TABLET, FILM COATED ORAL at 09:16

## 2024-04-30 RX ADMIN — ATORVASTATIN CALCIUM 20 MG: 10 TABLET, FILM COATED ORAL at 09:16

## 2024-04-30 RX ADMIN — ASPIRIN 81 MG: 81 TABLET, CHEWABLE ORAL at 09:16

## 2024-04-30 RX ADMIN — FOLIC ACID 1 MG: 1 TABLET ORAL at 09:16

## 2024-04-30 RX ADMIN — SENNOSIDES 8.6 MG: 8.6 TABLET, FILM COATED ORAL at 21:00

## 2024-04-30 RX ADMIN — CLOPIDOGREL BISULFATE 75 MG: 75 TABLET ORAL at 09:16

## 2024-04-30 RX ADMIN — SODIUM CHLORIDE 25 ML: 9 INJECTION, SOLUTION INTRAVENOUS at 22:49

## 2024-04-30 RX ADMIN — PREDNISONE 5 MG: 5 TABLET ORAL at 20:59

## 2024-04-30 RX ADMIN — DOCUSATE SODIUM 100 MG: 100 CAPSULE, LIQUID FILLED ORAL at 09:16

## 2024-04-30 RX ADMIN — PREDNISONE 5 MG: 5 TABLET ORAL at 15:02

## 2024-04-30 RX ADMIN — SODIUM CHLORIDE, PRESERVATIVE FREE 10 ML: 5 INJECTION INTRAVENOUS at 20:59

## 2024-04-30 RX ADMIN — CARBAMAZEPINE 200 MG: 200 TABLET ORAL at 09:16

## 2024-04-30 RX ADMIN — CEFTRIAXONE SODIUM 2000 MG: 2 INJECTION, POWDER, FOR SOLUTION INTRAMUSCULAR; INTRAVENOUS at 22:51

## 2024-04-30 RX ADMIN — ENOXAPARIN SODIUM 30 MG: 100 INJECTION SUBCUTANEOUS at 09:16

## 2024-04-30 RX ADMIN — SODIUM CHLORIDE, PRESERVATIVE FREE 10 ML: 5 INJECTION INTRAVENOUS at 09:16

## 2024-04-30 NOTE — PROGRESS NOTES
XOCHITL gibson dtr clark from [facility]  But on pl it says hx stroke, all mris' with no acute stroke

## 2024-04-30 NOTE — PLAN OF CARE
Problem: Discharge Planning  Goal: Discharge to home or other facility with appropriate resources  Outcome: Progressing     Problem: Skin/Tissue Integrity  Goal: Absence of new skin breakdown  Outcome: Progressing     Problem: ABCDS Injury Assessment  Goal: Absence of physical injury  Outcome: Progressing     Problem: Safety - Adult  Goal: Free from fall injury  Outcome: Progressing     Problem: Nutrition Deficit:  Goal: Optimize nutritional status  Outcome: Progressing

## 2024-04-30 NOTE — PROGRESS NOTES
04/30/24 1415   Encounter Summary   Encounter Overview/Reason Volunteer Encounter   Service Provided For Patient   Referral/Consult From Volunteer   Support System Family members   Last Encounter  04/30/24  (Visit by Eucjuju Rice, charted by  Andrés)   Complexity of Encounter Low   Begin Time 1000   End Time  1010   Total Time Calculated 10 min   Spiritual/Emotional needs   Type Spiritual Support   Assessment/Intervention/Outcome   Assessment Calm   Intervention Active listening   Outcome Coping   Plan and Referrals   Plan/Referrals Continue Support (comment)

## 2024-04-30 NOTE — PROGRESS NOTES
V2.0+  [facility] Hospitalist Progress Note      Name:  Beryl Alejandre /Age/Sex: 1934  (89 y.o. female)   MRN & CSN:  [id number] & [id number] Encounter Date/Time: 2024 4:01 PM EDT    Location:  [address] [place] [address]-A PCP: Holly Morillo MD       Hospital Day: 5    Assessment and Plan:   Beryl Alejandre is a 89 y.o. female who presents with altered mental status        -She is on ceftriaxone 2000 mg IV daily-the E. coli in the blood and urine are sensitive to it-continue ceftriaxone    Severe Sepsis due to E. coli bacteremia likely due to UTI  Lactic Acidosis   - UA suggestive of infection,   - Received IVF and Rocephin in ED  -Evaluated by Tele stroke and suggested likely metabolic 2/2 infection and to consider MRI if symptoms don't improve or CTA abnormal    - Hold MRI at this time   - BCx : E. coli  bottles.  Continue Rocephin.  Stop vancomycin.  Improved lactate 2.1.  CTA head and neck no LVO.  --- --Follow-up blood culture sensitivities and follow up urine culture  --Repeat BCx     Acute encephalopathy superimposed on Chronic dementia  -2/2 to UTI  -Delirium precautions  - Mental status appears improved.    Acute kidney injury  - Likely due to sepsis.  Received IVF.  Improved creatinine 1.0    Elevated troponin  - Continue to trend. No complain of any chest pain.    --EKG is unremarkable, however troponin more than doubled  --Follow up 2D echo  --Cardio consulted, will appreciate recs    Constipation  - CT abdomen pelvis shows large colonic stool burden with mild distention of rectum.  Continue bowel regimen     HTN  -Antihypertensives were held, in the setting of sepsis   --Will restart depending on trends     HLD  -Continue statin       Trigeminal neuralgia   -Continue carbamazepine    Rheumatoid arthritis  -On prednisone and methotrexate at home  -Resume methotrexate possibly on , May 5  -Continue prednisone    Diet ADULT DIET; Regular  ADULT ORAL NUTRITION SUPPLEMENT;

## 2024-04-30 NOTE — PROGRESS NOTES
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 Pain R knee less marked today.     Has arthritis both hips, L > R .      Difficulty in ambulation is multifactorial, arthritis knees & hips, spinal stenosis, neuropathy.     Shall cont PT,OT.     JAMIE BECK                     Electronically signed by Jamie Beck MD at 3/15/2016 12:26 PM

## 2024-04-30 NOTE — PROGRESS NOTES
as  well.  A small tip of this was fractured into the occluded region, which  we did not feel would have any negative effect as it was in an occluded  vessel stuffed inside a calcification.  At this point, after  approximately 3 hours, we felt that there was no benefit for us to  continue.     We were able to actually place an Astato wire into the small pedal  vessel of the foot as well.  However, this was still a situation that we  could not get a catheter or a device to track to be able to treat this  region.  At this point, we had ballooned the entire proximal peroneal  artery into the distal popliteal artery.  We attempted to get into the  anterior tibial artery; however, there was no distal vessel to be able  to aim for, and we could not get into this vessel either, and there was  no posterior tibial artery to identify.  So at this point, we exchanged  our sheath for a short 5-Russian sheath.  The patient tolerated the  procedure well, and she was taken to the recovery area in a stable  condition.           ANN BRADLEY MD     D: 05/26/2022 0:20:49       T: 05/26/2022 3:17:22     [email or web address]  Job#: [id number]     Doc#: [id number]     CC:               Last signed by: Ann Bradley MD at 5/28/2022 11:05 AM   Electronically signed by Ann Bradley MD at 5/28/2022 11:05 AM

## 2024-04-30 NOTE — CARE COORDINATION
Reviewed chart, discussed in IDR plan to Corie Roach once piror auth completed. CM will continue to follow.

## 2024-05-01 VITALS
HEART RATE: 64 BPM | DIASTOLIC BLOOD PRESSURE: 86 MMHG | RESPIRATION RATE: 16 BRPM | WEIGHT: 126.8 LBS | OXYGEN SATURATION: 98 % | HEIGHT: 66 IN | SYSTOLIC BLOOD PRESSURE: 140 MMHG | TEMPERATURE: 98 F | BODY MASS INDEX: 20.38 KG/M2

## 2024-05-01 LAB
ANION GAP SERPL CALCULATED.3IONS-SCNC: 10 MMOL/L (ref 7–16)
BASOPHILS ABSOLUTE: 0 K/CU MM
BASOPHILS RELATIVE PERCENT: 0.5 % (ref 0–1)
BUN SERPL-MCNC: 16 MG/DL (ref 6–23)
CALCIUM SERPL-MCNC: 9.3 MG/DL (ref 8.3–10.6)
CHLORIDE BLD-SCNC: 105 MMOL/L (ref 99–110)
CO2: 24 MMOL/L (ref 21–32)
CREAT SERPL-MCNC: 0.9 MG/DL (ref 0.6–1.1)
DIFFERENTIAL TYPE: ABNORMAL
EOSINOPHILS ABSOLUTE: 0.2 K/CU MM
EOSINOPHILS RELATIVE PERCENT: 2.9 % (ref 0–3)
GFR, ESTIMATED: 61 ML/MIN/1.73M2
GLUCOSE SERPL-MCNC: 118 MG/DL (ref 70–99)
HCT VFR BLD CALC: 30.4 % (ref 37–47)
HEMOGLOBIN: 9.4 GM/DL (ref 12.5–16)
IMMATURE NEUTROPHIL %: 0.2 % (ref 0–0.43)
LYMPHOCYTES ABSOLUTE: 2.1 K/CU MM
LYMPHOCYTES RELATIVE PERCENT: 26 % (ref 24–44)
MCH RBC QN AUTO: 34.2 PG (ref 27–31)
MCHC RBC AUTO-ENTMCNC: 30.9 % (ref 32–36)
MCV RBC AUTO: 110.5 FL (ref 78–100)
MONOCYTES ABSOLUTE: 1 K/CU MM
MONOCYTES RELATIVE PERCENT: 11.7 % (ref 0–4)
NEUTROPHILS ABSOLUTE: 4.8 K/CU MM
NEUTROPHILS RELATIVE PERCENT: 58.7 % (ref 36–66)
NUCLEATED RBC %: 0 %
PDW BLD-RTO: 13.1 % (ref 11.7–14.9)
PLATELET # BLD: 261 K/CU MM (ref 140–440)
PMV BLD AUTO: 9.4 FL (ref 7.5–11.1)
POTASSIUM SERPL-SCNC: 4 MMOL/L (ref 3.5–5.1)
RBC # BLD: 2.75 M/CU MM (ref 4.2–5.4)
SODIUM BLD-SCNC: 139 MMOL/L (ref 135–145)
TOTAL IMMATURE NEUTOROPHIL: 0.02 K/CU MM
TOTAL NUCLEATED RBC: 0 K/CU MM
WBC # BLD: 8.1 K/CU MM (ref 4–10.5)

## 2024-05-01 PROCEDURE — 94761 N-INVAS EAR/PLS OXIMETRY MLT: CPT

## 2024-05-01 PROCEDURE — 85025 COMPLETE CBC W/AUTO DIFF WBC: CPT

## 2024-05-01 PROCEDURE — 6370000000 HC RX 637 (ALT 250 FOR IP): Performed by: HOSPITALIST

## 2024-05-01 PROCEDURE — 36415 COLL VENOUS BLD VENIPUNCTURE: CPT

## 2024-05-01 PROCEDURE — 6360000002 HC RX W HCPCS: Performed by: STUDENT IN AN ORGANIZED HEALTH CARE EDUCATION/TRAINING PROGRAM

## 2024-05-01 PROCEDURE — 6370000000 HC RX 637 (ALT 250 FOR IP): Performed by: INTERNAL MEDICINE

## 2024-05-01 PROCEDURE — 2580000003 HC RX 258: Performed by: STUDENT IN AN ORGANIZED HEALTH CARE EDUCATION/TRAINING PROGRAM

## 2024-05-01 PROCEDURE — 80048 BASIC METABOLIC PNL TOTAL CA: CPT

## 2024-05-01 PROCEDURE — 6370000000 HC RX 637 (ALT 250 FOR IP): Performed by: STUDENT IN AN ORGANIZED HEALTH CARE EDUCATION/TRAINING PROGRAM

## 2024-05-01 RX ORDER — METHOTREXATE 2.5 MG/1
12.5 TABLET ORAL WEEKLY
Qty: 1 TABLET | Refills: 0
Start: 2024-05-13 | End: 2024-05-01

## 2024-05-01 RX ORDER — SULFAMETHOXAZOLE AND TRIMETHOPRIM 800; 160 MG/1; MG/1
1 TABLET ORAL 2 TIMES DAILY
Qty: 20 TABLET | Refills: 0
Start: 2024-05-01 | End: 2024-05-11

## 2024-05-01 RX ORDER — POLYETHYLENE GLYCOL 3350 17 G/17G
17 POWDER, FOR SOLUTION ORAL DAILY PRN
Qty: 30 PACKET | Refills: 0
Start: 2024-05-01 | End: 2024-05-31

## 2024-05-01 RX ORDER — SENNOSIDES A AND B 8.6 MG/1
1 TABLET, FILM COATED ORAL 2 TIMES DAILY
Qty: 14 TABLET | Refills: 0
Start: 2024-05-01 | End: 2024-05-08

## 2024-05-01 RX ORDER — METHOTREXATE 2.5 MG/1
12.5 TABLET ORAL WEEKLY
Qty: 1 TABLET | Refills: 0
Start: 2024-05-13

## 2024-05-01 RX ORDER — POLYETHYLENE GLYCOL 3350 17 G/17G
17 POWDER, FOR SOLUTION ORAL DAILY
Qty: 30 PACKET | Refills: 0
Start: 2024-05-02 | End: 2024-06-01

## 2024-05-01 RX ORDER — LISINOPRIL 20 MG/1
20 TABLET ORAL DAILY
Qty: 30 TABLET | Refills: 0
Start: 2024-05-01

## 2024-05-01 RX ORDER — BISACODYL 5 MG/1
5 TABLET, DELAYED RELEASE ORAL DAILY PRN
Qty: 30 TABLET | Refills: 0
Start: 2024-05-01

## 2024-05-01 RX ADMIN — ASPIRIN 81 MG: 81 TABLET, CHEWABLE ORAL at 09:45

## 2024-05-01 RX ADMIN — ATORVASTATIN CALCIUM 20 MG: 10 TABLET, FILM COATED ORAL at 09:45

## 2024-05-01 RX ADMIN — SODIUM CHLORIDE, PRESERVATIVE FREE 10 ML: 5 INJECTION INTRAVENOUS at 09:45

## 2024-05-01 RX ADMIN — METOPROLOL TARTRATE 25 MG: 25 TABLET, FILM COATED ORAL at 09:45

## 2024-05-01 RX ADMIN — ENOXAPARIN SODIUM 30 MG: 100 INJECTION SUBCUTANEOUS at 09:45

## 2024-05-01 RX ADMIN — FOLIC ACID 1 MG: 1 TABLET ORAL at 09:45

## 2024-05-01 RX ADMIN — PREDNISONE 5 MG: 5 TABLET ORAL at 09:45

## 2024-05-01 RX ADMIN — PANTOPRAZOLE SODIUM 40 MG: 40 TABLET, DELAYED RELEASE ORAL at 09:45

## 2024-05-01 RX ADMIN — POLYETHYLENE GLYCOL 3350 17 G: 17 POWDER, FOR SOLUTION ORAL at 09:46

## 2024-05-01 RX ADMIN — DOCUSATE SODIUM 100 MG: 100 CAPSULE, LIQUID FILLED ORAL at 09:45

## 2024-05-01 RX ADMIN — SENNOSIDES 8.6 MG: 8.6 TABLET, FILM COATED ORAL at 09:45

## 2024-05-01 RX ADMIN — CARBAMAZEPINE 200 MG: 200 TABLET ORAL at 09:45

## 2024-05-01 RX ADMIN — PAROXETINE HYDROCHLORIDE 20 MG: 20 TABLET, FILM COATED ORAL at 09:45

## 2024-05-01 RX ADMIN — CLOPIDOGREL BISULFATE 75 MG: 75 TABLET ORAL at 09:45

## 2024-05-01 NOTE — PLAN OF CARE
Problem: Discharge Planning  Goal: Discharge to home or other facility with appropriate resources  5/1/2024 0014 by Danielle Haynes LPN  Outcome: Progressing  4/30/2024 1833 by Ivy Youssef, RN  Outcome: Progressing     Problem: Skin/Tissue Integrity  Goal: Absence of new skin breakdown  Description: 1.  Monitor for areas of redness and/or skin breakdown  2.  Assess vascular access sites hourly  3.  Every 4-6 hours minimum:  Change oxygen saturation probe site  4.  Every 4-6 hours:  If on nasal continuous positive airway pressure, respiratory therapy assess nares and determine need for appliance change or resting period.  5/1/2024 0014 by Danielle Haynes LPN  Outcome: Progressing  4/30/2024 1833 by Ivy Youssef, RN  Outcome: Progressing     Problem: ABCDS Injury Assessment  Goal: Absence of physical injury  5/1/2024 0014 by Danielle Haynes LPN  Outcome: Progressing  4/30/2024 1833 by Ivy Youssef RN  Outcome: Progressing     Problem: Safety - Adult  Goal: Free from fall injury  5/1/2024 0014 by Danielle Haynes LPN  Outcome: Progressing  4/30/2024 1833 by Ivy Youssef, RN  Outcome: Progressing     Problem: Nutrition Deficit:  Goal: Optimize nutritional status  5/1/2024 0014 by Danielle Haynes LPN  Outcome: Progressing  4/30/2024 1833 by Ivy Youssef, RN  Outcome: Progressing

## 2024-05-01 NOTE — PROGRESS NOTES
Oral Daily    atorvastatin  20 mg Oral Daily    carBAMazepine  200 mg Oral BID    clopidogrel  75 mg Oral Daily    docusate sodium  100 mg Oral Daily    folic acid  1 mg Oral Daily    [Held by provider] lisinopril-hydroCHLOROthiazide  0.5 tablet Oral Daily    metoprolol tartrate  25 mg Oral BID    sodium chloride flush  5-40 mL IntraVENous 2 times per day      Infusions:    sodium chloride 25 mL (04/30/24 3524)     PRN Meds: bisacodyl, 5 mg, Daily PRN  sodium chloride flush, 5-40 mL, PRN  sodium chloride, , PRN  ondansetron, 4 mg, Q8H PRN   Or  ondansetron, 4 mg, Q6H PRN  polyethylene glycol, 17 g, Daily PRN  acetaminophen, 650 mg, Q6H PRN   Or  acetaminophen, 650 mg, Q6H PRN        Labs      Recent Labs     04/29/24 0350 04/30/24 0524   WBC 8.9 7.5   HGB 8.9* 9.7*   HCT 28.3* 31.6*    227        Recent Labs     04/29/24  0350 04/30/24 0524    145   K 4.2 4.1    110   CO2 23 25   BUN 27* 20   CREATININE 1.0 0.9       Recent Labs     04/29/24 0350   AST 30   ALT 18   BILITOT 0.1   ALKPHOS 64       No results for input(s): \"INR\" in the last 72 hours.    No results for input(s): \"CKTOTAL\", \"CKMB\", \"CKMBINDEX\", \"TROPONINT\" in the last 72 hours.  Lab Results   Component Value Date/Time    LABA1C 6.1 05/11/2011 08:30 PM     CALCIUM:  9.2/25 (04/30 0524)  Lab Results   Component Value Date/Time    MG 2.1 03/04/2022 01:14 PM           Electronically signed by CALIN KRAMER MD on 5/1/2024 at 9:58 AM

## 2024-05-01 NOTE — DISCHARGE SUMMARY
V2.0  Discharge Summary    Name:  Beryl Alejandre /Age/Sex: 1934 (89 y.o. female)   Admit Date: 2024  Discharge Date: 24    MRN & CSN:  [id number] & [id number] Encounter Date and Time 24 3:45 PM EDT    Attending:  Calin Kramer MD Discharging Provider: CALIN KRAMER MD       Hospital Course:     Brief HPI: Beryl Alejandre is a 89 y.o. female who presented with an altered mental status.  She was seen in the ED and was admitted.    Brief Problem Based Course:      Severe Sepsis due to E. coli bacteremia due to UTI  -While here both blood and urine cultures grew E. coli.  -CT abdomen and pelvis did not show any urinary tract abnormality and did show a large colon stool burden with mild distention of the rectum  -She was treated with appropriate IV antibiotics and was switched to p.o. upon discharge  -Of note, she also had a UTI with E. coli bacteremia in  for which she was also admitted here    Immunosuppressed due to being on methotrexate for RA  -Methotrexate has been temporarily held old until the infection clears    Chronic prednisone use for RA  -Continue prednisone    Dementia - present for 5 years per daughter who believes that it is mild     Acute encephalopathy superimposed on dementia  -She did have a stroke alert in the ED, but later it was determined that the confusion is due to UTI    Nonambulatory at baseline - but is able to pivot to a WC at home with some CGA  -she has seen Dr. Beck, neurology, in the past, who noted difficulty ambulation which is multifactorial due to arthritis in knees, hips, spinal stenosis, and neuropathy.     Acute kidney injury  -Likely due to sepsis.  Recovered     Elevated troponin  -Most likely due to demand ischemia per cardiology    LV dysfunction with EF 40 to 45% with apical lateral wall akinesis on TTE done because of elevated troponin  -Not a candidate for aggressive intervention per cardiology due to advanced age and mental  226 11/10/2015 09:40 AM     Hemoglobin A1C:   Lab Results   Component Value Date/Time    LABA1C 6.1 05/11/2011 08:30 PM     TSH: No results found for: \"TSH\"  Troponin:   Lab Results   Component Value Date/Time    TROPONINT 0.028 03/04/2022 08:16 PM    TROPONINT 0.029 03/04/2022 04:20 PM    TROPONINT 0.059 03/04/2022 01:14 PM     Lactic Acid: No results for input(s): \"LACTA\" in the last 72 hours.  BNP: No results for input(s): \"PROBNP\" in the last 72 hours.  UA:  Lab Results   Component Value Date/Time    NITRU POSITIVE 04/26/2024 08:46 PM    COLORU YELLOW 04/26/2024 08:46 PM    PHUR 5.5 04/26/2024 08:46 PM    WBCUA 94 04/26/2024 08:46 PM    RBCUA 64 04/26/2024 08:46 PM    MUCUS FEW 07/15/2022 07:40 PM    TRICHOMONAS NONE SEEN 04/26/2024 08:46 PM    YEAST MODERATE 03/04/2022 03:09 PM    BACTERIA MANY 04/26/2024 08:46 PM    CLARITYU TURBID 04/26/2024 08:46 PM    LEUKOCYTESUR LARGE NUMBER OR AMOUNT OBSERVED 04/26/2024 08:46 PM    UROBILINOGEN 0.2 04/26/2024 08:46 PM    BILIRUBINUR NEGATIVE 04/26/2024 08:46 PM    BLOODU SMALL NUMBER OR AMOUNT OBSERVED 04/26/2024 08:46 PM    GLUCOSEU NEGATIVE 04/26/2024 08:46 PM    KETUA NEGATIVE 04/26/2024 08:46 PM     Urine Cultures: No results found for: \"LABURIN\"  Blood Cultures: No results found for: \"BC\"  No results found for: \"BLOODCULT2\"  Organism:   Lab Results   Component Value Date/Time    ORG ENC 07/25/2017 09:26 PM       Time Spent Discharging patient *** minutes    Electronically signed by CALIN KRAMER MD on 5/1/2024 at 4:39 PM

## 2024-05-01 NOTE — PROGRESS NOTES
Comprehensive Nutrition Assessment    Type and Reason for Visit:  Reassess    Nutrition Recommendations/Plan:   Continue current diet order and oral nutrition supplement as ordered  Please assist with meal set up as pt able to feed self   Will offer higher calorie-protein snacks per pt preference   Encourage proper hydration/fluids intake  Monitor PO intakes, GI status, weights, fluids, labs, lytes, glucose, and plan of care      Malnutrition Assessment:  Malnutrition Status:  At risk for malnutrition (Comment) (05/01/24 1137)    Context:  Social/Environmental Circumstances     Findings of the 6 clinical characteristics of malnutrition:  Energy Intake:  Mild decrease in energy intake (Comment)  Weight Loss:  No significant weight loss     Body Fat Loss:  No significant body fat loss     Muscle Mass Loss:  Severe muscle mass loss Thigh (quadraceps), Calf (gastrocnemius)  Fluid Accumulation:  No significant fluid accumulation     Strength:  Measurable reduction in  strength (per pt)    Nutrition Assessment:    Met with pt. She was upset at visit for pain in her knees, removed tray on knees. Pt tolerates regular diet with dentures, consuming less than 50%, likely related to early satiety s/s advanced aging and confusion at times dos. Poor diet historian at this time. States living with dtr who helps feed her, reports eating well at home w/o issues. Pt states liking supplements, yet unopened at bedside. Would benefit from tray set up/promotion. Performed NFPE due to underweight status. Pt has severe wasting in BLE, noted pt doesn't ambulate much. At risk for malnutrition. Follow as high nutrition risk.    Nutrition Related Findings:    Sitting in chair, able to feed self; +glycolax, folvite Wound Type: None       Current Nutrition Intake & Therapies:    Average Meal Intake: 26-50%, 1-25%  Average Supplements Intake: Unable to assess (likes ONS per pt)  ADULT DIET; Regular  ADULT ORAL NUTRITION SUPPLEMENT;

## 2024-05-01 NOTE — PROGRESS NOTES
Patient report to RN at [facility]. Patient resting at this time. Report to transport.

## 2024-05-01 NOTE — CARE COORDINATION
Discharging Nurse; Upon discharge pt will be going to [facility], call report to [phone], fax AVS to [phone]; alternate fax number is [phone].Transportation made for 5:30 pm with [facility].      MAUDE reviewed medical record, spoke about pt in IDR, and spoke to pt.. Plan remains to go to [facility] at discharge. Dr. Salazar called / Dorie Chow and requested that we set up discharge time for 5:30 pm. This CM spoke with Gena/ [facility] and set up transportation at 17:30, and verified time with Dr. Salazar. MAUDE spoke with pt daughter, Melody Alejandre and made her aware of pending transfer to [facility]. MAUDE also made nursing staff and charge nurse aware of pending discharge and transfer to [facility].

## 2024-05-01 NOTE — DISCHARGE INSTR - DIET
Fiber, total dietary g 78   Sugars, total g 153   Minerals   Calcium, Ca mg 1754   Iron, Fe mg 25   Sodium, Na mg 2292   Vitamins   Vitamin C, total ascorbic acid mg 342   Vitamin A, IU IU 69066   Vitamin D    Lipids   Fatty acids, total saturated g 17   Fatty acids, total monounsaturated g 57   Fatty acids, total polyunsaturated g 38   Cholesterol mg 2        High-Calorie, High-Protein Vegetarian (Lacto-Ovo) Sample 1-Day Menu View Nutrient Info  Breakfast 1 cup cooked oatmeal made with:  1 cup whole milk  2 tablespoons ground flaxseeds  ½ cup blueberries  1 egg   Morning Snack 1 cup fruit smoothie made with: ½ cup whole milk  ½ cup frozen banana  2 tablespoons almond butter   Lunch 2 slices whole wheat bread  2 ounces cheese  ¼ cup lettuce  2 slices tomato  4 slices avocado  ½ cup baby carrots  1 medium apple  1 cup grape juice   Afternoon Snack 1 whole wheat ethel bread  ½ cup hummus  ½ cup orange juice   Evening Meal Burrito made with: 2, 6-inch corn tortilla  ½ cup refried vegetarian beans  ½ cup chopped tomatoes  ½ cup lettuce  2 tablespoons salsa  ½ cup brown rice  ½ tablespoon olive oil for rice  ½ cup cooked zucchini  1 cup whole milk   Evening Snack ½ cup raisins  ¼ cup peanuts   Daily Sum  Nutrient Unit Value   Macronutrients   Energy kcal 3021   Energy kJ 67662   Protein g 105   Total lipid (fat) g 124   Carbohydrate, by difference g 405   Fiber, total dietary g 59   Sugars, total g 174   Minerals   Calcium, Ca mg 1659   Iron, Fe mg 20   Sodium, Na mg 2733   Vitamins   Vitamin C, total ascorbic acid mg 168   Vitamin A, IU IU 98085   Vitamin D    Lipids   Fatty acids, total saturated g 35   Fatty acids, total monounsaturated g 51   Fatty acids, total polyunsaturated g 26   Cholesterol mg 286

## 2024-09-04 ENCOUNTER — HOSPITAL ENCOUNTER (OUTPATIENT)
Age: 89
Discharge: HOME | End: 2024-09-04
Payer: COMMERCIAL

## 2024-09-04 LAB
ALT SERPL-CCNC: 19 U/L (ref 10–40)
AST SERPL-CCNC: 20 IU/L (ref 15–37)
BUN SERPL-MCNC: 48 MG/DL (ref 6–23)
CREAT SERPL-MCNC: 1.3 MG/DL (ref 0.6–1.1)
GFR, ESTIMATED: 39 ML/MIN/1.73M2
HCT VFR BLD CALC: 35.2 % (ref 37–47)
HEMOGLOBIN: 10.6 GM/DL (ref 12.5–16)
MCH RBC QN AUTO: 33.9 PG (ref 27–31)
MCHC RBC AUTO-ENTMCNC: 30.1 % (ref 32–36)
MCV RBC AUTO: 112.5 FL (ref 78–100)
PDW BLD-RTO: 14.7 % (ref 11.7–14.9)
PLATELET # BLD: 224 K/CU MM (ref 140–440)
PMV BLD AUTO: 9.5 FL (ref 7.5–11.1)
RBC # BLD: 3.13 M/CU MM (ref 4.2–5.4)
SED RATE, AUTOMATED: 20 MM/HR (ref 0–30)
WBC # BLD: 8.5 K/CU MM (ref 4–10.5)

## 2024-09-04 PROCEDURE — 84450 TRANSFERASE (AST) (SGOT): CPT

## 2024-09-04 PROCEDURE — 85652 RBC SED RATE AUTOMATED: CPT

## 2024-09-04 PROCEDURE — 85027 COMPLETE CBC AUTOMATED: CPT

## 2024-09-04 PROCEDURE — 36415 COLL VENOUS BLD VENIPUNCTURE: CPT

## 2024-09-04 PROCEDURE — 84460 ALANINE AMINO (ALT) (SGPT): CPT

## 2024-09-04 PROCEDURE — 82565 ASSAY OF CREATININE: CPT

## 2024-09-04 PROCEDURE — 84520 ASSAY OF UREA NITROGEN: CPT
